# Patient Record
Sex: MALE | Race: WHITE | Employment: OTHER | URBAN - METROPOLITAN AREA
[De-identification: names, ages, dates, MRNs, and addresses within clinical notes are randomized per-mention and may not be internally consistent; named-entity substitution may affect disease eponyms.]

---

## 2018-04-16 ENCOUNTER — HOSPITAL ENCOUNTER (OUTPATIENT)
Dept: CT IMAGING | Age: 53
Discharge: HOME | End: 2018-04-16
Attending: NURSE PRACTITIONER
Payer: MEDICARE

## 2018-04-16 DIAGNOSIS — R91.8 X-RAY OF LUNG, ABNORMAL: ICD-10-CM

## 2018-04-16 LAB — CREAT BLD-MCNC: 0.8 MG/DL (ref 0.6–1.3)

## 2018-04-16 PROCEDURE — 74011636320 HC RX REV CODE- 636/320: Performed by: RADIOLOGY

## 2018-04-16 PROCEDURE — 71260 CT THORAX DX C+: CPT

## 2018-04-16 PROCEDURE — 82565 ASSAY OF CREATININE: CPT

## 2018-04-16 RX ORDER — SODIUM CHLORIDE 0.9 % (FLUSH) 0.9 %
5-10 SYRINGE (ML) INJECTION
Status: COMPLETED | OUTPATIENT
Start: 2018-04-16 | End: 2018-04-16

## 2018-04-16 RX ADMIN — IOPAMIDOL 100 ML: 612 INJECTION, SOLUTION INTRAVENOUS at 15:15

## 2018-04-16 RX ADMIN — Medication 10 ML: at 15:15

## 2018-06-01 ENCOUNTER — HOSPITAL ENCOUNTER (EMERGENCY)
Age: 53
Discharge: HOME | End: 2018-06-01
Attending: EMERGENCY MEDICINE
Payer: MEDICARE

## 2018-06-01 ENCOUNTER — APPOINTMENT (OUTPATIENT)
Dept: CT IMAGING | Age: 53
End: 2018-06-01
Attending: EMERGENCY MEDICINE
Payer: MEDICARE

## 2018-06-01 VITALS
HEIGHT: 69 IN | WEIGHT: 200 LBS | BODY MASS INDEX: 29.62 KG/M2 | TEMPERATURE: 98 F | DIASTOLIC BLOOD PRESSURE: 74 MMHG | OXYGEN SATURATION: 96 % | RESPIRATION RATE: 12 BRPM | HEART RATE: 78 BPM | SYSTOLIC BLOOD PRESSURE: 142 MMHG

## 2018-06-01 DIAGNOSIS — R55 SYNCOPE AND COLLAPSE: Primary | ICD-10-CM

## 2018-06-01 DIAGNOSIS — R73.9 HYPERGLYCEMIA: ICD-10-CM

## 2018-06-01 LAB
ANION GAP SERPL CALC-SCNC: 11 MMOL/L (ref 5–15)
ATRIAL RATE: 84 BPM
BUN SERPL-MCNC: 21 MG/DL (ref 6–20)
BUN/CREAT SERPL: 17 (ref 12–20)
CALCIUM SERPL-MCNC: 8.7 MG/DL (ref 8.5–10.1)
CALCULATED P AXIS, ECG09: 29 DEGREES
CALCULATED R AXIS, ECG10: 30 DEGREES
CALCULATED T AXIS, ECG11: 22 DEGREES
CHLORIDE SERPL-SCNC: 105 MMOL/L (ref 97–108)
CO2 SERPL-SCNC: 23 MMOL/L (ref 21–32)
CREAT SERPL-MCNC: 1.25 MG/DL (ref 0.7–1.3)
DIAGNOSIS, 93000: NORMAL
ERYTHROCYTE [DISTWIDTH] IN BLOOD BY AUTOMATED COUNT: 14.6 % (ref 11.5–14.5)
GLUCOSE BLD STRIP.AUTO-MCNC: 186 MG/DL (ref 65–100)
GLUCOSE SERPL-MCNC: 261 MG/DL (ref 65–100)
HCT VFR BLD AUTO: 34.4 % (ref 36.6–50.3)
HGB BLD-MCNC: 11 G/DL (ref 12.1–17)
MCH RBC QN AUTO: 30 PG (ref 26–34)
MCHC RBC AUTO-ENTMCNC: 32 G/DL (ref 30–36.5)
MCV RBC AUTO: 93.7 FL (ref 80–99)
NRBC # BLD: 0 K/UL (ref 0–0.01)
NRBC BLD-RTO: 0 PER 100 WBC
P-R INTERVAL, ECG05: 118 MS
PLATELET # BLD AUTO: 222 K/UL (ref 150–400)
PMV BLD AUTO: 10.8 FL (ref 8.9–12.9)
POTASSIUM SERPL-SCNC: 4.3 MMOL/L (ref 3.5–5.1)
Q-T INTERVAL, ECG07: 368 MS
QRS DURATION, ECG06: 88 MS
QTC CALCULATION (BEZET), ECG08: 434 MS
RBC # BLD AUTO: 3.67 M/UL (ref 4.1–5.7)
SERVICE CMNT-IMP: ABNORMAL
SODIUM SERPL-SCNC: 139 MMOL/L (ref 136–145)
TROPONIN I SERPL-MCNC: <0.04 NG/ML
VENTRICULAR RATE, ECG03: 84 BPM
WBC # BLD AUTO: 3.7 K/UL (ref 4.1–11.1)

## 2018-06-01 PROCEDURE — 82962 GLUCOSE BLOOD TEST: CPT

## 2018-06-01 PROCEDURE — 96360 HYDRATION IV INFUSION INIT: CPT

## 2018-06-01 PROCEDURE — 84484 ASSAY OF TROPONIN QUANT: CPT | Performed by: EMERGENCY MEDICINE

## 2018-06-01 PROCEDURE — 99285 EMERGENCY DEPT VISIT HI MDM: CPT

## 2018-06-01 PROCEDURE — 74011250636 HC RX REV CODE- 250/636: Performed by: EMERGENCY MEDICINE

## 2018-06-01 PROCEDURE — 70450 CT HEAD/BRAIN W/O DYE: CPT

## 2018-06-01 PROCEDURE — 36415 COLL VENOUS BLD VENIPUNCTURE: CPT | Performed by: EMERGENCY MEDICINE

## 2018-06-01 PROCEDURE — 93005 ELECTROCARDIOGRAM TRACING: CPT

## 2018-06-01 PROCEDURE — 85027 COMPLETE CBC AUTOMATED: CPT | Performed by: EMERGENCY MEDICINE

## 2018-06-01 PROCEDURE — 80048 BASIC METABOLIC PNL TOTAL CA: CPT | Performed by: EMERGENCY MEDICINE

## 2018-06-01 RX ORDER — ATORVASTATIN CALCIUM 10 MG/1
10 TABLET, FILM COATED ORAL
COMMUNITY
End: 2020-04-17 | Stop reason: SDUPTHER

## 2018-06-01 RX ORDER — TERBINAFINE HYDROCHLORIDE 250 MG/1
250 TABLET ORAL DAILY
COMMUNITY
End: 2021-02-17

## 2018-06-01 RX ORDER — ERGOCALCIFEROL 1.25 MG/1
50000 CAPSULE ORAL
COMMUNITY
End: 2021-02-17

## 2018-06-01 RX ORDER — INSULIN GLARGINE 100 [IU]/ML
40 INJECTION, SOLUTION SUBCUTANEOUS DAILY
COMMUNITY
End: 2018-10-11

## 2018-06-01 RX ORDER — METFORMIN HYDROCHLORIDE 850 MG/1
850 TABLET ORAL
COMMUNITY
End: 2020-10-09 | Stop reason: SDUPTHER

## 2018-06-01 RX ORDER — LOSARTAN POTASSIUM 25 MG/1
25 TABLET ORAL DAILY
COMMUNITY
End: 2021-02-17

## 2018-06-01 RX ORDER — SERTRALINE HYDROCHLORIDE 100 MG/1
100 TABLET, FILM COATED ORAL DAILY
COMMUNITY
End: 2020-09-06 | Stop reason: SDUPTHER

## 2018-06-01 RX ORDER — MIRTAZAPINE 15 MG/1
15 TABLET, FILM COATED ORAL
COMMUNITY
End: 2020-09-06 | Stop reason: SDUPTHER

## 2018-06-01 RX ORDER — DIVALPROEX SODIUM 500 MG/1
1500 TABLET, EXTENDED RELEASE ORAL
COMMUNITY
End: 2019-02-28 | Stop reason: DRUGHIGH

## 2018-06-01 RX ORDER — INSULIN ASPART 100 [IU]/ML
INJECTION, SOLUTION INTRAVENOUS; SUBCUTANEOUS
COMMUNITY
End: 2019-03-07 | Stop reason: SDUPTHER

## 2018-06-01 RX ADMIN — SODIUM CHLORIDE 1000 ML: 900 INJECTION, SOLUTION INTRAVENOUS at 12:44

## 2018-08-07 ENCOUNTER — HOSPITAL ENCOUNTER (EMERGENCY)
Age: 53
Discharge: HOME | End: 2018-08-07
Attending: EMERGENCY MEDICINE
Payer: MEDICARE

## 2018-08-07 VITALS
WEIGHT: 174 LBS | RESPIRATION RATE: 19 BRPM | OXYGEN SATURATION: 97 % | SYSTOLIC BLOOD PRESSURE: 157 MMHG | DIASTOLIC BLOOD PRESSURE: 81 MMHG | HEIGHT: 67 IN | HEART RATE: 83 BPM | TEMPERATURE: 97.9 F | BODY MASS INDEX: 27.31 KG/M2

## 2018-08-07 DIAGNOSIS — E87.5 ACUTE HYPERKALEMIA: ICD-10-CM

## 2018-08-07 DIAGNOSIS — R73.9 HYPERGLYCEMIA: Primary | ICD-10-CM

## 2018-08-07 LAB
ALBUMIN SERPL-MCNC: 3.1 G/DL (ref 3.5–5)
ALBUMIN/GLOB SERPL: 0.8 {RATIO} (ref 1.1–2.2)
ALP SERPL-CCNC: 64 U/L (ref 45–117)
ALT SERPL-CCNC: 16 U/L (ref 12–78)
ANION GAP SERPL CALC-SCNC: 10 MMOL/L (ref 5–15)
APPEARANCE UR: CLEAR
AST SERPL-CCNC: 11 U/L (ref 15–37)
BACTERIA URNS QL MICRO: NEGATIVE /HPF
BASOPHILS # BLD: 0 K/UL (ref 0–0.1)
BASOPHILS NFR BLD: 1 % (ref 0–1)
BILIRUB SERPL-MCNC: 0.3 MG/DL (ref 0.2–1)
BILIRUB UR QL: NEGATIVE
BUN SERPL-MCNC: 26 MG/DL (ref 6–20)
BUN/CREAT SERPL: 24 (ref 12–20)
CALCIUM SERPL-MCNC: 8.5 MG/DL (ref 8.5–10.1)
CHLORIDE SERPL-SCNC: 102 MMOL/L (ref 97–108)
CO2 SERPL-SCNC: 26 MMOL/L (ref 21–32)
COLOR UR: ABNORMAL
CREAT SERPL-MCNC: 1.1 MG/DL (ref 0.7–1.3)
DIFFERENTIAL METHOD BLD: ABNORMAL
EOSINOPHIL # BLD: 0.1 K/UL (ref 0–0.4)
EOSINOPHIL NFR BLD: 2 % (ref 0–7)
EPITH CASTS URNS QL MICRO: ABNORMAL /LPF
ERYTHROCYTE [DISTWIDTH] IN BLOOD BY AUTOMATED COUNT: 14.8 % (ref 11.5–14.5)
GLOBULIN SER CALC-MCNC: 3.7 G/DL (ref 2–4)
GLUCOSE BLD STRIP.AUTO-MCNC: 355 MG/DL (ref 65–100)
GLUCOSE BLD STRIP.AUTO-MCNC: 410 MG/DL (ref 65–100)
GLUCOSE SERPL-MCNC: 353 MG/DL (ref 65–100)
GLUCOSE UR STRIP.AUTO-MCNC: >1000 MG/DL
HCT VFR BLD AUTO: 30.4 % (ref 36.6–50.3)
HGB BLD-MCNC: 10.1 G/DL (ref 12.1–17)
HGB UR QL STRIP: NEGATIVE
HYALINE CASTS URNS QL MICRO: ABNORMAL /LPF (ref 0–5)
IMM GRANULOCYTES # BLD: 0 K/UL (ref 0–0.04)
IMM GRANULOCYTES NFR BLD AUTO: 0 % (ref 0–0.5)
KETONES UR QL STRIP.AUTO: 15 MG/DL
LEUKOCYTE ESTERASE UR QL STRIP.AUTO: NEGATIVE
LYMPHOCYTES # BLD: 1.2 K/UL (ref 0.8–3.5)
LYMPHOCYTES NFR BLD: 27 % (ref 12–49)
MCH RBC QN AUTO: 30.7 PG (ref 26–34)
MCHC RBC AUTO-ENTMCNC: 33.2 G/DL (ref 30–36.5)
MCV RBC AUTO: 92.4 FL (ref 80–99)
MONOCYTES # BLD: 0.5 K/UL (ref 0–1)
MONOCYTES NFR BLD: 12 % (ref 5–13)
NEUTS SEG # BLD: 2.5 K/UL (ref 1.8–8)
NEUTS SEG NFR BLD: 59 % (ref 32–75)
NITRITE UR QL STRIP.AUTO: NEGATIVE
NRBC # BLD: 0 K/UL (ref 0–0.01)
NRBC BLD-RTO: 0 PER 100 WBC
PH UR STRIP: 5.5 [PH] (ref 5–8)
PLATELET # BLD AUTO: 293 K/UL (ref 150–400)
PMV BLD AUTO: 10.2 FL (ref 8.9–12.9)
POTASSIUM SERPL-SCNC: 5.3 MMOL/L (ref 3.5–5.1)
POTASSIUM SERPL-SCNC: 5.9 MMOL/L (ref 3.5–5.1)
PROT SERPL-MCNC: 6.8 G/DL (ref 6.4–8.2)
PROT UR STRIP-MCNC: NEGATIVE MG/DL
RBC # BLD AUTO: 3.29 M/UL (ref 4.1–5.7)
RBC #/AREA URNS HPF: ABNORMAL /HPF (ref 0–5)
SERVICE CMNT-IMP: ABNORMAL
SERVICE CMNT-IMP: ABNORMAL
SODIUM SERPL-SCNC: 138 MMOL/L (ref 136–145)
SP GR UR REFRACTOMETRY: 1.02 (ref 1–1.03)
UR CULT HOLD, URHOLD: NORMAL
UROBILINOGEN UR QL STRIP.AUTO: 0.2 EU/DL (ref 0.2–1)
WBC # BLD AUTO: 4.3 K/UL (ref 4.1–11.1)
WBC URNS QL MICRO: ABNORMAL /HPF (ref 0–4)

## 2018-08-07 PROCEDURE — 82962 GLUCOSE BLOOD TEST: CPT

## 2018-08-07 PROCEDURE — 74011636637 HC RX REV CODE- 636/637: Performed by: EMERGENCY MEDICINE

## 2018-08-07 PROCEDURE — 74011250636 HC RX REV CODE- 250/636: Performed by: EMERGENCY MEDICINE

## 2018-08-07 PROCEDURE — 85025 COMPLETE CBC W/AUTO DIFF WBC: CPT | Performed by: EMERGENCY MEDICINE

## 2018-08-07 PROCEDURE — 74011250637 HC RX REV CODE- 250/637: Performed by: EMERGENCY MEDICINE

## 2018-08-07 PROCEDURE — 36415 COLL VENOUS BLD VENIPUNCTURE: CPT | Performed by: EMERGENCY MEDICINE

## 2018-08-07 PROCEDURE — 99285 EMERGENCY DEPT VISIT HI MDM: CPT

## 2018-08-07 PROCEDURE — 96374 THER/PROPH/DIAG INJ IV PUSH: CPT

## 2018-08-07 PROCEDURE — 96361 HYDRATE IV INFUSION ADD-ON: CPT

## 2018-08-07 PROCEDURE — 84132 ASSAY OF SERUM POTASSIUM: CPT | Performed by: EMERGENCY MEDICINE

## 2018-08-07 PROCEDURE — 81001 URINALYSIS AUTO W/SCOPE: CPT | Performed by: EMERGENCY MEDICINE

## 2018-08-07 PROCEDURE — 80053 COMPREHEN METABOLIC PANEL: CPT | Performed by: EMERGENCY MEDICINE

## 2018-08-07 RX ORDER — INSULIN LISPRO 100 [IU]/ML
25 INJECTION, SOLUTION INTRAVENOUS; SUBCUTANEOUS
Status: COMPLETED | OUTPATIENT
Start: 2018-08-07 | End: 2018-08-07

## 2018-08-07 RX ORDER — ACETAMINOPHEN 500 MG
1000 TABLET ORAL ONCE
Status: COMPLETED | OUTPATIENT
Start: 2018-08-07 | End: 2018-08-07

## 2018-08-07 RX ADMIN — HUMAN INSULIN 10 UNITS: 100 INJECTION, SOLUTION SUBCUTANEOUS at 16:41

## 2018-08-07 RX ADMIN — SODIUM CHLORIDE 1000 ML: 900 INJECTION, SOLUTION INTRAVENOUS at 16:42

## 2018-08-07 RX ADMIN — ACETAMINOPHEN 1000 MG: 500 TABLET ORAL at 16:41

## 2018-08-07 RX ADMIN — INSULIN LISPRO 25 UNITS: 100 INJECTION, SOLUTION INTRAVENOUS; SUBCUTANEOUS at 18:26

## 2018-08-07 NOTE — ED NOTES
Patient discharged by MD. Pt/family given the opportunity to ask questions, verbalized understanding of teaching.

## 2018-08-07 NOTE — ED NOTES
Bedside shift change report given to j carlos durán (oncoming nurse) by j carlos torres (offgoing nurse). Report included the following information SBAR, Kardex and ED Summary.

## 2018-08-07 NOTE — ED TRIAGE NOTES
Patient arrives through triage from group home patient assised by care giver. Care giver states patient took incorrect medications this morning 0730, per care giver patient has no symptoms. Patient took adderall, vit D, Ferrous sulphate, omeprazole, xarelto, citalopram, and lamictal per care giver. Patients care giver states this happened because another patients medication was placed in a cup and set down and patient mistook this medication for their own.

## 2018-08-07 NOTE — DISCHARGE INSTRUCTIONS
Please maintain tighter blood sugar control. Learning About High Blood Sugar  What is high blood sugar? Your body turns the food you eat into glucose (sugar), which it uses for energy. But if your body isn't able to use the sugar right away, it can build up in your blood and lead to high blood sugar. When the amount of sugar in your blood stays too high for too much of the time, you may have diabetes. Diabetes is a disease that can cause serious health problems. The good news is that lifestyle changes may help you get your blood sugar back to normal and avoid or delay diabetes. What causes high blood sugar? Sugar (glucose) can build up in your blood if you:  · Are overweight. · Have a family history of diabetes. · Take certain medicines, such as steroids. What are the symptoms? Having high blood sugar may not cause any symptoms at all. Or it may make you feel very thirsty or very hungry. You may also urinate more often than usual, have blurry vision, or lose weight without trying. How is high blood sugar treated? You can take steps to lower your blood sugar level if you understand what makes it get higher. Your doctor may want you to learn how to test your blood sugar level at home. Then you can see how illness, stress, or different kinds of food or medicine raise or lower your blood sugar level. Other tests may be needed to see if you have diabetes. How can you prevent high blood sugar? · Watch your weight. If you're overweight, losing just a small amount of weight may help. Reducing fat around your waist is most important. · Limit the amount of calories, sweets, and unhealthy fat you eat. Ask your doctor if a dietitian can help you. A registered dietitian can help you create meal plans that fit your lifestyle. · Get at least 30 minutes of exercise on most days of the week. Exercise helps control your blood sugar. It also helps you maintain a healthy weight. Walking is a good choice.  You also may want to do other activities, such as running, swimming, cycling, or playing tennis or team sports. · If your doctor prescribed medicines, take them exactly as prescribed. Call your doctor if you think you are having a problem with your medicine. You will get more details on the specific medicines your doctor prescribes. Follow-up care is a key part of your treatment and safety. Be sure to make and go to all appointments, and call your doctor if you are having problems. It's also a good idea to know your test results and keep a list of the medicines you take. Where can you learn more? Go to http://devExtended Stay Americaleonel.info/. Enter O108 in the search box to learn more about \"Learning About High Blood Sugar. \"  Current as of: December 7, 2017  Content Version: 11.7  © 2417-5369 [employer]. Care instructions adapted under license by [employer] (which disclaims liability or warranty for this information). If you have questions about a medical condition or this instruction, always ask your healthcare professional. [place] [address] any warranty or liability for your use of this information. Hyperkalemia: Care Instructions  Your Care Instructions    Hyperkalemia is too much potassium in the blood. Potassium helps keep the right mix of fluids in your body. It also helps your nerves and muscles work as they should. And it keeps your heartbeat in a normal rhythm. Some things can raise potassium levels. These include some health problems, medicines, and kidney problems. (Normally, your kidneys remove extra potassium.)  Too much potassium can cause nausea. It also can cause a heartbeat that isn't normal. But you may not have any symptoms. Too much potassium can be dangerous. That's why it's important to treat it. If you are taking any of the medicines that can raise your levels, your doctor will ask you to stop. You may get medicines to lower your levels.  And you may have to limit or not eat foods that have a lot of potassium. Follow-up care is a key part of your treatment and safety. Be sure to make and go to all appointments, and call your doctor if you are having problems. It's also a good idea to know your test results and keep a list of the medicines you take. How can you care for yourself at home? · Take your medicines exactly as prescribed. Call your doctor if you think you are having a problem with your medicine. · Stop taking certain medicines if your doctor asks you to. They may be causing your high potassium levels. If you have concerns about stopping medicine, talk with your doctor. · If you have kidney, heart, or liver disease and have to limit fluids, talk with your doctor before you increase the amount of fluids you drink. If the doctor says it's okay, drink plenty of fluids. This means drinking enough so that your urine is light yellow or clear like water. · Avoid strenuous exercise until your doctor tells you it is okay. · Potassium is in many foods, including vegetables, fruits, and milk products. Foods high in potassium include bananas, cantaloupe, broccoli, milk, potatoes, and tomatoes. · Low potassium foods include blueberries, raspberries, cucumber, white or brown rice, spaghetti, and macaroni. · Do not use a salt substitute without talking to your doctor first. Most of these are very high in potassium. · Be sure to tell your doctor about any prescription, over-the-counter, or herbal medicines you take. Some of these can raise potassium. When should you call for help? Call 911 anytime you think you may need emergency care. For example, call if:    · You passed out (lost consciousness).     · You have an unusual heartbeat.  Your heart may beat fast or skip beats.    Call your doctor now or seek immediate medical care if:    · You have muscle aches.     · You feel very weak.    Watch closely for changes in your health, and be sure to contact your doctor if:    · You do not get better as expected. Where can you learn more? Go to http://dev-leonel.info/. Enter F312 in the search box to learn more about \"Hyperkalemia: Care Instructions. \"  Current as of: May 12, 2017  Content Version: 11.7  © 1642-1422 [employer]. Care instructions adapted under license by [employer] (which disclaims liability or warranty for this information). If you have questions about a medical condition or this instruction, always ask your healthcare professional. [address] any warranty or liability for your use of this information.

## 2018-08-07 NOTE — ED PROVIDER NOTES
HPI Comments: 45 y.o. male with past medical history significant for neurocognitive disorder, DM, anxiety, and OCD who presents to the ED from group home with chief complaint of accidental medication ingestion. Pt's caregiver reports pt was at his adult home this morning when he accidentally took another resident's medications at about 0730. Per caregiver, the medications the pt took were adderall, vitamin D, ferrous sulfate, omeprazole, xarelto, citalopram, and lamictal. Per caregiver, pt has not taken any of his own medications today including insulin and metformin. Pt states he feels \"more tired\" than usual. Per pt's caregiver, pt is currently at his baseline. There are no other acute medical complaints voiced at this time. Social Hx: Former smoker. Denies EtOH use. Resident in adult home. PCP: Tylor Cheng NP    Note written by Last Milan, as dictated by Jacqui Hayes MD 2:35 PM     The history is provided by the patient and a caregiver. Past Medical History:   Diagnosis Date    Diabetes [facility])     Neurological disorder     neuro cognative disorder    Psychiatric disorder     OCD    Psychiatric disorder     anxiety       History reviewed. No pertinent surgical history. History reviewed. No pertinent family history. Social History     Social History    Marital status: SINGLE     Spouse name: N/A    Number of children: N/A    Years of education: N/A     Occupational History    Not on file. Social History Main Topics    Smoking status: Former Smoker    Smokeless tobacco: Never Used    Alcohol use No    Drug use: Not on file    Sexual activity: Not on file     Other Topics Concern    Not on file     Social History Narrative    No narrative on file         ALLERGIES: Penicillins    Review of Systems   Constitutional: Negative for activity change, chills and fever. HENT: Negative for nosebleeds, sore throat, trouble swallowing and voice change.     Eyes: Negative for visual disturbance. Respiratory: Negative for shortness of breath. Cardiovascular: Negative for chest pain and palpitations. Gastrointestinal: Negative for abdominal pain, constipation, diarrhea and nausea. Genitourinary: Negative for difficulty urinating, dysuria, hematuria and urgency. Musculoskeletal: Negative for back pain, neck pain and neck stiffness. Skin: Negative for color change. Allergic/Immunologic: Negative for immunocompromised state. Neurological: Negative for dizziness, seizures, syncope, weakness, light-headedness, numbness and headaches. Psychiatric/Behavioral: Negative for behavioral problems, confusion, hallucinations, self-injury and suicidal ideas. All other systems reviewed and are negative. Vitals:    08/07/18 1357   BP: 130/71   Pulse: (!) 104   Resp: 19   Temp: 97.9 °F (36.6 °C)   SpO2: 95%   Weight: 78.9 kg (174 lb)   Height: 5' 7\" (1.702 m)            Physical Exam   Constitutional: He is oriented to person, place, and time. He appears well-developed and well-nourished. No distress. HENT:   Head: Normocephalic and atraumatic. Eyes: Pupils are equal, round, and reactive to light. Neck: Normal range of motion. Neck supple. Cardiovascular: Normal rate, regular rhythm and normal heart sounds. Exam reveals no gallop and no friction rub. No murmur heard. Pulmonary/Chest: Effort normal and breath sounds normal. No respiratory distress. He has no wheezes. Abdominal: Soft. Bowel sounds are normal. He exhibits no distension. There is no tenderness. There is no rebound and no guarding. Musculoskeletal: Normal range of motion. Neurological: He is alert and oriented to person, place, and time. Skin: Skin is warm. No rash noted. He is not diaphoretic. Psychiatric: He has a normal mood and affect. His behavior is normal. Judgment and thought content normal.   Nursing note and vitals reviewed.      Note written by Last Khoury, as dictated by Liseth Puentes MD 2:36 PM    [facility]      ED Course     This is a 64-year-old male with past medical history, review of systems, physical exam as above, presenting with complaints of inadvertent ingestion of his housemates medications, including Adderall, vitamin D, iron sulfate, omeprazole, xarelto, citalopram, Lamictal. Patient endorses fatigue, unremarkable physical exam. He is not had his metformin for insulin today. Single doses of these medications, are unlikely to cause him acute distress, or complication. More concerning is his lack of insulin for history of diabetes this morning. Physical exam is reassuring. Plan to obtain CMP, CBC, UA. We will make a disposition based on the patient's diagnostics and response to therapy. Procedures    3:17 PM  CMP with hyperkalemia, no hx of, will recheck     6:17 PM  Patient w/o significant improvement in glucose, brother at bedside requesting DC to manage BG at home. Discussed elevated K+ as well, likely will correct with insulin as well. Will administer home Humalog and DC home with strict BG monitoring and return precautions, PCP follow up.

## 2018-08-07 NOTE — ED NOTES
Pt. States he is feeling \"sleepy\" otherwise denies any other symptoms. Caregiver at pt's side. Pt. Alert and oriented and sitting upright in chair.

## 2018-10-11 ENCOUNTER — OFFICE VISIT (OUTPATIENT)
Dept: FAMILY MEDICINE CLINIC | Age: 53
End: 2018-10-11

## 2018-10-11 VITALS
OXYGEN SATURATION: 99 % | SYSTOLIC BLOOD PRESSURE: 115 MMHG | HEIGHT: 67 IN | BODY MASS INDEX: 26.84 KG/M2 | WEIGHT: 171 LBS | HEART RATE: 87 BPM | RESPIRATION RATE: 16 BRPM | DIASTOLIC BLOOD PRESSURE: 71 MMHG | TEMPERATURE: 98.1 F

## 2018-10-11 DIAGNOSIS — Z23 ENCOUNTER FOR IMMUNIZATION: ICD-10-CM

## 2018-10-11 DIAGNOSIS — I78.0 HEREDITARY HEMORRHAGIC TELANGIECTASIA (HCC): Primary | ICD-10-CM

## 2018-10-11 DIAGNOSIS — G40.909 SEIZURE DISORDER (HCC): ICD-10-CM

## 2018-10-11 DIAGNOSIS — Q25.79 PULMONARY ARTERY ABNORMALITY: ICD-10-CM

## 2018-10-11 NOTE — PROGRESS NOTES
1. Have you been to the ER, urgent care clinic since your last visit? Hospitalized since your last visit? No    2. Have you seen or consulted any other health care providers outside of the [address] [place] [address] since your last visit? Include any pap smears or colon screening.  No     Chief Complaint   Patient presents with    Complete Physical    Labs     Non Fasting

## 2018-10-11 NOTE — PROGRESS NOTES
Chief Complaint   Patient presents with    Complete Physical    Labs     Non Fasting     he is a 52y.o. year old male who presents for evalution. Pt here to establish care. Needs new specialists, recently moved from [facility]. Need help establishing care with Pulm and Neuro, requesting referrals. 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 Pt wonders if this makes him feel tired all the time. Unsure who is managing his diabetes, if was seeing Endo previously. Not fasting for labs today and caregiver does not have log of blood sugars. No hypoglycemic episodes. Reviewed PmHx, RxHx, FmHx, SocHx, AllgHx and updated and dated in the chart. Review of Systems - negative except as listed above in the HPI    Objective:     Vitals:    10/11/18 0819   BP: 115/71   Pulse: 87   Resp: 16   Temp: 98.1 °F (36.7 °C)   TempSrc: Oral   SpO2: 99%   Weight: 171 lb (77.6 kg)   Height: 5' 7\" (1.702 m)     Physical Examination: General appearance - alert, well appearing, and in no distress  Chest - clear to auscultation, no wheezes, rales or rhonchi, symmetric air entry  Heart - normal rate, regular rhythm, normal S1, S2, no murmurs, rubs, clicks or gallops    Assessment/ Plan:   Diagnoses and all orders for this visit:    1. Hereditary hemorrhagic telangiectasia ([facility] [address]  -     REFERRAL TO PULMONARY DISEASE    2. Seizure disorder ([facility] [address]  -     [facility] ref [facility]    3. Pulmonary artery abnormality  -     REFERRAL TO PULMONARY DISEASE    4. Encounter for immunization  -     Influenza virus vaccine (QUADRIVALENT PRES FREE SYRINGE) IM ([id number])  -     [place] IMMUNIZ ADMIN,1 SINGLE/COMB VAC/TOXOID       Pt voiced understanding regarding plan of care. Follow-up Disposition:  Return in about 6 days (around 10/17/2018) for CPE, labs, PPD. I have discussed the diagnosis with the patient and the intended plan as seen in the above orders.   The patient has received an after-visit summary and questions were answered concerning future plans.      Medication Side Effects and Warnings were discussed with patient    Jason Modi NP

## 2018-10-11 NOTE — MR AVS SNAPSHOT
[address] [id number] [phone] Patient: Marielos Nascimento. MRN: [id number] :1965 Visit Information Date & Time Provider Department Dept. Phone Encounter #  
 10/11/2018  8:00 AM Jorge Hale NP [address] [facility] [phone] [id number] Follow-up Instructions Return in about 6 days (around 10/17/2018). Upcoming Health Maintenance Date Due Hepatitis C Screening 1965 DTaP/Tdap/Td series (1 - Tdap) 2007 Shingrix Vaccine Age 50> (1 of 2) 10/3/2013 FOBT Q 1 YEAR AGE 50-75 10/3/2013 Influenza Age 5 to Adult 2018 MEDICARE YEARLY EXAM 10/5/2018 Allergies as of 10/11/2018  Review Complete On: 10/11/2018 By: James Randolph LPN Severity Noted Reaction Type Reactions Penicillins  2018    Rash Current Immunizations  Never Reviewed Name Date Influenza Vaccine (Quad) PF  Incomplete, 2017 12:00 AM, 2016 12:00 AM, 2015 12:00 AM  
 Pneumococcal Polysaccharide (PPSV-23) 10/31/2007 12:00 AM  
 Td 10/31/2007 12:00 AM, 1998 12:00 AM  
  
 Not reviewed this visit You Were Diagnosed With   
  
 Codes Comments Hereditary hemorrhagic telangiectasia (HCC)    -  Primary ICD-10-CM: I78.0 ICD-9-CM: 448.0 [facility]     ICD-10-CM: G40.909 ICD-9-CM: 345.90 Pulmonary artery abnormality     ICD-10-CM: Q25.79 ICD-9-CM: 747.39 Encounter for immunization     ICD-10-CM: J09 ICD-9-CM: V03.89 Vitals BP Pulse Temp Resp Height(growth percentile) Weight(growth percentile) 115/71 87 98.1 °F (36.7 °C) (Oral) 16 5' 7\" (1.702 m) 171 lb (77.6 kg) SpO2 BMI Smoking Status 99% 26.78 kg/m2 Former Smoker BMI and BSA Data Body Mass Index Body Surface Area  
 26.78 kg/m 2 1.92 m 2 Preferred Pharmacy Pharmacy Name Phone [address] [place] [address] [phone] Your Updated Medication List  
  
   
This list is accurate as of 10/11/18  8:41 AM.  Always use your most recent med list.  
  
  
  
  
 atorvastatin 10 mg tablet Commonly known as:  LIPITOR Take 10 mg by mouth nightly. divalproex  mg ER tablet Commonly known as:  DEPAKOTE ER Take 1,500 mg by mouth nightly. LEVEMIR U-100 INSULIN 100 unit/mL injection Generic drug:  insulin detemir U-100  
55 Units by SubCUTAneous route nightly. losartan 25 mg tablet Commonly known as:  COZAAR Take 25 mg by mouth daily. metFORMIN 850 mg tablet Commonly known as:  GLUCOPHAGE Take 850 mg by mouth three (3) times daily (with meals). mirtazapine 15 mg tablet Commonly known as:  Gregoria Lav Take 15 mg by mouth nightly. NovoLOG U-100 Insulin aspart 100 unit/mL injection Generic drug:  insulin aspart U-100  
by SubCUTAneous route Before breakfast, lunch, and dinner. Sliding scale at meals  
  
 sertraline 100 mg tablet Commonly known as:  ZOLOFT Take 100 mg by mouth daily. terbinafine HCl 250 mg tablet Commonly known as:  LAMISIL Take 250 mg by mouth daily. VITAMIN D2 50,000 unit capsule Generic drug:  ergocalciferol Take 50,000 Units by mouth every seven (7) days. We Performed the Following INFLUENZA VIRUS VAC QUAD,SPLIT,PRESV FREE SYRINGE IM [id number] CPT(R)] [place] IMMUNIZ ADMIN,1 SINGLE/COMB VAC/TOXOID [id number] CPT(R)] REFERRAL TO NEUROLOGY [QRL43 Custom] REFERRAL TO PULMONARY DISEASE [RNS43 Custom] Follow-up Instructions Return in about 6 days (around 10/17/2018). Referral Information Referral ID Referred By Referred To  
  
 [id number] Tiffani NOLAN MD [address] [facility], [address] [facility] Phone: [phone] Fax: [phone] Visits Status Start Date End Date 1 New Request 10/11/18 10/11/19 If your referral has a status of pending review or denied, additional information will be sent to support the outcome of this decision. Referral ID Referred By Referred To  
 [id number] [facility] [address] Visits Status Start Date End Date 1 New Request 10/11/18 10/11/19 If your referral has a status of pending review or denied, additional information will be sent to support the outcome of this decision. Introducing [facility]! [employer] introduces TheShelf patient portal. Now you can access parts of your medical record, email your doctor's office, and request medication refills online. 1. In your internet browser, go to [email or web address] 2. Click on the First Time User? Click Here link in the Sign In box. You will see the New Member Sign Up page. 3. Enter your TheShelf Access Code exactly as it appears below. You will not need to use this code after youve completed the sign-up process. If you do not sign up before the expiration date, you must request a new code. · TheShelf Access Code: [email or web address] Expires: 11/5/2018  1:48 PM 
 
4. Enter the last four digits of your Social Security Number (xxxx) and Date of Birth (mm/dd/yyyy) as indicated and click Submit. You will be taken to the next sign-up page. 5. Create a Adiosot ID. This will be your TheShelf login ID and cannot be changed, so think of one that is secure and easy to remember. 6. Create a TheShelf password. You can change your password at any time. 7. Enter your Password Reset Question and Answer. This can be used at a later time if you forget your password. 8. Enter your e-mail address. You will receive e-mail notification when new information is available in [address]. 9. Click Sign Up. You can now view and download portions of your medical record. 10. Click the Download Summary menu link to download a portable copy of your medical information. If you have questions, please visit the Frequently Asked Questions section of the Anaphore website. Remember, Anaphore is NOT to be used for urgent needs. For medical emergencies, dial 911. Now available from your iPhone and Android! Please provide this summary of care documentation to your next provider. Your primary care clinician is listed as Mehran Lagunas. If you have any questions after today's visit, please call [phone].

## 2018-10-17 ENCOUNTER — HOSPITAL ENCOUNTER (OUTPATIENT)
Dept: LAB | Age: 53
Discharge: HOME | End: 2018-10-17
Payer: MEDICARE

## 2018-10-17 ENCOUNTER — OFFICE VISIT (OUTPATIENT)
Dept: FAMILY MEDICINE CLINIC | Age: 53
End: 2018-10-17

## 2018-10-17 VITALS
WEIGHT: 167 LBS | SYSTOLIC BLOOD PRESSURE: 133 MMHG | BODY MASS INDEX: 26.21 KG/M2 | DIASTOLIC BLOOD PRESSURE: 82 MMHG | TEMPERATURE: 98 F | RESPIRATION RATE: 16 BRPM | HEART RATE: 106 BPM | OXYGEN SATURATION: 99 % | HEIGHT: 67 IN

## 2018-10-17 DIAGNOSIS — E78.5 HYPERLIPIDEMIA, UNSPECIFIED HYPERLIPIDEMIA TYPE: ICD-10-CM

## 2018-10-17 DIAGNOSIS — Z12.11 SCREENING FOR COLON CANCER: ICD-10-CM

## 2018-10-17 DIAGNOSIS — Z12.5 SCREENING FOR PROSTATE CANCER: ICD-10-CM

## 2018-10-17 DIAGNOSIS — Z00.00 ROUTINE GENERAL MEDICAL EXAMINATION AT A HEALTH CARE FACILITY: Primary | ICD-10-CM

## 2018-10-17 DIAGNOSIS — Z11.59 SCREENING FOR VIRAL DISEASE: ICD-10-CM

## 2018-10-17 DIAGNOSIS — E11.9 CONTROLLED TYPE 2 DIABETES MELLITUS WITHOUT COMPLICATION, WITH LONG-TERM CURRENT USE OF INSULIN (HCC): ICD-10-CM

## 2018-10-17 DIAGNOSIS — Z11.1 SCREENING FOR TUBERCULOSIS: ICD-10-CM

## 2018-10-17 DIAGNOSIS — Z79.4 CONTROLLED TYPE 2 DIABETES MELLITUS WITHOUT COMPLICATION, WITH LONG-TERM CURRENT USE OF INSULIN (HCC): ICD-10-CM

## 2018-10-17 DIAGNOSIS — E55.9 VITAMIN D DEFICIENCY: ICD-10-CM

## 2018-10-17 PROCEDURE — 84443 ASSAY THYROID STIM HORMONE: CPT

## 2018-10-17 PROCEDURE — 83036 HEMOGLOBIN GLYCOSYLATED A1C: CPT

## 2018-10-17 PROCEDURE — 86803 HEPATITIS C AB TEST: CPT

## 2018-10-17 PROCEDURE — 80053 COMPREHEN METABOLIC PANEL: CPT

## 2018-10-17 PROCEDURE — 84153 ASSAY OF PSA TOTAL: CPT

## 2018-10-17 PROCEDURE — 80061 LIPID PANEL: CPT

## 2018-10-17 PROCEDURE — 82043 UR ALBUMIN QUANTITATIVE: CPT

## 2018-10-17 PROCEDURE — 82306 VITAMIN D 25 HYDROXY: CPT

## 2018-10-17 NOTE — PROGRESS NOTES
This is the Subsequent Medicare Annual Wellness Exam, performed 12 months or more after the Initial AWV or the last Subsequent AWV    I have reviewed the patient's medical history in detail and updated the computerized patient record. History     Past Medical History:   Diagnosis Date    Diabetes ([facility]     Neurological disorder     neuro cognative disorder    Psychiatric disorder     OCD    Psychiatric disorder     anxiety      History reviewed. No pertinent surgical history. Current Outpatient Medications   Medication Sig Dispense Refill    insulin detemir U-100 (LEVEMIR U-100 INSULIN) 100 unit/mL injection 55 Units by SubCUTAneous route nightly.  divalproex ER (DEPAKOTE ER) 500 mg ER tablet Take 1,500 mg by mouth nightly.  terbinafine HCl (LAMISIL) 250 mg tablet Take 250 mg by mouth daily.  atorvastatin (LIPITOR) 10 mg tablet Take 10 mg by mouth nightly.  metFORMIN (GLUCOPHAGE) 850 mg tablet Take 850 mg by mouth three (3) times daily (with meals).  sertraline (ZOLOFT) 100 mg tablet Take 100 mg by mouth daily.  losartan (COZAAR) 25 mg tablet Take 25 mg by mouth daily.  mirtazapine (REMERON) 15 mg tablet Take 15 mg by mouth nightly.  ergocalciferol (VITAMIN D2) 50,000 unit capsule Take 50,000 Units by mouth every seven (7) days.  insulin aspart U-100 (NOVOLOG U-100 INSULIN ASPART) 100 unit/mL injection by SubCUTAneous route Before breakfast, lunch, and dinner. Sliding scale at meals       Allergies   Allergen Reactions    Penicillins Rash     History reviewed. No pertinent family history.   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 Functional Ability and Level of Safety:   Hearing Loss  Hearing is good. Whisper Test done with normal results. Activities of Daily Living  The home contains: handrails and grab bars  Patient needs help with:  transportation, shopping, preparing meals, housework, managing medications and managing money    Fall Risk  No flowsheet data found. Abuse Screen  Patient is not abused    Cognitive Screening   Evaluation of Cognitive Function:  Has your family/caregiver stated any concerns about your memory: no  Abnormal but unchanged from prior    Fasting today for labs. Takes medications daily. No episodes of hypoglycemia. Did not bring log book of sugars. No pain in hands and feet. No chest pain, SOB, dizziness, or vision changes. Tries to watch diet, no real exercise.      Physical Examination: General appearance - alert, well appearing, and in no distress  Chest - clear to auscultation, no wheezes, rales or rhonchi, symmetric air entry  Heart - normal rate, regular rhythm, normal S1, S2, no murmurs, rubs, clicks or gallops  Neurological - abnormal neurological exam unchanged from prior examinations, hemiparesis on right  Extremities - peripheral pulses normal, no pedal edema, no clubbing or cyanosis    Patient Care Team   Patient Care Team:  Pee Spears NP as PCP - General (Nurse Practitioner)    Assessment/Plan   Education and counseling provided:  Are appropriate based on today's review and evaluation  End-of-Life planning (with patient's consent)  Pneumococcal Vaccine  Influenza Vaccine  Prostate cancer screening tests (PSA, covered annually)  Colorectal cancer screening tests  Cardiovascular screening blood test  Medical nutrition therapy for individuals with diabetes or renal disease    Diagnoses and all orders for this visit:    Routine general medical examination at a health care facility    Controlled type 2 diabetes mellitus without complication, with long-term current use of insulin ([facility]  -     METABOLIC PANEL, COMPREHENSIVE  -     LIPID PANEL  -     HEMOGLOBIN A1C WITH EAG  -     MICROALBUMIN, UR, RAND W/ MICROALB/CREAT RATIO  -     TSH 3RD GENERATION    Hyperlipidemia, unspecified hyperlipidemia type  -     METABOLIC PANEL, COMPREHENSIVE  -     LIPID PANEL  -     TSH 3RD GENERATION    Vitamin D deficiency  -     VITAMIN D, 25 HYDROXY    Screening for tuberculosis  -     AMB POC TUBERCULOSIS, INTRADERMAL (SKIN TEST)    Screening for viral disease  -     HEPATITIS C AB    Screening for colon cancer  -     OCCULT BLOOD IMMUNOASSAY,DIAGNOSTIC    Screening for prostate cancer  -     PSA SCREENING (SCREENING)    Will decide on F/U after reviewing labs.      Health Maintenance Due   Topic Date Due    Hepatitis C Screening  1965    HEMOGLOBIN A1C Q6M  1965    LIPID PANEL Q1  1965    FOOT EXAM Q1  10/03/1973    MICROALBUMIN Q1  10/03/1973    EYE EXAM RETINAL OR DILATED Q1  10/03/1973    DTaP/Tdap/Td series (1 - Tdap) 11/01/2007    Shingrix Vaccine Age 50> (1 of 2) 10/03/2013    FOBT Q 1 YEAR AGE 50-75  10/03/2013    MEDICARE YEARLY EXAM  10/05/2018     Ayah Moore NP

## 2018-10-17 NOTE — PATIENT INSTRUCTIONS

## 2018-10-17 NOTE — PROGRESS NOTES
1. Have you been to the ER, urgent care clinic since your last visit? Hospitalized since your last visit? No    2. Have you seen or consulted any other health care providers outside of the [address] [place] [address] since your last visit? Include any pap smears or colon screening.  No   Chief Complaint   Patient presents with    Complete Physical    Labs     Fasting    Injection     PPD

## 2018-10-18 LAB
25(OH)D3+25(OH)D2 SERPL-MCNC: 94.5 NG/ML (ref 30–100)
ALBUMIN SERPL-MCNC: 4.4 G/DL (ref 3.5–5.5)
ALBUMIN/CREAT UR: <3.8 MG/G CREAT (ref 0–30)
ALBUMIN/GLOB SERPL: 1.5 {RATIO} (ref 1.2–2.2)
ALP SERPL-CCNC: 63 IU/L (ref 39–117)
ALT SERPL-CCNC: 13 IU/L (ref 0–44)
AST SERPL-CCNC: 16 IU/L (ref 0–40)
BILIRUB SERPL-MCNC: 0.2 MG/DL (ref 0–1.2)
BUN SERPL-MCNC: 18 MG/DL (ref 6–24)
BUN/CREAT SERPL: 20 (ref 9–20)
CALCIUM SERPL-MCNC: 9.3 MG/DL (ref 8.7–10.2)
CHLORIDE SERPL-SCNC: 98 MMOL/L (ref 96–106)
CHOLEST SERPL-MCNC: 145 MG/DL (ref 100–199)
CO2 SERPL-SCNC: 24 MMOL/L (ref 20–29)
CREAT SERPL-MCNC: 0.89 MG/DL (ref 0.76–1.27)
CREAT UR-MCNC: 79.5 MG/DL
EST. AVERAGE GLUCOSE BLD GHB EST-MCNC: 192 MG/DL
GLOBULIN SER CALC-MCNC: 3 G/DL (ref 1.5–4.5)
GLUCOSE SERPL-MCNC: 104 MG/DL (ref 65–99)
HBA1C MFR BLD: 8.3 % (ref 4.8–5.6)
HCV AB S/CO SERPL IA: <0.1 S/CO RATIO (ref 0–0.9)
HDLC SERPL-MCNC: 48 MG/DL
INTERPRETATION, 910389: NORMAL
LDLC SERPL CALC-MCNC: 84 MG/DL (ref 0–99)
Lab: NORMAL
MICROALBUMIN UR-MCNC: <3 UG/ML
POTASSIUM SERPL-SCNC: 5 MMOL/L (ref 3.5–5.2)
PROT SERPL-MCNC: 7.4 G/DL (ref 6–8.5)
PSA SERPL-MCNC: 1.7 NG/ML (ref 0–4)
SODIUM SERPL-SCNC: 141 MMOL/L (ref 134–144)
TRIGL SERPL-MCNC: 66 MG/DL (ref 0–149)
TSH SERPL DL<=0.005 MIU/L-ACNC: 1.88 UIU/ML (ref 0.45–4.5)
VLDLC SERPL CALC-MCNC: 13 MG/DL (ref 5–40)

## 2018-10-19 LAB
MM INDURATION POC: 0 MM (ref 0–5)
PPD POC: NEGATIVE NEGATIVE

## 2018-10-19 NOTE — PROGRESS NOTES
Please inform caregivers labs show:   1. Cholesterol well controlled  2. Diabetes is currently uncontrolled, I would like them to F/U with list of blood sugars to discuss additional treatment   3.   Everything else is normal.   Thanks,  N

## 2018-10-19 NOTE — PROGRESS NOTES
Pt's caregiver Midi in office with pt for TB reading and  informed of lab results and providers recommendations, all questions answered. Midi expressed understanding. No further questions or comments voiced.  Labs results provided

## 2018-10-19 NOTE — PROGRESS NOTES
Caregiver also seen by TONY Law to inform of significant loss of DM control. She has been advised to stop at  to make an appt for a f/u.

## 2018-11-07 ENCOUNTER — TELEPHONE (OUTPATIENT)
Dept: FAMILY MEDICINE CLINIC | Age: 53
End: 2018-11-07

## 2018-11-07 NOTE — TELEPHONE ENCOUNTER
Spoke with Dr. Kalen Jc, pt had vomiting and was complaining about headache earlier today. Symptoms had resolved by time pt was in ER. Labs showed Hgb of 8.4, caregiver report frequent nose bleeds. Last Hgb in our file is from early August and was at 10.1. Did hemoccult test which was positive, recommended for admission. Wants to make me aware. Discussed new pt for us, had no prior records on him at this time. Discussed need to GI F/U if unable to have scope done in hospital due to high likelihood of AV malformations.

## 2018-11-07 NOTE — TELEPHONE ENCOUNTER
calling from [facility] ED requesting a call back from provider to discuss pts extensive medical history. He did request pts H&H results from last lab work. I advised him of results from 8/7/18, but he would still like a call back from provider.  His number is [phone]

## 2018-11-14 ENCOUNTER — OFFICE VISIT (OUTPATIENT)
Dept: FAMILY MEDICINE CLINIC | Age: 53
End: 2018-11-14

## 2018-11-14 ENCOUNTER — HOSPITAL ENCOUNTER (OUTPATIENT)
Dept: LAB | Age: 53
Discharge: HOME | End: 2018-11-14
Payer: MEDICARE

## 2018-11-14 VITALS
WEIGHT: 177 LBS | HEART RATE: 101 BPM | OXYGEN SATURATION: 99 % | BODY MASS INDEX: 27.78 KG/M2 | HEIGHT: 67 IN | SYSTOLIC BLOOD PRESSURE: 121 MMHG | TEMPERATURE: 98.1 F | DIASTOLIC BLOOD PRESSURE: 78 MMHG | RESPIRATION RATE: 16 BRPM

## 2018-11-14 DIAGNOSIS — D64.9 CHRONIC ANEMIA: Primary | ICD-10-CM

## 2018-11-14 DIAGNOSIS — K62.5 RECTAL BLEEDING: ICD-10-CM

## 2018-11-14 DIAGNOSIS — E87.5 HYPERKALEMIA: ICD-10-CM

## 2018-11-14 PROCEDURE — 85025 COMPLETE CBC W/AUTO DIFF WBC: CPT

## 2018-11-14 PROCEDURE — 80053 COMPREHEN METABOLIC PANEL: CPT

## 2018-11-14 NOTE — PROGRESS NOTES
1. Have you been to the ER, urgent care clinic since your last visit? Hospitalized since your last visit? Yes, Renoma Husam, 11/8/18    2. Have you seen or consulted any other health care providers outside of the [address] [place] [address] since your last visit? Include any pap smears or colon screening.  No   Chief Complaint   Patient presents with   [facility] Follow Up     Juanpablo Weiner, 11/8/18

## 2018-11-14 NOTE — PROGRESS NOTES
Chief Complaint   Patient presents with   [facility] Follow Up     Amandeep Pittman, 11/8/18     he is a 52y.o. year old male who presents for evalution. Pt here for hospital F/U. Review of records shows. Had heme positive stool and low Hgb. Also elevated potassium, uncontrolled diabetes. Here today for recheck. Pt and caregiver unsure if was scoped in hospital.      Pt states does have occasional rectal bleeding with bowel movements. Pt and caregiver not interested in discussing diabetes today and do not have log of blood sugars. Would like to discuss at different appointment. Reviewed PmHx, RxHx, FmHx, SocHx, AllgHx and updated and dated in the chart. Review of Systems - negative except as listed above in the HPI    Objective:     Vitals:    11/14/18 0821   BP: 121/78   Pulse: (!) 101   Resp: 16   Temp: 98.1 °F (36.7 °C)   TempSrc: Oral   SpO2: 99%   Weight: 177 lb (80.3 kg)   Height: 5' 7\" (1.702 m)     Physical Examination: General appearance - alert, well appearing, and in no distress  Chest - clear to auscultation, no wheezes, rales or rhonchi, symmetric air entry  Heart - normal rate, regular rhythm, normal S1, S2, no murmurs, rubs, clicks or gallops  Abdomen - soft, nontender, nondistended, no masses or organomegaly    Assessment/ Plan:   Diagnoses and all orders for this visit:    1. Chronic anemia  -     CBC WITH AUTOMATED DIFF  Will decide on F/U after reviewing labs. 2. Hyperkalemia  -     METABOLIC PANEL, COMPREHENSIVE  Will decide on F/U after reviewing labs. 3. Rectal bleeding  Use wet wipes instead of toilet paper after bowel movements. Pt voiced understanding regarding plan of care. Follow-up Disposition:  Return if symptoms worsen or fail to improve. I have discussed the diagnosis with the patient and the intended plan as seen in the above orders. The patient has received an after-visit summary and questions were answered concerning future plans. Medication Side Effects and Warnings were discussed with patient    All Schrader NP

## 2018-11-15 LAB
ALBUMIN SERPL-MCNC: 4.2 G/DL (ref 3.5–5.5)
ALBUMIN/GLOB SERPL: 1.5 {RATIO} (ref 1.2–2.2)
ALP SERPL-CCNC: 53 IU/L (ref 39–117)
ALT SERPL-CCNC: 11 IU/L (ref 0–44)
AST SERPL-CCNC: 13 IU/L (ref 0–40)
BASOPHILS # BLD AUTO: 0 X10E3/UL (ref 0–0.2)
BASOPHILS NFR BLD AUTO: 1 %
BILIRUB SERPL-MCNC: 0.2 MG/DL (ref 0–1.2)
BUN SERPL-MCNC: 25 MG/DL (ref 6–24)
BUN/CREAT SERPL: 26 (ref 9–20)
CALCIUM SERPL-MCNC: 9.2 MG/DL (ref 8.7–10.2)
CHLORIDE SERPL-SCNC: 98 MMOL/L (ref 96–106)
CO2 SERPL-SCNC: 23 MMOL/L (ref 20–29)
CREAT SERPL-MCNC: 0.97 MG/DL (ref 0.76–1.27)
EOSINOPHIL # BLD AUTO: 0.2 X10E3/UL (ref 0–0.4)
EOSINOPHIL NFR BLD AUTO: 5 %
ERYTHROCYTE [DISTWIDTH] IN BLOOD BY AUTOMATED COUNT: 17.4 % (ref 12.3–15.4)
GLOBULIN SER CALC-MCNC: 2.8 G/DL (ref 1.5–4.5)
GLUCOSE SERPL-MCNC: 334 MG/DL (ref 65–99)
HCT VFR BLD AUTO: 29.7 % (ref 37.5–51)
HGB BLD-MCNC: 9.1 G/DL (ref 13–17.7)
IMM GRANULOCYTES # BLD: 0 X10E3/UL (ref 0–0.1)
IMM GRANULOCYTES NFR BLD: 0 %
LYMPHOCYTES # BLD AUTO: 1.1 X10E3/UL (ref 0.7–3.1)
LYMPHOCYTES NFR BLD AUTO: 29 %
MCH RBC QN AUTO: 24.9 PG (ref 26.6–33)
MCHC RBC AUTO-ENTMCNC: 30.6 G/DL (ref 31.5–35.7)
MCV RBC AUTO: 81 FL (ref 79–97)
MONOCYTES # BLD AUTO: 0.5 X10E3/UL (ref 0.1–0.9)
MONOCYTES NFR BLD AUTO: 12 %
NEUTROPHILS # BLD AUTO: 2 X10E3/UL (ref 1.4–7)
NEUTROPHILS NFR BLD AUTO: 53 %
PLATELET # BLD AUTO: 400 X10E3/UL (ref 150–379)
POTASSIUM SERPL-SCNC: 5.1 MMOL/L (ref 3.5–5.2)
PROT SERPL-MCNC: 7 G/DL (ref 6–8.5)
RBC # BLD AUTO: 3.65 X10E6/UL (ref 4.14–5.8)
SODIUM SERPL-SCNC: 138 MMOL/L (ref 134–144)
WBC # BLD AUTO: 3.8 X10E3/UL (ref 3.4–10.8)

## 2018-11-15 NOTE — PROGRESS NOTES
Please inform caregivers labs show:   1. Anemia still stable from hospital.  Recheck 1 mo. 2.  Potassium back to normal range.    Thanks,  N

## 2019-02-27 ENCOUNTER — HOSPITAL ENCOUNTER (OUTPATIENT)
Dept: LAB | Age: 54
Discharge: HOME | End: 2019-02-27
Payer: MEDICARE

## 2019-02-27 ENCOUNTER — OFFICE VISIT (OUTPATIENT)
Dept: FAMILY MEDICINE CLINIC | Age: 54
End: 2019-02-27

## 2019-02-27 VITALS
BODY MASS INDEX: 25.74 KG/M2 | HEART RATE: 103 BPM | SYSTOLIC BLOOD PRESSURE: 134 MMHG | OXYGEN SATURATION: 97 % | DIASTOLIC BLOOD PRESSURE: 63 MMHG | WEIGHT: 164 LBS | RESPIRATION RATE: 18 BRPM | HEIGHT: 67 IN | TEMPERATURE: 97.5 F

## 2019-02-27 DIAGNOSIS — E11.65 UNCONTROLLED TYPE 2 DIABETES MELLITUS WITH HYPERGLYCEMIA (HCC): Primary | ICD-10-CM

## 2019-02-27 DIAGNOSIS — D64.9 ANEMIA, UNSPECIFIED TYPE: ICD-10-CM

## 2019-02-27 PROCEDURE — 85025 COMPLETE CBC W/AUTO DIFF WBC: CPT

## 2019-02-27 PROCEDURE — 83036 HEMOGLOBIN GLYCOSYLATED A1C: CPT

## 2019-02-27 NOTE — PROGRESS NOTES
Chief Complaint   Patient presents with    Follow-up     Diabetes(Type I-Insulin Dependent)     he is a 52y.o. year old male who presents for evalution. Pt here for DM F/U. States sugars have been very labile. Morning BS are typically in 300s. Before meals typically in 100s, low 200s,  Pt frequently fights staff to have blood sugar checked and gives them a hard time about administering insulin. Pt is not watching diet at all. No episodes of hypoglycemia. Caregiver requesting new rx for Nicky Faustin. Also would like pt to start seeing Endo. Reviewed PmHx, RxHx, FmHx, SocHx, AllgHx and updated and dated in the chart. Review of Systems - negative except as listed above in the HPI    Objective:     Vitals:    02/27/19 1044   BP: 134/63   Pulse: (!) 103   Resp: 18   Temp: 97.5 °F (36.4 °C)   TempSrc: Oral   SpO2: 97%   Weight: 164 lb (74.4 kg)   Height: 5' 7\" (1.702 m)     Physical Examination: General appearance - alert, well appearing, and in no distress  Chest - clear to auscultation, no wheezes, rales or rhonchi, symmetric air entry  Heart - normal rate, regular rhythm, normal S1, S2, no murmurs, rubs, clicks or gallops  Extremities - peripheral pulses normal, no pedal edema, no clubbing or cyanosis    Assessment/ Plan:   Diagnoses and all orders for this visit:    1. Uncontrolled type 2 diabetes mellitus with hyperglycemia (HCC)  -     REFERRAL TO ENDOCRINOLOGY  -     flash glucose sensor (FREESTYLE OTONIEL 14 DAY SENSOR) kit; Testing 6 times daily, dx: E11.65  -     flash glucose scanning reader (FREESTYLE OTONIEL 14 DAY READER) misc; Testing 6 times daily, dx: E11.65  -     insulin detemir U-100 (LEVEMIR FLEXTOUCH) 100 unit/mL (3 mL) inpn; 55 Units by SubCUTAneous route nightly for 30 days.  -     HEMOGLOBIN A1C WITH EAG  New rx and referral to Endo.   Discussed adding on Trulicity but caregiver would like to wait until we review log of blood sugars which she forgot at home and was just telling us off the top of her head. Involved NP Toña in diabetic care. Discussed importance of diabetic diet, need for regular exercise. Reviewed disease process and complications that arise from poor control of blood sugars - kidney damage, blindness, amputation, paresthesias. Goal of HgbA1c below 7 and LDL below 100. Need for yearly eye exam and regular foot care. 2. Anemia, unspecified type  -     CBC WITH AUTOMATED DIFF  Will decide on F/U after reviewing labs. Pt voiced understanding regarding plan of care. Follow-up Disposition: Not on File    I have discussed the diagnosis with the patient and the intended plan as seen in the above orders. The patient has received an after-visit summary and questions were answered concerning future plans.      Medication Side Effects and Warnings were discussed with patient      Óscar President, GREGG

## 2019-02-27 NOTE — PROGRESS NOTES
All health maintenance and other pertinent information has been reviewed in preparation for today's office visit. Patient presents in the office today for:    Chief Complaint   Patient presents with    Follow-up     Diabetes(Type I-Insulin Dependent)       1. Have you been to the ER, urgent care clinic since your last visit? Hospitalized since your last visit? No    2. Have you seen or consulted any other health care providers outside of the [address] [place] [address] since your last visit? Include any pap smears or colon screening.  No

## 2019-02-27 NOTE — PATIENT INSTRUCTIONS

## 2019-02-28 ENCOUNTER — OFFICE VISIT (OUTPATIENT)
Dept: NEUROLOGY | Age: 54
End: 2019-02-28

## 2019-02-28 VITALS
DIASTOLIC BLOOD PRESSURE: 65 MMHG | BODY MASS INDEX: 25.69 KG/M2 | HEART RATE: 94 BPM | RESPIRATION RATE: 18 BRPM | HEIGHT: 67 IN | OXYGEN SATURATION: 97 % | SYSTOLIC BLOOD PRESSURE: 138 MMHG

## 2019-02-28 DIAGNOSIS — G40.009 LOCALIZATION-RELATED (FOCAL) (PARTIAL) IDIOPATHIC EPILEPSY AND EPILEPTIC SYNDROMES WITH SEIZURES OF LOCALIZED ONSET, NOT INTRACTABLE, WITHOUT STATUS EPILEPTICUS (HCC): Primary | ICD-10-CM

## 2019-02-28 LAB
BASOPHILS # BLD AUTO: 0 X10E3/UL (ref 0–0.2)
BASOPHILS NFR BLD AUTO: 0 %
EOSINOPHIL # BLD AUTO: 0.1 X10E3/UL (ref 0–0.4)
EOSINOPHIL NFR BLD AUTO: 2 %
ERYTHROCYTE [DISTWIDTH] IN BLOOD BY AUTOMATED COUNT: 18.4 % (ref 12.3–15.4)
EST. AVERAGE GLUCOSE BLD GHB EST-MCNC: 194 MG/DL
HBA1C MFR BLD: 8.4 % (ref 4.8–5.6)
HCT VFR BLD AUTO: 27.5 % (ref 37.5–51)
HGB BLD-MCNC: 7.9 G/DL (ref 13–17.7)
IMM GRANULOCYTES # BLD AUTO: 0 X10E3/UL (ref 0–0.1)
IMM GRANULOCYTES NFR BLD AUTO: 0 %
LYMPHOCYTES # BLD AUTO: 1.5 X10E3/UL (ref 0.7–3.1)
LYMPHOCYTES NFR BLD AUTO: 25 %
MCH RBC QN AUTO: 22.1 PG (ref 26.6–33)
MCHC RBC AUTO-ENTMCNC: 28.7 G/DL (ref 31.5–35.7)
MCV RBC AUTO: 77 FL (ref 79–97)
MONOCYTES # BLD AUTO: 0.7 X10E3/UL (ref 0.1–0.9)
MONOCYTES NFR BLD AUTO: 12 %
NEUTROPHILS # BLD AUTO: 3.5 X10E3/UL (ref 1.4–7)
NEUTROPHILS NFR BLD AUTO: 61 %
PLATELET # BLD AUTO: 502 X10E3/UL (ref 150–379)
RBC # BLD AUTO: 3.57 X10E6/UL (ref 4.14–5.8)
WBC # BLD AUTO: 5.8 X10E3/UL (ref 3.4–10.8)

## 2019-02-28 RX ORDER — IPRATROPIUM BROMIDE AND ALBUTEROL SULFATE 2.5; .5 MG/3ML; MG/3ML
3 SOLUTION RESPIRATORY (INHALATION)
COMMUNITY
End: 2021-02-17

## 2019-02-28 RX ORDER — DIVALPROEX SODIUM 500 MG/1
TABLET, DELAYED RELEASE ORAL 2 TIMES DAILY
COMMUNITY
End: 2019-02-28 | Stop reason: DRUGHIGH

## 2019-02-28 RX ORDER — DIVALPROEX SODIUM 250 MG/1
TABLET, DELAYED RELEASE ORAL
Qty: 540 TAB | Refills: 2 | Status: SHIPPED | OUTPATIENT
Start: 2019-02-28 | End: 2019-05-17 | Stop reason: SDUPTHER

## 2019-02-28 RX ORDER — FERROUS SULFATE 325(65) MG
325 TABLET, DELAYED RELEASE (ENTERIC COATED) ORAL 2 TIMES DAILY WITH MEALS
Qty: 60 TAB | Refills: 2 | Status: SHIPPED | OUTPATIENT
Start: 2019-02-28 | End: 2019-12-10 | Stop reason: SDUPTHER

## 2019-02-28 NOTE — PROGRESS NOTES
Please inform caregivers labs show:   1. Worsening anemia, pt needs to F/U with Hematology since he was also just hospitalized for this. I like Dr. Talha Euceda office at [phone]. I will also send in daily iron supplement for patient. 2.  Diabetes remains uncontrolled but I am waiting on log of blood sugars before changing prescriptions so I will let them know once I have received it.    Thanks,  N

## 2019-02-28 NOTE — PATIENT INSTRUCTIONS
[facility]   Statement to Patients  April 1, 2014      In an effort to ensure the large volume of patient prescription refills is processed in the most efficient and expeditious manner, we are asking our patients to assist us by calling your Pharmacy for all prescription refills, this will include also your  Mail Order Pharmacy. The pharmacy will contact our office electronically to continue the refill process. Please do not wait until the last minute to call your pharmacy. We need at least 48 hours (2days) to fill prescriptions. We also encourage you to call your pharmacy before going to  your prescription to make sure it is ready. With regard to controlled substance prescription refill requests (narcotic refills) that need to be picked up at our office, we ask your cooperation by providing us with at least 72 hours (3days) notice that you will need a refill. We will not refill narcotic prescription refill requests after 4:00pm on any weekday, Monday through Thursday, or after 2:00pm on Fridays, or on the weekends. We encourage everyone to explore another way of getting your prescription refill request processed using [employer], our patient web portal through our electronic medical record system. [employer] is an efficient and effective way to communicate your medication request directly to the office and  downloadable as an april on your smart phone . [employer] also features a review functionality that allows you to view your medication list as well as leave messages for your physician. Are you ready to get connected? If so please review the attatched instructions or speak to any of our staff to get you set up right away! Thank you so much for your cooperation. Should you have any questions please contact our Practice Administrator.     The Physicians and Staff,  [facility] [address]  What is a living will?    A living will is a legal form you use to write down the kind of care you want at the end of your life. It is used by the health professionals who will treat you if you aren't able to decide for yourself. If you put your wishes in writing, your loved ones and others will know what kind of care you want. They won't need to guess. This can ease your mind and be helpful to others. A living will is not the same as an estate or property will. An estate will explains what you want to happen with your money and property after you die. Is a living will a legal document? A living will is a legal document. Each state has its own laws about living maria. If you move to another state, make sure that your living will is legal in the state where you now live. Or you might use a universal form that has been approved by many states. This kind of form can sometimes be completed and stored online. Your electronic copy will then be available wherever you have a connection to the Internet. In most cases, doctors will respect your wishes even if you have a form from a different state. · You don't need an  to complete a living will. But legal advice can be helpful if your state's laws are unclear, your health history is complicated, or your family can't agree on what should be in your living will. · You can change your living will at any time. Some people find that their wishes about end-of-life care change as their health changes. · In addition to making a living will, think about completing a medical power of  form. This form lets you name the person you want to make end-of-life treatment decisions for you (your \"health care agent\") if you're not able to. Many hospitals and nursing homes will give you the forms you need to complete a living will and a medical power of . · Your living will is used only if you can't make or communicate decisions for yourself anymore.  If you become able to make decisions again, you can accept or refuse any treatment, no matter what you wrote in your living will. · Your state may offer an online registry. This is a place where you can store your living will online so the doctors and nurses who need to treat you can find it right away. What should you think about when creating a living will? Talk about your end-of-life wishes with your family members and your doctor. Let them know what you want. That way the people making decisions for you won't be surprised by your choices. Think about these questions as you make your living will:  · Do you know enough about life support methods that might be used? If not, talk to your doctor so you know what might be done if you can't breathe on your own, your heart stops, or you're unable to swallow. · What things would you still want to be able to do after you receive life-support methods? Would you want to be able to walk? To speak? To eat on your own? To live without the help of machines? · If you have a choice, where do you want to be cared for? In your home? At a hospital or nursing home? · Do you want certain Baptism practices performed if you become very ill? · If you have a choice at the end of your life, where would you prefer to die? At home? In a hospital or nursing home? Somewhere else? · Would you prefer to be buried or cremated? · Do you want your organs to be donated after you die? What should you do with your living will? · Make sure that your family members and your health care agent have copies of your living will. · Give your doctor a copy of your living will to keep in your medical record. If you have more than one doctor, make sure that each one has a copy. · You may want to put a copy of your living will where it can be easily found. Where can you learn more? Go to http://dev-leonel.info/. Enter C542 in the search box to learn more about \"Learning About Living Permia. \"  Current as of: April 18, 2018  Content Version: 11.9  © 6164-8087 [employer]. Care instructions adapted under license by [employer] (which disclaims liability or warranty for this information). If you have questions about a medical condition or this instruction, always ask your healthcare professional. [address] any warranty or liability for your use of this information. First encounter procedures. We will increase the Depakote to just a revisit in 6 months. Seizure protocol sheet filled out.

## 2019-02-28 NOTE — PROGRESS NOTES
Pt caregiver Id x 3. Notified of labs and verbalized understanding. Copy of labs placed at the  for .

## 2019-02-28 NOTE — PROGRESS NOTES
Neurology Consult      Subjective:      Princess Simons is a 52 y.o. male who comes in today from a local group home. Has a complicated background history of seizures since age 6. 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 Ended up with right spastic hemiparesis and seizures. Has been currently 2 years at his local adult home and had his first seizure on February 11. Was witnessed to stiffen days for about 2 minutes. The last one before that was 1984? Has not had a neurologist in the picture in recent times. Is currently on Depakote delayed release 500 mg twice daily. Patient says he has no awareness of impending seizure. He has a primary care physician a dentist a podiatrist psychiatrist and a pulmonologist and ENT physician. Interestingly patient chooses not to believe that his recent episode in February was a seizure, as he has no recall. The patient's head CT would disclose prior craniectomy defects and large encephalomalacia of the posterior left cerebral hemisphere smaller focus right frontal lobe. Has left cerebral atrophy that creates a right to left shift. Prior chest CT in April 2018 disclosed bilateral pulmonary AVMs. Current Outpatient Medications   Medication Sig Dispense Refill    albuterol-ipratropium (DUO-NEB) 2.5 mg-0.5 mg/3 ml nebu 3 mL by Nebulization route.  divalproex DR (DEPAKOTE) 250 mg tablet 3 po bid 540 Tab 2    flash glucose sensor (FREESTYLE OTONIEL 14 DAY SENSOR) kit Testing 6 times daily, dx: E11.65 1 Kit 0    flash glucose scanning reader (FREESTYLE OTONIEL 14 DAY READER) misc Testing 6 times daily, dx: E11.65 1 Each 11    insulin detemir U-100 (LEVEMIR FLEXTOUCH) 100 unit/mL (3 mL) inpn 55 Units by SubCUTAneous route nightly for 30 days. 6 Pen 2    atorvastatin (LIPITOR) 10 mg tablet Take 10 mg by mouth nightly.       metFORMIN (GLUCOPHAGE) 850 mg tablet Take 850 mg by mouth three (3) times daily (with meals).  sertraline (ZOLOFT) 100 mg tablet Take 100 mg by mouth daily.  mirtazapine (REMERON) 15 mg tablet Take 15 mg by mouth nightly.  ergocalciferol (VITAMIN D2) 50,000 unit capsule Take 50,000 Units by mouth every seven (7) days.  insulin aspart U-100 (NOVOLOG U-100 INSULIN ASPART) 100 unit/mL injection by SubCUTAneous route Before breakfast, lunch, and dinner. Sliding scale at meals      ferrous sulfate (IRON) 325 mg (65 mg iron) EC tablet Take 1 Tab by mouth two (2) times daily (with meals). 60 Tab 2    terbinafine HCl (LAMISIL) 250 mg tablet Take 250 mg by mouth daily.  losartan (COZAAR) 25 mg tablet Take 25 mg by mouth daily.         Allergies   Allergen Reactions    Penicillins Rash     Past Medical History:   Diagnosis Date    Anxiety disorder     Arthritis     Depression     Diabetes ([facility] [address]     Liver disease     Neurological disorder     neuro cognative disorder    OCD (obsessive compulsive disorder)     Psychiatric disorder     OCD    Psychiatric disorder     anxiety    [facility]     Thyroid disease       Past Surgical History:   Procedure Laterality Date    HX CRANIOTOMY        Social History     Socioeconomic History    Marital status: SINGLE     Spouse name: Not on file    Number of children: Not on file    Years of education: Not on file    Highest education level: Not on file   Social Needs    Financial resource strain: Not on file    Food insecurity - worry: Not on file    Food insecurity - inability: Not on file   Contracts [employer] Grants needs - medical: Not on file   [place] needs - non-medical: Not on file   Occupational History    Not on file   Tobacco Use    Smoking status: Former Smoker    Smokeless tobacco: Never Used   Substance and Sexual Activity    Alcohol use: No    Drug use: No    Sexual activity: No   Other Topics Concern    Not on file   Social History Narrative    Not on file      Family History Problem Relation Age of Onset    Cancer Mother       Visit Vitals  /65   Pulse 94   Resp 18   Ht 5' 7\" (1.702 m)   SpO2 97%   BMI 25.69 kg/m²        Review of Systems:   A comprehensive review of systems was negative except for that written in the HPI. Neuro Exam:     Appearance: The patient is well developed, well nourished, provides a partial coherent history and is in no acute distress. Mental Status: Oriented to time, place and person. Mood and affect appropriate but somewhat impulsive. Cranial Nerves:    visual fields that are compromised in right and left hemifields. Doree Buoy are benign. OTILIA, EOM's full, no nystagmus, no ptosis. Facial sensation is normal. Corneal reflexes are intact. Facial movement is symmetric. Hearing is normal bilaterally. Palate is midline with normal sternocleidomastoid and trapezius muscles are normal. Tongue is midline. Motor:  5/5 strength in upper and lower proximal and distal muscles left and on the right he has contractured limb muscles with weakness that varies between 3 to 4+. . Normal bulk and tone. No fasciculations. Reflexes:   Deep tendon reflexes 2-3+/4 and symmetrical.   Sensory:   Normal to touch, pinprick and vibration on the left and consistently diminished on the right. Gait:   Patient ambulates with a right spastic hemiparetic gait. Tremor:   No tremor noted. Cerebellar:  No cerebellar signs present. Neurovascular:  Normal heart sounds and regular rhythm, peripheral pulses intact, and no carotid bruits. Assessment:   Localization-related seizures not intractable not status epilepticus. In lieu of recent seizure will increase Depakote delayed release to 750 mg twice daily. Protocol sheet filled out. Revisit 6 months. If doing well could envision annual revisits. Plan:   Revisit 6 months.   Signed by :  Swetha Andujar MD

## 2019-03-01 ENCOUNTER — TELEPHONE (OUTPATIENT)
Dept: FAMILY MEDICINE CLINIC | Age: 54
End: 2019-03-01

## 2019-03-01 NOTE — TELEPHONE ENCOUNTER
Caregiver id x 3, notified as per Nikhil Yun. She states that she believes that the day program checks the BG before lunch instead of after. She will provide clarification to them and obtain a copy of his BG log from them.

## 2019-03-05 NOTE — TELEPHONE ENCOUNTER
Yes, please place copy of blood sugar log on my desk when available. I will follow up w/ pt to set up an appointment.

## 2019-03-07 ENCOUNTER — DOCUMENTATION ONLY (OUTPATIENT)
Dept: FAMILY MEDICINE CLINIC | Age: 54
End: 2019-03-07

## 2019-03-07 DIAGNOSIS — E11.649 UNCONTROLLED TYPE 2 DIABETES MELLITUS WITH HYPOGLYCEMIA, UNSPECIFIED HYPOGLYCEMIA COMA STATUS (HCC): Primary | ICD-10-CM

## 2019-03-07 RX ORDER — INSULIN ASPART 100 [IU]/ML
INJECTION, SOLUTION INTRAVENOUS; SUBCUTANEOUS
Qty: 10 ML | Refills: 2 | Status: SHIPPED | OUTPATIENT
Start: 2019-03-07 | End: 2021-02-17

## 2019-03-21 ENCOUNTER — TELEPHONE (OUTPATIENT)
Dept: FAMILY MEDICINE CLINIC | Age: 54
End: 2019-03-21

## 2019-03-21 NOTE — TELEPHONE ENCOUNTER
Left voicemail. Called to discuss diabetes management. Pt enrolled in Diabetes Full [place] plan.

## 2019-03-22 ENCOUNTER — TELEPHONE (OUTPATIENT)
Dept: FAMILY MEDICINE CLINIC | Age: 54
End: 2019-03-22

## 2019-03-22 NOTE — TELEPHONE ENCOUNTER
Called and LVM to call the office back with glucose reading for today. If glucose is as elevated and yesterday recheck 30 minutes after insulin is administered and report that reading as well.

## 2019-03-22 NOTE — TELEPHONE ENCOUNTER
----- Message from Aleksandr Tyler sent at 3/21/2019  5:27 PM EDT -----  Regarding: GREGG Law/ Telephone   Contact: [phone]  Ms. Vick calling to report blood sugar reading at 497 and pt was given 8 units of Rx Novalog.

## 2019-04-01 ENCOUNTER — TELEPHONE (OUTPATIENT)
Dept: FAMILY MEDICINE CLINIC | Age: 54
End: 2019-04-01

## 2019-04-01 NOTE — TELEPHONE ENCOUNTER
RC to caregiver to see what pt's numbers were after the 30 mins. Caregiver had not heard back from pt's day support. Caregiver called day support, but they were not able to readily give pt's reading and stated that they would call her back. Caregiver assumes that pt's BG had not been rechecked by day support. Caregiver states that pt was switched from taking 55 unit of Levemir from the am to pm since they were having difficulty getting pt up to administer. Ever since the pt's BG in the afternoon has been out of whack. Caregiver also wants to know if we can place the sensor for his Freestyle Baig?

## 2019-04-01 NOTE — TELEPHONE ENCOUNTER
Received a call from pt's group home counselor. States that pt is at day support and his BG is 494. Advised to give 8 units of Novolog, recheck in 30mins and call back with reading. Please advise further.

## 2019-04-01 NOTE — TELEPHONE ENCOUNTER
Let me know what reading is 30 minutes after Humalog. Please ensure pt eating low sugar, low carb diet with appropriate protein.    Thanks,  N

## 2019-04-01 NOTE — TELEPHONE ENCOUNTER
I don't know how to place sensor, I reached out to Shavon Hull who is now managing pt until he can be seen by Dionte to see if she could.

## 2019-04-09 ENCOUNTER — DOCUMENTATION ONLY (OUTPATIENT)
Dept: FAMILY MEDICINE CLINIC | Age: 54
End: 2019-04-09

## 2019-04-09 NOTE — PROGRESS NOTES
Faxed Demographic, 02/27/19 office notes/lab results to Dr Rudolph Shook per [facility] request. Fax #[phone] confirmation received.

## 2019-04-10 ENCOUNTER — TELEPHONE (OUTPATIENT)
Dept: FAMILY MEDICINE CLINIC | Age: 54
End: 2019-04-10

## 2019-04-10 NOTE — TELEPHONE ENCOUNTER
Received call from pt's caregiver. Pt's BG is currently 551, and pt is at his day support. Advised that BG is critically high and that pt needs to go to ED for eval.   Caregiver verbalized understanding.

## 2019-04-11 ENCOUNTER — TELEPHONE (OUTPATIENT)
Dept: FAMILY MEDICINE CLINIC | Age: 54
End: 2019-04-11

## 2019-04-11 NOTE — TELEPHONE ENCOUNTER
RC to Ms. Vick. States that pt has already been taken to the ED. States that she would be appreciative of a protocol for when his BG is over 500 to avoid ED visits every day. States that this is only a problem when he goes to day support. States that when he was taken yesterday he was given IV fluids and a total of 16 units of insulin. States that he goes to the endo on 4/17/19. Rudy Huston- please advise if you have any recommendations on a protocol.

## 2019-04-11 NOTE — TELEPHONE ENCOUNTER
Mrs Charmayne [place] calling from group [place] and states that day support called them just now and pt's blood sugar is up to 464 again. It was high yesterday and they were told to take to urgent care and they did and all they told them was that he had slight anemia. She needs to know what to do due to it is high again today. Please call her back asap at [phone].

## 2019-04-11 NOTE — TELEPHONE ENCOUNTER
Give him additional 4 units of rapid acting insulin and recheck 30 minutes. Call us back if still over 300. Does pt have Endo appt scheduled yet? Thanks,  N    Courtney - don't know if we want to develop some sort of scale for nurses to use when group home calls with high readings to avoid sending him to Urgent Care/ER for labile blood sugars without other concerning symptoms?

## 2019-04-11 NOTE — LETTER
4/11/2019 3:17 PM 
 
[phone] Continue use of current sliding scale before meals. If patient's blood sugar over 300, give 8 units and recheck in 30 minutes. If patient's blood sugar remains above 300, give an additional 8 units and recheck in 30 minutes. If still remains above 300, call office. If patient's blood sugar is over 600-report to the ED. Sincerely, Doreen Brown, NP

## 2019-04-11 NOTE — TELEPHONE ENCOUNTER
New protocol per Wideman    Continue use of current sliding scale before meals. If patient's blood sugar over 300, give 8 units and recheck in 30 minutes. If patient's blood sugar remains above 300, give an additional 8 units and recheck in 30 minutes. If still remains above 300, call office. If patient's blood sugar is over 600-report to the ED. Will put on letterhead and print for Tanika to fax to group [place].

## 2019-04-11 NOTE — TELEPHONE ENCOUNTER
RC to Ms. Nicanor. Per MsKendell Vick, she advised day support to take pt out of the ED and administer 4 units of insulin and then recheck in 30 minutes. Ms Lonell Epley is requesting a new order to be sent in for protocol on what to do if bs remains above 300. Pt pmh of tbi. It is difficult for support staff to monitor pts soda and junk food intake b/c pt is able to make his own decisions. They can educate pt on food/drink choices but not allowed to take anything away from patient.

## 2019-04-11 NOTE — TELEPHONE ENCOUNTER
Dday Support called. They stated that pt's BS have been fine throughout the day. Pt's BS was fine and at lunch. Pt's BS is now 34 33 96. Day Support called [employer] and they stated to call office. PSR spoke with nurse and stated that pt needs to go to [facility] or ED. Day Support understood and stated that they will let the [address] [place] know.

## 2019-04-17 ENCOUNTER — OFFICE VISIT (OUTPATIENT)
Dept: FAMILY MEDICINE CLINIC | Age: 54
End: 2019-04-17

## 2019-04-17 VITALS
HEIGHT: 67 IN | HEART RATE: 87 BPM | DIASTOLIC BLOOD PRESSURE: 70 MMHG | OXYGEN SATURATION: 95 % | TEMPERATURE: 98.2 F | WEIGHT: 162.4 LBS | SYSTOLIC BLOOD PRESSURE: 129 MMHG | BODY MASS INDEX: 25.49 KG/M2 | RESPIRATION RATE: 18 BRPM

## 2019-04-17 DIAGNOSIS — G40.909 SEIZURE DISORDER (HCC): ICD-10-CM

## 2019-04-17 DIAGNOSIS — R73.09 LABILE BLOOD GLUCOSE: ICD-10-CM

## 2019-04-17 DIAGNOSIS — R41.3 MEMORY IMPAIRMENT: ICD-10-CM

## 2019-04-17 DIAGNOSIS — Z91.89 AT RISK FOR UNSAFE BEHAVIOR: ICD-10-CM

## 2019-04-17 DIAGNOSIS — Z72.4 INAPPROPRIATE DIET AND EATING HABITS: ICD-10-CM

## 2019-04-17 DIAGNOSIS — E11.65 UNCONTROLLED TYPE 2 DIABETES MELLITUS WITH HYPERGLYCEMIA (HCC): Primary | ICD-10-CM

## 2019-04-17 NOTE — PATIENT INSTRUCTIONS

## 2019-04-17 NOTE — LETTER
4/17/2019 9:31 AM 
 
Mr. Liliam Uriarte [facility] [phone] Pt needs to be on diabetic diet and is not allowed to make his own food choices. He is in capable of making his own food choices and puts his health and safety at risk when this is allowed. Sincerely, Robby Pretty, NP

## 2019-04-17 NOTE — PROGRESS NOTES
Identified pt with two pt identifiers(name and ). Reviewed record in preparation for visit and have obtained necessary documentation. All patient medications has been reviewed. Chief Complaint   Patient presents with    High Blood Sugar     x 3-4 days. pt states, when wake up in the morning it's low but around lunch its 500.  Diabetic Foot Exam       Health Maintenance Due   Topic    FOOT EXAM Q1     EYE EXAM RETINAL OR DILATED     DTaP/Tdap/Td series (1 - Tdap)    Shingrix Vaccine Age 49> (1 of 2)     3 most recent PHQ Screens 2019   Little interest or pleasure in doing things Several days   Feeling down, depressed, irritable, or hopeless Several days   Total Score PHQ 2 2         Vitals:    19 0917   BP: 129/70   Pulse: 87   Resp: 18   Temp: 98.2 °F (36.8 °C)   TempSrc: Oral   SpO2: 95%   Weight: 162 lb 6.4 oz (73.7 kg)   Height: 5' 7\" (1.702 m)   PainSc:   0 - No pain       Coordination of Care Questionnaire:  :   1) Have you been to an emergency room, urgent care, or hospitalized since your last visit? yes     Elevated Blood Sugar [facility]  2. Have seen or consulted any other health care provider since your last visit? NO    3) Do you have an Advanced Directive/ Living Will in place? NO  If yes, do we have a copy on file NO  If no, would you like information NO    Patient is accompanied by adult caretaker I have received verbal consent from Gui Razo. to discuss any/all medical information while they are present in the room.

## 2019-04-17 NOTE — PROGRESS NOTES
Chief Complaint   Patient presents with    High Blood Sugar     x 3-4 days. pt states, when wake up in the morning it's low but around lunch its 500.  Diabetic Foot Exam     he is a 52y.o. year old male who presents for evalution. Pt here for diabetes F/U. Sugars extremely labile and have been elevated after lunch - sometimes above 500s. Pt went to ER once for this, was given additional insulin. Pt is allowed to eat whatever he wants at group home and day support since they feel it is important to allow him to make his own decisions. Friday when pt called with elevated blood sugar for lunch had eaten peanut butter and jelly sandwich on white bread and 2 granola bars. Pt has appt with Endo later this afternoon. Reviewed PmHx, RxHx, FmHx, SocHx, AllgHx and updated and dated in the chart. Review of Systems - negative except as listed above in the HPI    Objective:     Vitals:    04/17/19 0917   BP: 129/70   Pulse: 87   Resp: 18   Temp: 98.2 °F (36.8 °C)   TempSrc: Oral   SpO2: 95%   Weight: 162 lb 6.4 oz (73.7 kg)   Height: 5' 7\" (1.702 m)     Physical Examination: General appearance - alert, well appearing, and in no distress  Chest - clear to auscultation, no wheezes, rales or rhonchi, symmetric air entry  Heart - normal rate, regular rhythm, normal S1, S2, no murmurs, rubs, clicks or gallops  Extremities - feet normal, good pulses, normal color, temperature and sensation, monofilament sensory exam is normal in both feet, no edema, redness or tenderness in the calves or thighs    Assessment/ Plan:   Diagnoses and all orders for this visit:    1. Uncontrolled type 2 diabetes mellitus with hyperglycemia (HCC)  2. Seizure disorder ([address]  3. Memory impairment  4. At risk for unsafe behavior  5. Inappropriate diet and eating habits  6.  Labile blood glucose  Pt incapable of making appropriate food choices, by allowing him to continue to do so puts him at risk  - already is developing significant hyperglycemia after meals. Needs to be on full diabetic diet and needs to see Endo ASAP. Pt voiced understanding regarding plan of care. Follow-up and Dispositions    · Return if symptoms worsen or fail to improve. I have discussed the diagnosis with the patient and the intended plan as seen in the above orders. The patient has received an after-visit summary and questions were answered concerning future plans.      Medication Side Effects and Warnings were discussed with patient      Roma Shrestha NP

## 2019-04-25 ENCOUNTER — DOCUMENTATION ONLY (OUTPATIENT)
Dept: FAMILY MEDICINE CLINIC | Age: 54
End: 2019-04-25

## 2019-05-14 DIAGNOSIS — E11.65 UNCONTROLLED TYPE 2 DIABETES MELLITUS WITH HYPERGLYCEMIA (HCC): ICD-10-CM

## 2019-05-17 RX ORDER — DIVALPROEX SODIUM 250 MG/1
TABLET, DELAYED RELEASE ORAL
Qty: 540 TAB | Refills: 2 | Status: SHIPPED | OUTPATIENT
Start: 2019-05-17 | End: 2021-02-17

## 2019-06-04 ENCOUNTER — DOCUMENTATION ONLY (OUTPATIENT)
Dept: FAMILY MEDICINE CLINIC | Age: 54
End: 2019-06-04

## 2019-06-10 ENCOUNTER — TELEPHONE (OUTPATIENT)
Dept: FAMILY MEDICINE CLINIC | Age: 54
End: 2019-06-10

## 2019-08-29 ENCOUNTER — OFFICE VISIT (OUTPATIENT)
Dept: NEUROLOGY | Age: 54
End: 2019-08-29

## 2019-08-29 VITALS
HEIGHT: 67 IN | OXYGEN SATURATION: 98 % | WEIGHT: 145 LBS | HEART RATE: 78 BPM | SYSTOLIC BLOOD PRESSURE: 158 MMHG | BODY MASS INDEX: 22.76 KG/M2 | DIASTOLIC BLOOD PRESSURE: 85 MMHG | RESPIRATION RATE: 18 BRPM

## 2019-08-29 DIAGNOSIS — R56.9 FOCAL SEIZURES (HCC): Primary | ICD-10-CM

## 2019-08-29 NOTE — PATIENT INSTRUCTIONS
[facility]   Statement to Patients  April 1, 2014      In an effort to ensure the large volume of patient prescription refills is processed in the most efficient and expeditious manner, we are asking our patients to assist us by calling your Pharmacy for all prescription refills, this will include also your  Mail Order Pharmacy. The pharmacy will contact our office electronically to continue the refill process. Please do not wait until the last minute to call your pharmacy. We need at least 48 hours (2days) to fill prescriptions. We also encourage you to call your pharmacy before going to  your prescription to make sure it is ready. With regard to controlled substance prescription refill requests (narcotic refills) that need to be picked up at our office, we ask your cooperation by providing us with at least 72 hours (3days) notice that you will need a refill. We will not refill narcotic prescription refill requests after 4:00pm on any weekday, Monday through Thursday, or after 2:00pm on Fridays, or on the weekends. We encourage everyone to explore another way of getting your prescription refill request processed using [employer], our patient web portal through our electronic medical record system. [employer] is an efficient and effective way to communicate your medication request directly to the office and  downloadable as an april on your smart phone . [employer] also features a review functionality that allows you to view your medication list as well as leave messages for your physician. Are you ready to get connected? If so please review the attatched instructions or speak to any of our staff to get you set up right away! Thank you so much for your cooperation. Should you have any questions please contact our Practice Administrator. The Physicians and Staff,  [facility]     Patient history reviewed and patient examined.   Doing well and will not manipulate his seizure medicine at this time. Revisit 1 year.

## 2019-08-29 NOTE — LETTER
8/29/19 Patient: Clarence Rashid. YOB: 1965 Date of Visit: 8/29/2019 Sirisha Gaitan NP 
[address] [place] [address] VIA Facsimile: [phone] Dear Sirisha Gaitan NP, Thank you for referring Mr. Alexander Underwood to [address] [place] [address] for evaluation. My notes for this consultation are attached. If you have questions, please do not hesitate to call me. I look forward to following your patient along with you.  
 
 
Sincerely, 
 
Jesús Barrow MD

## 2019-08-29 NOTE — PROGRESS NOTES
Neurology Consult      Subjective:      Marina Fox is a 52 y.o. male who comes in with the attendant from the adult home. 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 Had seeding of the brain with abscesses that needed attention and a mild right frontal atrophy and larger left encephalomalacia posterior head region with evidence of right to left shift on imaging. Is on Depakote delayed release 750 mg twice daily. Has been seizure-free and tolerates the medicine well. No seizures in recent times. I thought we could take advantage of this and suggest a 1 year follow-up visit. Exam looks strictly baseline and his usual very inquisitive and has lots of questions etc. by request the adult home encounter form was filled out as was a new permanent placard for handicap parking. Interestingly remains in complete denial about any seizures in recent times including the February 11 seizure breakthrough the led to his visit with me at the end of February. No reference to any new medical or surgical history. I looked at blood work from primary care and that covered for chemistries, where his hemoglobin A1c was around 8.4 random blood sugar 334, CBC with depression of hemoglobin and hematocrit etc.          Current Outpatient Medications   Medication Sig Dispense Refill    insulin detemir U-100 (LEVEMIR) 100 unit/mL injection by SubCUTAneous route nightly.  divalproex DR (DEPAKOTE) 250 mg tablet 3 po bid  Indications: Convulsive Seizures 540 Tab 2    flash glucose scanning reader (FREESTYLE OTONIEL 14 DAY READER) misc Testing 6 times daily, dx: E11.65 1 Each 11    insulin aspart U-100 (NOVOLOG U-100 INSULIN ASPART) 100 unit/mL injection Sliding scale at meals. DO NOT GIVE IF BLOOD SUGAR UNDER 150. 150-200 2 units; 201-250 4 units; 251-300 6 units.  >300 8 units and call PCP 10 mL 2    ferrous sulfate (IRON) 325 mg (65 mg iron) EC tablet Take 1 Tab by mouth two (2) times daily (with meals). 60 Tab 2    albuterol-ipratropium (DUO-NEB) 2.5 mg-0.5 mg/3 ml nebu 3 mL by Nebulization route.  flash glucose sensor (FREESTYLE OTONIEL 14 DAY SENSOR) kit Testing 6 times daily, dx: E11.65 1 Kit 0    atorvastatin (LIPITOR) 10 mg tablet Take 10 mg by mouth nightly.  metFORMIN (GLUCOPHAGE) 850 mg tablet Take 850 mg by mouth three (3) times daily (with meals).  sertraline (ZOLOFT) 100 mg tablet Take 100 mg by mouth daily.  mirtazapine (REMERON) 15 mg tablet Take 15 mg by mouth nightly.  terbinafine HCl (LAMISIL) 250 mg tablet Take 250 mg by mouth daily.  losartan (COZAAR) 25 mg tablet Take 25 mg by mouth daily.  ergocalciferol (VITAMIN D2) 50,000 unit capsule Take 50,000 Units by mouth every seven (7) days.         Allergies   Allergen Reactions    Penicillins Rash     Past Medical History:   Diagnosis Date    Anxiety disorder     Arthritis     Depression     Diabetes [facility]     Liver disease     Neurological disorder     neuro cognative disorder    OCD (obsessive compulsive disorder)     Psychiatric disorder     OCD    Psychiatric disorder     anxiety    Seizures ([facility]     Thyroid disease       Past Surgical History:   Procedure Laterality Date    HX CRANIOTOMY        Social History     Socioeconomic History    Marital status: SINGLE     Spouse name: Not on file    Number of children: Not on file    Years of education: Not on file    Highest education level: Not on file   Occupational History    Not on file   Social Needs    Financial resource strain: Not on file    Food insecurity:     Worry: Not on file     Inability: Not on file    Transportation needs:     Medical: Not on file     Non-medical: Not on file   Tobacco Use    Smoking status: Former Smoker    Smokeless tobacco: Never Used   Substance and Sexual Activity    Alcohol use: No    Drug use: No    Sexual activity: Never   Lifestyle    Physical activity:     Days per week: Not on file     Minutes per session: Not on file    Stress: Not on file   Relationships    Social connections:     Talks on phone: Not on file     Gets together: Not on file     Attends Buddhist service: Not on file     Active member of club or organization: Not on file     Attends meetings of clubs or organizations: Not on file     Relationship status: Not on file    Intimate partner violence:     Fear of current or ex partner: Not on file     Emotionally abused: Not on file     Physically abused: Not on file     Forced sexual activity: Not on file   Other Topics Concern    Not on file   Social History Narrative    Not on file      Family History   Problem Relation Age of Onset    Cancer Mother       Visit Vitals  /85   Pulse 78   Resp 18   Ht 5' 7\" (1.702 m)   Wt 65.8 kg (145 lb)   SpO2 98%   BMI 22.71 kg/m²        Review of Systems:   A comprehensive review of systems was negative except for that written in the HPI. Neuro Exam:     Appearance: The patient is well developed, well nourished, provides a partial coherent history and is in no acute distress. Mental Status: Oriented to time except called in September instead of August., place and person. Mood and affect appropriate and his usual very inquisitive. .   Cranial Nerves:   Intact visual fields. Fundi are benign. OTILIA, EOM's full, no nystagmus, no ptosis. Facial sensation is normal. Corneal reflexes are intact. Facial movement is symmetric. Hearing is normal bilaterally. Palate is midline with normal sternocleidomastoid and trapezius muscles are normal. Tongue is midline. Motor:  5/5 strength in upper and lower proximal and distal muscles on the left and 4+ to 5- strength right sided with impoverished right  and contracture formation. . Normal bulk and tone on the left and perceived right hemibody atrophy. No fasciculations.    Reflexes:   Deep tendon reflexes 2+/4 and asymmetrical on left, with the right reflexes being +3. Marianela Congo Sensory:   Normal to touch, pinprick and vibration on the left and diminished on the right. .   Gait:   Patient has his baseline right spastic hemiparetic gait. Tremor:   No tremor noted. Cerebellar:  No cerebellar signs present. Neurovascular:  Normal heart sounds and regular rhythm, peripheral pulses intact, and no carotid bruits. 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 Well-controlled at this time on Depakote delayed release 750 mg twice daily. The adult center form was filled out as was a original handicap parking placard application. General advice includes getting good sleep minimizing stress and staying compliant with medicine. Plan:   Revisit 1 year.   Signed by :  Laurel Johnson MD

## 2019-10-04 ENCOUNTER — ED HISTORICAL/CONVERTED ENCOUNTER (OUTPATIENT)
Dept: OTHER | Age: 54
End: 2019-10-04

## 2019-10-08 ENCOUNTER — OFFICE VISIT (OUTPATIENT)
Dept: FAMILY MEDICINE CLINIC | Age: 54
End: 2019-10-08

## 2019-10-08 VITALS
RESPIRATION RATE: 18 BRPM | WEIGHT: 154.2 LBS | DIASTOLIC BLOOD PRESSURE: 77 MMHG | BODY MASS INDEX: 24.2 KG/M2 | TEMPERATURE: 98.1 F | OXYGEN SATURATION: 94 % | SYSTOLIC BLOOD PRESSURE: 134 MMHG | HEART RATE: 87 BPM | HEIGHT: 67 IN

## 2019-10-08 DIAGNOSIS — G40.909 SEIZURE DISORDER (HCC): ICD-10-CM

## 2019-10-08 DIAGNOSIS — R04.0 NOSEBLEED: Primary | ICD-10-CM

## 2019-10-08 DIAGNOSIS — E11.649 UNCONTROLLED TYPE 2 DIABETES MELLITUS WITH HYPOGLYCEMIA, UNSPECIFIED HYPOGLYCEMIA COMA STATUS (HCC): ICD-10-CM

## 2019-10-08 NOTE — PROGRESS NOTES
Patient here for hospital f/u for nose bleed. Patient is a gh and nasal packing was removed by staff. Patient states he has had no active nosebleed since packing was removed. He does use vasoline every night. Patient currently taking Keflex for 7 days. Patient c/o toenails long and needs a podiatry consult. He states his feet hurt. Chief Complaint   Patient presents with   [facility] Follow Up     nose bleed    Foot Pain     toenails long, feet hurt, podiatry consult     He is a 62 y.o. male who presents for evalution. Reviewed PmHx, RxHx, FmHx, SocHx, AllgHx and updated and dated in the chart. Patient Active Problem List    Diagnosis    Memory impairment    Obsessive-compulsive disorder    Seizure disorder ([facility]    Uncontrolled type 2 diabetes mellitus ([facility]     Last Assessment & Plan:    Hemoglobin A1c is elevated 8.8% this time which is up from 7.3% in December of 2016. Worsened. This is most certainly related to his alcohol use that he has restarted in the last several months  I advised him to stop drinking in the strongest possible terms      Organic mental disorder    Anxiety disorder    Congenital anomaly of pulmonary artery    Hereditary hemorrhagic telangiectasia (HCC)       Review of Systems - negative except as listed above in the HPI    Objective:     Vitals:    10/08/19 1035   BP: 134/77   Pulse: 87   Resp: 18   Temp: 98.1 °F (36.7 °C)   SpO2: 94%   Weight: 154 lb 3.2 oz (69.9 kg)   Height: 5' 7\" (1.702 m)     Physical Examination: General appearance - alert, well appearing, and in no distress  Nose - normal and patent, no erythema, discharge or polyps  Mouth - mucous membranes moist, pharynx normal without lesions  Chest - clear to auscultation, no wheezes, rales or rhonchi, symmetric air entry  Heart - normal rate, regular rhythm, normal S1, S2, no murmurs, rubs, clicks or gallops    Assessment/ Plan:   Diagnoses and all orders for this visit:    1.  Nosebleed  -refer ENT  -better now    2. Uncontrolled type 2 diabetes mellitus with hypoglycemia, unspecified hypoglycemia coma status ([facility]  -stable  Lab Results   Component Value Date/Time    Hemoglobin A1c 8.4 (H) 02/27/2019 11:24 AM     -see endo    3. Seizure disorder ([facility]  -stble         I have discussed the diagnosis with the patient and the intended plan as seen in the above orders. The patient understands and agrees with the plan. The patient has received an after-visit summary and questions were answered concerning future plans. Medication Side Effects and Warnings were discussed with patient  Patient Labs were reviewed and or requested:  Patient Past Records were reviewed and or requested    Nat Fairchild M.D. There are no Patient Instructions on file for this visit.

## 2019-10-11 ENCOUNTER — TELEPHONE (OUTPATIENT)
Dept: FAMILY MEDICINE CLINIC | Age: 54
End: 2019-10-11

## 2019-10-11 NOTE — TELEPHONE ENCOUNTER
Farhana Chacko called back and advised of patients last physical date and made appointment with dr mccord for 11/07/19

## 2019-10-11 NOTE — TELEPHONE ENCOUNTER
[address] [facility]   Phone Number: [phone]             Caller's first and last name: Kobe Riddle with residential services   Reason for call : Inquiring about pt's last CPE date   Callback required yes/no and why: Yes   Best contact number(s): [phone] Office   Details to clarify the request: n/a

## 2019-10-31 ENCOUNTER — PATIENT OUTREACH (OUTPATIENT)
Dept: FAMILY MEDICINE CLINIC | Age: 54
End: 2019-10-31

## 2019-10-31 NOTE — PROGRESS NOTES
Ambulatory Care Management Note      Date/Time:  10/31/2019 11:34 AM    This patient was received as a referral from [address] [place] [address] reviewed with the provider   Labile BG at group home. Plan for patient to keep log to bring to appt             Ambulatory  contacted patient for discussion and case managements of DM   Summary of patients top problems:   1. DM- Patient has labile BG at group home, ranging from 200-500 per Melanie Hernandez, patient's supervisor. Patient is allowed to make his own food choices in the [facility] setting, not adhering to diabetic diet. Patient has appt with Dr. Rosalio Rivera 11/7. Patient's challenges to self management identified:   functional cognitive ability, ineffective coping, lack of knowledge about disease, level of motivation, medical condition, support system      Medication Management:  meds monitored by [facility] staff    Advance Care Planning:   Does patient have an Advance Directive:  not on file    [employer], Referrals, and Durable Medical Equipment: n/a    PCP/Specialist follow up:   Future Appointments   Date Time Provider Loulou Kelley   11/7/2019 10:20 AM Janet Orozco MD [facility]   8/27/2020 10:40 AM Vanda Ho MD [address] Patient verbalizes understanding of self -management goals of living with Diabetes. 10/31/19 [facility] staff will assist patient to keep BG log for review at appt 11/7. Will follow up after appt. APM             Patient verbalized understanding of all information discussed. Patient has this Nurse Navigators contact information for any further questions, concerns, or needs.

## 2019-11-07 ENCOUNTER — HOSPITAL ENCOUNTER (OUTPATIENT)
Dept: LAB | Age: 54
Discharge: HOME | End: 2019-11-07

## 2019-11-07 ENCOUNTER — OFFICE VISIT (OUTPATIENT)
Dept: FAMILY MEDICINE CLINIC | Age: 54
End: 2019-11-07

## 2019-11-07 VITALS
SYSTOLIC BLOOD PRESSURE: 115 MMHG | OXYGEN SATURATION: 95 % | RESPIRATION RATE: 16 BRPM | DIASTOLIC BLOOD PRESSURE: 63 MMHG | HEIGHT: 67 IN | HEART RATE: 75 BPM | TEMPERATURE: 97.9 F | BODY MASS INDEX: 24.33 KG/M2 | WEIGHT: 155 LBS

## 2019-11-07 DIAGNOSIS — Z11.1 SCREENING-PULMONARY TB: ICD-10-CM

## 2019-11-07 DIAGNOSIS — D64.9 CHRONIC ANEMIA: ICD-10-CM

## 2019-11-07 DIAGNOSIS — F09 ORGANIC MENTAL DISORDER: ICD-10-CM

## 2019-11-07 DIAGNOSIS — Z23 ENCOUNTER FOR IMMUNIZATION: ICD-10-CM

## 2019-11-07 DIAGNOSIS — G40.909 SEIZURE DISORDER (HCC): ICD-10-CM

## 2019-11-07 DIAGNOSIS — E11.649 UNCONTROLLED TYPE 2 DIABETES MELLITUS WITH HYPOGLYCEMIA, UNSPECIFIED HYPOGLYCEMIA COMA STATUS (HCC): ICD-10-CM

## 2019-11-07 DIAGNOSIS — Z00.00 ROUTINE GENERAL MEDICAL EXAMINATION AT A HEALTH CARE FACILITY: Primary | ICD-10-CM

## 2019-11-07 NOTE — PROGRESS NOTES
Chief Complaint   Patient presents with    Complete Physical    Shoulder Pain     Right shoulder    Labs     NON Fasting     1. Have you been to the ER, urgent care clinic since your last visit? Hospitalized since your last visit? No    2. Have you seen or consulted any other health care providers outside of the [address] [place] [address] since your last visit? Include any pap smears or colon screening. No    Chief Complaint   Patient presents with    Complete Physical    Shoulder Pain     Right shoulder    Labs     NON Fasting     he is a 62y.o. year old male who presents for evalution. Reviewed PmHx, RxHx, FmHx, SocHx, AllgHx and updated and dated in the chart. Patient Active Problem List    Diagnosis    Memory impairment    Obsessive-compulsive disorder    Seizure disorder ([facility]    Uncontrolled type 2 diabetes mellitus ([facility]     Last Assessment & Plan:    Hemoglobin A1c is elevated 8.8% this time which is up from 7.3% in December of 2016. Worsened.   This is most certainly related to his alcohol use that he has restarted in the last several months  I advised him to stop drinking in the strongest possible terms      Organic mental disorder    Anxiety disorder    Congenital anomaly of pulmonary artery    Hereditary hemorrhagic telangiectasia (HCC)       Review of Systems - negative except as listed above in the HPI    Objective:     Vitals:    11/07/19 1032   BP: 115/63   Pulse: 75   Resp: 16   Temp: 97.9 °F (36.6 °C)   TempSrc: Oral   SpO2: 95%   Weight: 155 lb (70.3 kg)   Height: 5' 7\" (1.702 m)     Physical Examination: General appearance - alert, well appearing, and in no distress  Nose - normal and patent, no erythema, discharge or polyps  Mouth - mucous membranes moist, pharynx normal without lesions  Neck - supple, no significant adenopathy  Chest - clear to auscultation, no wheezes, rales or rhonchi, symmetric air entry  Heart - normal rate, regular rhythm, normal S1, S2, no murmurs, rubs, clicks or gallops  Abdomen - soft, nontender, nondistended, no masses or organomegaly  Extremities - peripheral pulses normal, no pedal edema, no clubbing or cyanosis    Assessment/ Plan:   Diagnoses and all orders for this visit:    1. Routine general medical examination [facility] a health care facility  [facility]     2. Uncontrolled type 2 diabetes mellitus with hypoglycemia, unspecified hypoglycemia coma status ([facility] [address]  -     LIPID PANEL; Future  -     METABOLIC PANEL, COMPREHENSIVE; Future  -     HEMOGLOBIN A1C WITH EAG; Future  -     CBC WITH AUTOMATED DIFF; Future  -     REFERRAL TO HEMATOLOGY ONCOLOGY  -poor compliance and needs to be seen every 3 mo  -not at goal at [facility]    3. Seizure disorder ([facility]  -stable    4. Organic mental disorder  -[facility]  5. Chronic anemia  -     CBC WITH AUTOMATED DIFF; Future  -     FERRITIN; Future  -     IRON PROFILE; Future  -never saw specialist, referral given    6. Screening-pulmonary TB  -     QUANTIFERON-TB PLUS(CLIENT INCUB.); Future    7. Encounter for immunization  -     INFLUENZA, INJECTABLE, MDCK, PRESERVATIVE FREE, QUADRIVALENT  -     ADMIN INFLUENZA VIRUS VAC       -Patient is in good health  -Discussed with patient cancer risk factors and screens needed  -Colonoscopy was recommended based on current guidelines for screening.  -Labs from previous visits were discussed with patient yes  -Discussed with patient diet and exercise  -Immunizations appropriate for age were discussed with pt and updated  -  Follow-up and Dispositions    · Return in about 3 months (around 2/7/2020) for dm. I have discussed the diagnosis with the patient and the intended plan as seen in the above orders. The patient understands and agrees with the plan. The patient has received an after-visit summary and questions were answered concerning future plans.      Medication Side Effects and Warnings were discussed with patient  Patient Labs were reviewed and or requested  Patient Past Records were reviewed and or requested     There are no Patient Instructions on file for this visit.         Rola Harrison M.D.

## 2019-11-08 LAB
ALBUMIN SERPL-MCNC: 3.4 G/DL (ref 3.5–5)
ALBUMIN/GLOB SERPL: 0.9 {RATIO} (ref 1.1–2.2)
ALP SERPL-CCNC: 70 U/L (ref 45–117)
ALT SERPL-CCNC: 15 U/L (ref 12–78)
ANION GAP SERPL CALC-SCNC: 6 MMOL/L (ref 5–15)
AST SERPL-CCNC: 5 U/L (ref 15–37)
BASOPHILS # BLD: 0.1 K/UL (ref 0–0.1)
BASOPHILS NFR BLD: 1 % (ref 0–1)
BILIRUB SERPL-MCNC: 0.3 MG/DL (ref 0.2–1)
BUN SERPL-MCNC: 16 MG/DL (ref 6–20)
BUN/CREAT SERPL: 18 (ref 12–20)
CALCIUM SERPL-MCNC: 9 MG/DL (ref 8.5–10.1)
CHLORIDE SERPL-SCNC: 104 MMOL/L (ref 97–108)
CHOLEST SERPL-MCNC: 125 MG/DL
CO2 SERPL-SCNC: 30 MMOL/L (ref 21–32)
CREAT SERPL-MCNC: 0.88 MG/DL (ref 0.7–1.3)
DIFFERENTIAL METHOD BLD: ABNORMAL
EOSINOPHIL # BLD: 0.1 K/UL (ref 0–0.4)
EOSINOPHIL NFR BLD: 2 % (ref 0–7)
ERYTHROCYTE [DISTWIDTH] IN BLOOD BY AUTOMATED COUNT: 17.8 % (ref 11.5–14.5)
EST. AVERAGE GLUCOSE BLD GHB EST-MCNC: 209 MG/DL
FERRITIN SERPL-MCNC: 10 NG/ML (ref 26–388)
GLOBULIN SER CALC-MCNC: 3.8 G/DL (ref 2–4)
GLUCOSE SERPL-MCNC: 239 MG/DL (ref 65–100)
HBA1C MFR BLD: 8.9 % (ref 4.2–6.3)
HCT VFR BLD AUTO: 37.8 % (ref 36.6–50.3)
HDLC SERPL-MCNC: 52 MG/DL
HDLC SERPL: 2.4 {RATIO} (ref 0–5)
HGB BLD-MCNC: 10.8 G/DL (ref 12.1–17)
IMM GRANULOCYTES # BLD AUTO: 0 K/UL (ref 0–0.04)
IMM GRANULOCYTES NFR BLD AUTO: 0 % (ref 0–0.5)
IRON SATN MFR SERPL: 11 % (ref 20–50)
IRON SERPL-MCNC: 38 UG/DL (ref 35–150)
LDLC SERPL CALC-MCNC: 54.6 MG/DL (ref 0–100)
LIPID PROFILE,FLP: NORMAL
LYMPHOCYTES # BLD: 1.5 K/UL (ref 0.8–3.5)
LYMPHOCYTES NFR BLD: 27 % (ref 12–49)
MCH RBC QN AUTO: 28.3 PG (ref 26–34)
MCHC RBC AUTO-ENTMCNC: 28.6 G/DL (ref 30–36.5)
MCV RBC AUTO: 99.2 FL (ref 80–99)
MONOCYTES # BLD: 0.7 K/UL (ref 0–1)
MONOCYTES NFR BLD: 12 % (ref 5–13)
NEUTS SEG # BLD: 3.3 K/UL (ref 1.8–8)
NEUTS SEG NFR BLD: 58 % (ref 32–75)
NRBC # BLD: 0 K/UL (ref 0–0.01)
NRBC BLD-RTO: 0 PER 100 WBC
PLATELET # BLD AUTO: 238 K/UL (ref 150–400)
PLATELET COMMENTS,PCOM: ABNORMAL
PMV BLD AUTO: 11.2 FL (ref 8.9–12.9)
POTASSIUM SERPL-SCNC: 5 MMOL/L (ref 3.5–5.1)
PROT SERPL-MCNC: 7.2 G/DL (ref 6.4–8.2)
RBC # BLD AUTO: 3.81 M/UL (ref 4.1–5.7)
RBC MORPH BLD: ABNORMAL
SODIUM SERPL-SCNC: 140 MMOL/L (ref 136–145)
TIBC SERPL-MCNC: 354 UG/DL (ref 250–450)
TRIGL SERPL-MCNC: 92 MG/DL (ref ?–150)
VLDLC SERPL CALC-MCNC: 18.4 MG/DL
WBC # BLD AUTO: 5.7 K/UL (ref 4.1–11.1)

## 2019-11-11 LAB
M TB IFN-G BLD-IMP: NEGATIVE
QUANTIFERON CRITERIA, QFI1T: NORMAL
QUANTIFERON MITOGEN VALUE: >10 IU/ML
QUANTIFERON NIL VALUE: 0.04 IU/ML
QUANTIFERON TB1 AG: 0.08 IU/ML
QUANTIFERON TB2 AG: 0.09 IU/ML

## 2019-11-11 NOTE — PROGRESS NOTES
DM not at goal, what is current does at [facility]? Iron is low, is pt on iron?     Phyllis Snyder

## 2019-11-12 NOTE — PROGRESS NOTES
Spoke with caregiver stated she will call nurse back with pts insulin regimen and if he is taking iron supplements. Caregiver notes pt has not been following a low carb diet or being consciousness of diet choices.

## 2019-11-19 ENCOUNTER — PATIENT OUTREACH (OUTPATIENT)
Dept: FAMILY MEDICINE CLINIC | Age: 54
End: 2019-11-19

## 2019-11-20 NOTE — PROGRESS NOTES
Goals      Patient verbalizes understanding of self -management goals of living with Diabetes. 10/31/19 [address] staff will assist patient to keep BG log for review at appt 11/7. Will follow up after appt. MANUELA    11/20/19 A1C elevated further at [address]. Iron also low. Per nursing note, patient has not been compliant with [address] to call office to discuss insulin and iron intake, has not returned call. ACM left  requesting follow up. Will attempt call again next week.  MANUELA

## 2019-11-24 ENCOUNTER — IP HISTORICAL/CONVERTED ENCOUNTER (OUTPATIENT)
Dept: OTHER | Age: 54
End: 2019-11-24

## 2019-12-09 NOTE — PROGRESS NOTES
[employer]  Medical Oncology at [facility]  [phone]    Hematology / Oncology Consult    Reason for Visit:   Kay Griggs is a 62 y.o. male who is seen in consultation at the request of Dr. Adela Bennett for evaluation of anemia    History of Present Illness:   Kay Griggs is a 62 y.o. 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 Pt lives in a group home. Review of labs shows anemia since 2018, with Hgb fluctuating between 7-11 range. Currently taking iron supplements twice daily. He has recurrent nosebleeds which last anywhere from 2 -25 min. He has occasional hemoptysis. He reports intermittent red blood in stools and occasional black stools. No hematuria. He endorses SOB, which is at his baseline. Denies ice cravings. Last colonoscopy was 12-15 yrs ago by Dr. Any Cheng in [facility] notable for polyps. He states he usually has a BM four times a day. Patient's sister, Mason Enamorado, states patient's last colonoscopy was approx 5 yrs ago.  It was done at [facility] by        Labs 11/12/19: Hgb 10.8, MCV 99.2, ferritin 10, iron sat 11  Labs 2/2019: Hgb 7.9, MCV 77  Labs 11/14/18: Hgb 9.1 MCV 81    Past Medical History:   Diagnosis Date    Anxiety disorder     Arthritis     Depression     Diabetes ([facility] [address] [facility]     Liver disease     Neurological disorder     neuro cognative disorder    OCD (obsessive compulsive disorder)     Psychiatric disorder     OCD    Psychiatric disorder     anxiety    Seizures ([facility] [facility]     Thyroid disease       Past Surgical History:   Procedure Laterality Date    HX CRANIOTOMY        Social History     Tobacco Use    Smoking status: Former Smoker    Smokeless tobacco: Never Used   Substance Use Topics    Alcohol use: No      Family History   Problem Relation Age of Onset    Cancer Mother      Current Outpatient Medications   Medication Sig    insulin detemir U-100 (LEVEMIR) 100 unit/mL injection by SubCUTAneous route nightly.  divalproex DR (DEPAKOTE) 250 mg tablet 3 po bid  Indications: Convulsive Seizures    flash glucose scanning reader (FREESTYLE OTONIEL 14 DAY READER) misc Testing 6 times daily, dx: E11.65    insulin aspart U-100 (NOVOLOG U-100 INSULIN ASPART) 100 unit/mL injection Sliding scale at meals. DO NOT GIVE IF BLOOD SUGAR UNDER 150. 150-200 2 units; 201-250 4 units; 251-300 6 units. >300 8 units and call PCP    ferrous sulfate (IRON) 325 mg (65 mg iron) EC tablet Take 1 Tab by mouth two (2) times daily (with meals).  albuterol-ipratropium (DUO-NEB) 2.5 mg-0.5 mg/3 ml nebu 3 mL by Nebulization route.  flash glucose sensor (FREESTYLE OTONIEL 14 DAY SENSOR) kit Testing 6 times daily, dx: E11.65    terbinafine HCl (LAMISIL) 250 mg tablet Take 250 mg by mouth daily.  atorvastatin (LIPITOR) 10 mg tablet Take 10 mg by mouth nightly.  metFORMIN (GLUCOPHAGE) 850 mg tablet Take 850 mg by mouth three (3) times daily (with meals).  sertraline (ZOLOFT) 100 mg tablet Take 100 mg by mouth daily.  losartan (COZAAR) 25 mg tablet Take 25 mg by mouth daily.  mirtazapine (REMERON) 15 mg tablet Take 15 mg by mouth nightly.  ergocalciferol (VITAMIN D2) 50,000 unit capsule Take 50,000 Units by mouth every seven (7) days. No current facility-administered medications for this visit. Allergies   Allergen Reactions    Penicillins Rash        Review of Systems: A complete review of systems was obtained, negative except as described above.     Physical Exam:     Visit Vitals  /50   Pulse 92   Temp 97.7 °F (36.5 °C) (Oral)   Resp 16   Ht 5' 7\" (1.702 m)   Wt 151 lb 9.6 oz (68.8 kg)   SpO2 95%   BMI 23.74 kg/m²     ECOG PS: 3  General: Well developed, no acute distress  Eyes: PERRLA, EOMI, anicteric sclerae  HENT: Atraumatic, OP clear, TMs intact without erythema  Neck: Supple  Lymphatic: No cervical, supraclavicular, axillary or inguinal adenopathy  Respiratory: CTAB, normal respiratory effort  CV: Normal rate, regular rhythm, no murmurs, no peripheral edema  GI: Soft, nontender, nondistended, no masses, no hepatomegaly, no splenomegaly  MS: Normal gait and station. Digits without clubbing or cyanosis. Skin: No rashes, ecchymoses, or petechiae. Normal temperature, turgor, and texture. Neuro/Psych: Alert, oriented. 5/5 strength in all 4 extremities. Appropriate affect, normal judgment/insight. Results:     Lab Results   Component Value Date/Time    WBC 5.7 11/07/2019 10:57 AM    HGB 10.8 (L) 11/07/2019 10:57 AM    HCT 37.8 11/07/2019 10:57 AM    PLATELET 286 53/18/2330 10:57 AM    MCV 99.2 (H) 11/07/2019 10:57 AM    ABS. NEUTROPHILS 3.3 11/07/2019 10:57 AM     Lab Results   Component Value Date/Time    Sodium 140 11/07/2019 10:57 AM    Potassium 5.0 11/07/2019 10:57 AM    Chloride 104 11/07/2019 10:57 AM    CO2 30 11/07/2019 10:57 AM    Glucose 239 (H) 11/07/2019 10:57 AM    BUN 16 11/07/2019 10:57 AM    Creatinine 0.88 11/07/2019 10:57 AM    GFR est AA >60 11/07/2019 10:57 AM    GFR est non-AA >60 11/07/2019 10:57 AM    Calcium 9.0 11/07/2019 10:57 AM    Glucose (POC) 355 (H) 08/07/2018 06:00 PM    Creatinine (POC) 0.8 04/16/2018 02:53 PM     Lab Results   Component Value Date/Time    Bilirubin, total 0.3 11/07/2019 10:57 AM    ALT (SGPT) 15 11/07/2019 10:57 AM    AST (SGOT) 5 (L) 11/07/2019 10:57 AM    Alk.  phosphatase 70 11/07/2019 10:57 AM    Protein, total 7.2 11/07/2019 10:57 AM    Albumin 3.4 (L) 11/07/2019 10:57 AM    Globulin 3.8 11/07/2019 10:57 AM     Lab Results   Component Value Date/Time    Iron 38 11/07/2019 10:57 AM    TIBC 354 11/07/2019 10:57 AM    Iron % saturation 11 (L) 11/07/2019 10:57 AM    Ferritin 10 (L) 11/07/2019 10:57 AM       No results found for: B12LT, FOL, RBCF  Lab Results   Component Value Date/Time    TSH 1.880 10/17/2018 08:31 AM     Lab Results   Component Value Date/Time    Hep C Virus Ab <0.1 10/17/2018 08:31 AM         Imaging:     Radiology report(s) reviewed. Assessment & Plan:   Dasia Lambert is a 62 y.o. male here for evaluation of anemia. 1. Iron deficiency anemia: Likely 2/2 blood loss from GI tract as well as other sources of blood loss related to HHT. Although ferrous sulfate appears prescribed by Travis Pope in 2/2019, patient and staff member from his group home state he is not taking an iron supplement. Therefore, I feel it is reasonable to start with iron supplementation bid with meals rather than jumping to parenteral iron. He also needs GI evaluation to rule out ongoing bleeding lesion. -- Referral to GI for colonoscopy/EGD  -- Start daily iron supplement, ferrous sulfate 325mg bid with meals and stool softener prn for constipation. -- Urged patient and caretaker to call us if he is unable to tolerate iron supplements as we could write for parenteral iron at that time. -- Return in 8-9 weeks with repeat labs, MD visit    2. 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 Well-controlled at this time on Depakote delayed release 750 mg twice daily. Followed by neurology. 3. Type 2 Diabetes Mellitus: uncontrolled. Hgb A1c 8.8%. Managed by Dr. Radha Reddy. 4. HHT: Has intermittent bleeding from nose and GI tract per patient. Contact: August Reed (mom): [phone]    Emotional well being: Pt is coping well with his/her disease and has excellent support. I appreciate the opportunity to participate in Mr. Georgia Israel Jr.'s care.     Signed By: Laurent Garcia MD     December 10, 2019

## 2019-12-10 ENCOUNTER — OFFICE VISIT (OUTPATIENT)
Dept: ONCOLOGY | Age: 54
End: 2019-12-10

## 2019-12-10 VITALS
RESPIRATION RATE: 16 BRPM | BODY MASS INDEX: 23.79 KG/M2 | HEIGHT: 67 IN | HEART RATE: 92 BPM | TEMPERATURE: 97.7 F | DIASTOLIC BLOOD PRESSURE: 50 MMHG | SYSTOLIC BLOOD PRESSURE: 111 MMHG | OXYGEN SATURATION: 95 % | WEIGHT: 151.6 LBS

## 2019-12-10 DIAGNOSIS — G40.909 SEIZURE DISORDER (HCC): ICD-10-CM

## 2019-12-10 DIAGNOSIS — E11.649 UNCONTROLLED TYPE 2 DIABETES MELLITUS WITH HYPOGLYCEMIA, UNSPECIFIED HYPOGLYCEMIA COMA STATUS (HCC): ICD-10-CM

## 2019-12-10 DIAGNOSIS — D50.9 IRON DEFICIENCY ANEMIA, UNSPECIFIED IRON DEFICIENCY ANEMIA TYPE: Primary | ICD-10-CM

## 2019-12-10 DIAGNOSIS — I78.0 HEREDITARY HEMORRHAGIC TELANGIECTASIA (HCC): ICD-10-CM

## 2019-12-10 RX ORDER — LANOLIN ALCOHOL/MO/W.PET/CERES
325 CREAM (GRAM) TOPICAL
Qty: 60 TAB | Refills: 2 | Status: SHIPPED | OUTPATIENT
Start: 2019-12-10 | End: 2020-05-26 | Stop reason: SDUPTHER

## 2019-12-10 RX ORDER — LANOLIN ALCOHOL/MO/W.PET/CERES
325 CREAM (GRAM) TOPICAL
Qty: 60 TAB | Refills: 2 | Status: SHIPPED | OUTPATIENT
Start: 2019-12-10 | End: 2019-12-10 | Stop reason: SDUPTHER

## 2019-12-10 NOTE — PATIENT INSTRUCTIONS
I have placed a referral for GI to meet you. You will get a call about that appointment. They will discuss EGD, colonoscopy with you. I want you to start taking Ferrous sulfate 325mg twice a day with meals. Please add Docusate 50mg bid if you have constipation. Return to see us in 9 weeks to check on your anemia.

## 2019-12-16 ENCOUNTER — OFFICE VISIT (OUTPATIENT)
Dept: FAMILY MEDICINE CLINIC | Age: 54
End: 2019-12-16

## 2019-12-16 ENCOUNTER — HOSPITAL ENCOUNTER (OUTPATIENT)
Dept: LAB | Age: 54
Discharge: HOME | End: 2019-12-16

## 2019-12-16 VITALS
DIASTOLIC BLOOD PRESSURE: 73 MMHG | HEIGHT: 67 IN | RESPIRATION RATE: 20 BRPM | OXYGEN SATURATION: 95 % | SYSTOLIC BLOOD PRESSURE: 123 MMHG | WEIGHT: 152.6 LBS | HEART RATE: 99 BPM | TEMPERATURE: 97.9 F | BODY MASS INDEX: 23.95 KG/M2

## 2019-12-16 DIAGNOSIS — D64.9 ANEMIA, UNSPECIFIED TYPE: Primary | ICD-10-CM

## 2019-12-16 DIAGNOSIS — D64.9 ANEMIA, UNSPECIFIED TYPE: ICD-10-CM

## 2019-12-16 NOTE — PROGRESS NOTES
Patient here for hosp f/u fainting. Labs requested by Dr. Tenisha Montero. 1. Have you been to the ER, urgent care clinic since your last visit? Hospitalized since your last visit? No    2. Have you seen or consulted any other health care providers outside of the [address] [place] [address] since your last visit? Include any pap smears or colon screening. No       Chief Complaint   Patient presents with    Labs     labs by Dr. Tenisha Montero, [facility] f/u fainting , SSR     He is a 62 y.o. male who presents for evalution. Reviewed PmHx, RxHx, FmHx, SocHx, AllgHx and updated and dated in the chart. Patient Active Problem List    Diagnosis    Memory impairment    Obsessive-compulsive disorder    Seizure disorder ([facility] [facility]    Uncontrolled type 2 diabetes mellitus ([facility] [facility]     Last Assessment & Plan:    Hemoglobin A1c is elevated 8.8% this time which is up from 7.3% in December of 2016. Worsened. This is most certainly related to his alcohol use that he has restarted in the last several months  I advised him to stop drinking in the strongest possible terms      Organic mental disorder    Anxiety disorder    Congenital anomaly of pulmonary artery    Hereditary hemorrhagic telangiectasia (HCC)       Review of Systems - negative except as listed above in the HPI    Objective:     Vitals:    12/16/19 1349   BP: 123/73   Pulse: 99   Resp: 20   Temp: 97.9 °F (36.6 °C)   SpO2: 95%   Weight: 152 lb 9.6 oz (69.2 kg)   Height: 5' 7\" (1.702 m)     Physical Examination: General appearance - alert, well appearing, and in no distress  Chest - clear to auscultation, no wheezes, rales or rhonchi, symmetric air entry  Heart - normal rate, regular rhythm, normal S1, S2, no murmurs, rubs, clicks or gallops      Assessment/ Plan:   Diagnoses and all orders for this visit:    1. Anemia, unspecified type  -     CBC WITH AUTOMATED DIFF; Future  -     IRON PROFILE;  Future  -     FERRITIN; Future  -fax labs to Heme  -dw GH insulin regimen     Follow-up and Dispositions    · Return in about 3 months (around 3/16/2020). I have discussed the diagnosis with the patient and the intended plan as seen in the above orders. The patient understands and agrees with the plan. The patient has received an after-visit summary and questions were answered concerning future plans. Medication Side Effects and Warnings were discussed with patient  Patient Labs were reviewed and or requested:  Patient Past Records were reviewed and or requested    Sherlyn Dai M.D. There are no Patient Instructions on file for this visit.

## 2019-12-17 LAB
BASOPHILS # BLD: 0 K/UL (ref 0–0.1)
BASOPHILS NFR BLD: 1 % (ref 0–1)
DIFFERENTIAL METHOD BLD: ABNORMAL
EOSINOPHIL # BLD: 0.1 K/UL (ref 0–0.4)
EOSINOPHIL NFR BLD: 1 % (ref 0–7)
ERYTHROCYTE [DISTWIDTH] IN BLOOD BY AUTOMATED COUNT: 16.1 % (ref 11.5–14.5)
FERRITIN SERPL-MCNC: 11 NG/ML (ref 26–388)
HCT VFR BLD AUTO: 36.2 % (ref 36.6–50.3)
HGB BLD-MCNC: 11.4 G/DL (ref 12.1–17)
IMM GRANULOCYTES # BLD AUTO: 0 K/UL (ref 0–0.04)
IMM GRANULOCYTES NFR BLD AUTO: 1 % (ref 0–0.5)
IRON SATN MFR SERPL: 13 % (ref 20–50)
IRON SERPL-MCNC: 47 UG/DL (ref 35–150)
LYMPHOCYTES # BLD: 1.8 K/UL (ref 0.8–3.5)
LYMPHOCYTES NFR BLD: 28 % (ref 12–49)
MCH RBC QN AUTO: 30.6 PG (ref 26–34)
MCHC RBC AUTO-ENTMCNC: 31.5 G/DL (ref 30–36.5)
MCV RBC AUTO: 97.1 FL (ref 80–99)
MONOCYTES # BLD: 0.7 K/UL (ref 0–1)
MONOCYTES NFR BLD: 11 % (ref 5–13)
NEUTS SEG # BLD: 3.8 K/UL (ref 1.8–8)
NEUTS SEG NFR BLD: 58 % (ref 32–75)
NRBC # BLD: 0 K/UL (ref 0–0.01)
NRBC BLD-RTO: 0 PER 100 WBC
PLATELET # BLD AUTO: 388 K/UL (ref 150–400)
PMV BLD AUTO: 11.1 FL (ref 8.9–12.9)
RBC # BLD AUTO: 3.73 M/UL (ref 4.1–5.7)
TIBC SERPL-MCNC: 356 UG/DL (ref 250–450)
WBC # BLD AUTO: 6.5 K/UL (ref 4.1–11.1)

## 2019-12-24 ENCOUNTER — DOCUMENTATION ONLY (OUTPATIENT)
Dept: FAMILY MEDICINE CLINIC | Age: 54
End: 2019-12-24

## 2019-12-24 NOTE — PROGRESS NOTES
Faxed 12/16/19 & 11/07/19 office notes/lab results to Dr Christi Rawls per [facility] request. Fax #[phone] confirmation received.

## 2020-01-09 ENCOUNTER — PATIENT OUTREACH (OUTPATIENT)
Dept: FAMILY MEDICINE CLINIC | Age: 55
End: 2020-01-09

## 2020-01-09 NOTE — PROGRESS NOTES
Patient has graduated from the Complex Case Management  program on 01/09/20. Patient/family has the ability to self-manage at this time Care management goals have been completed. No further Ambulatory Care Manager follow up scheduled. Goals Addressed                 This Visit's Progress     COMPLETED: Patient verbalizes understanding of self -management goals of living with Diabetes. 10/31/19 [facility] staff will assist patient to keep BG log for review at appt 11/7. Will follow up after appt. APM    11/20/19 A1C elevated further at [address]. Iron also low. Per nursing note, patient has not been compliant with diabetic diet at [facility]. [address] [place] [address] to call office to discuss insulin and iron intake, has not returned call. ACM left VM requesting follow up. Will attempt call again next week. AP    01/09/20 no return calls from [facility] staff. Resolving encounter. APM            Patient has Ambulatory Care Manager's contact information for any further questions, concerns, or needs.   Patients upcoming visits:    Future Appointments   Date Time Provider Loulou Rezai   1/21/2020  7:15 AM Gagan Cano MD [facility]   2/6/2020  9:30 AM Gagan Cano MD [facility]   2/11/2020  1:00 PM Sada Worthington MD [facility]   8/27/2020 10:40 AM Noemí Barrow  [facility]

## 2020-01-30 ENCOUNTER — OFFICE VISIT (OUTPATIENT)
Dept: FAMILY MEDICINE CLINIC | Age: 55
End: 2020-01-30

## 2020-01-30 VITALS
HEART RATE: 92 BPM | DIASTOLIC BLOOD PRESSURE: 59 MMHG | SYSTOLIC BLOOD PRESSURE: 112 MMHG | WEIGHT: 153 LBS | BODY MASS INDEX: 24.01 KG/M2 | TEMPERATURE: 97.4 F | RESPIRATION RATE: 16 BRPM | OXYGEN SATURATION: 95 % | HEIGHT: 67 IN

## 2020-01-30 DIAGNOSIS — R19.7 DIARRHEA, UNSPECIFIED TYPE: Primary | ICD-10-CM

## 2020-01-30 DIAGNOSIS — E11.649 UNCONTROLLED TYPE 2 DIABETES MELLITUS WITH HYPOGLYCEMIA, UNSPECIFIED HYPOGLYCEMIA COMA STATUS (HCC): ICD-10-CM

## 2020-01-30 NOTE — PROGRESS NOTES
1. Have you been to the ER, urgent care clinic since your last visit? Hospitalized since your last visit? No    2. Have you seen or consulted any other health care providers outside of the [address] [place] [address] since your last visit? Include any pap smears or colon screening. No     Chief Complaint   Patient presents with    Diarrhea     Patient reports diarrhea about a week ago. Patient reports symptoms      Visit Vitals  /59 (BP 1 Location: Left arm, BP Patient Position: Sitting)   Pulse 92   Temp 97.4 °F (36.3 °C) (Oral)   Resp 16   Ht 5' 7\" (1.702 m)   Wt 153 lb (69.4 kg)   SpO2 95%   BMI 23.96 kg/m²       Chief Complaint   Patient presents with    Diarrhea     Patient reports diarrhea about a week ago. Patient reports symptoms comes and goes. He is a 62 y.o. male who presents for evalution. Reviewed PmHx, RxHx, FmHx, SocHx, AllgHx and updated and dated in the chart. Patient Active Problem List    Diagnosis    Memory impairment    Obsessive-compulsive disorder    Seizure disorder ([facility] [facility]    Uncontrolled type 2 diabetes mellitus ([facility] [facility]     Last Assessment & Plan:    Hemoglobin A1c is elevated 8.8% this time which is up from 7.3% in December of 2016. Worsened.   This is most certainly related to his alcohol use that he has restarted in the last several months  I advised him to stop drinking in the strongest possible terms      Organic mental disorder    Anxiety disorder    Congenital anomaly of pulmonary artery    Hereditary hemorrhagic telangiectasia (HCC)       Review of Systems - negative except as listed above in the HPI    Objective:     Vitals:    01/30/20 1051   BP: 112/59   Pulse: 92   Resp: 16   Temp: 97.4 °F (36.3 °C)   TempSrc: Oral   SpO2: 95%   Weight: 153 lb (69.4 kg)   Height: 5' 7\" (1.702 m)     Physical Examination: General appearance - alert, well appearing, and in no distress  Chest - clear to auscultation, no wheezes, rales or rhonchi, symmetric air entry  Heart - normal rate, regular rhythm, normal S1, S2, no murmurs, rubs, clicks or gallops  Abdomen - soft, nontender, nondistended, no masses or organomegaly      Assessment/ Plan:   Diagnoses and all orders for this visit:    1. Diarrhea, unspecified type  -better  -observe    2. Uncontrolled type 2 diabetes mellitus with hypoglycemia, unspecified hypoglycemia coma status ([facility] [address]  Lab Results   Component Value Date/Time    Hemoglobin A1c 8.9 (H) 11/07/2019 10:57 AM          Follow-up and Dispositions    · Return if symptoms worsen or fail to improve. I have discussed the diagnosis with the patient and the intended plan as seen in the above orders. The patient understands and agrees with the plan. The patient has received an after-visit summary and questions were answered concerning future plans. Medication Side Effects and Warnings were discussed with patient  Patient Labs were reviewed and or requested:  Patient Past Records were reviewed and or requested    Nellie Bull M.D. There are no Patient Instructions on file for this visit.

## 2020-02-17 DIAGNOSIS — D50.9 IRON DEFICIENCY ANEMIA, UNSPECIFIED IRON DEFICIENCY ANEMIA TYPE: ICD-10-CM

## 2020-02-25 ENCOUNTER — OFFICE VISIT (OUTPATIENT)
Dept: ONCOLOGY | Age: 55
End: 2020-02-25

## 2020-02-25 ENCOUNTER — HOSPITAL ENCOUNTER (OUTPATIENT)
Dept: LAB | Age: 55
Discharge: HOME | End: 2020-02-25

## 2020-02-25 VITALS
BODY MASS INDEX: 24.17 KG/M2 | RESPIRATION RATE: 16 BRPM | HEART RATE: 97 BPM | SYSTOLIC BLOOD PRESSURE: 121 MMHG | OXYGEN SATURATION: 93 % | DIASTOLIC BLOOD PRESSURE: 70 MMHG | WEIGHT: 154 LBS | HEIGHT: 67 IN | TEMPERATURE: 98.2 F

## 2020-02-25 DIAGNOSIS — D50.9 IRON DEFICIENCY ANEMIA, UNSPECIFIED IRON DEFICIENCY ANEMIA TYPE: ICD-10-CM

## 2020-02-25 DIAGNOSIS — G40.909 SEIZURE DISORDER (HCC): ICD-10-CM

## 2020-02-25 DIAGNOSIS — D50.9 IRON DEFICIENCY ANEMIA, UNSPECIFIED IRON DEFICIENCY ANEMIA TYPE: Primary | ICD-10-CM

## 2020-02-25 DIAGNOSIS — I78.0 HEREDITARY HEMORRHAGIC TELANGIECTASIA (HCC): ICD-10-CM

## 2020-02-25 LAB
BASOPHILS # BLD: 0.1 K/UL (ref 0–0.1)
BASOPHILS NFR BLD: 1 % (ref 0–1)
DIFFERENTIAL METHOD BLD: ABNORMAL
EOSINOPHIL # BLD: 0.1 K/UL (ref 0–0.4)
EOSINOPHIL NFR BLD: 2 % (ref 0–7)
ERYTHROCYTE [DISTWIDTH] IN BLOOD BY AUTOMATED COUNT: 14.8 % (ref 11.5–14.5)
FERRITIN SERPL-MCNC: 34 NG/ML (ref 26–388)
HCT VFR BLD AUTO: 35.1 % (ref 36.6–50.3)
HGB BLD-MCNC: 10.9 G/DL (ref 12.1–17)
IMM GRANULOCYTES # BLD AUTO: 0.2 K/UL (ref 0–0.04)
IMM GRANULOCYTES NFR BLD AUTO: 3 % (ref 0–0.5)
IRON SATN MFR SERPL: 7 % (ref 20–50)
IRON SERPL-MCNC: 25 UG/DL (ref 35–150)
LYMPHOCYTES # BLD: 1.8 K/UL (ref 0.8–3.5)
LYMPHOCYTES NFR BLD: 29 % (ref 12–49)
MCH RBC QN AUTO: 30.7 PG (ref 26–34)
MCHC RBC AUTO-ENTMCNC: 31.1 G/DL (ref 30–36.5)
MCV RBC AUTO: 98.9 FL (ref 80–99)
MONOCYTES # BLD: 0.9 K/UL (ref 0–1)
MONOCYTES NFR BLD: 15 % (ref 5–13)
NEUTS SEG # BLD: 3.2 K/UL (ref 1.8–8)
NEUTS SEG NFR BLD: 50 % (ref 32–75)
NRBC # BLD: 0 K/UL (ref 0–0.01)
NRBC BLD-RTO: 0 PER 100 WBC
PLATELET # BLD AUTO: 533 K/UL (ref 150–400)
PMV BLD AUTO: 10 FL (ref 8.9–12.9)
RBC # BLD AUTO: 3.55 M/UL (ref 4.1–5.7)
RBC MORPH BLD: ABNORMAL
TIBC SERPL-MCNC: 352 UG/DL (ref 250–450)
WBC # BLD AUTO: 6.3 K/UL (ref 4.1–11.1)

## 2020-02-25 NOTE — PROGRESS NOTES
[facility] [facility]   Medical Oncology at [address] [place] [address]  [phone]    Hematology / Oncology Consult    Reason for Visit:   Mal Munson is a 62 y.o. male who is seen in consultation at the request of Dr. Guillermo Jacinto for evaluation of anemia    History of Present Illness:   Mal Munson is a 62 y.o. 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 Pt lives in a group home. Review of labs shows anemia since 2018, with Hgb fluctuating between 7-11 range. Currently taking iron supplements twice daily. He has recurrent nosebleeds which last anywhere from 2 -25 min. He has occasional hemoptysis. He reports intermittent red blood in stools and occasional black stools. No hematuria. He endorses SOB, which is at his baseline. Denies ice cravings. Last colonoscopy was 12-15 yrs ago by Dr. Loni Mojica in [facility] notable for polyps. He states he usually has a BM four times a day. Patient's sister, Osmar Levin, states patient's last colonoscopy was approx 5 yrs ago. It was done at [facility] by      Labs 11/12/19: Hgb 10.8, MCV 99.2, ferritin 10, iron sat 11  Labs 2/2019: Hgb 7.9, MCV 77  Labs 11/14/18: Hgb 9.1 MCV 81    Interval History: Today, patient here for follow up of iron deficiency anemia. He was started on ferrous sulfate BID which he is taking. Dr. Guillermo Jacinto with [facility] evaluated patient on 12/31/19 with plans to perform colonoscopy and EGD. Patient missed appointment and is rescheduled. Unsure of reschedule date. Reports ongoing fatigue, napping one hour per day. Denies ice cravings. Denies SOB/MORE. Reports occasional dark blood when he has a bowel movement. He has a cough today, reports he is recovering from the flu.      Past Medical History:   Diagnosis Date    Anxiety disorder     Arthritis     Depression     Diabetes ([facility]     Liver disease     Neurological disorder     neuro cognative disorder    OCD (obsessive compulsive disorder)     Psychiatric disorder     OCD    Psychiatric disorder     anxiety    Seizures ([facility]     Thyroid disease       Past Surgical History:   Procedure Laterality Date    HX CRANIOTOMY        Social History     Tobacco Use    Smoking status: Former Smoker    Smokeless tobacco: Never Used   Substance Use Topics    Alcohol use: No      Family History   Problem Relation Age of Onset    Cancer Mother      Current Outpatient Medications   Medication Sig    docusate sodium (COLACE) 50 mg capsule Take 1 Cap by mouth two (2) times daily as needed for Constipation for up to 90 days.  ferrous sulfate 325 mg (65 mg iron) tablet Take 1 Tab by mouth two (2) times daily (after meals).  insulin detemir U-100 (LEVEMIR) 100 unit/mL injection by SubCUTAneous route nightly.  divalproex DR (DEPAKOTE) 250 mg tablet 3 po bid  Indications: Convulsive Seizures    flash glucose scanning reader (FREESTYLE OTONIEL 14 DAY READER) misc Testing 6 times daily, dx: E11.65    insulin aspart U-100 (NOVOLOG U-100 INSULIN ASPART) 100 unit/mL injection Sliding scale at meals. DO NOT GIVE IF BLOOD SUGAR UNDER 150. 150-200 2 units; 201-250 4 units; 251-300 6 units. >300 8 units and call PCP    albuterol-ipratropium (DUO-NEB) 2.5 mg-0.5 mg/3 ml nebu 3 mL by Nebulization route.  flash glucose sensor (FREESTYLE OTONIEL 14 DAY SENSOR) kit Testing 6 times daily, dx: E11.65    terbinafine HCl (LAMISIL) 250 mg tablet Take 250 mg by mouth daily.  atorvastatin (LIPITOR) 10 mg tablet Take 10 mg by mouth nightly.  metFORMIN (GLUCOPHAGE) 850 mg tablet Take 850 mg by mouth three (3) times daily (with meals).  sertraline (ZOLOFT) 100 mg tablet Take 100 mg by mouth daily.  losartan (COZAAR) 25 mg tablet Take 25 mg by mouth daily.  mirtazapine (REMERON) 15 mg tablet Take 15 mg by mouth nightly.  ergocalciferol (VITAMIN D2) 50,000 unit capsule Take 50,000 Units by mouth every seven (7) days. No current facility-administered medications for this visit. Allergies   Allergen Reactions    Penicillins Rash        Review of Systems: A complete review of systems was obtained, negative except as described above. Physical Exam:     Visit Vitals  /70   Pulse 97   Temp 98.2 °F (36.8 °C) (Oral)   Resp 16   Ht 5' 7\" (1.702 m)   Wt 154 lb (69.9 kg)   SpO2 93%   BMI 24.12 kg/m²     ECOG PS: 3  General: Well developed, no acute distress  Eyes: PERRLA, EOMI, anicteric sclerae  HENT: Atraumatic, OP clear, TMs intact without erythema  Neck: Supple  Lymphatic: No cervical, supraclavicular, axillary or inguinal adenopathy  Respiratory: CTAB, normal respiratory effort  CV: Normal rate, regular rhythm, no murmurs, no peripheral edema  GI: Soft, nontender, nondistended, no masses, no hepatomegaly, no splenomegaly  MS: Normal gait and station. Digits without clubbing or cyanosis. Skin: No rashes, ecchymoses, or petechiae. Normal temperature, turgor, and texture. Neuro/Psych: Alert, oriented. 5/5 strength in all 4 extremities. Appropriate affect, normal judgment/insight. Results:     Lab Results   Component Value Date/Time    WBC 6.5 12/16/2019 02:05 PM    HGB 11.4 (L) 12/16/2019 02:05 PM    HCT 36.2 (L) 12/16/2019 02:05 PM    PLATELET 453 66/29/8226 02:05 PM    MCV 97.1 12/16/2019 02:05 PM    ABS.  NEUTROPHILS 3.8 12/16/2019 02:05 PM     Lab Results   Component Value Date/Time    Sodium 140 11/07/2019 10:57 AM    Potassium 5.0 11/07/2019 10:57 AM    Chloride 104 11/07/2019 10:57 AM    CO2 30 11/07/2019 10:57 AM    Glucose 239 (H) 11/07/2019 10:57 AM    BUN 16 11/07/2019 10:57 AM    Creatinine 0.88 11/07/2019 10:57 AM    GFR est AA >60 11/07/2019 10:57 AM    GFR est non-AA >60 11/07/2019 10:57 AM    Calcium 9.0 11/07/2019 10:57 AM Glucose (POC) 355 (H) 08/07/2018 06:00 PM    Creatinine (POC) 0.8 04/16/2018 02:53 PM     Lab Results   Component Value Date/Time    Bilirubin, total 0.3 11/07/2019 10:57 AM    ALT (SGPT) 15 11/07/2019 10:57 AM    AST (SGOT) 5 (L) 11/07/2019 10:57 AM    Alk. phosphatase 70 11/07/2019 10:57 AM    Protein, total 7.2 11/07/2019 10:57 AM    Albumin 3.4 (L) 11/07/2019 10:57 AM    Globulin 3.8 11/07/2019 10:57 AM     Lab Results   Component Value Date/Time    Iron 47 12/16/2019 02:05 PM    TIBC 356 12/16/2019 02:05 PM    Iron % saturation 13 (L) 12/16/2019 02:05 PM    Ferritin 11 (L) 12/16/2019 02:05 PM       No results found for: B12LT, FOL, RBCF  Lab Results   Component Value Date/Time    TSH 1.880 10/17/2018 08:31 AM     Lab Results   Component Value Date/Time    Hep C Virus Ab <0.1 10/17/2018 08:31 AM         Imaging:     Radiology report(s) reviewed. Assessment & Plan:   Emerita Quintana. is a 62 y.o. male here for evaluation of anemia. 1. Iron deficiency anemia: Improving with iron supplementation. Was likely 2/2 blood loss from GI tract as well as other sources of blood loss related to HHT. He also needs GI evaluation to rule out ongoing bleeding lesion. [facility] evaluated patient on 12/31/19 with plans to perform colonoscopy and EGD, he missed the appointment and is rescheduled, group home coordinator unsure of date. -- Colonoscopy/EGD to be rescheduled. -- Continue daily iron supplement, ferrous sulfate 325mg bid with meals and stool softener prn for constipation. -- Urged patient and caretaker to call us if he is unable to tolerate iron supplements as we could write for parenteral iron at that time. -- CBC, iron profile, ferritin - now call patient with results - call mother or group home with results. -- Return in 12 weeks with repeat labs, MD visit. 2. 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  Well-controlled at this time on Depakote delayed release 750 mg twice daily. Followed by neurology. 3. Type 2 Diabetes Mellitus: uncontrolled. Hgb A1c 8.8%. Managed by Dr. Tony Heard. 4. HHT: Has intermittent bleeding from nose and GI tract per patient. Contact: Emma Campos (mom): [phone] or \"[place] [facility]\" [phone] [facility]    Emotional well being: Pt is coping well with his/her disease and has excellent support. I appreciate the opportunity to participate in Mr. Christal Pierce 's care.     Signed By: Jean Jaquez MD     February 25, 2020

## 2020-02-26 ENCOUNTER — TELEPHONE (OUTPATIENT)
Dept: ONCOLOGY | Age: 55
End: 2020-02-26

## 2020-02-26 PROBLEM — D50.0 IRON DEFICIENCY ANEMIA DUE TO CHRONIC BLOOD LOSS: Status: ACTIVE | Noted: 2020-02-26

## 2020-02-26 RX ORDER — HEPARIN 100 UNIT/ML
300-500 SYRINGE INTRAVENOUS AS NEEDED
Status: CANCELLED
Start: 2020-03-20

## 2020-02-26 RX ORDER — DIPHENHYDRAMINE HYDROCHLORIDE 50 MG/ML
25 INJECTION, SOLUTION INTRAMUSCULAR; INTRAVENOUS AS NEEDED
Status: CANCELLED
Start: 2020-03-13

## 2020-02-26 RX ORDER — SODIUM CHLORIDE 9 MG/ML
10 INJECTION INTRAMUSCULAR; INTRAVENOUS; SUBCUTANEOUS AS NEEDED
Status: CANCELLED | OUTPATIENT
Start: 2020-03-13

## 2020-02-26 RX ORDER — DIPHENHYDRAMINE HYDROCHLORIDE 50 MG/ML
50 INJECTION, SOLUTION INTRAMUSCULAR; INTRAVENOUS AS NEEDED
Status: CANCELLED
Start: 2020-03-13

## 2020-02-26 RX ORDER — HEPARIN 100 UNIT/ML
300-500 SYRINGE INTRAVENOUS AS NEEDED
Status: CANCELLED
Start: 2020-03-13

## 2020-02-26 RX ORDER — ACETAMINOPHEN 325 MG/1
650 TABLET ORAL AS NEEDED
Status: CANCELLED
Start: 2020-03-13

## 2020-02-26 RX ORDER — EPINEPHRINE 1 MG/ML
0.3 INJECTION, SOLUTION, CONCENTRATE INTRAVENOUS AS NEEDED
Status: CANCELLED | OUTPATIENT
Start: 2020-03-20

## 2020-02-26 RX ORDER — DIPHENHYDRAMINE HYDROCHLORIDE 50 MG/ML
25 INJECTION, SOLUTION INTRAMUSCULAR; INTRAVENOUS AS NEEDED
Status: CANCELLED
Start: 2020-03-20

## 2020-02-26 RX ORDER — ALBUTEROL SULFATE 0.83 MG/ML
2.5 SOLUTION RESPIRATORY (INHALATION) AS NEEDED
Status: CANCELLED
Start: 2020-03-13

## 2020-02-26 RX ORDER — SODIUM CHLORIDE 9 MG/ML
25 INJECTION, SOLUTION INTRAVENOUS CONTINUOUS
Status: CANCELLED
Start: 2020-03-13

## 2020-02-26 RX ORDER — DIPHENHYDRAMINE HYDROCHLORIDE 50 MG/ML
50 INJECTION, SOLUTION INTRAMUSCULAR; INTRAVENOUS AS NEEDED
Status: CANCELLED
Start: 2020-03-20

## 2020-02-26 RX ORDER — ONDANSETRON 2 MG/ML
8 INJECTION INTRAMUSCULAR; INTRAVENOUS AS NEEDED
Status: CANCELLED | OUTPATIENT
Start: 2020-03-20

## 2020-02-26 RX ORDER — ALBUTEROL SULFATE 0.83 MG/ML
2.5 SOLUTION RESPIRATORY (INHALATION) AS NEEDED
Status: CANCELLED
Start: 2020-03-20

## 2020-02-26 RX ORDER — SODIUM CHLORIDE 9 MG/ML
10 INJECTION INTRAMUSCULAR; INTRAVENOUS; SUBCUTANEOUS AS NEEDED
Status: CANCELLED | OUTPATIENT
Start: 2020-03-20

## 2020-02-26 RX ORDER — HYDROCORTISONE SODIUM SUCCINATE 100 MG/2ML
100 INJECTION, POWDER, FOR SOLUTION INTRAMUSCULAR; INTRAVENOUS AS NEEDED
Status: CANCELLED | OUTPATIENT
Start: 2020-03-20

## 2020-02-26 RX ORDER — ACETAMINOPHEN 325 MG/1
650 TABLET ORAL AS NEEDED
Status: CANCELLED
Start: 2020-03-20

## 2020-02-26 RX ORDER — SODIUM CHLORIDE 0.9 % (FLUSH) 0.9 %
10 SYRINGE (ML) INJECTION AS NEEDED
Status: CANCELLED
Start: 2020-03-20

## 2020-02-26 RX ORDER — SODIUM CHLORIDE 9 MG/ML
25 INJECTION, SOLUTION INTRAVENOUS CONTINUOUS
Status: CANCELLED
Start: 2020-03-20

## 2020-02-26 RX ORDER — EPINEPHRINE 1 MG/ML
0.3 INJECTION, SOLUTION, CONCENTRATE INTRAVENOUS AS NEEDED
Status: CANCELLED | OUTPATIENT
Start: 2020-03-13

## 2020-02-26 RX ORDER — SODIUM CHLORIDE 0.9 % (FLUSH) 0.9 %
10 SYRINGE (ML) INJECTION AS NEEDED
Status: CANCELLED
Start: 2020-03-13

## 2020-02-26 RX ORDER — HYDROCORTISONE SODIUM SUCCINATE 100 MG/2ML
100 INJECTION, POWDER, FOR SOLUTION INTRAMUSCULAR; INTRAVENOUS AS NEEDED
Status: CANCELLED | OUTPATIENT
Start: 2020-03-13

## 2020-02-26 RX ORDER — ONDANSETRON 2 MG/ML
8 INJECTION INTRAMUSCULAR; INTRAVENOUS AS NEEDED
Status: CANCELLED | OUTPATIENT
Start: 2020-03-13

## 2020-02-26 NOTE — PROGRESS NOTES
Called and spoke with patient's sister, Caden Armenta (okay per PHI on file). Informed her, per Yareli Anaya, that patient's repeat labs show anemia despite taking iron supplements twice daily. Advised that Dr. Marko Fiore and Yareli Anaya recommend injectafer x 2. Discussed that this consists of two separate appointments in the infusion center, scheduled a week a part from each other. Also discussed that medication is administered via an IV and that the  will contact patient's group home directly to coordinate scheduling of appointments. Caden Armenta agreeable and verbalized understanding. No further questions or concerns at this time.

## 2020-02-26 NOTE — TELEPHONE ENCOUNTER
LVM on Supervisor line about Appts for the injectafer .   Any questions, please call the office    [phone]

## 2020-02-26 NOTE — TELEPHONE ENCOUNTER
----- Message from Chuy Nicolas sent at 2/26/2020  1:22 PM EST -----  Can someone please call this patients group home and let them know he has appointments on 3/10 and 3/17 at 3:30. If that does not work we can reschedule  ----- Message -----  From: Ivania Crouch RN  Sent: 2/26/2020  11:18 AM EST  To: Taye Gallegos RN, #    Please schedule patient for injectafer x 2. He lives in a group home, number is listed at bottom of office note as well as on PHI.      Thank you,  Darwin Blanton

## 2020-02-28 ENCOUNTER — OFFICE VISIT (OUTPATIENT)
Dept: FAMILY MEDICINE CLINIC | Age: 55
End: 2020-02-28

## 2020-02-28 VITALS
HEART RATE: 100 BPM | SYSTOLIC BLOOD PRESSURE: 116 MMHG | WEIGHT: 154.1 LBS | TEMPERATURE: 96 F | RESPIRATION RATE: 18 BRPM | BODY MASS INDEX: 24.19 KG/M2 | DIASTOLIC BLOOD PRESSURE: 78 MMHG | HEIGHT: 67 IN | OXYGEN SATURATION: 96 %

## 2020-02-28 DIAGNOSIS — E16.2 HYPOGLYCEMIA: Primary | ICD-10-CM

## 2020-02-28 NOTE — PROGRESS NOTES
Chief Complaint   Patient presents with   [facility] Follow Up     patient is here for a hospital follow up for oxana Abreu. is a 62 y.o. male who presents for evaluation. Here for hospital follow up from [facility]. [facility] caregiver states pts BG was low, he was admitted for \"a few days\" for this  Caregiver does not know how low BG was during the hospital   Hx of DM, states his insulin was lowered prior to discharge and BG no longer low  Checking BG 4-5 times a day  Has endocrinologist and follow up has already been planned per caregiver  Also has appt here next week for annual physical  Has appt w/ podiatrist upcoming as well for concerns about long toenails    Reviewed PmHx, RxHx, FmHx, SocHx, AllgHx and updated and dated in the chart. Patient Active Problem List    Diagnosis    Iron deficiency anemia due to chronic blood loss    Memory impairment    Obsessive-compulsive disorder    [facility] disorder ([facility]    Uncontrolled type 2 diabetes mellitus ([facility]     Last Assessment & Plan:    Hemoglobin A1c is elevated 8.8% this time which is up from 7.3% in December of 2016. Worsened.   This is most certainly related to his alcohol use that he has restarted in the last several months  I advised him to stop drinking in the strongest possible terms      Organic mental disorder    Anxiety disorder    Congenital anomaly of pulmonary artery    Hereditary hemorrhagic telangiectasia (HCC)       Review of Systems - negative except as listed above in the HPI    Objective:     Vitals:    02/28/20 1425   BP: 116/78   Pulse: 100   Resp: 18   Temp: 96 °F (35.6 °C)   TempSrc: Oral   SpO2: 96%   Weight: 154 lb 1.6 oz (69.9 kg)   Height: 5' 7\" (1.702 m)     Physical Examination: General appearance - alert, well appearing, and in no distress  Mental status - alert, oriented to person, place, and time  Chest - clear to auscultation, no wheezes, rales or rhonchi, symmetric air entry  Heart - normal rate, regular rhythm, normal S1, S2, no murmurs, rubs, clicks or gallops  Extremities - peripheral pulses normal, no pedal edema, no clubbing or cyanosis    Assessment/ Plan:   Diagnoses and all orders for this visit:    1. Hypoglycemia  - Resolved s/p discharge from hospital, insulin lowered  - Has f/u with endo planned   - Will defer labs, has annual physical w/ me on Monday     Follow-up and Dispositions    · Return if symptoms worsen or fail to improve. I have discussed the diagnosis with the patient and the intended plan as seen in the above orders. The patient understands and agrees with the plan. The patient has received an after-visit summary and questions were answered concerning future plans. Medication Side Effects and Warnings were discussed with patient  Patient Labs were reviewed and or requested:  Patient Past Records were reviewed and or requested    Radha Klein NP  [facility]    There are no Patient Instructions on file for this visit.

## 2020-02-28 NOTE — PROGRESS NOTES
Joel Anderson. is a 62 y.o. male , id x 2(name and ). Reviewed record, history, and  medications. Chief Complaint   Patient presents with   [facility] Follow Up     patient is here for a hospital follow up for flu       Vitals:    20 1425   BP: 116/78   Pulse: 100   Resp: 18   Temp: 96 °F (35.6 °C)   TempSrc: Oral   SpO2: 96%   Weight: 154 lb 1.6 oz (69.9 kg)   Height: 5' 7\" (1.702 m)   PainSc:   0 - No pain       Coordination of Care Questionnaire:   1) Have you been to an emergency room, urgent care, or hospitalized since your last visit? yes   Patient was seen at [facility] for flu  2. Have seen or consulted any other health care provider since your last visit? YES      3 most recent PHQ Screens 2020   Little interest or pleasure in doing things Not at all   Feeling down, depressed, irritable, or hopeless Not at all   Total Score PHQ 2 0       Patient is accompanied by self and adult caretaker I have received verbal consent from Joel AndersonKendell to discuss any/all medical information while they are present in the room.

## 2020-03-04 ENCOUNTER — OFFICE VISIT (OUTPATIENT)
Dept: FAMILY MEDICINE CLINIC | Age: 55
End: 2020-03-04

## 2020-03-04 ENCOUNTER — HOSPITAL ENCOUNTER (OUTPATIENT)
Dept: LAB | Age: 55
Discharge: HOME | End: 2020-03-04

## 2020-03-04 VITALS
BODY MASS INDEX: 24.6 KG/M2 | OXYGEN SATURATION: 96 % | RESPIRATION RATE: 18 BRPM | HEART RATE: 94 BPM | TEMPERATURE: 97.7 F | SYSTOLIC BLOOD PRESSURE: 129 MMHG | HEIGHT: 67 IN | WEIGHT: 156.7 LBS | DIASTOLIC BLOOD PRESSURE: 62 MMHG

## 2020-03-04 DIAGNOSIS — D64.9 CHRONIC ANEMIA: ICD-10-CM

## 2020-03-04 DIAGNOSIS — Z79.4 CONTROLLED TYPE 2 DIABETES MELLITUS WITHOUT COMPLICATION, WITH LONG-TERM CURRENT USE OF INSULIN (HCC): ICD-10-CM

## 2020-03-04 DIAGNOSIS — Z13.29 SCREENING FOR THYROID DISORDER: ICD-10-CM

## 2020-03-04 DIAGNOSIS — G40.909 SEIZURE DISORDER (HCC): ICD-10-CM

## 2020-03-04 DIAGNOSIS — E11.9 CONTROLLED TYPE 2 DIABETES MELLITUS WITHOUT COMPLICATION, WITH LONG-TERM CURRENT USE OF INSULIN (HCC): ICD-10-CM

## 2020-03-04 DIAGNOSIS — Z11.1 SCREENING-PULMONARY TB: ICD-10-CM

## 2020-03-04 DIAGNOSIS — Z00.00 ROUTINE GENERAL MEDICAL EXAMINATION AT A HEALTH CARE FACILITY: Primary | ICD-10-CM

## 2020-03-04 LAB
ALBUMIN SERPL-MCNC: 3.3 G/DL (ref 3.5–5)
ALBUMIN/GLOB SERPL: 0.8 {RATIO} (ref 1.1–2.2)
ALP SERPL-CCNC: 65 U/L (ref 45–117)
ALT SERPL-CCNC: 16 U/L (ref 12–78)
ANION GAP SERPL CALC-SCNC: 10 MMOL/L (ref 5–15)
AST SERPL-CCNC: 6 U/L (ref 15–37)
BASOPHILS # BLD: 0 K/UL (ref 0–0.1)
BASOPHILS NFR BLD: 1 % (ref 0–1)
BILIRUB SERPL-MCNC: 0.2 MG/DL (ref 0.2–1)
BUN SERPL-MCNC: 30 MG/DL (ref 6–20)
BUN/CREAT SERPL: 28 (ref 12–20)
CALCIUM SERPL-MCNC: 9 MG/DL (ref 8.5–10.1)
CHLORIDE SERPL-SCNC: 101 MMOL/L (ref 97–108)
CHOLEST SERPL-MCNC: 155 MG/DL
CO2 SERPL-SCNC: 26 MMOL/L (ref 21–32)
CREAT SERPL-MCNC: 1.08 MG/DL (ref 0.7–1.3)
DIFFERENTIAL METHOD BLD: ABNORMAL
EOSINOPHIL # BLD: 0.1 K/UL (ref 0–0.4)
EOSINOPHIL NFR BLD: 1 % (ref 0–7)
ERYTHROCYTE [DISTWIDTH] IN BLOOD BY AUTOMATED COUNT: 14.6 % (ref 11.5–14.5)
EST. AVERAGE GLUCOSE BLD GHB EST-MCNC: 186 MG/DL
GLOBULIN SER CALC-MCNC: 3.9 G/DL (ref 2–4)
GLUCOSE SERPL-MCNC: 388 MG/DL (ref 65–100)
HBA1C MFR BLD: 8.1 % (ref 4–5.6)
HCT VFR BLD AUTO: 34.1 % (ref 36.6–50.3)
HDLC SERPL-MCNC: 44 MG/DL
HDLC SERPL: 3.5 {RATIO} (ref 0–5)
HGB BLD-MCNC: 10.7 G/DL (ref 12.1–17)
IMM GRANULOCYTES # BLD AUTO: 0 K/UL (ref 0–0.04)
IMM GRANULOCYTES NFR BLD AUTO: 0 % (ref 0–0.5)
LDLC SERPL CALC-MCNC: 92.2 MG/DL (ref 0–100)
LIPID PROFILE,FLP: NORMAL
LYMPHOCYTES # BLD: 1.5 K/UL (ref 0.8–3.5)
LYMPHOCYTES NFR BLD: 33 % (ref 12–49)
MCH RBC QN AUTO: 30.6 PG (ref 26–34)
MCHC RBC AUTO-ENTMCNC: 31.4 G/DL (ref 30–36.5)
MCV RBC AUTO: 97.4 FL (ref 80–99)
MONOCYTES # BLD: 0.5 K/UL (ref 0–1)
MONOCYTES NFR BLD: 12 % (ref 5–13)
NEUTS SEG # BLD: 2.4 K/UL (ref 1.8–8)
NEUTS SEG NFR BLD: 53 % (ref 32–75)
NRBC # BLD: 0 K/UL (ref 0–0.01)
NRBC BLD-RTO: 0 PER 100 WBC
PLATELET # BLD AUTO: 406 K/UL (ref 150–400)
PMV BLD AUTO: 10.8 FL (ref 8.9–12.9)
POTASSIUM SERPL-SCNC: 5.1 MMOL/L (ref 3.5–5.1)
PROT SERPL-MCNC: 7.2 G/DL (ref 6.4–8.2)
RBC # BLD AUTO: 3.5 M/UL (ref 4.1–5.7)
SODIUM SERPL-SCNC: 137 MMOL/L (ref 136–145)
TRIGL SERPL-MCNC: 94 MG/DL (ref ?–150)
TSH SERPL DL<=0.05 MIU/L-ACNC: 0.94 UIU/ML (ref 0.36–3.74)
VLDLC SERPL CALC-MCNC: 18.8 MG/DL
WBC # BLD AUTO: 4.5 K/UL (ref 4.1–11.1)

## 2020-03-04 NOTE — PROGRESS NOTES
Chris Maravilla is a 62 y.o. male , id x 2(name and ). Reviewed record, history, and  medications. Chief Complaint   Patient presents with    Physical     patient is here for annual physical       Vitals:    20 1435   BP: 129/62   Pulse: 94   Resp: 18   Temp: 97.7 °F (36.5 °C)   TempSrc: Oral   SpO2: 96%   Weight: 156 lb 11.2 oz (71.1 kg)   Height: 5' 7\" (1.702 m)   PainSc:   0 - No pain       Coordination of Care Questionnaire:   1) Have you been to an emergency room, urgent care, or hospitalized since your last visit?   no       2. Have seen or consulted any other health care provider since your last visit? NO      3 most recent PHQ Screens 2020   Little interest or pleasure in doing things Not at all   Feeling down, depressed, irritable, or hopeless Not at all   Total Score PHQ 2 0       Patient is accompanied by self I have received verbal consent from Chris Maravilla to discuss any/all medical information while they are present in the room.

## 2020-03-04 NOTE — PROGRESS NOTES
Chief Complaint   Patient presents with    Physical     patient is here for annual physical     Yony Bush. is a 62y.o. year old male who presents for evaluation/CPE. [address] [facility] patient here for annual physical and TB screen  Wants me to look at right foot, just saw podiatrist yesterday and states his toe is bothering him  Followed by endocrinologist for DM. States BG was 347 before lunch today. Recently adjusted insulin s/p hospital admission for hypoglycemia. [address] [facility] caregiver unsure of when his next follow up with endo is. Had 2 sandwiches for lunch (not fasting). Reviewed PmHx, RxHx, FmHx, SocHx, AllgHx and updated and dated in the chart. Patient Active Problem List    Diagnosis    Iron deficiency anemia due to chronic blood loss    Memory impairment    Obsessive-compulsive disorder    Se[facility] disorder ([facility]    Uncontrolled type 2 diabetes mellitus ([facility]     Last Assessment & Plan:    Hemoglobin A1c is elevated 8.8% this time which is up from 7.3% in December of 2016. Worsened.   This is most certainly related to his alcohol use that he has restarted in the last several months  I advised him to stop drinking in the strongest possible terms      Organic mental disorder    Anxiety disorder    Congenital anomaly of pulmonary artery    Hereditary hemorrhagic telangiectasia (HCC)       Review of Systems - negative except as listed above in the HPI    Objective:     Vitals:    03/04/20 1435   BP: 129/62   Pulse: 94   Resp: 18   Temp: 97.7 °F (36.5 °C)   TempSrc: Oral   SpO2: 96%   Weight: 156 lb 11.2 oz (71.1 kg)   Height: 5' 7\" (1.702 m)     Physical Examination: General appearance - alert, well appearing, and in no distress  Mental status - alert, oriented to person, place, and time  Eyes - pupils equal and reactive, extraocular eye movements intact  Ears - bilateral TM's and external ear canals normal  Nose - normal and patent, no erythema, discharge or polyps  Mouth - mucous membranes moist, pharynx normal without lesions  Neck - supple, no significant adenopathy  Chest - clear to auscultation, no wheezes, rales or rhonchi, symmetric air entry  Heart - normal rate, regular rhythm, normal S1, S2, no murmurs, rubs, clicks or gallops  Abdomen - soft, nontender, nondistended, no masses or organomegaly  Extremities - peripheral pulses normal, no pedal edema, no clubbing or cyanosis    Assessment/ Plan:   Diagnoses and all orders for this visit:    1. Routine general medical examination at a health care facility  - Completed group home paperwork    2. Seizure disorder ([facility]  -     CBC WITH AUTOMATED DIFF; Future  -     METABOLIC PANEL, COMPREHENSIVE; Future    3. Chronic anemia  -     CBC WITH AUTOMATED DIFF; Future    4. Screening-pulmonary TB  -     QUANTIFERON-TB GOLD PLUS    5. Controlled type 2 diabetes mellitus without complication, with long-term current use of insulin ([facility]  -     LIPID PANEL; Future  -     HEMOGLOBIN A1C WITH EAG; Future  - Followed by endo, on insulin    6. Screening for thyroid disorder  -     TSH 3RD GENERATION; Future       -Patient is in good health  -Discussed with patient cancer risk factors and screens needed  -Colonoscopy was recommended based on current guidelines for screening.  -Labs from previous visits were discussed with patient: yes  -Discussed with patient diet and exercise  -Immunizations appropriate for age were discussed with pt and updated    Follow-up and Dispositions    · Return if symptoms worsen or fail to improve. I have discussed the diagnosis with the patient and the intended plan as seen in the above orders. The patient understands and agrees with the plan. The patient has received an after-visit summary and questions were answered concerning future plans.      Medication Side Effects and Warnings were discussed with patient  Patient Labs were reviewed and or requested  Patient Past Records were reviewed and or requested     There are no Patient Instructions on file for this visit.       Josee Ascencio NP  [address] [facility]

## 2020-03-05 NOTE — PROGRESS NOTES
Please inform group home caregiver that diabetes is above goal. He needs to follow up with his endocrinologist to adjust his insulin. Cholesterol is perfect. Complete blood count shows he is still anemic but stable compared to last check.

## 2020-03-05 NOTE — PROGRESS NOTES
Patients gh was contacted, I informed them that his diabetes is above goal an that he needs to make a follow up with his endocrinologist, his cholesterol is perfect and his complete blood count shows he still is anemic but stable since last time.

## 2020-03-07 LAB
GAMMA INTERFERON BACKGROUND BLD IA-ACNC: 0.02 IU/ML
M TB IFN-G BLD-IMP: NEGATIVE
M TB IFN-G CD4+ BCKGRND COR BLD-ACNC: 0.1 IU/ML
MITOGEN IGNF BLD-ACNC: >10 IU/ML
QUANTIFERON INCUBATION, QF1T: NORMAL
QUANTIFERON TB2 AG: 0.11 IU/ML
SERVICE CMNT-IMP: NORMAL

## 2020-03-12 ENCOUNTER — TELEPHONE (OUTPATIENT)
Dept: FAMILY MEDICINE CLINIC | Age: 55
End: 2020-03-12

## 2020-03-13 ENCOUNTER — HOSPITAL ENCOUNTER (OUTPATIENT)
Dept: INFUSION THERAPY | Age: 55
Discharge: HOME | End: 2020-03-13
Payer: MEDICARE

## 2020-03-13 VITALS
WEIGHT: 154.9 LBS | DIASTOLIC BLOOD PRESSURE: 67 MMHG | RESPIRATION RATE: 16 BRPM | SYSTOLIC BLOOD PRESSURE: 123 MMHG | OXYGEN SATURATION: 97 % | TEMPERATURE: 97 F | HEART RATE: 87 BPM | BODY MASS INDEX: 24.89 KG/M2 | HEIGHT: 66 IN

## 2020-03-13 DIAGNOSIS — D50.0 IRON DEFICIENCY ANEMIA DUE TO CHRONIC BLOOD LOSS: Primary | ICD-10-CM

## 2020-03-13 PROCEDURE — 74011250636 HC RX REV CODE- 250/636: Performed by: NURSE PRACTITIONER

## 2020-03-13 PROCEDURE — 96365 THER/PROPH/DIAG IV INF INIT: CPT

## 2020-03-13 PROCEDURE — 74011000258 HC RX REV CODE- 258: Performed by: NURSE PRACTITIONER

## 2020-03-13 RX ORDER — SODIUM CHLORIDE 9 MG/ML
25 INJECTION, SOLUTION INTRAVENOUS CONTINUOUS
Status: DISCONTINUED | OUTPATIENT
Start: 2020-03-13 | End: 2020-03-14 | Stop reason: HOSPADM

## 2020-03-13 RX ADMIN — SODIUM CHLORIDE 25 ML/HR: 900 INJECTION, SOLUTION INTRAVENOUS at 12:42

## 2020-03-13 RX ADMIN — FERRIC CARBOXYMALTOSE INJECTION 750 MG: 50 INJECTION, SOLUTION INTRAVENOUS at 12:46

## 2020-03-13 NOTE — PROGRESS NOTES
Outpatient Infusion Center Short Visit Progress Note    1120 Patient admitted to [facility] ambulatory in stable condition. Assessment completed. No new concerns voiced. Vital Signs:  Visit Vitals  /67   Pulse 87   Temp 97 °F (36.1 °C)   Resp 16   Ht 5' 6\" (1.676 m)   Wt 70.3 kg (154 lb 14.4 oz)   SpO2 97%   BMI 25.00 kg/m²         24 PIV placed in right hand with positive blood return. Lab Results:  N/A        Medications:  Medications Administered     0.9% sodium chloride infusion     Admin Date  03/13/2020 Action  New Bag Dose  25 mL/hr Rate  25 mL/hr Route  IntraVENous Administered By  Isidro Vo RN          ferric carboxymaltose (INJECTAFER) 750 mg in 0.9% sodium chloride 250 mL, overfill volume 25 mL IVPB     Admin Date  03/13/2020 Action  Given Dose  750 mg Rate  870 mL/hr Route  IntraVENous Administered By  Isidro Vo RN                Patient tolerated treatment well. Patient discharged from [place] [address] ambulatory in no distress at 1315 . Patient aware of next appointment.     Future Appointments   Date Time Provider Loulou Kelley   3/20/2020 11:00 AM SS INF7 CH1 LAB PsychiatricS . [place]   5/19/2020  1:00 PM Minerva Ford MD [facility] SCHED   6/11/2020  9:00 AM Emily George NP [facility] SCHED   8/27/2020 10:40 AM Myah Charles  [facility]

## 2020-03-13 NOTE — PROGRESS NOTES
OUTPATIENT INFUSION CENTER    DISCHARGE INSTRUCTIONS FOR:    IRON INFUSIONS - INCLUDING VENOFER, FERRLECIT, AND INFED    You should continue to take your usual home medications unless otherwise instructed by your physician. Drink plenty of fluids and eat your usual diet. All medications have the potential to cause side effects. Your physician can instruct you regarding any necessary treatment for side effects. Some possible side effects of iron infusions may include the following:     - Urinary changes;   - Mild muscle cramping;   - Mild nausea, stomach pain, diarrhea or constipation;   - Mild skin itching, mild pain at IV site. Signs/Symptoms of an allergic reaction may require immediate medical attention. These may include one or more of the following:       Skin redness, itching, swelling, blistering, weeping, crusting, rash or hives. Wheezing, chest tightness, cough, or shortness of breath;   Swelling of the face, eyelids, lips, tongue, or throat;  Severe headache, seizures or tremor;  Stuffy nose, runny nose, sneezing;   Red (bloodshot), itchy, swollen, or watery eyes;  Stomach  pain, nausea, vomiting, diarrhea or bloody diarrhea; Chest pain or tightness, increased heart rate, palpitations, changes in blood pressure which can cause dizziness, unusual feelings of weakness or fatigue;  Back ache or pain around waist;  Painful urination, increase or decrease in amount of urine, blood in urine. Contact your physicians office with any questions or concerns regarding your treatment.     Mika Aguirre., Signature: _____________________________________ 3/13/2020  Smitha Weathers RN

## 2020-03-17 ENCOUNTER — APPOINTMENT (OUTPATIENT)
Dept: INFUSION THERAPY | Age: 55
End: 2020-03-17
Payer: MEDICARE

## 2020-03-17 ENCOUNTER — HOSPITAL ENCOUNTER (OUTPATIENT)
Dept: INFUSION THERAPY | Age: 55
End: 2020-03-17

## 2020-03-20 ENCOUNTER — HOSPITAL ENCOUNTER (OUTPATIENT)
Dept: INFUSION THERAPY | Age: 55
Discharge: HOME | End: 2020-03-20
Payer: MEDICARE

## 2020-03-20 VITALS
WEIGHT: 155.8 LBS | SYSTOLIC BLOOD PRESSURE: 127 MMHG | BODY MASS INDEX: 25.15 KG/M2 | OXYGEN SATURATION: 94 % | HEART RATE: 80 BPM | RESPIRATION RATE: 18 BRPM | DIASTOLIC BLOOD PRESSURE: 58 MMHG | TEMPERATURE: 96.3 F

## 2020-03-20 DIAGNOSIS — D50.0 IRON DEFICIENCY ANEMIA DUE TO CHRONIC BLOOD LOSS: Primary | ICD-10-CM

## 2020-03-20 PROCEDURE — 96365 THER/PROPH/DIAG IV INF INIT: CPT

## 2020-03-20 PROCEDURE — 74011000258 HC RX REV CODE- 258: Performed by: NURSE PRACTITIONER

## 2020-03-20 PROCEDURE — 74011250636 HC RX REV CODE- 250/636: Performed by: NURSE PRACTITIONER

## 2020-03-20 RX ORDER — SODIUM CHLORIDE 9 MG/ML
25 INJECTION, SOLUTION INTRAVENOUS CONTINUOUS
Status: DISCONTINUED | OUTPATIENT
Start: 2020-03-20 | End: 2020-03-21 | Stop reason: HOSPADM

## 2020-03-20 RX ADMIN — FERRIC CARBOXYMALTOSE INJECTION 750 MG: 50 INJECTION, SOLUTION INTRAVENOUS at 12:09

## 2020-03-20 RX ADMIN — SODIUM CHLORIDE 25 ML/HR: 900 INJECTION, SOLUTION INTRAVENOUS at 11:30

## 2020-03-20 NOTE — PROGRESS NOTES
[facility] VISIT NOTE    1115. Pt arrived at [facility] ambulatory and in no distress for Injectafer 2/2. Assessment completed, pt with no new complaints or concerns. 24g PIV placed in Left Wrist without difficulty. Positive blood return noted and flushes easily. Medications received:  NS KVO  Injectafer IV    Tolerated treatment well, no adverse reaction noted. PIV removed at discharge, no s/s of infiltration noted. Patient Vitals for the past 12 hrs:   Temp Pulse Resp BP SpO2   03/20/20 1230 96.3 °F (35.7 °C) 80 18 127/58 --   03/20/20 1116 97.7 °F (36.5 °C) 87 16 119/53 94 %     1235. D/C'd from [facility] ambulatory and in no distress accompanied by caregiver.

## 2020-04-17 RX ORDER — ATORVASTATIN CALCIUM 10 MG/1
10 TABLET, FILM COATED ORAL
Qty: 30 TAB | Refills: 3 | Status: SHIPPED | OUTPATIENT
Start: 2020-04-17 | End: 2020-09-06 | Stop reason: SDUPTHER

## 2020-05-05 ENCOUNTER — TELEPHONE (OUTPATIENT)
Dept: ONCOLOGY | Age: 55
End: 2020-05-05

## 2020-05-07 ENCOUNTER — TELEPHONE (OUTPATIENT)
Dept: ONCOLOGY | Age: 55
End: 2020-05-07

## 2020-05-26 RX ORDER — LANOLIN ALCOHOL/MO/W.PET/CERES
325 CREAM (GRAM) TOPICAL
Qty: 60 TAB | Refills: 2 | Status: SHIPPED | OUTPATIENT
Start: 2020-05-26 | End: 2020-11-04 | Stop reason: SDUPTHER

## 2020-06-01 ENCOUNTER — TELEPHONE (OUTPATIENT)
Dept: ONCOLOGY | Age: 55
End: 2020-06-01

## 2020-06-25 ENCOUNTER — DOCUMENTATION ONLY (OUTPATIENT)
Dept: FAMILY MEDICINE CLINIC | Age: 55
End: 2020-06-25

## 2020-07-02 ENCOUNTER — DOCUMENTATION ONLY (OUTPATIENT)
Dept: FAMILY MEDICINE CLINIC | Age: 55
End: 2020-07-02

## 2020-07-06 ENCOUNTER — IP HISTORICAL/CONVERTED ENCOUNTER (OUTPATIENT)
Dept: OTHER | Age: 55
End: 2020-07-06

## 2020-07-13 ENCOUNTER — DOCUMENTATION ONLY (OUTPATIENT)
Dept: FAMILY MEDICINE CLINIC | Age: 55
End: 2020-07-13

## 2020-07-15 ENCOUNTER — DOCUMENTATION ONLY (OUTPATIENT)
Dept: FAMILY MEDICINE CLINIC | Age: 55
End: 2020-07-15

## 2020-07-16 ENCOUNTER — TELEPHONE (OUTPATIENT)
Dept: FAMILY MEDICINE CLINIC | Age: 55
End: 2020-07-16

## 2020-07-16 NOTE — TELEPHONE ENCOUNTER
[facility] order was signed & faxed to [phone],PN,UVBS placed in scan folder to be scanned to chart.

## 2020-07-21 ENCOUNTER — TELEPHONE (OUTPATIENT)
Dept: FAMILY MEDICINE CLINIC | Age: 55
End: 2020-07-21

## 2020-07-21 ENCOUNTER — VIRTUAL VISIT (OUTPATIENT)
Dept: FAMILY MEDICINE CLINIC | Age: 55
End: 2020-07-21

## 2020-07-21 DIAGNOSIS — E11.649 UNCONTROLLED TYPE 2 DIABETES MELLITUS WITH HYPOGLYCEMIA, UNSPECIFIED HYPOGLYCEMIA COMA STATUS (HCC): Primary | ICD-10-CM

## 2020-07-21 DIAGNOSIS — G40.909 SEIZURE DISORDER (HCC): ICD-10-CM

## 2020-07-21 DIAGNOSIS — D50.0 IRON DEFICIENCY ANEMIA DUE TO CHRONIC BLOOD LOSS: ICD-10-CM

## 2020-07-21 NOTE — TELEPHONE ENCOUNTER
Returned call to [facility]. She questions whether patient may return to work since hospitalization. Per notes from Dr. Lloyd Morrissey virtual visit, no documentation that he is out of work. Labs scheduled for 7/23/2020.

## 2020-07-21 NOTE — TELEPHONE ENCOUNTER
Yuliya Peoples from [place] [facility] called and states she would like a call back to let her know if patient is cleared to go bsck to work or does he have to wait for labs to come back once he has them done.  Please call her back at [phone]

## 2020-07-21 NOTE — TELEPHONE ENCOUNTER
Jaron Khanna from [place] [facility] called and needs to set up a lab appt.  Please call her back at [phone]

## 2020-07-21 NOTE — PROGRESS NOTES
[address] [facility] pt here for VV for DM and anemia and other labs    Has seen endo in past    Consent: Sudhakar Casas, who was seen by synchronous (real-time) audio-video technology, and/or his healthcare decision maker, is aware that this patient-initiated, Telehealth encounter on 7/21/2020 is a billable service, with coverage as determined by his insurance carrier. He is aware that he may receive a bill and has provided verbal consent to proceed: YES-Consent obtained within past 12 months        712  Subjective:   Sudhakar Casas is a 62 y.o. male who was seen for No chief complaint on file. Prior to Admission medications    Medication Sig Start Date End Date Taking? Authorizing Provider   ferrous sulfate 325 mg (65 mg iron) tablet Take 1 Tab by mouth two (2) times daily (after meals). 5/26/20   Alex Martinez MD   atorvastatin (LIPITOR) 10 mg tablet Take 1 Tab by mouth nightly. 4/17/20   Garth Rodriguez NP   insulin detemir U-100 (LEVEMIR) 100 unit/mL injection by SubCUTAneous route nightly. Provider, Historical   divalproex DR (DEPAKOTE) 250 mg tablet 3 po bid  Indications: Convulsive Seizures 5/17/19   Fidelia Winters MD   flash glucose scanning reader (FREESTYLE OTONIEL 14 DAY READER) misc Testing 6 times daily, dx: E11.65 5/14/19   Kajal Trivedi NP   insulin aspart U-100 (NOVOLOG U-100 INSULIN ASPART) 100 unit/mL injection Sliding scale at meals. DO NOT GIVE IF BLOOD SUGAR UNDER 150. 150-200 2 units; 201-250 4 units; 251-300 6 units. >300 8 units and call PCP 3/7/19   Kajal Trivedi NP   albuterol-ipratropium (DUO-NEB) 2.5 mg-0.5 mg/3 ml nebu 3 mL by Nebulization route. Provider, Historical   flash glucose sensor (FREESTYLE OTONIEL 14 DAY SENSOR) kit Testing 6 times daily, dx: E11.65 2/27/19   Romel Gong NP   terbinafine HCl (LAMISIL) 250 mg tablet Take 250 mg by mouth daily.     Other, MD Pauly   metFORMIN (GLUCOPHAGE) 850 mg tablet Take 850 mg by mouth three (3) times daily (with meals). Pauly Lopez MD   sertraline (ZOLOFT) 100 mg tablet Take 100 mg by mouth daily. Pauly Lopez MD   losartan (COZAAR) 25 mg tablet Take 25 mg by mouth daily. Pauly Lopez MD   mirtazapine (REMERON) 15 mg tablet Take 15 mg by mouth nightly. Pauly Lopez MD   ergocalciferol (VITAMIN D2) 50,000 unit capsule Take 50,000 Units by mouth every seven (7) days. Pauly Lopez MD     Allergies   Allergen Reactions    Penicillins Rash     Patient Active Problem List    Diagnosis    Iron deficiency anemia due to chronic blood loss    Memory impairment    Obsessive-compulsive disorder    Seizure disorder ([facility]    Uncontrolled type 2 diabetes mellitus ([facility]     Last Assessment & Plan:    Hemoglobin A1c is elevated 8.8% this time which is up from 7.3% in December of 2016. Worsened. This is most certainly related to his alcohol use that he has restarted in the last several months  I advised him to stop drinking in the strongest possible terms      Organic mental disorder    Anxiety disorder    Congenital anomaly of pulmonary artery    Hereditary hemorrhagic telangiectasia (HCC)     Patient Active Problem List    Diagnosis Date Noted    Iron deficiency anemia due to chronic blood loss 02/26/2020    Memory impairment 04/26/2016    Obsessive-compulsive disorder 04/26/2016    Seizure disorder ([facility] 02/03/2016    Uncontrolled type 2 diabetes mellitus ([facility] 09/03/2015    Organic mental disorder 04/13/2010    Anxiety disorder 05/21/2002    Congenital anomaly of pulmonary artery 1965    Hereditary hemorrhagic telangiectasia ([facility] 1963       ROS    Objective:   Vital Signs: (As obtained by patient/caregiver at home)  There were no vitals taken for this visit.      [INSTRUCTIONS:  \"[x]\" Indicates a positive item  \"[]\" Indicates a negative item  -- DELETE ALL ITEMS NOT EXAMINED]    Constitutional: [x] Appears well-developed and well-nourished [x] No apparent distress      [] Abnormal - Mental status: [x] Alert and awake  [x] Oriented to person/place/time [x] Able to follow commands    [] Abnormal -     Eyes:   EOM    [x]  Normal    [] Abnormal -   Sclera  [x]  Normal    [] Abnormal -          Discharge [x]  None visible   [] Abnormal -     HENT: [x] Normocephalic, atraumatic  [] Abnormal -   [x] Mouth/Throat: Mucous membranes are moist    External Ears [x] Normal  [] Abnormal -    Neck: [x] No visualized mass [] Abnormal -     Pulmonary/Chest: [x] Respiratory effort normal   [x] No visualized signs of difficulty breathing or respiratory distress        [] Abnormal -        Neurological:        [x] No Facial Asymmetry (Cranial nerve 7 motor function) (limited exam due to video visit)          [x] No gaze palsy        [] Abnormal -          Skin:        [x] No significant exanthematous lesions or discoloration noted on facial skin         [] Abnormal -            Psychiatric:       [x] Normal Affect [] Abnormal -        [x] No Hallucinations    Other pertinent observable physical exam findings:-              Assessment & Plan:   Diagnoses and all orders for this visit:    1. Uncontrolled type 2 diabetes mellitus with hypoglycemia, unspecified hypoglycemia coma status ([facility]  -     LIPID PANEL; Future  -     METABOLIC PANEL, COMPREHENSIVE; Future  -     CBC WITH AUTOMATED DIFF; Future  -     TSH 3RD GENERATION; Future  -     HEMOGLOBIN A1C WITH EAG; Future  -     MICROALBUMIN, UR, RAND W/ MICROALB/CREAT RATIO; Future  Lab Results   Component Value Date/Time    Hemoglobin A1c 8.1 (H) 03/04/2020 03:03 PM   -not at goal  -[address] needs to do better with diet  -? Seeing endo during covid    2. Iron deficiency anemia due to chronic blood loss  -     CBC WITH AUTOMATED DIFF; Future    3. Seizure disorder ([facility]  -stable at [address]          Follow-up and Dispositions    · Return in about 3 months (around 10/21/2020) for dm.                We discussed the expected course, resolution and complications of the diagnosis(es) in detail. Medication risks, benefits, costs, interactions, and alternatives were discussed as indicated. I advised him to contact the office if his condition worsens, changes or fails to improve as anticipated. He expressed understanding with the diagnosis(es) and plan. Kay Griggs is a 62 y.o. male being evaluated by a video visit encounter for concerns as above. A caregiver was present when appropriate. Due to this being a TeleHealth encounter (During [facility] emergency), evaluation of the following organ systems was limited: Vitals/Constitutional/EENT/Resp/CV/GI//MS/Neuro/Skin/Heme-Lymph-Imm. Pursuant to the emergency declaration under the [facility] waiver authority and the [employer] Act, this Virtual  Visit was conducted, with patient's (and/or legal guardian's) consent, to reduce the patient's risk of exposure to COVID-19 and provide necessary medical care. Services were provided through a video synchronous discussion virtually to substitute for in-person clinic visit. Patient and provider were located at their individual homes.         Chasidy Hinojosa MD

## 2020-07-23 ENCOUNTER — HOSPITAL ENCOUNTER (OUTPATIENT)
Dept: LAB | Age: 55
Discharge: HOME | End: 2020-07-23

## 2020-07-23 DIAGNOSIS — E11.649 UNCONTROLLED TYPE 2 DIABETES MELLITUS WITH HYPOGLYCEMIA, UNSPECIFIED HYPOGLYCEMIA COMA STATUS (HCC): ICD-10-CM

## 2020-07-23 DIAGNOSIS — D50.0 IRON DEFICIENCY ANEMIA DUE TO CHRONIC BLOOD LOSS: ICD-10-CM

## 2020-07-23 LAB
ALBUMIN SERPL-MCNC: 3.3 G/DL (ref 3.5–5)
ALBUMIN/GLOB SERPL: 0.9 {RATIO} (ref 1.1–2.2)
ALP SERPL-CCNC: 64 U/L (ref 45–117)
ALT SERPL-CCNC: 12 U/L (ref 12–78)
ANION GAP SERPL CALC-SCNC: 5 MMOL/L (ref 5–15)
AST SERPL-CCNC: 7 U/L (ref 15–37)
BASOPHILS # BLD: 0 K/UL (ref 0–0.1)
BASOPHILS NFR BLD: 1 % (ref 0–1)
BILIRUB SERPL-MCNC: 0.2 MG/DL (ref 0.2–1)
BUN SERPL-MCNC: 16 MG/DL (ref 6–20)
BUN/CREAT SERPL: 16 (ref 12–20)
CALCIUM SERPL-MCNC: 8.7 MG/DL (ref 8.5–10.1)
CHLORIDE SERPL-SCNC: 104 MMOL/L (ref 97–108)
CHOLEST SERPL-MCNC: 122 MG/DL
CO2 SERPL-SCNC: 31 MMOL/L (ref 21–32)
CREAT SERPL-MCNC: 0.98 MG/DL (ref 0.7–1.3)
CREAT UR-MCNC: 151 MG/DL
DIFFERENTIAL METHOD BLD: ABNORMAL
EOSINOPHIL # BLD: 0.2 K/UL (ref 0–0.4)
EOSINOPHIL NFR BLD: 5 % (ref 0–7)
ERYTHROCYTE [DISTWIDTH] IN BLOOD BY AUTOMATED COUNT: 13.2 % (ref 11.5–14.5)
EST. AVERAGE GLUCOSE BLD GHB EST-MCNC: 134 MG/DL
GLOBULIN SER CALC-MCNC: 3.6 G/DL (ref 2–4)
GLUCOSE SERPL-MCNC: 212 MG/DL (ref 65–100)
HBA1C MFR BLD: 6.3 % (ref 4–5.6)
HCT VFR BLD AUTO: 40.3 % (ref 36.6–50.3)
HDLC SERPL-MCNC: 51 MG/DL
HDLC SERPL: 2.4 {RATIO} (ref 0–5)
HGB BLD-MCNC: 12.4 G/DL (ref 12.1–17)
IMM GRANULOCYTES # BLD AUTO: 0 K/UL (ref 0–0.04)
IMM GRANULOCYTES NFR BLD AUTO: 0 % (ref 0–0.5)
LDLC SERPL CALC-MCNC: 56.8 MG/DL (ref 0–100)
LIPID PROFILE,FLP: NORMAL
LYMPHOCYTES # BLD: 2.2 K/UL (ref 0.8–3.5)
LYMPHOCYTES NFR BLD: 45 % (ref 12–49)
MCH RBC QN AUTO: 33.1 PG (ref 26–34)
MCHC RBC AUTO-ENTMCNC: 30.8 G/DL (ref 30–36.5)
MCV RBC AUTO: 107.5 FL (ref 80–99)
MICROALBUMIN UR-MCNC: 0.68 MG/DL
MICROALBUMIN/CREAT UR-RTO: 5 MG/G (ref 0–30)
MONOCYTES # BLD: 0.4 K/UL (ref 0–1)
MONOCYTES NFR BLD: 8 % (ref 5–13)
NEUTS SEG # BLD: 2 K/UL (ref 1.8–8)
NEUTS SEG NFR BLD: 41 % (ref 32–75)
NRBC # BLD: 0 K/UL (ref 0–0.01)
NRBC BLD-RTO: 0 PER 100 WBC
PLATELET # BLD AUTO: 216 K/UL (ref 150–400)
PMV BLD AUTO: 11.1 FL (ref 8.9–12.9)
POTASSIUM SERPL-SCNC: 4.4 MMOL/L (ref 3.5–5.1)
PROT SERPL-MCNC: 6.9 G/DL (ref 6.4–8.2)
RBC # BLD AUTO: 3.75 M/UL (ref 4.1–5.7)
SODIUM SERPL-SCNC: 140 MMOL/L (ref 136–145)
TRIGL SERPL-MCNC: 71 MG/DL (ref ?–150)
TSH SERPL DL<=0.05 MIU/L-ACNC: 2.5 UIU/ML (ref 0.36–3.74)
VLDLC SERPL CALC-MCNC: 14.2 MG/DL
WBC # BLD AUTO: 4.9 K/UL (ref 4.1–11.1)

## 2020-07-27 NOTE — PROGRESS NOTES
Sunita Whitfield on hippa id x 3, notified of labs and verbalized understanding. Jeanna Starkey will drop of POA paper work.

## 2020-07-31 ENCOUNTER — TELEPHONE (OUTPATIENT)
Dept: FAMILY MEDICINE CLINIC | Age: 55
End: 2020-07-31

## 2020-07-31 NOTE — TELEPHONE ENCOUNTER
Bryan stafford from [facility] called and states that she would like to have a copy of patients med list and med regimen from last visit. Please fax to her at [phone]. Her number is [phone] ext [phone] if any questions.

## 2020-09-04 NOTE — TELEPHONE ENCOUNTER
[facility] requesting Sucralfate 1 GM Tablet and Pantoprazole Sod 40 MG TA 3 tab Metformin  MG tabl 6 tabs, Ferrous Sulfate 325 MG TA 62 Tab

## 2020-09-06 RX ORDER — ATORVASTATIN CALCIUM 10 MG/1
10 TABLET, FILM COATED ORAL
Qty: 30 TAB | Refills: 3 | Status: SHIPPED | OUTPATIENT
Start: 2020-09-06 | End: 2020-12-21

## 2020-09-06 RX ORDER — MIRTAZAPINE 15 MG/1
15 TABLET, FILM COATED ORAL
Qty: 90 TAB | Refills: 1 | Status: SHIPPED | OUTPATIENT
Start: 2020-09-06 | End: 2021-04-29

## 2020-09-06 RX ORDER — SERTRALINE HYDROCHLORIDE 100 MG/1
100 TABLET, FILM COATED ORAL DAILY
Qty: 90 TAB | Refills: 1 | Status: SHIPPED | OUTPATIENT
Start: 2020-09-06 | End: 2021-04-29

## 2020-09-24 ENCOUNTER — TELEPHONE (OUTPATIENT)
Dept: FAMILY MEDICINE CLINIC | Age: 55
End: 2020-09-24

## 2020-10-09 RX ORDER — METFORMIN HYDROCHLORIDE 850 MG/1
850 TABLET ORAL
Qty: 90 TAB | Refills: 2 | Status: SHIPPED | OUTPATIENT
Start: 2020-10-09 | End: 2021-01-20

## 2020-10-15 ENCOUNTER — TELEPHONE (OUTPATIENT)
Dept: FAMILY MEDICINE CLINIC | Age: 55
End: 2020-10-15

## 2020-10-20 ENCOUNTER — VIRTUAL VISIT (OUTPATIENT)
Dept: FAMILY MEDICINE CLINIC | Age: 55
End: 2020-10-20
Payer: MEDICARE

## 2020-10-20 DIAGNOSIS — Q25.79 CONGENITAL ANOMALY OF PULMONARY ARTERY: ICD-10-CM

## 2020-10-20 DIAGNOSIS — J11.00 INFLUENZA AND PNEUMONIA: Primary | ICD-10-CM

## 2020-10-20 DIAGNOSIS — E16.2 HYPOGLYCEMIA: ICD-10-CM

## 2020-10-20 DIAGNOSIS — G40.909 SEIZURE DISORDER (HCC): ICD-10-CM

## 2020-10-20 DIAGNOSIS — R41.3 MEMORY IMPAIRMENT: ICD-10-CM

## 2020-10-20 DIAGNOSIS — E11.649 UNCONTROLLED TYPE 2 DIABETES MELLITUS WITH HYPOGLYCEMIA, UNSPECIFIED HYPOGLYCEMIA COMA STATUS (HCC): ICD-10-CM

## 2020-10-20 PROCEDURE — 99214 OFFICE O/P EST MOD 30 MIN: CPT | Performed by: FAMILY MEDICINE

## 2020-10-20 PROCEDURE — G8510 SCR DEP NEG, NO PLAN REQD: HCPCS | Performed by: FAMILY MEDICINE

## 2020-10-20 PROCEDURE — 3044F HG A1C LEVEL LT 7.0%: CPT | Performed by: FAMILY MEDICINE

## 2020-10-20 PROCEDURE — G0463 HOSPITAL OUTPT CLINIC VISIT: HCPCS | Performed by: FAMILY MEDICINE

## 2020-10-20 PROCEDURE — G8427 DOCREV CUR MEDS BY ELIG CLIN: HCPCS | Performed by: FAMILY MEDICINE

## 2020-10-20 PROCEDURE — 3017F COLORECTAL CA SCREEN DOC REV: CPT | Performed by: FAMILY MEDICINE

## 2020-10-20 PROCEDURE — 2022F DILAT RTA XM EVC RTNOPTHY: CPT | Performed by: FAMILY MEDICINE

## 2020-10-20 NOTE — PROGRESS NOTES
Patient is here today for follow-up visit from the hospital when she was admitted for pneumonia. He is doing much better and feeling much better overall. In addition he is having low sugars at the group home he is currently taking Levemir 50 units once a day. None seizure activity. Consent: Mika Walden, who was seen by synchronous (real-time) audio-video technology, and/or his healthcare decision maker, is aware that this patient-initiated, Telehealth encounter on 10/20/2020 is a billable service, with coverage as determined by his insurance carrier. He is aware that he may receive a bill and has provided verbal consent to proceed: YES-Consent obtained within past 12 months        712  Subjective:   Mika Walden is a 60 y.o. male who was seen for No chief complaint on file. Prior to Admission medications    Medication Sig Start Date End Date Taking? Authorizing Provider   metFORMIN (GLUCOPHAGE) 850 mg tablet Take 1 Tab by mouth three (3) times daily (with meals). 10/9/20   Gabriel Saenz MD   mirtazapine (REMERON) 15 mg tablet Take 1 Tab by mouth nightly. 9/6/20   Gabriel Saenz MD   sertraline (ZOLOFT) 100 mg tablet Take 1 Tab by mouth daily. 9/6/20   Gabriel Saenz MD   atorvastatin (LIPITOR) 10 mg tablet Take 1 Tab by mouth nightly. 9/6/20   Gabriel Saenz MD   ferrous sulfate 325 mg (65 mg iron) tablet Take 1 Tab by mouth two (2) times daily (after meals). 5/26/20   Gabriel Saenz MD   insulin detemir U-100 (LEVEMIR) 100 unit/mL injection by SubCUTAneous route nightly. Provider, Historical   divalproex DR (DEPAKOTE) 250 mg tablet 3 po bid  Indications: Convulsive Seizures 5/17/19   Kathy Hull MD   flash glucose scanning reader (FREESTYLE OTONIEL 14 DAY READER) misc Testing 6 times daily, dx: E11.65 5/14/19   Adela Rashid, GREGG   insulin aspart U-100 (NOVOLOG U-100 INSULIN ASPART) 100 unit/mL injection Sliding scale at meals. DO NOT GIVE IF BLOOD SUGAR UNDER 150. 150-200 2 units; 201-250 4 units; 251-300 6 units. >300 8 units and call PCP 3/7/19   Bart Chiu NP   albuterol-ipratropium (DUO-NEB) 2.5 mg-0.5 mg/3 ml nebu 3 mL by Nebulization route. Provider, Historical   flash glucose sensor (FREESTYLE OTONIEL 14 DAY SENSOR) kit Testing 6 times daily, dx: E11.65 2/27/19   William Welsh NP   terbinafine HCl (LAMISIL) 250 mg tablet Take 250 mg by mouth daily. OtherPauly MD   losartan (COZAAR) 25 mg tablet Take 25 mg by mouth daily. Pauly Lopez MD   ergocalciferol (VITAMIN D2) 50,000 unit capsule Take 50,000 Units by mouth every seven (7) days. Jessica, MD Pauly     Allergies   Allergen Reactions    Penicillins Rash       ROS    Objective:   Vital Signs: (As obtained by patient/caregiver at home)  There were no vitals taken for this visit.      [INSTRUCTIONS:  \"[x]\" Indicates a positive item  \"[]\" Indicates a negative item  -- DELETE ALL ITEMS NOT EXAMINED]    Constitutional: [x] Appears well-developed and well-nourished [x] No apparent distress      [] Abnormal -     Mental status: [x] Alert and awake  [x] Oriented to person/place/time [x] Able to follow commands    [] Abnormal -     Eyes:   EOM    [x]  Normal    [] Abnormal -   Sclera  [x]  Normal    [] Abnormal -          Discharge [x]  None visible   [] Abnormal -     HENT: [x] Normocephalic, atraumatic  [] Abnormal -   [x] Mouth/Throat: Mucous membranes are moist    External Ears [x] Normal  [] Abnormal -    Neck: [x] No visualized mass [] Abnormal -     Pulmonary/Chest: [x] Respiratory effort normal   [x] No visualized signs of difficulty breathing or respiratory distress        [] Abnormal -        Neurological:        [x] No Facial Asymmetry (Cranial nerve 7 motor function) (limited exam due to video visit)          [x] No gaze palsy        [] Abnormal -          Skin:        [x] No significant exanthematous lesions or discoloration noted on facial skin         [] Abnormal -            Psychiatric: [x] Normal Affect [] Abnormal -        [x] No Hallucinations    Other pertinent observable physical exam findings:-              Assessment & Plan:   Diagnoses and all orders for this visit:    1. Influenza and pneumonia  -Resolved  -Continue current medications from hospital discharge    2. Uncontrolled type 2 diabetes mellitus with hypoglycemia, unspecified hypoglycemia coma status ([facility]  -Patient having some hypoglycemia  -Decrease Levemir from 50 units once a day to 40 units once a day  -Performed keep track of sugars let me know if there is any other issues, he is on sliding scale as well    3. Congenital anomaly of pulmonary artery  -Normal  4. Seizure disorder ([facility]  -No active seizures  5. Hypoglycemia  -As above  6. Memory impairment  -Stable at baseline  7. Lives in group home                    We discussed the expected course, resolution and complications of the diagnosis(es) in detail. Medication risks, benefits, costs, interactions, and alternatives were discussed as indicated. I advised him to contact the office if his condition worsens, changes or fails to improve as anticipated. He expressed understanding with the diagnosis(es) and plan. Chrissy Griffith is a 60 y.o. male being evaluated by a video visit encounter for concerns as above. A caregiver was present when appropriate. Due to this being a TeleHealth encounter (During [facility]- public health emergency), evaluation of the following organ systems was limited: Vitals/Constitutional/EENT/Resp/CV/GI//MS/Neuro/Skin/Heme-Lymph-Imm. Pursuant to the emergency declaration under the [facility] waiver authority and the [employer] Act, this Virtual  Visit was conducted, with patient's (and/or legal guardian's) consent, to reduce the patient's risk of exposure to COVID-19 and provide necessary medical care.      Services were provided through a video synchronous discussion virtually to substitute for in-person clinic visit. Patient and provider were located at their individual homes.         Giana Sibley MD

## 2020-11-05 RX ORDER — LANOLIN ALCOHOL/MO/W.PET/CERES
325 CREAM (GRAM) TOPICAL
Qty: 60 TAB | Refills: 2 | Status: SHIPPED | OUTPATIENT
Start: 2020-11-05 | End: 2021-01-20 | Stop reason: SDUPTHER

## 2020-11-10 ENCOUNTER — APPOINTMENT (OUTPATIENT)
Dept: CT IMAGING | Age: 55
End: 2020-11-10
Attending: PHYSICIAN ASSISTANT
Payer: MEDICARE

## 2020-11-10 ENCOUNTER — APPOINTMENT (OUTPATIENT)
Dept: GENERAL RADIOLOGY | Age: 55
End: 2020-11-10
Attending: PHYSICIAN ASSISTANT
Payer: MEDICARE

## 2020-11-10 ENCOUNTER — HOSPITAL ENCOUNTER (EMERGENCY)
Age: 55
Discharge: HOME | End: 2020-11-10
Payer: MEDICARE

## 2020-11-10 VITALS
RESPIRATION RATE: 15 BRPM | DIASTOLIC BLOOD PRESSURE: 56 MMHG | WEIGHT: 141 LBS | TEMPERATURE: 97.8 F | HEART RATE: 91 BPM | BODY MASS INDEX: 22.66 KG/M2 | OXYGEN SATURATION: 98 % | SYSTOLIC BLOOD PRESSURE: 105 MMHG | HEIGHT: 66 IN

## 2020-11-10 DIAGNOSIS — R11.2 NON-INTRACTABLE VOMITING WITH NAUSEA, UNSPECIFIED VOMITING TYPE: Primary | ICD-10-CM

## 2020-11-10 DIAGNOSIS — B34.9 VIRAL SYNDROME: ICD-10-CM
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
## 2020-11-10 DIAGNOSIS — S09.90XA CLOSED HEAD INJURY, INITIAL ENCOUNTER: ICD-10-CM

## 2020-11-10 DIAGNOSIS — R53.1 WEAKNESS: ICD-10-CM

## 2020-11-10 LAB
ALBUMIN SERPL-MCNC: 2.3 G/DL (ref 3.5–5)
ALBUMIN/GLOB SERPL: 0.8 {RATIO} (ref 1.1–2.2)
ALP SERPL-CCNC: 48 U/L (ref 45–117)
ALT SERPL-CCNC: 6 U/L (ref 12–78)
ANION GAP SERPL CALC-SCNC: 5 MMOL/L (ref 5–15)
APPEARANCE UR: ABNORMAL
APTT PPP: 31.1 SEC (ref 23–35.7)
AST SERPL W P-5'-P-CCNC: 5 U/L (ref 15–37)
ATRIAL RATE: 97 BPM
BACTERIA URNS QL MICRO: NEGATIVE /HPF
BASOPHILS # BLD: 0 K/UL (ref 0–0.1)
BASOPHILS NFR BLD: 0 % (ref 0–1)
BILIRUB DIRECT SERPL-MCNC: 0.1 MG/DL (ref 0–0.2)
BILIRUB SERPL-MCNC: 0.2 MG/DL (ref 0.2–1)
BILIRUB UR QL: NEGATIVE
BUN SERPL-MCNC: 33 MG/DL (ref 6–20)
BUN/CREAT SERPL: 26 (ref 12–20)
CA-I BLD-MCNC: 10.4 MG/DL (ref 8.5–10.1)
CALCULATED P AXIS, ECG09: 69 DEGREES
CALCULATED R AXIS, ECG10: 41 DEGREES
CALCULATED T AXIS, ECG11: 49 DEGREES
CHLORIDE SERPL-SCNC: 108 MMOL/L (ref 97–108)
CO2 SERPL-SCNC: 21 MMOL/L (ref 21–32)
COLOR UR: ABNORMAL
CREAT SERPL-MCNC: 1.27 MG/DL (ref 0.7–1.3)
DIAGNOSIS, 93000: NORMAL
DIFFERENTIAL METHOD BLD: ABNORMAL
EOSINOPHIL # BLD: 0.1 K/UL (ref 0–0.4)
EOSINOPHIL NFR BLD: 1 % (ref 0–7)
ERYTHROCYTE [DISTWIDTH] IN BLOOD BY AUTOMATED COUNT: 14.7 % (ref 11.5–14.5)
GLOBULIN SER CALC-MCNC: 2.9 G/DL (ref 2–4)
GLUCOSE BLD STRIP.AUTO-MCNC: 359 MG/DL (ref 65–100)
GLUCOSE SERPL-MCNC: 179 MG/DL (ref 65–100)
GLUCOSE UR STRIP.AUTO-MCNC: NEGATIVE MG/DL
HCT VFR BLD AUTO: 43.7 % (ref 36.6–50.3)
HGB BLD-MCNC: 14.5 G/DL (ref 12.1–17)
HGB UR QL STRIP: NEGATIVE
IMM GRANULOCYTES # BLD AUTO: 0 K/UL (ref 0–0.04)
IMM GRANULOCYTES NFR BLD AUTO: 0 % (ref 0–0.5)
INR PPP: 1 (ref 0.9–1.1)
KETONES UR QL STRIP.AUTO: 5 MG/DL
LACTATE SERPL-SCNC: 0.8 MMOL/L (ref 0.4–2)
LEUKOCYTE ESTERASE UR QL STRIP.AUTO: NEGATIVE
LIPASE SERPL-CCNC: 55 U/L (ref 73–393)
LYMPHOCYTES # BLD: 1.4 K/UL (ref 0.8–3.5)
LYMPHOCYTES NFR BLD: 8 % (ref 12–49)
MCH RBC QN AUTO: 32.7 PG (ref 26–34)
MCHC RBC AUTO-ENTMCNC: 33.2 G/DL (ref 30–36.5)
MCV RBC AUTO: 98.4 FL (ref 80–99)
MONOCYTES # BLD: 0.9 K/UL (ref 0–1)
MONOCYTES NFR BLD: 5 % (ref 5–13)
MUCOUS THREADS URNS QL MICRO: ABNORMAL /LPF
NEUTS SEG # BLD: 15.3 K/UL (ref 1.8–8)
NEUTS SEG NFR BLD: 86 % (ref 32–75)
NITRITE UR QL STRIP.AUTO: NEGATIVE
P-R INTERVAL, ECG05: 128 MS
PERFORMED BY, TECHID: ABNORMAL
PH UR STRIP: 5 [PH] (ref 5–8)
PLATELET # BLD AUTO: 226 K/UL (ref 150–400)
PMV BLD AUTO: 11.5 FL (ref 8.9–12.9)
POTASSIUM SERPL-SCNC: 3.4 MMOL/L (ref 3.5–5.1)
PROT SERPL-MCNC: 5.2 G/DL (ref 6.4–8.2)
PROT UR STRIP-MCNC: NEGATIVE MG/DL
PROTHROMBIN TIME: 13.5 SEC (ref 11.9–14.7)
Q-T INTERVAL, ECG07: 328 MS
QRS DURATION, ECG06: 86 MS
QTC CALCULATION (BEZET), ECG08: 416 MS
RBC # BLD AUTO: 4.44 M/UL (ref 4.1–5.7)
RBC #/AREA URNS HPF: ABNORMAL /HPF (ref 0–5)
SODIUM SERPL-SCNC: 134 MMOL/L (ref 136–145)
SP GR UR REFRACTOMETRY: >1.03 (ref 1–1.03)
THERAPEUTIC RANGE,PTTT: NORMAL SEC (ref 68–109)
TROPONIN I SERPL-MCNC: <0.05 NG/ML
UROBILINOGEN UR QL STRIP.AUTO: 0.1 EU/DL (ref 0.1–1)
VENTRICULAR RATE, ECG03: 97 BPM
WBC # BLD AUTO: 17.8 K/UL (ref 4.1–11.1)
WBC URNS QL MICRO: ABNORMAL /HPF (ref 0–4)

## 2020-11-10 PROCEDURE — 74011636637 HC RX REV CODE- 636/637: Performed by: PHYSICIAN ASSISTANT

## 2020-11-10 PROCEDURE — 36415 COLL VENOUS BLD VENIPUNCTURE: CPT

## 2020-11-10 PROCEDURE — 72125 CT NECK SPINE W/O DYE: CPT

## 2020-11-10 PROCEDURE — 80048 BASIC METABOLIC PNL TOTAL CA: CPT

## 2020-11-10 PROCEDURE — 85610 PROTHROMBIN TIME: CPT

## 2020-11-10 PROCEDURE — 74011000636 HC RX REV CODE- 636: Performed by: PHYSICIAN ASSISTANT

## 2020-11-10 PROCEDURE — 74011250636 HC RX REV CODE- 250/636: Performed by: PHYSICIAN ASSISTANT

## 2020-11-10 PROCEDURE — 82962 GLUCOSE BLOOD TEST: CPT

## 2020-11-10 PROCEDURE — 96375 TX/PRO/DX INJ NEW DRUG ADDON: CPT

## 2020-11-10 PROCEDURE — 83690 ASSAY OF LIPASE: CPT

## 2020-11-10 PROCEDURE — 71045 X-RAY EXAM CHEST 1 VIEW: CPT

## 2020-11-10 PROCEDURE — 71260 CT THORAX DX C+: CPT

## 2020-11-10 PROCEDURE — 84484 ASSAY OF TROPONIN QUANT: CPT

## 2020-11-10 PROCEDURE — 70450 CT HEAD/BRAIN W/O DYE: CPT

## 2020-11-10 PROCEDURE — 87040 BLOOD CULTURE FOR BACTERIA: CPT

## 2020-11-10 PROCEDURE — 85025 COMPLETE CBC W/AUTO DIFF WBC: CPT

## 2020-11-10 PROCEDURE — 74011000258 HC RX REV CODE- 258: Performed by: PHYSICIAN ASSISTANT

## 2020-11-10 PROCEDURE — 83605 ASSAY OF LACTIC ACID: CPT

## 2020-11-10 PROCEDURE — 74176 CT ABD & PELVIS W/O CONTRAST: CPT

## 2020-11-10 PROCEDURE — 85730 THROMBOPLASTIN TIME PARTIAL: CPT

## 2020-11-10 PROCEDURE — 80076 HEPATIC FUNCTION PANEL: CPT

## 2020-11-10 PROCEDURE — 81001 URINALYSIS AUTO W/SCOPE: CPT

## 2020-11-10 PROCEDURE — 93005 ELECTROCARDIOGRAM TRACING: CPT

## 2020-11-10 PROCEDURE — 96374 THER/PROPH/DIAG INJ IV PUSH: CPT

## 2020-11-10 PROCEDURE — 99285 EMERGENCY DEPT VISIT HI MDM: CPT

## 2020-11-10 RX ORDER — LEVOFLOXACIN 5 MG/ML
750 INJECTION, SOLUTION INTRAVENOUS EVERY 24 HOURS
Status: DISCONTINUED | OUTPATIENT
Start: 2020-11-10 | End: 2020-11-10 | Stop reason: HOSPADM

## 2020-11-10 RX ORDER — ONDANSETRON 4 MG/1
4 TABLET, ORALLY DISINTEGRATING ORAL
Qty: 10 TAB | Refills: 0 | Status: SHIPPED | OUTPATIENT
Start: 2020-11-10 | End: 2020-11-17

## 2020-11-10 RX ORDER — INSULIN LISPRO 100 [IU]/ML
8 INJECTION, SOLUTION INTRAVENOUS; SUBCUTANEOUS ONCE
Status: COMPLETED | OUTPATIENT
Start: 2020-11-10 | End: 2020-11-10

## 2020-11-10 RX ADMIN — INSULIN LISPRO 8 UNITS: 100 INJECTION, SOLUTION INTRAVENOUS; SUBCUTANEOUS at 18:10

## 2020-11-10 RX ADMIN — SODIUM CHLORIDE 1000 ML: 9 INJECTION, SOLUTION INTRAVENOUS at 14:18

## 2020-11-10 RX ADMIN — SODIUM CHLORIDE 1000 ML: 9 INJECTION, SOLUTION INTRAVENOUS at 11:07

## 2020-11-10 RX ADMIN — LEVOFLOXACIN 750 MG: 5 INJECTION, SOLUTION INTRAVENOUS at 15:13

## 2020-11-10 RX ADMIN — CEFEPIME HYDROCHLORIDE 2 G: 2 INJECTION, POWDER, FOR SOLUTION INTRAVENOUS at 14:19

## 2020-11-10 RX ADMIN — IOPAMIDOL 100 ML: 755 INJECTION, SOLUTION INTRAVENOUS at 14:06

## 2020-11-10 NOTE — ED PROVIDER NOTES
EMERGENCY DEPARTMENT HISTORY AND PHYSICAL EXAM      Date: 11/10/2020  Patient Name: Mika Aguirre. History of Presenting Illness     Chief Complaint   Patient presents with    Vomiting    Diarrhea       History Provided By: Patient and Caregiver    HPI: Mika Aguirre., 60 y.o. male with a past medical history significant diabetes, seizure and dementia, HHT presents to the ED with cc of feeling very weak, nausea, and vomiting onset last night. Patient reports that he got up to use the restroom this morning and had a hard time getting up off the toilet. He then fell and hit the right side of his head just above his eyebrow. He is unsure what he hit his head on. He denies any loss of consciousness. Ecchymosis and swelling just above right eyebrow noted. Patient does not take any blood thinners. Group home coordinator at bedside who confirms this and states that he was just very weak and fatigued this morning. He was unable to get up on his own which is why they called EMS. When EMS arrived, he vomited. No reported fevers. He specifically denies chest pain, shortness of breath, abdominal pain, headache, neck pain, back pain, dysuria, blurred vision    There are no other complaints, changes, or physical findings at this time. PCP: Gabriel Saenz MD    Current Outpatient Medications   Medication Sig Dispense Refill    ondansetron (Zofran ODT) 4 mg disintegrating tablet Take 1 Tab by mouth every eight (8) hours as needed for Nausea or Nausea or Vomiting for up to 7 days. 10 Tab 0    ferrous sulfate 325 mg (65 mg iron) tablet Take 1 Tab by mouth two (2) times daily (after meals). 60 Tab 2    insulin detemir U-100 (LEVEMIR) 100 unit/mL injection 40 Units by SubCUTAneous route daily. 5 Vial 1    metFORMIN (GLUCOPHAGE) 850 mg tablet Take 1 Tab by mouth three (3) times daily (with meals). 90 Tab 2    mirtazapine (REMERON) 15 mg tablet Take 1 Tab by mouth nightly.  90 Tab 1    sertraline (ZOLOFT) 100 mg tablet Take 1 Tab by mouth daily. 90 Tab 1    atorvastatin (LIPITOR) 10 mg tablet Take 1 Tab by mouth nightly. 30 Tab 3    divalproex DR (DEPAKOTE) 250 mg tablet 3 po bid  Indications: Convulsive Seizures 540 Tab 2    flash glucose scanning reader (FREESTYLE OTONIEL 14 DAY READER) misc Testing 6 times daily, dx: E11.65 1 Each 11    insulin aspart U-100 (NOVOLOG U-100 INSULIN ASPART) 100 unit/mL injection Sliding scale at meals. DO NOT GIVE IF BLOOD SUGAR UNDER 150. 150-200 2 units; 201-250 4 units; 251-300 6 units. >300 8 units and call PCP 10 mL 2    albuterol-ipratropium (DUO-NEB) 2.5 mg-0.5 mg/3 ml nebu 3 mL by Nebulization route.  flash glucose sensor (FREESTYLE OTONIEL 14 DAY SENSOR) kit Testing 6 times daily, dx: E11.65 1 Kit 0    terbinafine HCl (LAMISIL) 250 mg tablet Take 250 mg by mouth daily.  losartan (COZAAR) 25 mg tablet Take 25 mg by mouth daily.  ergocalciferol (VITAMIN D2) 50,000 unit capsule Take 50,000 Units by mouth every seven (7) days. Past History     Past Medical History:  Past Medical History:   Diagnosis Date    Anxiety disorder     Arthritis     Depression     Diabetes ([address]     Liver disease     Neurological disorder     neuro cognative disorder    OCD (obsessive compulsive disorder)     Psychiatric disorder     OCD    Psychiatric disorder     anxiety    Seizures [facility]     Thyroid disease        Past Surgical History:  Past Surgical History:   Procedure Laterality Date    HX CRANIOTOMY         Family History:  Family History   Problem Relation Age of Onset    Cancer Mother        Social History:  Social History     Tobacco Use    Smoking status: Former Smoker    Smokeless tobacco: Never Used   Substance Use Topics    Alcohol use: No    Drug use: No       Allergies: Allergies   Allergen Reactions    Penicillins Rash         Review of Systems   Review of Systems   Constitutional: Positive for fatigue.  Negative for activity change, chills and fever. HENT: Negative for congestion, ear pain, rhinorrhea and trouble swallowing. Eyes: Negative for pain and visual disturbance. Respiratory: Negative for cough and shortness of breath. Cardiovascular: Negative for chest pain. Gastrointestinal: Positive for nausea and vomiting. Negative for abdominal pain and diarrhea. Genitourinary: Negative for decreased urine volume, difficulty urinating, dysuria and hematuria. Musculoskeletal: Negative for arthralgias and myalgias. Skin: Negative for rash. Neurological: Positive for dizziness and weakness. Negative for headaches. Hematological: Negative for adenopathy. Psychiatric/Behavioral: The patient is not nervous/anxious. All other systems reviewed and are negative. Physical Exam   Physical Exam  Vitals signs and nursing note reviewed. Constitutional:       General: He is not in acute distress. Appearance: He is well-developed. HENT:      Head: Normocephalic. Contusion present. Comments: Small hematoma/contusion noted just above the right eyebrow, no laceration or abrasion, nontender to palpation, no other signs of trauma     Mouth/Throat:      Mouth: Mucous membranes are moist.   Eyes:      Extraocular Movements: Extraocular movements intact. Pupils: Pupils are equal, round, and reactive to light. Neck:      Musculoskeletal: Normal range of motion. No injury, pain with movement or spinous process tenderness. Trachea: Trachea normal.   Cardiovascular:      Rate and Rhythm: Normal rate and regular rhythm. Heart sounds: Normal heart sounds. Pulmonary:      Effort: Pulmonary effort is normal. No respiratory distress. Breath sounds: Normal breath sounds. Abdominal:      General: Abdomen is flat. Bowel sounds are normal.      Palpations: Abdomen is soft. Tenderness: There is no abdominal tenderness. Skin:     General: Skin is warm and dry.       Capillary Refill: Capillary refill takes less than 2 seconds. Findings: No rash. Neurological:      General: No focal deficit present. Mental Status: He is alert and oriented to person, place, and time. Mental status is at baseline. GCS: GCS eye subscore is 4. GCS verbal subscore is 5. GCS motor subscore is 6. Cranial Nerves: No cranial nerve deficit. Sensory: Sensation is intact. 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         Behavior: Behavior normal.         Diagnostic Study Results     Labs -     Recent Results (from the past 48 hour(s))   GLUCOSE, POC    Collection Time: 11/10/20  5:55 PM   Result Value Ref Range    Glucose (POC) 359 (H) 65 - 100 mg/dL    Performed by Yas Tucker        Radiologic Studies -   XR Results (most recent):  Results from Hospital Encounter encounter on 11/10/20   XR CHEST PORT    Narrative Chest single view. Comparison single view chest July 6, 2020    Similar coarsened reticular markings present through the lungs. Right perihilar nodular-like density measuring 2 cm; this is more conspicuous  compared to prior imaging. No gross interstitial or alveolar pulmonary edema. Cardiac and mediastinal structures unchanged noting thoracic aorta  atherosclerosis. No pneumothorax or sizable pleural effusion. Consider contrast enhanced CT chest.    cc: nurse navigator. CT Results  (Last 48 hours)               11/10/20 1608  CT ABD PELV WO CONT Final result    Impression:  IMPRESSION:   1. There are findings suspect for chronic air trapping related to chronic   obstructive pulmonary disease. 2.  There also are findings suspect for small to medium caliber reactive airways   disease. 3.  For the patient's age, there is increased degree of calcification along the   wall of his abdominal aorta and involving its branch vessels.  This finding could   be related to history of diabetes or chronic renal insufficiency. 4.  There retained food particles within the gastric lumen at the time of   imaging which might be associated with gastroparesis. 5.  This examination is negative for findings specific for colitis. Narrative: The study is a CT evaluation of the abdomen and pelvis dated 11/10/2020. History: Diarrhea. Technique: 3 mm axial imaging was acquired through the abdomen and pelvis   without intravenous contrast administration. Sagittal and coronal reconstructed   imaging is also submitted for interpretation. Dose Reduction Technique was employed to reduce radiation exposure - This   includes reduction optimization techniques as appropriate to a performed exam   with automated exposure control adjustments of the mA and/or Kv according to   patient size, or use of iterative reconstruction technique. LIMITATIONS: None. COMPARISON: There are no previous CT examinations of the abdomen available for   comparison. Correlation is made with the CT evaluation of the chest dated   11/10/2017. LIVER AND SPLEEN: This examination is negative for focal abnormalities of liver   of the spleen. [place] is contrast material within the renal collecting systems. This is   secondary to recent IV contrast administered during the patient's CT examination   of the chest. This examination is negative for renal obstruction or renal   masses. PANCREAS AND ADRENAL GLANDS: Normal.       GALLBLADDER AND BILIARY TREE: Normal.       LOWER CHEST: There is a splaying of the pulmonary vessels within the lung bases   suspect for chronic obstructive pulmonary disease with a hyperinflation of the   lungs. There also is peribronchial inflammation compatible with small to medium   caliber reactive airways disease. There is discoid atelectasis within the lung   bases.        There are moderate diffuse calcifications of the coronary arteries. VASCULARITY: There is significant increased calcification along the wall of the   abdominal aorta and involving the mesenteric arterial branches and the bilateral   renal arterial branches. This examination is negative for aneurysmal disease. BOWEL: There were retained food particles within the stomach at the time of   imaging which could be related to gastroparesis. The small bowel is normal in   caliber without a small bowel obstruction. This examination is negative for   findings specific for active colitis. OTHER: None. CT EVALUATION OF THE PELVIS: The prostate gland is normal in size. The seminal   vesicles image in a normal fashion. The bladder is unremarkable. There are   diffuse vascular calcifications of the common femoral and iliac arterial   branches. OSSEOUS STRUCTURES:There is mild degenerative disc disease at the L5-S1 level. There is mild leftward convexity along the lower lumbar spine and rightward   convexity at the thoracolumbar junction. There is spondylolysis of the L5 pars   interarticularis with mild grade 1 anterolisthesis of the L5 vertebra. Medical Decision Making and ED Course   I am the first provider for this patient. I reviewed the vital signs, available nursing notes, past medical history, past surgical history, family history and social history. Vital Signs-Reviewed the patient's vital signs. No data found.     Wt Readings from Last 3 Encounters:   11/10/20 64 kg (141 lb)   03/20/20 70.7 kg (155 lb 12.8 oz)   03/13/20 70.3 kg (154 lb 14.4 oz)     Temp Readings from Last 3 Encounters:   11/10/20 97.8 °F (36.6 °C)   03/20/20 96.3 °F (35.7 °C)   03/13/20 97 °F (36.1 °C)     BP Readings from Last 3 Encounters:   11/10/20 (!) 105/56   03/20/20 127/58   03/13/20 123/67     Pulse Readings from Last 3 Encounters:   11/10/20 91   03/20/20 80   03/13/20 87         EKG interpretation: (Preliminary)  Rhythm: normal sinus rhythm; and regular . Rate (approx.): 97; Axis: normal; ND interval: normal; QRS interval: normal ; ST/T wave: normal; Other findings: possible left atrial enlargement. Records Reviewed: Nursing Notes    Provider Notes (Medical Decision Making):       MDM  Number of Diagnoses or Management Options  Closed head injury, initial encounter:   Fall from ground level:   Non-intractable vomiting with nausea, unspecified vomiting type:   Viral syndrome:   Weakness:   Diagnosis management comments: Differentials include gastroenteritis, viral syndrome, syncopal episode, hypovolemia, orthostatic hypotension, hypoglycemia, intracranial hemorrhage, closed head injury, concussion    49-year-old male with history of dementia, resides at [facility], presented via EMS for ground-level fall this morning after a few days of diarrhea and vomiting. He does have a hematoma over his right eyebrow. Lab abnormality significant for leukocytosis. Lactic acid within normal limits. He has been given to liter bolus fluids, tolerating p.o., feels better at this time. CT head without any acute intracranial abnormality. CT chest without any signs of pneumonia or groundglass opacities. CT abdomen pelvis without any signs of acute infection, no signs of colitis, or diverticulitis. There is no reports of a fever over the last few days. I suspect some sort of viral syndrome causing nausea, vomiting, diarrhea. Suspect reactive leukocytosis secondary to vomiting diarrhea. Patient would like to go home. There are no URI symptoms or fever or groundglass opacities on CT chest therefore I have a low suspicion for COVID-19. I have discussed the case with Dr. John Coronado who agrees with the plan of care. I have also discussed close return precautions with  who was at bedside to include inability to keep food or drink down, dehydration, fever, pain, worsening symptoms.   She voices understanding. Amount and/or Complexity of Data Reviewed  Clinical lab tests: ordered and reviewed  Tests in the radiology section of CPT®: ordered and reviewed  Discuss the patient with other providers: yes          ED Course:   Initial assessment performed. The patients presenting problems have been discussed, and they are in agreement with the care plan formulated and outlined with them. I have encouraged them to ask questions as they arise throughout their visit. Procedures       Awilda Hendricks PA-C    Procedures     Awilda Hendricks PA-C        Disposition       Discharged      DISCHARGE PLAN:  1. Discharge Medication List as of 11/10/2020  5:39 PM      START taking these medications    Details   ondansetron (Zofran ODT) 4 mg disintegrating tablet Take 1 Tab by mouth every eight (8) hours as needed for Nausea or Nausea or Vomiting for up to 7 days. , Normal, Disp-10 Tab,R-0         CONTINUE these medications which have NOT CHANGED    Details   ferrous sulfate 325 mg (65 mg iron) tablet Take 1 Tab by mouth two (2) times daily (after meals). , Normal, Disp-60 Tab,R-2      insulin detemir U-100 (LEVEMIR) 100 unit/mL injection 40 Units by SubCUTAneous route daily. , Normal, Disp-5 Vial,R-1      metFORMIN (GLUCOPHAGE) 850 mg tablet Take 1 Tab by mouth three (3) times daily (with meals). , Normal, Disp-90 Tab,R-2      mirtazapine (REMERON) 15 mg tablet Take 1 Tab by mouth nightly., Normal, Disp-90 Tab,R-1      sertraline (ZOLOFT) 100 mg tablet Take 1 Tab by mouth daily. , Normal, Disp-90 Tab,R-1      atorvastatin (LIPITOR) 10 mg tablet Take 1 Tab by mouth nightly., Normal, Disp-30 Tab,R-3      divalproex DR (DEPAKOTE) 250 mg tablet 3 po bid  Indications: Convulsive Seizures, Normal, Disp-540 Tab, R-2      flash glucose scanning reader (FREESTYLE OTONIEL 14 DAY READER) misc Testing 6 times daily, dx: E11.65, Normal, Disp-1 Each, R-11      insulin aspart U-100 (NOVOLOG U-100 INSULIN ASPART) 100 unit/mL injection Sliding scale at meals. DO NOT GIVE IF BLOOD SUGAR UNDER 150. 150-200 2 units; 201-250 4 units; 251-300 6 units. >300 8 units and call PCP, Normal, Disp-10 mL, R-2      albuterol-ipratropium (DUO-NEB) 2.5 mg-0.5 mg/3 ml nebu 3 mL by Nebulization route., Historical Med      flash glucose sensor (FREESTYLE OTONIEL 14 DAY SENSOR) kit Testing 6 times daily, dx: E11.65, Normal, Disp-1 Kit, R-0      terbinafine HCl (LAMISIL) 250 mg tablet Take 250 mg by mouth daily. , Historical Med      losartan (COZAAR) 25 mg tablet Take 25 mg by mouth daily. , Historical Med      ergocalciferol (VITAMIN D2) 50,000 unit capsule Take 50,000 Units by mouth every seven (7) days. , Historical Med            2.   Follow-up Information     Follow up With Specialties Details Why Contact Info    Arnold Salazar MD Family Medicine Schedule an appointment as soon as possible for a visit for follow up from ER visit [id number] [place] [address] [address] EMERGENCY DEPT Emergency Medicine  As needed, If symptoms worsen [address] [address]  [phone]        3. Return to ED if worse     Diagnosis     Clinical Impression:   1. Non-intractable vomiting with nausea, unspecified vomiting type    2. Viral syndrome    3. Weakness    4. Fall from ground level    5. Closed head injury, initial encounter        Attestations:    Zoie Zee PA-C    Please note that this dictation was completed with "[employer] the [employer] voice recognition software. Quite often unanticipated grammatical, syntax, homophones, and other interpretive errors are inadvertently transcribed by the computer software. Please disregard these errors. Please excuse any errors that have escaped final proofreading. Thank you.

## 2020-11-10 NOTE — ED NOTES
6:03 PM    Patient's group home representative here to d/c patient home. This nurse spoke with patient's brother with verbal permission and informed of d/c and d/c instructions. Group home staff with instructions as well.  at time of d/c and staff requesting 8 units Novolog before d/c. Tee Goss approached and okay with insulin administration before d/c.

## 2020-11-10 NOTE — DISCHARGE INSTRUCTIONS
Patient Education        Viral Infections: Care Instructions  Your Care Instructions     You don't feel well, but it's not clear what's causing it. You may have a viral infection. Viruses cause many illnesses, such as the common cold, influenza, fever, rashes, and the diarrhea, nausea, and vomiting that are often called \"stomach flu. \" You may wonder if antibiotic medicines could make you feel better. But antibiotics only treat infections caused by bacteria. They don't work on viruses. The good news is that viral infections usually aren't serious. Most will go away in a few days without medical treatment. In the meantime, there are a few things you can do to make yourself more comfortable. Follow-up care is a key part of your treatment and safety. Be sure to make and go to all appointments, and call your doctor if you are having problems. It's also a good idea to know your test results and keep a list of the medicines you take. How can you care for yourself at home? · Get plenty of rest if you feel tired. · Take an over-the-counter pain medicine if needed, such as acetaminophen (Tylenol), ibuprofen (Advil, Motrin), or naproxen (Aleve). Read and follow all instructions on the label. · Be careful when taking over-the-counter cold or flu medicines and Tylenol at the same time. Many of these medicines have acetaminophen, which is Tylenol. Read the labels to make sure that you are not taking more than the recommended dose. Too much acetaminophen (Tylenol) can be harmful. · Drink plenty of fluids, enough so that your urine is light yellow or clear like water. If you have kidney, heart, or liver disease and have to limit fluids, talk with your doctor before you increase the amount of fluids you drink. · Stay home from work, school, and other public places while you have a fever. When should you call for help? Call 911 anytime you think you may need emergency care.  For example, call if:    · You have severe trouble breathing.     · You passed out (lost consciousness). Call your doctor now or seek immediate medical care if:    · You seem to be getting much sicker.     · You have a new or higher fever.     · You have blood in your stools.     · You have new belly pain, or your pain gets worse.     · You have a new rash. Watch closely for changes in your health, and be sure to contact your doctor if:    · You start to get better and then get worse.     · You do not get better as expected. Where can you learn more? Go to http://www.gray.com/  Enter L906 in the search box to learn more about \"Viral Infections: Care Instructions. \"  Current as of: February 11, 2020               Content Version: 12.6  © 2006-2020 [employer]. Care instructions adapted under license by [employer] (which disclaims liability or warranty for this information). If you have questions about a medical condition or this instruction, always ask your healthcare professional. [place] [address] any warranty or liability for your use of this information. Patient Education        Weakness: Care Instructions  Your Care Instructions     Weakness is a lack of physical or muscle strength. You may feel that you need to make extra effort to move your arms, legs, or other muscles. Generalized weakness means that you feel weak in most areas of your body. Another type of weakness may affect just one muscle or group of muscles. You may feel weak and tired after you have done too much activity, such as taking an extra-long hike. This is not a serious problem. It often goes away on its own. Feeling weak can also be caused by medical conditions like thyroid problems, depression, or a virus. Sometimes the cause can be serious. Your doctor may want to do more tests to try to find the cause of the weakness. The doctor has checked you carefully, but problems can develop later.  If you notice any problems or new symptoms, get medical treatment right away. Follow-up care is a key part of your treatment and safety. Be sure to make and go to all appointments, and call your doctor if you are having problems. It's also a good idea to know your test results and keep a list of the medicines you take. How can you care for yourself at home? · Rest when you feel tired. · Be safe with medicines. If your doctor prescribed medicine, take it exactly as prescribed. Call your doctor if you think you are having a problem with your medicine. You will get more details on the specific medicines your doctor prescribes. · Do not skip meals. Eating a balanced diet may increase your energy level. · Get some physical activity every day, but do not get too tired. When should you call for help? Call your doctor now or seek immediate medical care if:    · You have new or worse weakness.     · You are dizzy or lightheaded, or you feel like you may faint. Watch closely for changes in your health, and be sure to contact your doctor if:    · You do not get better as expected. Where can you learn more? Go to http://www.gray.com/  Enter V492 in the search box to learn more about \"Weakness: Care Instructions. \"  Current as of: June 26, 2019               Content Version: 12.6  © 6028-7528 [employer]. Care instructions adapted under license by [employer] (which disclaims liability or warranty for this information). If you have questions about a medical condition or this instruction, always ask your healthcare professional. [address] any warranty or liability for your use of this information.

## 2020-11-16 LAB
BACTERIA SPEC CULT: NORMAL
SPECIAL REQUESTS,SREQ: NORMAL

## 2020-12-21 RX ORDER — ATORVASTATIN CALCIUM 10 MG/1
TABLET, FILM COATED ORAL
Qty: 31 TAB | Refills: 0 | Status: SHIPPED | OUTPATIENT
Start: 2020-12-21 | End: 2021-01-20 | Stop reason: SDUPTHER

## 2020-12-26 ENCOUNTER — HOSPITAL ENCOUNTER (EMERGENCY)
Age: 55
Discharge: HOME | End: 2020-12-26
Attending: EMERGENCY MEDICINE | Admitting: EMERGENCY MEDICINE
Payer: MEDICARE

## 2020-12-26 VITALS
DIASTOLIC BLOOD PRESSURE: 72 MMHG | SYSTOLIC BLOOD PRESSURE: 111 MMHG | OXYGEN SATURATION: 97 % | RESPIRATION RATE: 20 BRPM | HEART RATE: 84 BPM | TEMPERATURE: 97.9 F

## 2020-12-26 DIAGNOSIS — R11.10 VOMITING, INTRACTABILITY OF VOMITING NOT SPECIFIED, PRESENCE OF NAUSEA NOT SPECIFIED, UNSPECIFIED VOMITING TYPE: ICD-10-CM

## 2020-12-26 DIAGNOSIS — R73.9 HYPERGLYCEMIA: Primary | ICD-10-CM

## 2020-12-26 LAB
ALBUMIN SERPL-MCNC: 2.9 G/DL (ref 3.5–5)
ALBUMIN/GLOB SERPL: 0.7 {RATIO} (ref 1.1–2.2)
ALP SERPL-CCNC: 71 U/L (ref 45–117)
ALT SERPL-CCNC: 10 U/L (ref 12–78)
ANION GAP SERPL CALC-SCNC: 6 MMOL/L (ref 5–15)
APPEARANCE UR: CLEAR
AST SERPL W P-5'-P-CCNC: 6 U/L (ref 15–37)
ATRIAL RATE: 80 BPM
B-OH-BUTYR SERPL-SCNC: 0.12 MMOL/L
BACTERIA URNS QL MICRO: NEGATIVE /HPF
BASE DEFICIT BLDV-SCNC: 0.5 MMOL/L (ref 0–2)
BASOPHILS # BLD: 0 K/UL (ref 0–0.1)
BASOPHILS NFR BLD: 0 % (ref 0–1)
BDY SITE: ABNORMAL
BILIRUB SERPL-MCNC: 0.2 MG/DL (ref 0.2–1)
BILIRUB UR QL: NEGATIVE
BUN SERPL-MCNC: 21 MG/DL (ref 6–20)
BUN/CREAT SERPL: 18 (ref 12–20)
CA-I BLD-MCNC: 9 MG/DL (ref 8.5–10.1)
CALCULATED P AXIS, ECG09: 56 DEGREES
CALCULATED R AXIS, ECG10: 11 DEGREES
CALCULATED T AXIS, ECG11: 38 DEGREES
CHLORIDE SERPL-SCNC: 110 MMOL/L (ref 97–108)
CO2 SERPL-SCNC: 23 MMOL/L (ref 21–32)
COLOR UR: ABNORMAL
CREAT SERPL-MCNC: 1.14 MG/DL (ref 0.7–1.3)
DIAGNOSIS, 93000: NORMAL
DIFFERENTIAL METHOD BLD: ABNORMAL
EOSINOPHIL # BLD: 0.1 K/UL (ref 0–0.4)
EOSINOPHIL NFR BLD: 2 % (ref 0–7)
ERYTHROCYTE [DISTWIDTH] IN BLOOD BY AUTOMATED COUNT: 15.1 % (ref 11.5–14.5)
FIO2 ON VENT: 21 %
GLOBULIN SER CALC-MCNC: 3.9 G/DL (ref 2–4)
GLUCOSE BLD STRIP.AUTO-MCNC: 246 MG/DL (ref 65–100)
GLUCOSE BLD STRIP.AUTO-MCNC: 374 MG/DL (ref 65–100)
GLUCOSE SERPL-MCNC: 368 MG/DL (ref 65–100)
GLUCOSE UR STRIP.AUTO-MCNC: >300 MG/DL
HCO3 BLDV-SCNC: 24 MMOL/L (ref 22–26)
HCT VFR BLD AUTO: 29.4 % (ref 36.6–50.3)
HGB BLD-MCNC: 9.7 G/DL (ref 12.1–17)
HGB UR QL STRIP: NEGATIVE
IMM GRANULOCYTES # BLD AUTO: 0 K/UL (ref 0–0.04)
IMM GRANULOCYTES NFR BLD AUTO: 0 % (ref 0–0.5)
KETONES UR QL STRIP.AUTO: 5 MG/DL
LEUKOCYTE ESTERASE UR QL STRIP.AUTO: NEGATIVE
LIPASE SERPL-CCNC: 73 U/L (ref 73–393)
LYMPHOCYTES # BLD: 1.1 K/UL (ref 0.8–3.5)
LYMPHOCYTES NFR BLD: 30 % (ref 12–49)
MCH RBC QN AUTO: 31.7 PG (ref 26–34)
MCHC RBC AUTO-ENTMCNC: 33 G/DL (ref 30–36.5)
MCV RBC AUTO: 96.1 FL (ref 80–99)
MONOCYTES # BLD: 0.5 K/UL (ref 0–1)
MONOCYTES NFR BLD: 14 % (ref 5–13)
NEUTS SEG # BLD: 2 K/UL (ref 1.8–8)
NEUTS SEG NFR BLD: 54 % (ref 32–75)
NITRITE UR QL STRIP.AUTO: NEGATIVE
P-R INTERVAL, ECG05: 126 MS
PCO2 BLDV: 41.7 MMHG (ref 40–50)
PERFORMED BY, TECHID: ABNORMAL
PERFORMED BY, TECHID: ABNORMAL
PH BLDV: 7.38 [PH] (ref 7.31–7.41)
PH UR STRIP: 5 [PH] (ref 5–8)
PLATELET # BLD AUTO: 293 K/UL (ref 150–400)
PMV BLD AUTO: 11.2 FL (ref 8.9–12.9)
PO2 BLDV: 53 MMHG (ref 36–42)
POTASSIUM SERPL-SCNC: 4.8 MMOL/L (ref 3.5–5.1)
PROT SERPL-MCNC: 6.8 G/DL (ref 6.4–8.2)
PROT UR STRIP-MCNC: NEGATIVE MG/DL
Q-T INTERVAL, ECG07: 360 MS
QRS DURATION, ECG06: 84 MS
QTC CALCULATION (BEZET), ECG08: 415 MS
RBC # BLD AUTO: 3.06 M/UL (ref 4.1–5.7)
RBC #/AREA URNS HPF: ABNORMAL /HPF (ref 0–5)
SAO2 % BLDV: 86 % (ref 60–80)
SAO2% DEVICE SAO2% SENSOR NAME: ABNORMAL
SODIUM SERPL-SCNC: 139 MMOL/L (ref 136–145)
SP GR UR REFRACTOMETRY: 1.02 (ref 1–1.03)
TROPONIN I SERPL-MCNC: <0.05 NG/ML
UA: UC IF INDICATED,UAUC: ABNORMAL
UROBILINOGEN UR QL STRIP.AUTO: 0.1 EU/DL (ref 0.1–1)
VENTRICULAR RATE, ECG03: 80 BPM
WBC # BLD AUTO: 3.7 K/UL (ref 4.1–11.1)
WBC URNS QL MICRO: ABNORMAL /HPF (ref 0–4)

## 2020-12-26 PROCEDURE — 82962 GLUCOSE BLOOD TEST: CPT

## 2020-12-26 PROCEDURE — 84484 ASSAY OF TROPONIN QUANT: CPT

## 2020-12-26 PROCEDURE — 93005 ELECTROCARDIOGRAM TRACING: CPT

## 2020-12-26 PROCEDURE — 82803 BLOOD GASES ANY COMBINATION: CPT

## 2020-12-26 PROCEDURE — 82010 KETONE BODYS QUAN: CPT

## 2020-12-26 PROCEDURE — 36415 COLL VENOUS BLD VENIPUNCTURE: CPT

## 2020-12-26 PROCEDURE — 74011250636 HC RX REV CODE- 250/636: Performed by: EMERGENCY MEDICINE

## 2020-12-26 PROCEDURE — 96374 THER/PROPH/DIAG INJ IV PUSH: CPT

## 2020-12-26 PROCEDURE — 99284 EMERGENCY DEPT VISIT MOD MDM: CPT

## 2020-12-26 PROCEDURE — 83690 ASSAY OF LIPASE: CPT

## 2020-12-26 PROCEDURE — 80053 COMPREHEN METABOLIC PANEL: CPT

## 2020-12-26 PROCEDURE — 85025 COMPLETE CBC W/AUTO DIFF WBC: CPT

## 2020-12-26 PROCEDURE — 81001 URINALYSIS AUTO W/SCOPE: CPT

## 2020-12-26 RX ORDER — ONDANSETRON 2 MG/ML
4 INJECTION INTRAMUSCULAR; INTRAVENOUS
Status: COMPLETED | OUTPATIENT
Start: 2020-12-26 | End: 2020-12-26

## 2020-12-26 RX ORDER — ONDANSETRON 4 MG/1
4 TABLET, ORALLY DISINTEGRATING ORAL
Qty: 20 TAB | Refills: 0 | Status: SHIPPED | OUTPATIENT
Start: 2020-12-26 | End: 2021-03-18 | Stop reason: SDUPTHER

## 2020-12-26 RX ADMIN — ONDANSETRON 4 MG: 2 INJECTION INTRAMUSCULAR; INTRAVENOUS at 11:02

## 2020-12-26 RX ADMIN — SODIUM CHLORIDE 1000 ML: 9 INJECTION, SOLUTION INTRAVENOUS at 11:02

## 2020-12-26 NOTE — ED PROVIDER NOTES
EMERGENCY DEPARTMENT HISTORY AND PHYSICAL EXAM      Date: 12/26/2020  Patient Name: Zak Doran History of Presenting Illness     Chief Complaint   Patient presents with    Vomiting     pt had one episode of vomiting this am when fsbs was taken read HI       History Provided By: Patient    HPI: Zak Doran, 60 y.o. male with a past medical history significant No significant past medical history presents to the ED with chief complaint of Vomiting (pt had one episode of vomiting this am when fsbs was taken read HI)  . Patient with a history of diabetes. Patient took his insulin this morning when his glucose was reading high in the 500s. Unable to tolerate any food. No abdominal pain. No diarrhea. No cough congestion. Otherwise has been feeling fine. He vomited one time in the bathroom today. There are no other complaints, changes, or physical findings at this time. PCP: Martha Ridley MD    Current Outpatient Medications   Medication Sig Dispense Refill    ondansetron (Zofran ODT) 4 mg disintegrating tablet 1 Tab by SubLINGual route every eight (8) hours as needed for Nausea or Vomiting. 20 Tab 0    atorvastatin (LIPITOR) 10 mg tablet TAKE 1 TABLET BY MOUTH ONCE DAILY 31 Tab 0    ferrous sulfate 325 mg (65 mg iron) tablet Take 1 Tab by mouth two (2) times daily (after meals). 60 Tab 2    insulin detemir U-100 (LEVEMIR) 100 unit/mL injection 40 Units by SubCUTAneous route daily. 5 Vial 1    metFORMIN (GLUCOPHAGE) 850 mg tablet Take 1 Tab by mouth three (3) times daily (with meals). 90 Tab 2    mirtazapine (REMERON) 15 mg tablet Take 1 Tab by mouth nightly. 90 Tab 1    sertraline (ZOLOFT) 100 mg tablet Take 1 Tab by mouth daily.  90 Tab 1    divalproex DR (DEPAKOTE) 250 mg tablet 3 po bid  Indications: Convulsive Seizures 540 Tab 2    flash glucose scanning reader (FREESTYLE OTONIEL 14 DAY READER) misc Testing 6 times daily, dx: E11.65 1 Each 11    insulin aspart U-100 (NOVOLOG U-100 INSULIN ASPART) 100 unit/mL injection Sliding scale at meals. DO NOT GIVE IF BLOOD SUGAR UNDER 150. 150-200 2 units; 201-250 4 units; 251-300 6 units. >300 8 units and call PCP 10 mL 2    albuterol-ipratropium (DUO-NEB) 2.5 mg-0.5 mg/3 ml nebu 3 mL by Nebulization route.  flash glucose sensor (FREESTYLE OTONIEL 14 DAY SENSOR) kit Testing 6 times daily, dx: E11.65 1 Kit 0    terbinafine HCl (LAMISIL) 250 mg tablet Take 250 mg by mouth daily.  losartan (COZAAR) 25 mg tablet Take 25 mg by mouth daily.  ergocalciferol (VITAMIN D2) 50,000 unit capsule Take 50,000 Units by mouth every seven (7) days. Past History     Past Medical History:  Past Medical History:   Diagnosis Date    Anxiety disorder     Arthritis     Depression     Diabetes ([facility] [address]     Liver disease     Neurological disorder     neuro cognative disorder    OCD (obsessive compulsive disorder)     Psychiatric disorder     OCD    Psychiatric disorder     anxiety    Seizures ([facility] [address]     Thyroid disease        Past Surgical History:  Past Surgical History:   Procedure Laterality Date    HX CRANIOTOMY         Family History:  Family History   Problem Relation Age of Onset    Cancer Mother        Social History:  Social History     Tobacco Use    Smoking status: Former Smoker    Smokeless tobacco: Never Used   Substance Use Topics    Alcohol use: No    Drug use: No       Allergies: Allergies   Allergen Reactions    Penicillins Rash         Review of Systems   Review of Systems   Constitutional: Negative. Negative for chills, fatigue and fever. HENT: Negative. Negative for congestion, ear pain, nosebleeds and sore throat. Eyes: Negative. Negative for pain, discharge and visual disturbance. Respiratory: Negative. Negative for cough, chest tightness and shortness of breath. Cardiovascular: Negative. Negative for chest pain and leg swelling. Gastrointestinal: Positive for nausea and vomiting. Negative for abdominal pain, blood in stool, constipation and diarrhea. Endocrine: Negative. Genitourinary: Negative. Negative for difficulty urinating, dysuria and flank pain. Musculoskeletal: Negative. Negative for back pain and myalgias. Skin: Negative. Negative for rash and wound. Allergic/Immunologic: Negative. Neurological: Negative. Negative for dizziness, syncope, weakness, numbness and headaches. Hematological: Negative. Does not bruise/bleed easily. Psychiatric/Behavioral: Negative. Negative for agitation, confusion, hallucinations and suicidal ideas. All other systems reviewed and are negative. Physical Exam   Physical Exam  Vitals signs and nursing note reviewed. Constitutional:       General: He is not in acute distress. Appearance: He is normal weight. He is not ill-appearing. HENT:      Head: Normocephalic and atraumatic. Right Ear: External ear normal.      Left Ear: External ear normal.      Nose: Nose normal. No rhinorrhea. Mouth/Throat:      Mouth: Mucous membranes are moist.      Pharynx: Oropharynx is clear. Eyes:      Extraocular Movements: Extraocular movements intact. Conjunctiva/sclera: Conjunctivae normal.      Pupils: Pupils are equal, round, and reactive to light. Neck:      Musculoskeletal: Normal range of motion and neck supple. Cardiovascular:      Rate and Rhythm: Normal rate and regular rhythm. Pulses: Normal pulses. Heart sounds: Normal heart sounds. Pulmonary:      Effort: Pulmonary effort is normal. No respiratory distress. Breath sounds: Normal breath sounds. Abdominal:      General: Abdomen is flat. Bowel sounds are normal.      Palpations: Abdomen is soft. Musculoskeletal: Normal range of motion. General: No tenderness or deformity. Skin:     General: Skin is warm and dry. Capillary Refill: Capillary refill takes less than 2 seconds. Findings: No bruising, lesion or rash. Neurological:      General: No focal deficit present. Mental Status: He is alert and oriented to person, place, and time. Mental status is at baseline. Psychiatric:         Mood and Affect: Mood normal.         Behavior: Behavior normal.         Thought Content: Thought content normal.         Judgment: Judgment normal.         Diagnostic Study Results     Labs -     Recent Results (from the past 12 hour(s))   METABOLIC PANEL, COMPREHENSIVE    Collection Time: 12/26/20  9:50 AM   Result Value Ref Range    Sodium 139 136 - 145 mmol/L    Potassium 4.8 3.5 - 5.1 mmol/L    Chloride 110 (H) 97 - 108 mmol/L    CO2 23 21 - 32 mmol/L    Anion gap 6 5 - 15 mmol/L    Glucose 368 (H) 65 - 100 mg/dL    BUN 21 (H) 6 - 20 mg/dL    Creatinine 1.14 0.70 - 1.30 mg/dL    BUN/Creatinine ratio 18 12 - 20      GFR est AA >60 >60 ml/min/1.73m2    GFR est non-AA >60 >60 ml/min/1.73m2    Calcium 9.0 8.5 - 10.1 mg/dL    Bilirubin, total 0.2 0.2 - 1.0 mg/dL    AST (SGOT) 6 (L) 15 - 37 U/L    ALT (SGPT) 10 (L) 12 - 78 U/L    Alk.  phosphatase 71 45 - 117 U/L    Protein, total 6.8 6.4 - 8.2 g/dL    Albumin 2.9 (L) 3.5 - 5.0 g/dL    Globulin 3.9 2.0 - 4.0 g/dL    A-G Ratio 0.7 (L) 1.1 - 2.2     LIPASE    Collection Time: 12/26/20  9:50 AM   Result Value Ref Range    Lipase 73 73 - 393 U/L   TROPONIN I    Collection Time: 12/26/20  9:50 AM   Result Value Ref Range    Troponin-I, Qt. <0.05 <0.05 ng/mL   GLUCOSE, POC    Collection Time: 12/26/20 10:01 AM   Result Value Ref Range    Glucose (POC) 374 (H) 65 - 100 mg/dL    Performed by BRIDGET CARTER    VENOUS BLOOD GAS    Collection Time: 12/26/20 10:15 AM   Result Value Ref Range    VENOUS PH 7.381 7.31 - 7.41      VENOUS PCO2 41.7 40 - 50 mmHg    VENOUS PO2 53 (H) 36 - 42 mmHg    VENOUS O2 SATURATION 86 (H) 60 - 80 %    VENOUS BICARBONATE 24 22 - 26 mmol/L    VENOUS BASE DEFICIT 0.5 0 - 2 mmol/L    O2 METHOD Room air      FIO2 21.0 %    SITE Right Brachial     GLUCOSE, POC    Collection Time: 12/26/20 11:47 AM   Result Value Ref Range    Glucose (POC) 246 (H) 65 - 100 mg/dL    Performed by [facility]        Radiologic Studies -   No orders to display     CT Results  (Last 48 hours)    None        CXR Results  (Last 48 hours)    None          Medical Decision Making and ED Course   I am the first provider for this patient. I reviewed the vital signs, available nursing notes, past medical history, past surgical history, family history and social history. Vital Signs-Reviewed the patient's vital signs. Patient Vitals for the past 12 hrs:   Temp Pulse Resp BP SpO2   12/26/20 0933 97.9 °F (36.6 °C) 92 20 133/78 98 %       EKG interpretation:   EKG at 10:56 AM.  Normal sinus rhythm rate of 80. No ST changes. No T wave inversions. Normal intervals. Reason rule out ACS. Interpreted by ER physician. Records Reviewed: Previous Hospital chart. EMS run report      ED Course:   Initial assessment performed. The patients presenting problems have been discussed, and they are in agreement with the care plan formulated and outlined with them. I have encouraged them to ask questions as they arise throughout their visit.     Orders Placed This Encounter    CBC WITH AUTOMATED DIFF     Standing Status:   Standing     Number of Occurrences:   1    METABOLIC PANEL, COMPREHENSIVE     Standing Status:   Standing     Number of Occurrences:   1    LIPASE     Standing Status:   Standing     Number of Occurrences:   1    BETA-HYDROXYBUTYRATE     Standing Status:   Standing     Number of Occurrences:   1    VENOUS BLOOD GAS     Standing Status:   Standing     Number of Occurrences:   1    URINALYSIS W/ REFLEX CULTURE     Standing Status:   Standing     Number of Occurrences:   1    TROPONIN I     Standing Status:   Standing     Number of Occurrences:   1    POC GLUCOSE     Standing Status:   Standing     Number of Occurrences:   1    GLUCOSE, POC     Standing Status:   Standing     Number of Occurrences:   1    GLUCOSE, POC     Standing Status:   Standing     Number of Occurrences:   1    EKG 12 LEAD INITIAL     Standing Status:   Standing     Number of Occurrences:   1     Order Specific Question:   Reason for Exam:     Answer:   vomit    sodium chloride 0.9 % bolus infusion 1,000 mL    ondansetron (ZOFRAN) injection 4 mg    ondansetron (Zofran ODT) 4 mg disintegrating tablet     Si Tab by SubLINGual route every eight (8) hours as needed for Nausea or Vomiting. Dispense:  20 Tab     Refill:  0              CONSULTANTS:  Consults      Provider Notes (Medical Decision Making):   66-year-old diabetic with improved glucose control after 1 L of fluids in his own home insulin that he already took. Tolerating p.o. now. No evidence of acute DKA severe dehydration or infection. Plan to discharge home with nausea medication      Procedures                       Disposition       Emergency Department Disposition:  Discharged         DISCHARGE PLAN:    Patient is discharged home. Discharge instructions provided. Patient is stable and improved at time of disposition. Vitals are stable. 1.   Current Discharge Medication List      START taking these medications    Details   ondansetron (Zofran ODT) 4 mg disintegrating tablet 1 Tab by SubLINGual route every eight (8) hours as needed for Nausea or Vomiting. Qty: 20 Tab, Refills: 0         CONTINUE these medications which have NOT CHANGED    Details   atorvastatin (LIPITOR) 10 mg tablet TAKE 1 TABLET BY MOUTH ONCE DAILY  Qty: 31 Tab, Refills: 0      ferrous sulfate 325 mg (65 mg iron) tablet Take 1 Tab by mouth two (2) times daily (after meals). Qty: 60 Tab, Refills: 2      insulin detemir U-100 (LEVEMIR) 100 unit/mL injection 40 Units by SubCUTAneous route daily. Qty: 5 Vial, Refills: 1      metFORMIN (GLUCOPHAGE) 850 mg tablet Take 1 Tab by mouth three (3) times daily (with meals).   Qty: 90 Tab, Refills: 2      mirtazapine (REMERON) 15 mg tablet Take 1 Tab by mouth nightly. Qty: 90 Tab, Refills: 1      sertraline (ZOLOFT) 100 mg tablet Take 1 Tab by mouth daily. Qty: 90 Tab, Refills: 1      divalproex DR (DEPAKOTE) 250 mg tablet 3 po bid  Indications: Convulsive Seizures  Qty: 540 Tab, Refills: 2      flash glucose scanning reader (FREESTYLE OTONIEL 14 DAY READER) misc Testing 6 times daily, dx: E11.65  Qty: 1 Each, Refills: 11    Associated Diagnoses: Uncontrolled type 2 diabetes mellitus with hyperglycemia (HCC)      insulin aspart U-100 (NOVOLOG U-100 INSULIN ASPART) 100 unit/mL injection Sliding scale at meals. DO NOT GIVE IF BLOOD SUGAR UNDER 150. 150-200 2 units; 201-250 4 units; 251-300 6 units. >300 8 units and call PCP  Qty: 10 mL, Refills: 2    Associated Diagnoses: Uncontrolled type 2 diabetes mellitus with hypoglycemia, unspecified hypoglycemia coma status (HCC)      albuterol-ipratropium (DUO-NEB) 2.5 mg-0.5 mg/3 ml nebu 3 mL by Nebulization route. flash glucose sensor (FREESTYLE OTONIEL 14 DAY SENSOR) kit Testing 6 times daily, dx: E11.65  Qty: 1 Kit, Refills: 0    Associated Diagnoses: Uncontrolled type 2 diabetes mellitus with hyperglycemia (HCC)      terbinafine HCl (LAMISIL) 250 mg tablet Take 250 mg by mouth daily. losartan (COZAAR) 25 mg tablet Take 25 mg by mouth daily. ergocalciferol (VITAMIN D2) 50,000 unit capsule Take 50,000 Units by mouth every seven (7) days. 2.   Follow-up Information    None       3. Return to ED if worse     Pt voiced they understand they plan and do not have questions at this time        Diagnosis     Clinical Impression:   1. Hyperglycemia    2. Vomiting, intractability of vomiting not specified, presence of nausea not specified, unspecified vomiting type        Attestations:    Morris Campos MD    Please note that this dictation was completed with [employer], the [employer] voice recognition software.   Quite often unanticipated grammatical, syntax, homophones, and other interpretive errors are inadvertently transcribed by the computer software. Please disregard these errors. Please excuse any errors that have escaped final proofreading. Thank you.

## 2020-12-26 NOTE — ED NOTES
Working on getting pt a ride home. IV fluids complete. Pt finished meal tray. Alert and oriented but asked the same questions everytime someone goes in the room.  Where is my wallet, when will I go home

## 2020-12-26 NOTE — DISCHARGE INSTRUCTIONS
Thank you! Thank you for allowing me to care for you in the emergency department. I sincerely hope that you are satisfied with your visit today. It is my goal to provide you with excellent care. Below you will find a list of your labs and imaging from your visit today. Should you have any questions regarding these results please do not hesitate to call the emergency department. Labs -     Recent Results (from the past 12 hour(s))   METABOLIC PANEL, COMPREHENSIVE    Collection Time: 12/26/20  9:50 AM   Result Value Ref Range    Sodium 139 136 - 145 mmol/L    Potassium 4.8 3.5 - 5.1 mmol/L    Chloride 110 (H) 97 - 108 mmol/L    CO2 23 21 - 32 mmol/L    Anion gap 6 5 - 15 mmol/L    Glucose 368 (H) 65 - 100 mg/dL    BUN 21 (H) 6 - 20 mg/dL    Creatinine 1.14 0.70 - 1.30 mg/dL    BUN/Creatinine ratio 18 12 - 20      GFR est AA >60 >60 ml/min/1.73m2    GFR est non-AA >60 >60 ml/min/1.73m2    Calcium 9.0 8.5 - 10.1 mg/dL    Bilirubin, total 0.2 0.2 - 1.0 mg/dL    AST (SGOT) 6 (L) 15 - 37 U/L    ALT (SGPT) 10 (L) 12 - 78 U/L    Alk.  phosphatase 71 45 - 117 U/L    Protein, total 6.8 6.4 - 8.2 g/dL    Albumin 2.9 (L) 3.5 - 5.0 g/dL    Globulin 3.9 2.0 - 4.0 g/dL    A-G Ratio 0.7 (L) 1.1 - 2.2     LIPASE    Collection Time: 12/26/20  9:50 AM   Result Value Ref Range    Lipase 73 73 - 393 U/L   TROPONIN I    Collection Time: 12/26/20  9:50 AM   Result Value Ref Range    Troponin-I, Qt. <0.05 <0.05 ng/mL   GLUCOSE, POC    Collection Time: 12/26/20 10:01 AM   Result Value Ref Range    Glucose (POC) 374 (H) 65 - 100 mg/dL    Performed by BRIDGET CARTER    VENOUS BLOOD GAS    Collection Time: 12/26/20 10:15 AM   Result Value Ref Range    VENOUS PH 7.381 7.31 - 7.41      VENOUS PCO2 41.7 40 - 50 mmHg    VENOUS PO2 53 (H) 36 - 42 mmHg    VENOUS O2 SATURATION 86 (H) 60 - 80 %    VENOUS BICARBONATE 24 22 - 26 mmol/L    VENOUS BASE DEFICIT 0.5 0 - 2 mmol/L    O2 METHOD Room air      FIO2 21.0 %    SITE Right Brachial GLUCOSE, POC    Collection Time: 12/26/20 11:47 AM   Result Value Ref Range    Glucose (POC) 246 (H) 65 - 100 mg/dL    Performed by Denice Duvall        Radiologic Studies -   No orders to display     CT Results  (Last 48 hours)      None          CXR Results  (Last 48 hours)      None               If you feel that you have not received excellent quality care or timely care, please ask to speak to the nurse manager. Please choose us in the future for your continued health care needs. ------------------------------------------------------------------------------------------------------------  The exam and treatment you received in the Emergency Department were for an urgent problem and are not intended as complete care. It is important that you follow-up with a doctor, nurse practitioner, or physician assistant to:  (1) confirm your diagnosis,  (2) re-evaluation of changes in your illness and treatment, and  (3) for ongoing care. If your symptoms become worse or you do not improve as expected and you are unable to reach your usual health care provider, you should return to the Emergency Department. We are available 24 hours a day. Please take your discharge instructions with you when you go to your follow-up appointment. If you have any problem arranging a follow-up appointment, contact the Emergency Department immediately. If a prescription has been provided, please have it filled as soon as possible to prevent a delay in treatment. Read the entire medication instruction sheet provided to you by the pharmacy. If you have any questions or reservations about taking the medication due to side effects or interactions with other medications, please call your primary care physician or contact the ER to speak with the charge nurse.      Make an appointment with your family doctor or the physician you were referred to for follow-up of this visit as instructed on your discharge paperwork, as this is a mandatory follow-up. Return to the ER if you are unable to be seen or if you are unable to be seen in a timely manner. If you have any problem arranging the follow-up visit, contact the Emergency Department immediately.

## 2021-01-13 ENCOUNTER — TELEPHONE (OUTPATIENT)
Dept: FAMILY MEDICINE CLINIC | Age: 56
End: 2021-01-13

## 2021-01-13 NOTE — TELEPHONE ENCOUNTER
Pharmacy sent in a refill request for Pantoprazole SOD 40 mg ta, 31 tab, Take one tablet by mouth before breakfast. Also Sucralfate 1 gm tab 124 tab take one tablet by mouth before meals and at night. Split noon M-F/SS.  And Pantoprazole sod 40 mg ta 31 tab Take one tablet by mouth before breakfast. Didn't see these on his med list.

## 2021-01-20 RX ORDER — LANOLIN ALCOHOL/MO/W.PET/CERES
325 CREAM (GRAM) TOPICAL
Qty: 60 TAB | Refills: 2 | Status: SHIPPED | OUTPATIENT
Start: 2021-01-20 | End: 2021-05-26

## 2021-01-20 RX ORDER — METFORMIN HYDROCHLORIDE 850 MG/1
TABLET ORAL
Qty: 93 TAB | Refills: 0 | Status: SHIPPED | OUTPATIENT
Start: 2021-01-20 | End: 2021-02-17

## 2021-02-05 ENCOUNTER — TELEPHONE (OUTPATIENT)
Dept: FAMILY MEDICINE CLINIC | Age: 56
End: 2021-02-05

## 2021-02-05 NOTE — TELEPHONE ENCOUNTER
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 Pharmacy on file.  Ms Arcenio Talley phone: [phone]

## 2021-02-05 NOTE — TELEPHONE ENCOUNTER
Spoke with Sanjay [place] states that patient has enough pins for his novolog states that she think he needs syringe for insulin

## 2021-02-08 ENCOUNTER — TELEPHONE (OUTPATIENT)
Dept: FAMILY MEDICINE CLINIC | Age: 56
End: 2021-02-08

## 2021-02-08 NOTE — TELEPHONE ENCOUNTER
Kalen Matias/ [employer] - requesting a call back to discuss how to manage patient's diabetes medication and there are two different orders.  Mr Coral López phone: [phone] ext [phone]

## 2021-02-09 NOTE — TELEPHONE ENCOUNTER
Called and LVM for Patient's care giver. Edgar Matias/ [employer])  Requested call back to office to discuss concerns.

## 2021-02-17 ENCOUNTER — APPOINTMENT (OUTPATIENT)
Dept: NON INVASIVE DIAGNOSTICS | Age: 56
DRG: 637 | End: 2021-02-17
Attending: INTERNAL MEDICINE
Payer: MEDICARE

## 2021-02-17 ENCOUNTER — APPOINTMENT (OUTPATIENT)
Dept: GENERAL RADIOLOGY | Age: 56
DRG: 637 | End: 2021-02-17
Attending: EMERGENCY MEDICINE
Payer: MEDICARE

## 2021-02-17 ENCOUNTER — HOSPITAL ENCOUNTER (INPATIENT)
Age: 56
LOS: 3 days | Discharge: HOME | DRG: 637 | End: 2021-02-20
Attending: EMERGENCY MEDICINE | Admitting: FAMILY MEDICINE
Payer: MEDICARE

## 2021-02-17 ENCOUNTER — APPOINTMENT (OUTPATIENT)
Dept: CT IMAGING | Age: 56
DRG: 637 | End: 2021-02-17
Attending: EMERGENCY MEDICINE
Payer: MEDICARE

## 2021-02-17 DIAGNOSIS — R57.9 SHOCK (HCC): ICD-10-CM

## 2021-02-17 DIAGNOSIS — I21.4 NSTEMI (NON-ST ELEVATED MYOCARDIAL INFARCTION) (HCC): ICD-10-CM

## 2021-02-17 DIAGNOSIS — E87.1 HYPONATREMIA: ICD-10-CM

## 2021-02-17 DIAGNOSIS — N17.9 AKI (ACUTE KIDNEY INJURY) (HCC): ICD-10-CM

## 2021-02-17 DIAGNOSIS — E11.10 DIABETIC KETOACIDOSIS WITHOUT COMA ASSOCIATED WITH TYPE 2 DIABETES MELLITUS (HCC): ICD-10-CM

## 2021-02-17 DIAGNOSIS — E11.649 UNCONTROLLED TYPE 2 DIABETES MELLITUS WITH HYPOGLYCEMIA, UNSPECIFIED HYPOGLYCEMIA COMA STATUS (HCC): ICD-10-CM

## 2021-02-17 DIAGNOSIS — E87.5 ACUTE HYPERKALEMIA: ICD-10-CM

## 2021-02-17 DIAGNOSIS — E08.10 DIABETIC KETOACIDOSIS WITHOUT COMA ASSOCIATED WITH DIABETES MELLITUS DUE TO UNDERLYING CONDITION (HCC): Primary | ICD-10-CM

## 2021-02-17 DIAGNOSIS — R41.3 MEMORY IMPAIRMENT: ICD-10-CM

## 2021-02-17 DIAGNOSIS — G40.909 SEIZURE DISORDER (HCC): ICD-10-CM

## 2021-02-17 DIAGNOSIS — D50.0 IRON DEFICIENCY ANEMIA DUE TO CHRONIC BLOOD LOSS: ICD-10-CM

## 2021-02-17 PROBLEM — E87.20 METABOLIC ACIDOSIS: Status: ACTIVE | Noted: 2021-02-17

## 2021-02-17 LAB
ADMINISTERED INITIALS, ADMINIT: NORMAL
ALBUMIN SERPL-MCNC: 3.1 G/DL (ref 3.5–5)
ALBUMIN/GLOB SERPL: 0.7 {RATIO} (ref 1.1–2.2)
ALP SERPL-CCNC: 92 U/L (ref 45–117)
ALT SERPL-CCNC: 11 U/L (ref 12–78)
AMPHET UR QL SCN: NEGATIVE
AMYLASE SERPL-CCNC: 30 U/L (ref 25–115)
ANION GAP SERPL CALC-SCNC: 2 MMOL/L (ref 5–15)
ANION GAP SERPL CALC-SCNC: 28 MMOL/L (ref 5–15)
ANION GAP SERPL CALC-SCNC: 30 MMOL/L (ref 5–15)
ANION GAP SERPL CALC-SCNC: 6 MMOL/L (ref 5–15)
ANION GAP SERPL CALC-SCNC: 9 MMOL/L (ref 5–15)
APPEARANCE UR: CLEAR
APTT PPP: 25.8 SEC (ref 22.1–31)
ARTERIAL PATENCY WRIST A: ABNORMAL
AST SERPL-CCNC: 15 U/L (ref 15–37)
ATRIAL RATE: 122 BPM
B-OH-BUTYR SERPL-SCNC: >4.42 MMOL/L
BACTERIA URNS QL MICRO: NEGATIVE /HPF
BARBITURATES UR QL SCN: NEGATIVE
BASE DEFICIT BLDA-SCNC: 25.7 MMOL/L
BASOPHILS # BLD: 0 K/UL (ref 0–0.1)
BASOPHILS # BLD: 0 K/UL (ref 0–0.1)
BASOPHILS NFR BLD: 0 % (ref 0–1)
BASOPHILS NFR BLD: 0 % (ref 0–1)
BDY SITE: ABNORMAL
BENZODIAZ UR QL: NEGATIVE
BILIRUB SERPL-MCNC: 0.4 MG/DL (ref 0.2–1)
BILIRUB UR QL: NEGATIVE
BUN SERPL-MCNC: 31 MG/DL (ref 6–20)
BUN SERPL-MCNC: 34 MG/DL (ref 6–20)
BUN SERPL-MCNC: 36 MG/DL (ref 6–20)
BUN SERPL-MCNC: 44 MG/DL (ref 6–20)
BUN SERPL-MCNC: 44 MG/DL (ref 6–20)
BUN/CREAT SERPL: 20 (ref 12–20)
BUN/CREAT SERPL: 22 (ref 12–20)
BUN/CREAT SERPL: 23 (ref 12–20)
BUN/CREAT SERPL: 23 (ref 12–20)
BUN/CREAT SERPL: 24 (ref 12–20)
CALCIUM SERPL-MCNC: 7.4 MG/DL (ref 8.5–10.1)
CALCIUM SERPL-MCNC: 7.6 MG/DL (ref 8.5–10.1)
CALCIUM SERPL-MCNC: 7.8 MG/DL (ref 8.5–10.1)
CALCIUM SERPL-MCNC: 7.9 MG/DL (ref 8.5–10.1)
CALCIUM SERPL-MCNC: 8.6 MG/DL (ref 8.5–10.1)
CALCULATED P AXIS, ECG09: 73 DEGREES
CALCULATED R AXIS, ECG10: 46 DEGREES
CALCULATED T AXIS, ECG11: 52 DEGREES
CANNABINOIDS UR QL SCN: NEGATIVE
CHLORIDE SERPL-SCNC: 100 MMOL/L (ref 97–108)
CHLORIDE SERPL-SCNC: 108 MMOL/L (ref 97–108)
CHLORIDE SERPL-SCNC: 109 MMOL/L (ref 97–108)
CHLORIDE SERPL-SCNC: 112 MMOL/L (ref 97–108)
CHLORIDE SERPL-SCNC: 90 MMOL/L (ref 97–108)
CO2 SERPL-SCNC: 26 MMOL/L (ref 21–32)
CO2 SERPL-SCNC: 30 MMOL/L (ref 21–32)
CO2 SERPL-SCNC: 30 MMOL/L (ref 21–32)
CO2 SERPL-SCNC: 5 MMOL/L (ref 21–32)
CO2 SERPL-SCNC: 9 MMOL/L (ref 21–32)
COCAINE UR QL SCN: NEGATIVE
COLOR UR: ABNORMAL
COMMENT, HOLDF: NORMAL
COVID-19 RAPID TEST, COVR: NOT DETECTED
CREAT SERPL-MCNC: 1.3 MG/DL (ref 0.7–1.3)
CREAT SERPL-MCNC: 1.49 MG/DL (ref 0.7–1.3)
CREAT SERPL-MCNC: 1.77 MG/DL (ref 0.7–1.3)
CREAT SERPL-MCNC: 1.94 MG/DL (ref 0.7–1.3)
CREAT SERPL-MCNC: 1.97 MG/DL (ref 0.7–1.3)
CRP SERPL-MCNC: 0.44 MG/DL (ref 0–0.6)
D50 ADMINISTERED, D50ADM: 0 ML
D50 ORDER, D50ORD: 0 ML
DIAGNOSIS, 93000: NORMAL
DIFFERENTIAL METHOD BLD: ABNORMAL
DIFFERENTIAL METHOD BLD: ABNORMAL
DRUG SCRN COMMENT,DRGCM: NORMAL
ECHO AO ROOT DIAM: 3.35 CM
ECHO AV AREA PEAK VELOCITY: 2.46 CM2
ECHO AV AREA VTI: 2.44 CM2
ECHO AV AREA/BSA PEAK VELOCITY: 1.5 CM2/M2
ECHO AV AREA/BSA VTI: 1.5 CM2/M2
ECHO AV MEAN GRADIENT: 4.92 MMHG
ECHO AV PEAK GRADIENT: 9.48 MMHG
ECHO AV PEAK VELOCITY: 153.97 CM/S
ECHO AV VTI: 29.91 CM
ECHO LA MAJOR AXIS: 3.62 CM
ECHO LA MINOR AXIS: 2.18 CM
ECHO LA VOL 2C: 33.52 ML (ref 18–58)
ECHO LA VOL 4C: 27.41 ML (ref 18–58)
ECHO LA VOL BP: 33.99 ML (ref 18–58)
ECHO LA VOL/BSA BIPLANE: 20.48 ML/M2 (ref 16–28)
ECHO LA VOLUME INDEX A2C: 20.19 ML/M2 (ref 16–28)
ECHO LA VOLUME INDEX A4C: 16.51 ML/M2 (ref 16–28)
ECHO LV INTERNAL DIMENSION DIASTOLIC: 3.58 CM (ref 4.2–5.9)
ECHO LV INTERNAL DIMENSION SYSTOLIC: 2.47 CM
ECHO LV IVSD: 1.05 CM (ref 0.6–1)
ECHO LV MASS 2D: 125.6 G (ref 88–224)
ECHO LV MASS INDEX 2D: 75.7 G/M2 (ref 49–115)
ECHO LV POSTERIOR WALL DIASTOLIC: 1.18 CM (ref 0.6–1)
ECHO LVOT DIAM: 1.99 CM
ECHO LVOT PEAK GRADIENT: 5.87 MMHG
ECHO LVOT PEAK VELOCITY: 121.19 CM/S
ECHO LVOT SV: 73 ML
ECHO LVOT VTI: 23.37 CM
ECHO PV PEAK INSTANTANEOUS GRADIENT SYSTOLIC: 7.56 MMHG
ECHO RV INTERNAL DIMENSION: 3.33 CM
ECHO TV REGURGITANT MAX VELOCITY: 179.87 CM/S
ECHO TV REGURGITANT PEAK GRADIENT: 12.94 MMHG
EOSINOPHIL # BLD: 0 K/UL (ref 0–0.4)
EOSINOPHIL # BLD: 0 K/UL (ref 0–0.4)
EOSINOPHIL NFR BLD: 0 % (ref 0–7)
EOSINOPHIL NFR BLD: 0 % (ref 0–7)
EPITH CASTS URNS QL MICRO: ABNORMAL /LPF
ERYTHROCYTE [DISTWIDTH] IN BLOOD BY AUTOMATED COUNT: 14.7 % (ref 11.5–14.5)
ERYTHROCYTE [DISTWIDTH] IN BLOOD BY AUTOMATED COUNT: 15 % (ref 11.5–14.5)
ERYTHROCYTE [SEDIMENTATION RATE] IN BLOOD: 17 MM/HR (ref 0–20)
EST. AVERAGE GLUCOSE BLD GHB EST-MCNC: 194 MG/DL
GLOBULIN SER CALC-MCNC: 4.2 G/DL (ref 2–4)
GLSCOM COMMENTS: NORMAL
GLUCOSE BLD STRIP.AUTO-MCNC: 112 MG/DL (ref 65–100)
GLUCOSE BLD STRIP.AUTO-MCNC: 119 MG/DL (ref 65–100)
GLUCOSE BLD STRIP.AUTO-MCNC: 121 MG/DL (ref 65–100)
GLUCOSE BLD STRIP.AUTO-MCNC: 126 MG/DL (ref 65–100)
GLUCOSE BLD STRIP.AUTO-MCNC: 190 MG/DL (ref 65–100)
GLUCOSE BLD STRIP.AUTO-MCNC: 210 MG/DL (ref 65–100)
GLUCOSE BLD STRIP.AUTO-MCNC: 256 MG/DL (ref 65–100)
GLUCOSE BLD STRIP.AUTO-MCNC: 384 MG/DL (ref 65–100)
GLUCOSE BLD STRIP.AUTO-MCNC: 520 MG/DL (ref 65–100)
GLUCOSE BLD STRIP.AUTO-MCNC: 563 MG/DL (ref 65–100)
GLUCOSE BLD STRIP.AUTO-MCNC: 88 MG/DL (ref 65–100)
GLUCOSE BLD STRIP.AUTO-MCNC: 99 MG/DL (ref 65–100)
GLUCOSE BLD STRIP.AUTO-MCNC: >600 MG/DL (ref 65–100)
GLUCOSE SERPL-MCNC: 131 MG/DL (ref 65–100)
GLUCOSE SERPL-MCNC: 148 MG/DL (ref 65–100)
GLUCOSE SERPL-MCNC: 228 MG/DL (ref 65–100)
GLUCOSE SERPL-MCNC: 686 MG/DL (ref 65–100)
GLUCOSE SERPL-MCNC: 960 MG/DL (ref 65–100)
GLUCOSE UR STRIP.AUTO-MCNC: >1000 MG/DL
GLUCOSE, GLC: 119 MG/DL
GLUCOSE, GLC: 121 MG/DL
GLUCOSE, GLC: 126 MG/DL
GLUCOSE, GLC: 210 MG/DL
GLUCOSE, GLC: 256 MG/DL
GLUCOSE, GLC: 384 MG/DL
GLUCOSE, GLC: 520 MG/DL
GLUCOSE, GLC: 563 MG/DL
GLUCOSE, GLC: 601 MG/DL
GLUCOSE, GLC: 601 MG/DL
GLUCOSE, GLC: 88 MG/DL
HBA1C MFR BLD: 8.4 % (ref 4–5.6)
HCO3 BLDA-SCNC: 4 MMOL/L (ref 22–26)
HCT VFR BLD AUTO: 25.2 % (ref 36.6–50.3)
HCT VFR BLD AUTO: 28.5 % (ref 36.6–50.3)
HEMOCCULT STL QL: POSITIVE
HGB BLD-MCNC: 8.4 G/DL (ref 12.1–17)
HGB BLD-MCNC: 8.9 G/DL (ref 12.1–17)
HGB UR QL STRIP: ABNORMAL
HIGH TARGET, HITG: 250 MG/DL
HYALINE CASTS URNS QL MICRO: ABNORMAL /LPF (ref 0–5)
IMM GRANULOCYTES # BLD AUTO: 0 K/UL (ref 0–0.04)
IMM GRANULOCYTES # BLD AUTO: 0.1 K/UL (ref 0–0.04)
IMM GRANULOCYTES NFR BLD AUTO: 0 % (ref 0–0.5)
IMM GRANULOCYTES NFR BLD AUTO: 1 % (ref 0–0.5)
INSULIN ADMINSTERED, INSADM: 0 UNITS/HOUR
INSULIN ADMINSTERED, INSADM: 0.3 UNITS/HOUR
INSULIN ADMINSTERED, INSADM: 10.8 UNITS/HOUR
INSULIN ADMINSTERED, INSADM: 16.2 UNITS/HOUR
INSULIN ADMINSTERED, INSADM: 16.2 UNITS/HOUR
INSULIN ADMINSTERED, INSADM: 2 UNITS/HOUR
INSULIN ADMINSTERED, INSADM: 2.4 UNITS/HOUR
INSULIN ADMINSTERED, INSADM: 20.1 UNITS/HOUR
INSULIN ADMINSTERED, INSADM: 23 UNITS/HOUR
INSULIN ADMINSTERED, INSADM: 7.5 UNITS/HOUR
INSULIN ADMINSTERED, INSADM: 9.8 UNITS/HOUR
INSULIN ORDER, INSORD: 0 UNITS/HOUR
INSULIN ORDER, INSORD: 0.3 UNITS/HOUR
INSULIN ORDER, INSORD: 10.8 UNITS/HOUR
INSULIN ORDER, INSORD: 16.2 UNITS/HOUR
INSULIN ORDER, INSORD: 16.2 UNITS/HOUR
INSULIN ORDER, INSORD: 2 UNITS/HOUR
INSULIN ORDER, INSORD: 2.4 UNITS/HOUR
INSULIN ORDER, INSORD: 20.1 UNITS/HOUR
INSULIN ORDER, INSORD: 23 UNITS/HOUR
INSULIN ORDER, INSORD: 7.5 UNITS/HOUR
INSULIN ORDER, INSORD: 9.8 UNITS/HOUR
KETONES UR QL STRIP.AUTO: 80 MG/DL
LACTATE BLD-SCNC: 4.05 MMOL/L (ref 0.4–2)
LACTATE SERPL-SCNC: 0.8 MMOL/L (ref 0.4–2)
LACTATE SERPL-SCNC: 1.6 MMOL/L (ref 0.4–2)
LACTATE SERPL-SCNC: 3.1 MMOL/L (ref 0.4–2)
LACTATE SERPL-SCNC: 4.1 MMOL/L (ref 0.4–2)
LEUKOCYTE ESTERASE UR QL STRIP.AUTO: NEGATIVE
LIPASE SERPL-CCNC: 54 U/L (ref 73–393)
LOW TARGET, LOT: 150 MG/DL
LVOT MG: 2.72 MMHG
LYMPHOCYTES # BLD: 0.7 K/UL (ref 0.8–3.5)
LYMPHOCYTES # BLD: 1.5 K/UL (ref 0.8–3.5)
LYMPHOCYTES NFR BLD: 14 % (ref 12–49)
LYMPHOCYTES NFR BLD: 6 % (ref 12–49)
MAGNESIUM SERPL-MCNC: 1.4 MG/DL (ref 1.6–2.4)
MCH RBC QN AUTO: 31.8 PG (ref 26–34)
MCH RBC QN AUTO: 32.2 PG (ref 26–34)
MCHC RBC AUTO-ENTMCNC: 31.2 G/DL (ref 30–36.5)
MCHC RBC AUTO-ENTMCNC: 33.3 G/DL (ref 30–36.5)
MCV RBC AUTO: 101.8 FL (ref 80–99)
MCV RBC AUTO: 96.6 FL (ref 80–99)
METHADONE UR QL: NEGATIVE
MINUTES UNTIL NEXT BG, NBG: 60 MIN
MONOCYTES # BLD: 1.1 K/UL (ref 0–1)
MONOCYTES # BLD: 1.5 K/UL (ref 0–1)
MONOCYTES NFR BLD: 14 % (ref 5–13)
MONOCYTES NFR BLD: 9 % (ref 5–13)
MULTIPLIER, MUL: 0
MULTIPLIER, MUL: 0.01
MULTIPLIER, MUL: 0.02
MULTIPLIER, MUL: 0.03
MULTIPLIER, MUL: 0.03
MULTIPLIER, MUL: 0.04
MULTIPLIER, MUL: 0.04
MULTIPLIER, MUL: 0.05
NEUTS SEG # BLD: 10.5 K/UL (ref 1.8–8)
NEUTS SEG # BLD: 7.4 K/UL (ref 1.8–8)
NEUTS SEG NFR BLD: 72 % (ref 32–75)
NEUTS SEG NFR BLD: 84 % (ref 32–75)
NITRITE UR QL STRIP.AUTO: NEGATIVE
NRBC # BLD: 0 K/UL (ref 0–0.01)
NRBC # BLD: 0 K/UL (ref 0–0.01)
NRBC BLD-RTO: 0 PER 100 WBC
NRBC BLD-RTO: 0 PER 100 WBC
OPIATES UR QL: NEGATIVE
ORDER INITIALS, ORDINIT: NORMAL
P-R INTERVAL, ECG05: 140 MS
PCO2 BLDA: 14 MMHG (ref 35–45)
PCP UR QL: NEGATIVE
PH BLDA: 7.01 [PH] (ref 7.35–7.45)
PH UR STRIP: 5 [PH] (ref 5–8)
PHOSPHATE SERPL-MCNC: 9 MG/DL (ref 2.6–4.7)
PLATELET # BLD AUTO: 293 K/UL (ref 150–400)
PLATELET # BLD AUTO: 336 K/UL (ref 150–400)
PMV BLD AUTO: 10 FL (ref 8.9–12.9)
PMV BLD AUTO: 9.3 FL (ref 8.9–12.9)
PO2 BLDA: 110 MMHG (ref 80–100)
POTASSIUM SERPL-SCNC: 3.1 MMOL/L (ref 3.5–5.1)
POTASSIUM SERPL-SCNC: 3.9 MMOL/L (ref 3.5–5.1)
POTASSIUM SERPL-SCNC: 4 MMOL/L (ref 3.5–5.1)
POTASSIUM SERPL-SCNC: 4.2 MMOL/L (ref 3.5–5.1)
POTASSIUM SERPL-SCNC: 6.3 MMOL/L (ref 3.5–5.1)
PROCALCITONIN SERPL-MCNC: 0.2 NG/ML
PROT SERPL-MCNC: 7.3 G/DL (ref 6.4–8.2)
PROT UR STRIP-MCNC: ABNORMAL MG/DL
Q-T INTERVAL, ECG07: 320 MS
QRS DURATION, ECG06: 92 MS
QTC CALCULATION (BEZET), ECG08: 456 MS
RBC # BLD AUTO: 2.61 M/UL (ref 4.1–5.7)
RBC # BLD AUTO: 2.8 M/UL (ref 4.1–5.7)
RBC #/AREA URNS HPF: ABNORMAL /HPF (ref 0–5)
RBC MORPH BLD: ABNORMAL
RBC MORPH BLD: ABNORMAL
SAMPLES BEING HELD,HOLD: NORMAL
SAO2 % BLD: 95 % (ref 92–97)
SAO2% DEVICE SAO2% SENSOR NAME: ABNORMAL
SARS-COV-2, COV2: NORMAL
SERVICE CMNT-IMP: ABNORMAL
SERVICE CMNT-IMP: NORMAL
SERVICE CMNT-IMP: NORMAL
SODIUM SERPL-SCNC: 125 MMOL/L (ref 136–145)
SODIUM SERPL-SCNC: 137 MMOL/L (ref 136–145)
SODIUM SERPL-SCNC: 144 MMOL/L (ref 136–145)
SOURCE, COVRS: NORMAL
SP GR UR REFRACTOMETRY: 1.02 (ref 1–1.03)
SPECIMEN SITE: ABNORMAL
THERAPEUTIC RANGE,PTTT: NORMAL SECS (ref 58–77)
TROPONIN I SERPL-MCNC: 1.99 NG/ML
TROPONIN I SERPL-MCNC: 11.6 NG/ML
TROPONIN I SERPL-MCNC: 8.05 NG/ML
UA: UC IF INDICATED,UAUC: ABNORMAL
UROBILINOGEN UR QL STRIP.AUTO: 0.2 EU/DL (ref 0.2–1)
VENTRICULAR RATE, ECG03: 122 BPM
WBC # BLD AUTO: 10.4 K/UL (ref 4.1–11.1)
WBC # BLD AUTO: 12.4 K/UL (ref 4.1–11.1)
WBC URNS QL MICRO: ABNORMAL /HPF (ref 0–4)

## 2021-02-17 PROCEDURE — 74011000258 HC RX REV CODE- 258: Performed by: INTERNAL MEDICINE

## 2021-02-17 PROCEDURE — 2709999900 HC NON-CHARGEABLE SUPPLY

## 2021-02-17 PROCEDURE — 83735 ASSAY OF MAGNESIUM: CPT

## 2021-02-17 PROCEDURE — 65610000006 HC RM INTENSIVE CARE

## 2021-02-17 PROCEDURE — 82962 GLUCOSE BLOOD TEST: CPT

## 2021-02-17 PROCEDURE — 74011250636 HC RX REV CODE- 250/636: Performed by: INTERNAL MEDICINE

## 2021-02-17 PROCEDURE — 99285 EMERGENCY DEPT VISIT HI MDM: CPT

## 2021-02-17 PROCEDURE — 71045 X-RAY EXAM CHEST 1 VIEW: CPT

## 2021-02-17 PROCEDURE — 99232 SBSQ HOSP IP/OBS MODERATE 35: CPT | Performed by: CLINICAL NURSE SPECIALIST

## 2021-02-17 PROCEDURE — 87040 BLOOD CULTURE FOR BACTERIA: CPT

## 2021-02-17 PROCEDURE — 83605 ASSAY OF LACTIC ACID: CPT

## 2021-02-17 PROCEDURE — C9113 INJ PANTOPRAZOLE SODIUM, VIA: HCPCS | Performed by: INTERNAL MEDICINE

## 2021-02-17 PROCEDURE — 93306 TTE W/DOPPLER COMPLETE: CPT | Performed by: STUDENT IN AN ORGANIZED HEALTH CARE EDUCATION/TRAINING PROGRAM

## 2021-02-17 PROCEDURE — 80053 COMPREHEN METABOLIC PANEL: CPT

## 2021-02-17 PROCEDURE — 96375 TX/PRO/DX INJ NEW DRUG ADDON: CPT

## 2021-02-17 PROCEDURE — 74011250636 HC RX REV CODE- 250/636: Performed by: STUDENT IN AN ORGANIZED HEALTH CARE EDUCATION/TRAINING PROGRAM

## 2021-02-17 PROCEDURE — 85025 COMPLETE CBC W/AUTO DIFF WBC: CPT

## 2021-02-17 PROCEDURE — C1751 CATH, INF, PER/CENT/MIDLINE: HCPCS

## 2021-02-17 PROCEDURE — 93005 ELECTROCARDIOGRAM TRACING: CPT

## 2021-02-17 PROCEDURE — 87635 SARS-COV-2 COVID-19 AMP PRB: CPT

## 2021-02-17 PROCEDURE — 36600 WITHDRAWAL OF ARTERIAL BLOOD: CPT

## 2021-02-17 PROCEDURE — 74011636637 HC RX REV CODE- 636/637: Performed by: STUDENT IN AN ORGANIZED HEALTH CARE EDUCATION/TRAINING PROGRAM

## 2021-02-17 PROCEDURE — 75810000455 HC PLCMT CENT VENOUS CATH LVL 2 5182

## 2021-02-17 PROCEDURE — 80048 BASIC METABOLIC PNL TOTAL CA: CPT

## 2021-02-17 PROCEDURE — 83036 HEMOGLOBIN GLYCOSYLATED A1C: CPT

## 2021-02-17 PROCEDURE — 97165 OT EVAL LOW COMPLEX 30 MIN: CPT

## 2021-02-17 PROCEDURE — 85652 RBC SED RATE AUTOMATED: CPT

## 2021-02-17 PROCEDURE — 81001 URINALYSIS AUTO W/SCOPE: CPT

## 2021-02-17 PROCEDURE — 82803 BLOOD GASES ANY COMBINATION: CPT

## 2021-02-17 PROCEDURE — 96361 HYDRATE IV INFUSION ADD-ON: CPT

## 2021-02-17 PROCEDURE — 36415 COLL VENOUS BLD VENIPUNCTURE: CPT

## 2021-02-17 PROCEDURE — 74011000250 HC RX REV CODE- 250: Performed by: INTERNAL MEDICINE

## 2021-02-17 PROCEDURE — 84484 ASSAY OF TROPONIN QUANT: CPT

## 2021-02-17 PROCEDURE — 02HV33Z INSERTION OF INFUSION DEVICE INTO SUPERIOR VENA CAVA, PERCUTANEOUS APPROACH: ICD-10-PCS | Performed by: FAMILY MEDICINE

## 2021-02-17 PROCEDURE — 96374 THER/PROPH/DIAG INJ IV PUSH: CPT

## 2021-02-17 PROCEDURE — 87086 URINE CULTURE/COLONY COUNT: CPT

## 2021-02-17 PROCEDURE — 74011250637 HC RX REV CODE- 250/637: Performed by: EMERGENCY MEDICINE

## 2021-02-17 PROCEDURE — 97161 PT EVAL LOW COMPLEX 20 MIN: CPT

## 2021-02-17 PROCEDURE — 80307 DRUG TEST PRSMV CHEM ANLYZR: CPT

## 2021-02-17 PROCEDURE — 74011000258 HC RX REV CODE- 258: Performed by: EMERGENCY MEDICINE

## 2021-02-17 PROCEDURE — 93306 TTE W/DOPPLER COMPLETE: CPT

## 2021-02-17 PROCEDURE — 82010 KETONE BODYS QUAN: CPT

## 2021-02-17 PROCEDURE — 65270000029 HC RM PRIVATE

## 2021-02-17 PROCEDURE — 84145 PROCALCITONIN (PCT): CPT

## 2021-02-17 PROCEDURE — 82150 ASSAY OF AMYLASE: CPT

## 2021-02-17 PROCEDURE — 86140 C-REACTIVE PROTEIN: CPT

## 2021-02-17 PROCEDURE — 80061 LIPID PANEL: CPT

## 2021-02-17 PROCEDURE — 84100 ASSAY OF PHOSPHORUS: CPT

## 2021-02-17 PROCEDURE — 97535 SELF CARE MNGMENT TRAINING: CPT

## 2021-02-17 PROCEDURE — 97530 THERAPEUTIC ACTIVITIES: CPT

## 2021-02-17 PROCEDURE — 74011636637 HC RX REV CODE- 636/637: Performed by: EMERGENCY MEDICINE

## 2021-02-17 PROCEDURE — 77030040831 HC BAG URINE DRNG MDII -A

## 2021-02-17 PROCEDURE — 74011250636 HC RX REV CODE- 250/636: Performed by: EMERGENCY MEDICINE

## 2021-02-17 PROCEDURE — 82272 OCCULT BLD FECES 1-3 TESTS: CPT

## 2021-02-17 PROCEDURE — 83690 ASSAY OF LIPASE: CPT

## 2021-02-17 PROCEDURE — 74176 CT ABD & PELVIS W/O CONTRAST: CPT

## 2021-02-17 PROCEDURE — 85730 THROMBOPLASTIN TIME PARTIAL: CPT

## 2021-02-17 RX ORDER — INSULIN ASPART 100 [IU]/ML
INJECTION, SOLUTION INTRAVENOUS; SUBCUTANEOUS
COMMUNITY
End: 2021-05-24

## 2021-02-17 RX ORDER — SODIUM BICARBONATE 1 MEQ/ML
100 SYRINGE (ML) INTRAVENOUS ONCE
Status: DISCONTINUED | OUTPATIENT
Start: 2021-02-17 | End: 2021-02-17 | Stop reason: ALTCHOICE

## 2021-02-17 RX ORDER — GUAIFENESIN 100 MG/5ML
200 SOLUTION ORAL
COMMUNITY
End: 2021-05-24

## 2021-02-17 RX ORDER — MAGNESIUM SULFATE 100 %
4 CRYSTALS MISCELLANEOUS AS NEEDED
Status: DISCONTINUED | OUTPATIENT
Start: 2021-02-17 | End: 2021-02-20 | Stop reason: HOSPADM

## 2021-02-17 RX ORDER — SODIUM CHLORIDE 9 MG/ML
75 INJECTION, SOLUTION INTRAVENOUS CONTINUOUS
Status: DISCONTINUED | OUTPATIENT
Start: 2021-02-17 | End: 2021-02-17

## 2021-02-17 RX ORDER — METFORMIN HYDROCHLORIDE 850 MG/1
850 TABLET ORAL 3 TIMES DAILY
COMMUNITY
End: 2021-04-29

## 2021-02-17 RX ORDER — POTASSIUM CHLORIDE AND SODIUM CHLORIDE 900; 300 MG/100ML; MG/100ML
INJECTION, SOLUTION INTRAVENOUS CONTINUOUS
Status: DISCONTINUED | OUTPATIENT
Start: 2021-02-17 | End: 2021-02-17

## 2021-02-17 RX ORDER — METRONIDAZOLE 500 MG/100ML
500 INJECTION, SOLUTION INTRAVENOUS EVERY 12 HOURS
Status: DISCONTINUED | OUTPATIENT
Start: 2021-02-17 | End: 2021-02-20 | Stop reason: HOSPADM

## 2021-02-17 RX ORDER — DEXTROSE 50 % IN WATER (D50W) INTRAVENOUS SYRINGE
25-50 AS NEEDED
Status: DISCONTINUED | OUTPATIENT
Start: 2021-02-17 | End: 2021-02-20 | Stop reason: HOSPADM

## 2021-02-17 RX ORDER — SODIUM CHLORIDE 0.9 % (FLUSH) 0.9 %
5-40 SYRINGE (ML) INJECTION AS NEEDED
Status: DISCONTINUED | OUTPATIENT
Start: 2021-02-17 | End: 2021-02-20 | Stop reason: HOSPADM

## 2021-02-17 RX ORDER — INSULIN LISPRO 100 [IU]/ML
INJECTION, SOLUTION INTRAVENOUS; SUBCUTANEOUS
Status: DISCONTINUED | OUTPATIENT
Start: 2021-02-17 | End: 2021-02-20 | Stop reason: HOSPADM

## 2021-02-17 RX ORDER — PANTOPRAZOLE SODIUM 40 MG/1
40 TABLET, DELAYED RELEASE ORAL
COMMUNITY
End: 2021-05-05 | Stop reason: SDUPTHER

## 2021-02-17 RX ORDER — HEPARIN SODIUM 10000 [USP'U]/100ML
12-25 INJECTION, SOLUTION INTRAVENOUS
Status: DISCONTINUED | OUTPATIENT
Start: 2021-02-17 | End: 2021-02-19

## 2021-02-17 RX ORDER — SODIUM BICARBONATE 1 MEQ/ML
100 SYRINGE (ML) INTRAVENOUS ONCE
Status: COMPLETED | OUTPATIENT
Start: 2021-02-17 | End: 2021-02-17

## 2021-02-17 RX ORDER — ACETAMINOPHEN 325 MG/1
650 TABLET ORAL
Status: ON HOLD | COMMUNITY
End: 2021-05-06 | Stop reason: ALTCHOICE

## 2021-02-17 RX ORDER — ACETAMINOPHEN 325 MG/1
650 TABLET ORAL
Status: DISCONTINUED | OUTPATIENT
Start: 2021-02-17 | End: 2021-02-20 | Stop reason: HOSPADM

## 2021-02-17 RX ORDER — HYDROCORTISONE 1 %
CREAM (GRAM) TOPICAL
COMMUNITY
End: 2021-05-24

## 2021-02-17 RX ORDER — ONDANSETRON 2 MG/ML
8 INJECTION INTRAMUSCULAR; INTRAVENOUS
Status: COMPLETED | OUTPATIENT
Start: 2021-02-17 | End: 2021-02-17

## 2021-02-17 RX ORDER — INSULIN GLARGINE 100 [IU]/ML
10 INJECTION, SOLUTION SUBCUTANEOUS DAILY
Status: DISCONTINUED | OUTPATIENT
Start: 2021-02-17 | End: 2021-02-18

## 2021-02-17 RX ORDER — ALBUTEROL SULFATE 0.83 MG/ML
5 SOLUTION RESPIRATORY (INHALATION)
Status: DISCONTINUED | OUTPATIENT
Start: 2021-02-17 | End: 2021-02-17

## 2021-02-17 RX ORDER — ALBUTEROL SULFATE 90 UG/1
6 AEROSOL, METERED RESPIRATORY (INHALATION)
Status: DISPENSED | OUTPATIENT
Start: 2021-02-17 | End: 2021-02-17

## 2021-02-17 RX ORDER — LOPERAMIDE HCL 2 MG
2 TABLET ORAL
Status: ON HOLD | COMMUNITY
End: 2021-05-06 | Stop reason: ALTCHOICE

## 2021-02-17 RX ORDER — SUCRALFATE 1 G/1
1 TABLET ORAL
COMMUNITY
End: 2021-05-05 | Stop reason: SDUPTHER

## 2021-02-17 RX ORDER — IBUPROFEN 200 MG
400 TABLET ORAL
COMMUNITY
End: 2021-05-24

## 2021-02-17 RX ORDER — GUAIFENESIN 100 MG/5ML
81 LIQUID (ML) ORAL DAILY
Status: DISCONTINUED | OUTPATIENT
Start: 2021-02-18 | End: 2021-02-20 | Stop reason: HOSPADM

## 2021-02-17 RX ORDER — DIPHENHYDRAMINE HCL 25 MG
25 CAPSULE ORAL
Status: ON HOLD | COMMUNITY
End: 2021-05-06 | Stop reason: ALTCHOICE

## 2021-02-17 RX ORDER — HEPARIN SODIUM 5000 [USP'U]/ML
60 INJECTION, SOLUTION INTRAVENOUS; SUBCUTANEOUS ONCE
Status: COMPLETED | OUTPATIENT
Start: 2021-02-17 | End: 2021-02-17

## 2021-02-17 RX ORDER — VANCOMYCIN/0.9 % SOD CHLORIDE 1.5G/250ML
1500 PLASTIC BAG, INJECTION (ML) INTRAVENOUS ONCE
Status: COMPLETED | OUTPATIENT
Start: 2021-02-17 | End: 2021-02-17

## 2021-02-17 RX ORDER — DEXTROSE 50 % IN WATER (D50W) INTRAVENOUS SYRINGE
25-50 AS NEEDED
Status: DISCONTINUED | OUTPATIENT
Start: 2021-02-17 | End: 2021-02-17 | Stop reason: SDUPTHER

## 2021-02-17 RX ORDER — DEXTROSE, SODIUM CHLORIDE, AND POTASSIUM CHLORIDE 5; .45; .3 G/100ML; G/100ML; G/100ML
INJECTION INTRAVENOUS CONTINUOUS
Status: DISPENSED | OUTPATIENT
Start: 2021-02-17 | End: 2021-02-17

## 2021-02-17 RX ORDER — SODIUM CHLORIDE 0.9 % (FLUSH) 0.9 %
5-40 SYRINGE (ML) INJECTION EVERY 8 HOURS
Status: DISCONTINUED | OUTPATIENT
Start: 2021-02-17 | End: 2021-02-20 | Stop reason: HOSPADM

## 2021-02-17 RX ORDER — VALPROIC ACID 250 MG/5ML
500 SOLUTION ORAL EVERY 8 HOURS
Status: DISCONTINUED | OUTPATIENT
Start: 2021-02-17 | End: 2021-02-17 | Stop reason: SDUPTHER

## 2021-02-17 RX ORDER — SODIUM CHLORIDE 9 MG/ML
125 INJECTION, SOLUTION INTRAVENOUS CONTINUOUS
Status: DISCONTINUED | OUTPATIENT
Start: 2021-02-17 | End: 2021-02-19

## 2021-02-17 RX ORDER — DIVALPROEX SODIUM 500 MG/1
1500 TABLET, EXTENDED RELEASE ORAL
COMMUNITY

## 2021-02-17 RX ORDER — ONDANSETRON 2 MG/ML
4 INJECTION INTRAMUSCULAR; INTRAVENOUS
Status: DISCONTINUED | OUTPATIENT
Start: 2021-02-17 | End: 2021-02-20 | Stop reason: HOSPADM

## 2021-02-17 RX ORDER — SODIUM POLYSTYRENE SULFONATE 15 G/60ML
30 SUSPENSION ORAL; RECTAL
Status: COMPLETED | OUTPATIENT
Start: 2021-02-17 | End: 2021-02-17

## 2021-02-17 RX ORDER — NALOXONE HYDROCHLORIDE 0.4 MG/ML
0.4 INJECTION, SOLUTION INTRAMUSCULAR; INTRAVENOUS; SUBCUTANEOUS AS NEEDED
Status: DISCONTINUED | OUTPATIENT
Start: 2021-02-17 | End: 2021-02-17

## 2021-02-17 RX ORDER — INSULIN LISPRO 100 [IU]/ML
INJECTION, SOLUTION INTRAVENOUS; SUBCUTANEOUS
Status: DISCONTINUED | OUTPATIENT
Start: 2021-02-17 | End: 2021-02-17

## 2021-02-17 RX ORDER — LORAZEPAM 2 MG/ML
1 INJECTION INTRAMUSCULAR ONCE
Status: COMPLETED | OUTPATIENT
Start: 2021-02-17 | End: 2021-02-17

## 2021-02-17 RX ORDER — KETOCONAZOLE 20 MG/G
CREAM TOPICAL
Status: ON HOLD | COMMUNITY
End: 2021-05-06 | Stop reason: ALTCHOICE

## 2021-02-17 RX ORDER — DIPHENHYDRAMINE HYDROCHLORIDE 50 MG/ML
25 INJECTION, SOLUTION INTRAMUSCULAR; INTRAVENOUS
Status: COMPLETED | OUTPATIENT
Start: 2021-02-17 | End: 2021-02-17

## 2021-02-17 RX ORDER — MAGNESIUM SULFATE 100 %
45 CRYSTALS MISCELLANEOUS AS NEEDED
COMMUNITY
End: 2021-05-24

## 2021-02-17 RX ORDER — IPRATROPIUM BROMIDE AND ALBUTEROL SULFATE 2.5; .5 MG/3ML; MG/3ML
3 SOLUTION RESPIRATORY (INHALATION)
COMMUNITY

## 2021-02-17 RX ORDER — MAGNESIUM SULFATE HEPTAHYDRATE 40 MG/ML
2 INJECTION, SOLUTION INTRAVENOUS ONCE
Status: COMPLETED | OUTPATIENT
Start: 2021-02-17 | End: 2021-02-17

## 2021-02-17 RX ORDER — MAGNESIUM SULFATE 100 %
4 CRYSTALS MISCELLANEOUS AS NEEDED
Status: DISCONTINUED | OUTPATIENT
Start: 2021-02-17 | End: 2021-02-17 | Stop reason: SDUPTHER

## 2021-02-17 RX ORDER — HYDROCODONE BITARTRATE AND ACETAMINOPHEN 5; 325 MG/1; MG/1
1 TABLET ORAL
Status: DISCONTINUED | OUTPATIENT
Start: 2021-02-17 | End: 2021-02-17

## 2021-02-17 RX ADMIN — SODIUM BICARBONATE 100 MEQ: 84 INJECTION, SOLUTION INTRAVENOUS at 05:10

## 2021-02-17 RX ADMIN — DIPHENHYDRAMINE HYDROCHLORIDE 25 MG: 50 INJECTION, SOLUTION INTRAMUSCULAR; INTRAVENOUS at 02:57

## 2021-02-17 RX ADMIN — PANTOPRAZOLE SODIUM 40 MG: 40 INJECTION, POWDER, FOR SOLUTION INTRAVENOUS at 20:35

## 2021-02-17 RX ADMIN — DEXTROSE MONOHYDRATE 4 MCG/MIN: 50 INJECTION, SOLUTION INTRAVENOUS at 08:28

## 2021-02-17 RX ADMIN — SODIUM CHLORIDE 10.8 UNITS/HR: 9 INJECTION, SOLUTION INTRAVENOUS at 05:23

## 2021-02-17 RX ADMIN — INSULIN GLARGINE 10 UNITS: 100 INJECTION, SOLUTION SUBCUTANEOUS at 20:28

## 2021-02-17 RX ADMIN — CEFEPIME HYDROCHLORIDE 2 G: 2 INJECTION, POWDER, FOR SOLUTION INTRAVENOUS at 08:58

## 2021-02-17 RX ADMIN — SODIUM POLYSTYRENE SULFONATE 30 G: 15 SUSPENSION ORAL; RECTAL at 05:28

## 2021-02-17 RX ADMIN — SODIUM CHLORIDE 16.2 UNITS/HR: 9 INJECTION, SOLUTION INTRAVENOUS at 09:52

## 2021-02-17 RX ADMIN — SODIUM CHLORIDE 125 ML/HR: 9 INJECTION, SOLUTION INTRAVENOUS at 23:01

## 2021-02-17 RX ADMIN — SODIUM CHLORIDE 500 MG: 9 INJECTION, SOLUTION INTRAVENOUS at 23:03

## 2021-02-17 RX ADMIN — SODIUM CHLORIDE 75 ML/HR: 9 INJECTION, SOLUTION INTRAVENOUS at 07:22

## 2021-02-17 RX ADMIN — SODIUM CHLORIDE 23 UNITS/HR: 9 INJECTION, SOLUTION INTRAVENOUS at 08:35

## 2021-02-17 RX ADMIN — SODIUM CHLORIDE 2.4 UNITS/HR: 9 INJECTION, SOLUTION INTRAVENOUS at 13:21

## 2021-02-17 RX ADMIN — HEPARIN SODIUM 3500 UNITS: 5000 INJECTION INTRAVENOUS; SUBCUTANEOUS at 20:25

## 2021-02-17 RX ADMIN — LORAZEPAM 1 MG: 2 INJECTION INTRAMUSCULAR; INTRAVENOUS at 05:23

## 2021-02-17 RX ADMIN — INSULIN HUMAN 10 UNITS: 100 INJECTION, SOLUTION PARENTERAL at 03:53

## 2021-02-17 RX ADMIN — DEXTROSE MONOHYDRATE 5 MCG/MIN: 50 INJECTION, SOLUTION INTRAVENOUS at 08:58

## 2021-02-17 RX ADMIN — METRONIDAZOLE 500 MG: 500 INJECTION, SOLUTION INTRAVENOUS at 08:58

## 2021-02-17 RX ADMIN — METRONIDAZOLE 500 MG: 500 INJECTION, SOLUTION INTRAVENOUS at 21:13

## 2021-02-17 RX ADMIN — SODIUM CHLORIDE 1000 ML: 9 INJECTION, SOLUTION INTRAVENOUS at 04:07

## 2021-02-17 RX ADMIN — Medication 10 ML: at 07:01

## 2021-02-17 RX ADMIN — DEXTROSE MONOHYDRATE 6 MCG/MIN: 50 INJECTION, SOLUTION INTRAVENOUS at 09:40

## 2021-02-17 RX ADMIN — POTASSIUM CHLORIDE AND SODIUM CHLORIDE: 900; 300 INJECTION, SOLUTION INTRAVENOUS at 13:22

## 2021-02-17 RX ADMIN — SODIUM CHLORIDE 9.8 UNITS/HR: 9 INJECTION, SOLUTION INTRAVENOUS at 11:06

## 2021-02-17 RX ADMIN — SODIUM CHLORIDE 1000 ML: 9 INJECTION, SOLUTION INTRAVENOUS at 02:57

## 2021-02-17 RX ADMIN — SODIUM CHLORIDE 2 UNITS/HR: 9 INJECTION, SOLUTION INTRAVENOUS at 14:22

## 2021-02-17 RX ADMIN — DEXTROSE MONOHYDRATE, SODIUM CHLORIDE, AND POTASSIUM CHLORIDE: 50; 4.5; 2.98 INJECTION, SOLUTION INTRAVENOUS at 14:22

## 2021-02-17 RX ADMIN — ONDANSETRON 8 MG: 2 INJECTION INTRAMUSCULAR; INTRAVENOUS at 02:57

## 2021-02-17 RX ADMIN — CEFEPIME HYDROCHLORIDE 2 G: 2 INJECTION, POWDER, FOR SOLUTION INTRAVENOUS at 20:37

## 2021-02-17 RX ADMIN — Medication 10 ML: at 20:32

## 2021-02-17 RX ADMIN — SODIUM CHLORIDE 1000 ML: 9 INJECTION, SOLUTION INTRAVENOUS at 06:48

## 2021-02-17 RX ADMIN — Medication 10 ML: at 22:00

## 2021-02-17 RX ADMIN — VANCOMYCIN HYDROCHLORIDE 1500 MG: 5 INJECTION, POWDER, LYOPHILIZED, FOR SOLUTION INTRAVENOUS at 20:42

## 2021-02-17 RX ADMIN — MAGNESIUM SULFATE HEPTAHYDRATE 2 G: 40 INJECTION, SOLUTION INTRAVENOUS at 07:22

## 2021-02-17 RX ADMIN — PANTOPRAZOLE SODIUM 40 MG: 40 INJECTION, POWDER, FOR SOLUTION INTRAVENOUS at 07:20

## 2021-02-17 RX ADMIN — SODIUM BICARBONATE: 84 INJECTION, SOLUTION INTRAVENOUS at 07:01

## 2021-02-17 RX ADMIN — HEPARIN SODIUM 12 UNITS/KG/HR: 10000 INJECTION, SOLUTION INTRAVENOUS at 20:47

## 2021-02-17 NOTE — ED NOTES
0200  Unable to get IV on patient; second attempt with IV US was unsuccessful; patient vomiting at bedside and reports feeling unwell        0315  Patients blood sugar checked and registered HI and recheck registered HI a second time;  notified

## 2021-02-17 NOTE — PROGRESS NOTES
Problem: Self Care Deficits Care Plan (Adult)  Goal: *Acute Goals and Plan of Care (Insert Text)  Description:   FUNCTIONAL STATUS PRIOR TO ADMISSION: Pt is a poor historian but reports he lives in a [address] [place] [address]. Per discussion with RN, pt is normally more alert and oriented, able to converse. Unclear PLOF with regards to ADL performance and mobility. Occupational Therapy Goals  Initiated 2/17/2021  1. Patient will perform grooming, seated EOB, with supervision/set-up within 7 day(s). 2.  Patient will perform upper body dressing and bathing with minimal assistance/contact guard assist within 7 day(s). 3.  Patient will perform lower body dressing with minimal assistance within 7 day(s). 4.  Patient will perform toilet transfers with minimal assistance/contact guard assist within 7 day(s). 5.  Patient will perform all aspects of toileting with minimal assistance/contact guard assist within 7 day(s). 6.  Patient will participate in upper extremity therapeutic exercise/activities with supervision/set-up for 5 minutes within 7 day(s). 7.  Patient will utilize energy conservation techniques during functional activities with verbal cues within 7 day(s). Outcome: Progressing Towards Goal     OCCUPATIONAL THERAPY EVALUATION  Patient: Janes Chapman (56 y.o. male)  Date: 2/17/2021  Primary Diagnosis: DKA (diabetic ketoacidoses) ([facility] [address] [E11.10]        Precautions: Fall; Droplet Plus (COVID r/o)       ASSESSMENT  Based on the objective data described below, the patient presents with decreased activity tolerance, lethargy, decreased command following, impaired balance, generally decreased strength, and impaired coordination following admission for DKA, SHIRA, and AMS, now COVID r/o. Pt is received in ED, very lethargic but able to participate with therapy with increased time and stimulation, intermittently confused and a fair to poor historian.  He has baseline R hemiparesis from h/o CVA \"from when he was 6years old\" per pt and demonstrates decreased AROM in R UE. He requires A x 2 for bed mobility and for standing trials at EOB for linen change. He requires increased assistance to manage seated UB ADL tasks due to R lateral lean at EOB and performs self feeding with min A in semi-supine. At this time, anticipate pt is functioning below his baseline and will require continued skilled OT services. Current Level of Function Impacting Discharge (ADLs/self-care): up to max A for toileting, LB dressing; up to mod A for self feeding and grooming; A x 2 for mobility    Functional Outcome Measure: The patient scored Total: 10/100 on the Barthel Index outcome measure which is indicative of 90% impaired ability to care for basic self needs/dependency on others; inferred 100% dependency on others for instrumental ADLs. Other factors to consider for discharge: unclear PLOF; lethargy; high fall risk; h/o CVA with R heri (flexor tone)     Patient will benefit from skilled therapy intervention to address the above noted impairments. PLAN :  Recommendations and Planned Interventions: self care training, functional mobility training, therapeutic exercise, balance training, visual/perceptual training, therapeutic activities, cognitive retraining, endurance activities, neuromuscular re-education, patient education, home safety training and family training/education    Frequency/Duration: Patient will be followed by occupational therapy 5 times a week to address goals. Recommendation for discharge: (in order for the patient to meet his/her long term goals)  Therapy up to 5 days/week in SNF setting vs TBD    This discharge recommendation:  Has been made in collaboration with the attending provider and/or case management    IF patient discharges home will need the following DME: TBD       SUBJECTIVE:   Patient stated That arm is paralyzed.  -referring to R UE    OBJECTIVE DATA SUMMARY:   HISTORY:   Past Medical History:   Diagnosis Date    Anxiety disorder     Arthritis     Depression     [facility]     Liver disease     Neurological disorder     neuro cognative disorder    OCD (obsessive compulsive disorder)     Psychiatric disorder     OCD    Psychiatric disorder     anxiety    Seizures ([facility]     Thyroid disease      Past Surgical History:   Procedure Laterality Date    HX CRANIOTOMY         Expanded or extensive additional review of patient history:     Home Situation  Home Environment: Group home  Living Alone: No    Hand dominance: Left    EXAMINATION OF PERFORMANCE DEFICITS:  Cognitive/Behavioral Status:  Neurologic State: Lethargic; Alert;Eyes open to voice  Orientation Level: Oriented to person;Oriented to place  Cognition: No command following; Impaired decision making  Perception: Cues to maintain midline in sitting; Tactile;Verbal;Visual(R lateral lean)  Perseveration: No perseveration noted  Safety/Judgement: Decreased awareness of environment;Decreased awareness of need for safety; Fall prevention    Vision/Perceptual:    Tracking: Unable to test secondary due to decreased visual attention      Range of Motion:  AROM: Generally decreased, functional(RUE non-functional)    Strength:  Strength: Generally decreased, functional(RUE non-functional)    Coordination:  Coordination: Grossly decreased, non-functional  Fine Motor Skills-Upper: Right Impaired;Left Intact(due to h/o stroke)    Gross Motor Skills-Upper: Right Impaired;Left Intact    Tone:  Tone: Abnormal    Balance:  Sitting: Impaired  Sitting - Static: Fair (occasional)  Sitting - Dynamic: Fair (occasional); Poor (constant support)  Standing: Impaired; With support  Standing - Static: Poor  Standing - Dynamic : Poor    Functional Mobility and Transfers for ADLs:  Bed Mobility:  Supine to Sit: Maximum assistance; Total assistance;Assist x2  Sit to Supine:  Total assistance;Assist x2  Scooting: Maximum assistance    Transfers:  Sit to Stand: Moderate assistance;Maximum assistance;Assist x2  Stand to Sit: Moderate assistance;Assist x2    ADL Assessment:  Feeding: Minimum assistance; Moderate assistance    Oral Facial Hygiene/Grooming: Minimum assistance; Moderate assistance    Bathing: Maximum assistance    Upper Body Dressing: Moderate assistance    Lower Body Dressing: Maximum assistance; Total assistance    Toileting: Maximum assistance; Total assistance    ADL Intervention and task modifications:  Feeding  Utensil Management: Set-up; Minimum assistance(to hold spoon)  Food to Mouth: Set-up(to bring spoonful jell-o up to mouth; cues for orientation )  Cues: Physical assistance; Tactile cues provided;Verbal cues provided;Visual cues provided    Grooming  Grooming Assistance: Moderate assistance  Position Performed: Seated edge of bed  Washing Face: Moderate assistance  Cues: Physical assistance; Tactile cues provided;Verbal cues provided;Visual cues provided    Upper Body [address] Gown: Maximum assistance  Cues: Physical assistance; Tactile cues provided;Verbal cues provided;Visual cues provided    Lower Body Dressing Assistance  Socks: Total assistance (dependent)  Leg Crossed Method Used: No  Position Performed: Supine  Cues: Don;Physical assistance; Tactile cues provided;Verbal cues provided;Visual cues provided    Toileting  Bowel Hygiene: (performed in supine due to incontinence)    Cognitive Retraining  Safety/Judgement: Decreased awareness of environment;Decreased awareness of need for safety; Fall prevention    Functional Measure:  Barthel Index:    Bathin  Bladder: 0  Bowels: 0  Groomin  Dressin  Feedin  Mobility: 0  Stairs: 0  Toilet Use: 0  Transfer (Bed to Chair and Back): 5  Total: 10/100        The Barthel ADL Index: Guidelines  1. The index should be used as a record of what a patient does, not as a record of what a patient could do.   2. The main aim is to establish degree of independence from any help, physical or verbal, however minor and for whatever reason. 3. The need for supervision renders the patient not independent. 4. A patient's performance should be established using the best available evidence. Asking the patient, friends/relatives and nurses are the usual sources, but direct observation and common sense are also important. However direct testing is not needed. 5. Usually the patient's performance over the preceding 24-48 hours is important, but occasionally longer periods will be relevant. 6. Middle categories imply that the patient supplies over 50 per cent of the effort. 7. Use of aids to be independent is allowed. Frederica Del., Barthel, DALVIN (1117). Functional evaluation: the Barthel Index. [facility] (14)2. Xavi Galindo don MAR Diaz, Myrla Holter., Dale Rodriguez., [place], [facility] (1999). Measuring the change indisability after inpatient rehabilitation; comparison of the responsiveness of the Barthel Index and Functional [place] Measure. Journal of Neurology, Neurosurgery, and Psychiatry, 66(4), 548-082. Kat Barry, N.J.A, NIVIA Hoffman, & Adrian Bennett MLEATHA. (2004.) Assessment of post-stroke quality of life in cost-effectiveness studies: The usefulness of the Barthel Index and the EuroQoL-5D. Quality of Life Research, 15, 422-01     Occupational Therapy Evaluation Charge Determination   History Examination Decision-Making   LOW Complexity : Brief history review  HIGH Complexity : 5 or more performance deficits relating to physical, cognitive , or psychosocial skils that result in activity limitations and / or participation restrictions HIGH Complexity : Patient presents with comorbidities that affect occupational performance.  Signifigant modification of tasks or assistance (eg, physical or verbal) with assessment (s) is necessary to enable patient to complete evaluation       Based on the above components, the patient evaluation is determined to be of the following complexity level: LOW   Pain Rating:  Pt reporting minimal pain    Activity Tolerance:   Poor    After treatment patient left in no apparent distress:    Supine in bed, Call bell within reach and Side rails x 3    COMMUNICATION/EDUCATION:   The patients plan of care was discussed with: Physical therapist and Registered nurse. Home safety education was provided and the patient/caregiver indicated understanding., Patient/family have participated as able in goal setting and plan of care. and Patient/family agree to work toward stated goals and plan of care. This patients plan of care is appropriate for delegation to ANDREW.     Thank you for this referral.  Lindsey Ortiz, OT  Time Calculation: 26 mins

## 2021-02-17 NOTE — PROGRESS NOTES
Received critical from lab, will update primary RN     Carbon Dioxide 9  Glucose 686   Lactic Acid 4.1  Troponin 1.99

## 2021-02-17 NOTE — ED TRIAGE NOTES
Patient reports vomiting that started at 0000; he has had 4 episodes but none since EMS transport; BS in EMS was 169

## 2021-02-17 NOTE — ED NOTES
Pt unaware of situation and unable to consent for central line. Two physicians agree to central line. (Regina/ Divya).

## 2021-02-17 NOTE — ED NOTES
Bedside and Verbal shift change report given to Matt (oncoming nurse) by Kristofer Flynn (offgoing nurse). Report included the following information SBAR, Kardex, ED Summary, Intake/Output, MAR and Recent Results.

## 2021-02-17 NOTE — PROGRESS NOTES
[address] Dosing Services: Antimicrobial Stewardship Progress Note Consult for antibiotic dosing of cefepime, vancomycin by Dr. Angie Zhou Pharmacist reviewed antibiotic appropriateness for 60year old , male  for indication of sepsis Day of Therapy 1 Plan: 
Vancomycin therapy: 
Start Vancomycin therapy, with a dose of 1000 (mg) IV once. Pharmacy will dose based on level due to SHIRA Dose calculated to approximate a therapeutic trough of 15 to 20 mcg/mL. Last trough level / Plan for level: Creatinine ordered daily, MRSA swab ordered. Random level ordered with am labs on 2/18 Pharmacy to follow daily and will make changes to dose and/or frequency based on clinical status. Non-Kinetic Antimicrobial Dosing:  
Recommendation: cefepime 2 gram IV every 12 hours Other Antimicrobial 
(not dosed by pharmacist) Cultures Serum Creatinine Lab Results Component Value Date/Time Creatinine 1.97 (H) 02/17/2021 06:40 AM  
 Creatinine (POC) 0.8 04/16/2018 02:53 PM  
   
Creatinine Clearance Estimated Creatinine Clearance: 34.2 mL/min (A) (based on SCr of 1.97 mg/dL (H)). Procalcitonin Lab Results Component Value Date/Time Procalcitonin 0.20 02/17/2021 02:51 AM  
 
  
Temp 97.5 °F (36.4 °C) WBC Lab Results Component Value Date/Time WBC 12.4 (H) 02/17/2021 06:40 AM  
   
H/H Lab Results Component Value Date/Time HGB 8.9 (L) 02/17/2021 06:40 AM  
  
Platelets Lab Results Component Value Date/Time PLATELET 703 66/76/7848 06:40 AM  
 
  
 
 
Pharmacist: Signed Noa Nation Contact information: [phone]

## 2021-02-17 NOTE — DIABETES MGMT
[facility] SPECIALIST CONSULT NOTE    Presentation   Perla Montes De Oca. is a 60 y.o. male presented to the ED 2/17/21 with nausea and vomiting. Patient found to be in DKA by labs in ED.  and AG 30 by labs at 0251. Patient on isolation precautions due to rule out COVID-19. HX:   Past Medical History:   Diagnosis Date    Anxiety disorder     Arthritis     Depression     Diabetes ([facility] [address]     Liver disease     Neurological disorder     neuro cognative disorder    OCD (obsessive compulsive disorder)     Psychiatric disorder     OCD    Psychiatric disorder     anxiety    Seizures ([facility] [address]     Thyroid disease        Current clinical course has been uncomplicated. Diabetes: Patient has known Type 2 diabetes. Home diabetes medication regimen is Metformin 850 mg three times daily with meals, Levemir 40 units daily and Novolog on a sliding scale. Admission  and A1c 8.4% (2/17/21) indicate poor diabetes control. Consulted by Provider for advanced diabetes nursing assessment and care, specifically related to   [] Transitioning off Dariusz Thomas   [] Inpatient management strategy  [] Home management assessment  [] Survival skill education    Diabetes-related medical history  Acute complications  DKA  Neurological complications  Unable to assess at this time  Microvascular disease  Nephropathy  Macrovascular disease  Unable to assess at this time  Other associated conditions     Depression    Diabetes medication history  Drug class Currently in use Discontinued Never used   Biguanide Metformin 850 mg TID with meals     DDP-4 inhibitor       Sulfonylurea      Thiazolidinedione      GLP-1 RA      SGLT-2 inhibitors      Basal insulin Levemir 40 units daily     Bolus insulin Novolog sliding scale     Fixed Dose  Combinations        Subjective   Patient on isolation precautions due to COVID-19 rule out. Assessment completed by chart review. Objective   Physical exam    Vital Signs   Visit Vitals  BP (!) 113/50   Pulse (!) 107   Temp 97.5 °F (36.4 °C)   Resp 28   Ht 5' 6\" (1.676 m)   Wt 58.5 kg (129 lb)   SpO2 99%   BMI 20.82 kg/m²     Full PE deferred due to patient on isolation precautions due to COVID-19 rule out. Laboratory  Lab Results   Component Value Date/Time    Hemoglobin A1c 8.4 (H) 02/17/2021 06:40 AM     Lab Results   Component Value Date/Time    LDL, calculated 56.8 07/23/2020 09:45 AM     Lab Results   Component Value Date/Time    Creatinine (POC) 0.8 04/16/2018 02:53 PM    Creatinine 1.97 (H) 02/17/2021 06:40 AM     Lab Results   Component Value Date/Time    Sodium 137 02/17/2021 06:40 AM    Potassium 4.0 02/17/2021 06:40 AM    Chloride 100 02/17/2021 06:40 AM    CO2 9 (LL) 02/17/2021 06:40 AM    Anion gap 28 (H) 02/17/2021 06:40 AM    Glucose 686 (HH) 02/17/2021 06:40 AM    BUN 44 (H) 02/17/2021 06:40 AM    Creatinine 1.97 (H) 02/17/2021 06:40 AM    BUN/Creatinine ratio 22 (H) 02/17/2021 06:40 AM    GFR est AA 43 (L) 02/17/2021 06:40 AM    GFR est non-AA 35 (L) 02/17/2021 06:40 AM    Calcium 7.6 (L) 02/17/2021 06:40 AM    Bilirubin, total 0.4 02/17/2021 02:51 AM    Alk.  phosphatase 92 02/17/2021 02:51 AM    Protein, total 7.3 02/17/2021 02:51 AM    Albumin 3.1 (L) 02/17/2021 02:51 AM    Globulin 4.2 (H) 02/17/2021 02:51 AM    A-G Ratio 0.7 (L) 02/17/2021 02:51 AM    ALT (SGPT) 11 (L) 02/17/2021 02:51 AM     Lab Results   Component Value Date/Time    ALT (SGPT) 11 (L) 02/17/2021 02:51 AM       Tests 2/17/21  0251 2/17/21  0640 2/17/21  1123   A1c - 8.4 -    656 228   Anion gap 30 28 9   Serum creatinine 1.94 1.97 1.77   GFR 36 35 40   Troponin  1.99 8.05     Factors affecting BG management  Factor Dose Comments   Nutrition:  Clear liquid   Clear liquid    Drugs:  Vasopressor load  Steroids  HIV   Epogen  Blood transfusion(s)  Other: n/a   Levophed  n/a  n/a  n/a  n/a         A1cs inaccurate  A1cs inaccurate   Pain 0/10 Infection Cefepime, Flagyl Sepsis of unknown etiology   Other: n/a n/a      Blood glucose pattern            Evaluation   This 60year old male, with Type 2 diabetes, did not achieve diabetes control prior to admission (PTA), as evidenced by admission BG of 960 and A1c of 8.4%. Based on assessment of diabetes self-care practices, interventions that warrant action while hospitalized include:  Unable to assess at this time. The patient would also benefit from diabetes self-management education and support [facility] after [facility]. During this hospitalization, the patient has not achieved inpatient blood glucose target of 100-180mg/dl. BG's have ranged 228-960 over the past 24 hours. Factors that have played a role include:  [x] Critical nature of illness state  [x] Compromised insulin absorption or delivery  [x]  Kidney dysfunction  [x]  Liver disease    Basal insulin isn't in use. Bolus insulin isn't in use. Patient is eating meals. Corrective insulin is in use. Patient has not required any corrective insulin as remains on insulin drip. Insulin drip started today at 0523. Drip continues to infuse at this time. Patient received a one time dose of Regular insulin 10 units at 0323 today. To optimize BG control and support a positive health outcome, would utilize the Subcutaneous Insulin Order set (9762). Impression: It is likely that the insulin drip will bring BG's down and close anion gap. It is suspected that patient will require a basal/bolus/corrective insulin regimen once transitions off of insulin drip.        Assessment and Plan   Nursing Diagnosis Risk for unstable blood glucose pattern   Nursing Intervention Domain 3584 Decision-making Support   Nursing Interventions Examined current inpatient diabetes control   Explored factors facilitating and impeding inpatient management  Identified self-management practices impeding diabetes control  Explored corrective strategies with responsible inpatient provider        Recommendations   Recommend:     Continue insulin drip with glucostabilizer until anion gap has closed on 2 consecutive BMP's. Transition off insulin drip 2 hours after giving 1st basal dose. [x] Use of Subcutaneous Insulin Order set (1343)  Insulin Use Recommendation   Basal                                      (Based on weight, BMI & GFR) Addresses basic metabolic needs Lantus 12 units (0.2 units/kg/day)   Nutritional                                      (Based on CHO load) Addresses nutrition interventions Humalog 3 units with meals, hold if patient eats < 50% CHO on meal tray. Corrective                                       (Offset gaps in dosing) Useful in adjusting insulin dosing Correctional Scale for Normal Sensitivity    200-249- 2units Humalog  [phone] Humalog  [phone] Humalog  [phone] Humalog  Over 400- 10units Humalog    Do NOT hold for NPO; give in addition to meal time insulin dose. If patient does not eat, -give correction dose only. [x] Referral to    [x] Diabetes Self-Management Training through [facility] (Phone [phone] to schedule appointment)    Billing Code(s)   Total time spent with patient: 25 Minutes. I personally reviewed medical record, including notes, data and current medications, and examined the patient at bedside before making care recommendations.        [x] 19815 IP subsequent hospital care - 25 minutes    TEDDY Pinzon  Diabetes Clinical Nurse Specialist  [facility]  Access via [address] [place] [address]

## 2021-02-17 NOTE — PROGRESS NOTES
[facility] Pharmacy Dosing Services: Antimicrobial Stewardship Progress Note    Consult for antibiotic dosing of Cefepime and flagyl for sepsis unclear sourceby Dr. Jeff Kothari  Pharmacist reviewed antibiotic appropriateness for 60year old , male  for indication of sepsis  Day of Therapy 1    Plan:      Non-Kinetic Antimicrobial Dosing:   Current Regimen:  cefepime 2 gram IV every 12 hours, metronidazole 500 mg IV every 12 hours    Other Antimicrobial  (not dosed by pharmacist)      Cultures        Serum Creatinine     Lab Results   Component Value Date/Time    Creatinine 1.97 (H) 02/17/2021 06:40 AM    Creatinine (POC) 0.8 04/16/2018 02:53 PM       Creatinine Clearance Estimated Creatinine Clearance: 34.2 mL/min (A) (based on SCr of 1.97 mg/dL (H)).      Procalcitonin    Lab Results   Component Value Date/Time    Procalcitonin 0.20 02/17/2021 02:51 AM        Temp   97.5 °F (36.4 °C)    WBC   Lab Results   Component Value Date/Time    WBC 12.4 (H) 02/17/2021 06:40 AM       H/H   Lab Results   Component Value Date/Time    HGB 8.9 (L) 02/17/2021 06:40 AM      Platelets Lab Results   Component Value Date/Time    PLATELET 805 70/75/1573 06:40 AM            Pharmacist: Arya rByant PHARMD Contact information: [phone]

## 2021-02-17 NOTE — PROGRESS NOTES
2/17/2021  5:00 PM  CM attempted to complete assessment w/ pt, pt is on droplet precautions for COVID R/O as he resides in a group home. CM contacted pt's sister Dutch Henao) [phone], she recommended I call pt's brother Genoveva Stephens (C) [phone], she also provided name of Group Home [facility] is [facility] [address] is Coletta Meigs [phone]. CM called brother Uzma Contreras no answer.   Will continue to follow  HERMAN Mccray

## 2021-02-17 NOTE — PROGRESS NOTES
[facility]  [address] [facility], [address] [place] [address]  [phone]      Medical Progress Note      NAME:         Demario Garduno. :        1965  MRM:        [id number]    Date of service: 2021      Subjective: Patient has been seen and examined as a follow up for multiple medical issues. Chart, labs, diagnostics reviewed. Patient not a good historian. Discussed with his nurse for collaborative hx. Now with an elevated troponin, and hypotensive. Objective:    Vital Signs:    Visit Vitals  BP (!) 104/55   Pulse 94   Temp 97.5 °F (36.4 °C)   Resp 15   Ht 5' 6\" (1.676 m)   Wt 58.5 kg (129 lb)   SpO2 93%   BMI 20.82 kg/m²          Intake/Output Summary (Last 24 hours) at 2021 1234  Last data filed at 2021 0407  Gross per 24 hour   Intake 1000 ml   Output 250 ml   Net 750 ml        Physical Examination:    General:   Weak and critically ill male patient, not in distress   Eyes:   pink conjunctivae, PERRLA with no discharge. ENT:   no ottorrhea or rhinorrhea with dry mucous membranes  Neck: no masses, thyroid non-tender and trachea central.  Pulm:  no accessory muscle use, decreased and clear breath sounds without crackles or wheezes  Card:  no JVD or murmurs, has regular and normal S1, S2 without thrills, bruits or peripheral edema  Abd:  Soft, non-tender, non-distended, normoactive bowel sounds with no palpable organomegaly  Musc:  No cyanosis, clubbing, atrophy or deformities. Skin:  No rashes, bruising or ulcers. Neuro: Awake but lapses to sleep. Able to answer a few questions.  Otherwise, no obvious focal weakness  Psych:  Has a poor insight to illness     Current Facility-Administered Medications   Medication Dose Route Frequency    insulin regular (NOVOLIN R, HUMULIN R) 100 Units in 0.9% sodium chloride 100 mL infusion  0-50 Units/hr IntraVENous TITRATE    insulin lispro (HUMALOG) injection   SubCUTAneous TIDAC    glucose chewable tablet 16 g  4 Tab Oral PRN    dextrose (D50W) injection syrg 12.5-25 g  25-50 mL IntraVENous PRN    glucagon (GLUCAGEN) injection 1 mg  1 mg IntraMUSCular PRN    0.9% sodium chloride infusion  75 mL/hr IntraVENous CONTINUOUS    sodium bicarbonate (8.4%) 100 mEq in sterile water 1,000 mL infusion   IntraVENous CONTINUOUS    sodium chloride (NS) flush 5-40 mL  5-40 mL IntraVENous Q8H    sodium chloride (NS) flush 5-40 mL  5-40 mL IntraVENous PRN    acetaminophen (TYLENOL) tablet 650 mg  650 mg Oral Q4H PRN    HYDROcodone-acetaminophen (NORCO) 5-325 mg per tablet 1 Tab  1 Tab Oral Q4H PRN    naloxone (NARCAN) injection 0.4 mg  0.4 mg IntraVENous PRN    ondansetron (ZOFRAN) injection 4 mg  4 mg IntraVENous Q4H PRN    pantoprazole (PROTONIX) 40 mg in 0.9% sodium chloride 10 mL injection  40 mg IntraVENous Q12H    albuterol (PROVENTIL HFA, VENTOLIN HFA, PROAIR HFA) inhaler 6 Puff  6 Puff Inhalation NOW    NOREPINephrine (LEVOPHED) 32,000 mcg in dextrose 5% 250 mL (128 mcg/mL) infusion  0.5-16 mcg/min IntraVENous TITRATE    cefepime (MAXIPIME) 2 g in sterile water (preservative free) 10 mL IV syringe  2 g IntraVENous Q12H    metroNIDAZOLE (FLAGYL) IVPB premix 500 mg  500 mg IntraVENous Q12H    sodium bicarbonate 8.4 % (1 mEq/mL) injection 100 mEq  100 mEq IntraVENous ONCE    valproic acid (as sodium salt) (DEPAKENE) 250 mg/5 mL (5 mL) oral solution 500 mg  500 mg Oral Q8H    [START ON 2/18/2021] aspirin chewable tablet 81 mg  81 mg Oral DAILY     Current Outpatient Medications   Medication Sig    diphenhydrAMINE (BENADRYL) 25 mg capsule Take 25 mg by mouth every four (4) hours as needed for Itching.  divalproex ER (Depakote ER) 500 mg ER tablet Take 1,500 mg by mouth nightly.  insulin detemir U-100 (LEVEMIR FLEXTOUCH) 100 unit/mL (3 mL) inpn 26 Units by SubCUTAneous route Daily (before breakfast).     metFORMIN (GLUCOPHAGE) 850 mg tablet Take 850 mg by mouth three (3) times daily.  insulin aspart U-100 (NovoLOG Flexpen U-100 Insulin) 100 unit/mL (3 mL) inpn by SubCUTAneous route Before breakfast, lunch, and dinner. Sliding Scale  Blood glucose  Less than 70 give 0 units  70 to 150 give 3 units  151 to 200 give 5 units  201 to 250 give 7 units  251 to 300 give 12 units  301 to 350 give 15 units  Greater than 350 give  20 units  If blood glucose is greater than is greater than 300 recheck in 2 hours    insulin aspart U-100 (NovoLOG Flexpen U-100 Insulin) 100 unit/mL (3 mL) inpn by SubCUTAneous route nightly. Sliding scale  Blood glucose  Less than 200 give 0 units  200 to 299 give 3 units  Greater than 300 give 6 units    pantoprazole (Protonix) 40 mg tablet Take 40 mg by mouth Daily (before breakfast).  sucralfate (Carafate) 1 gram tablet Take 1 g by mouth Before breakfast, lunch, dinner and at bedtime.  lip protectant (BLISTEX) 0.6-0.5-1.1-0.5 % oint ointment Apply  to affected area as needed for Lubrication.  lip protectant with sunscreen (CARMEX) crea Apply  to affected area as needed for Dry Skin.  glucagon (GLUCAGEN) 1 mg injection 1 mg by IntraVENous route as needed for Hypoglycemia.  glucose 4 gram chewable tablet Take 45 g by mouth as needed (Blood glucose less than 70).  guaiFENesin (ROBITUSSIN) 100 mg/5 mL liquid Take 200 mg by mouth four (4) times daily as needed for Cough.  hydrocortisone (CORTAID) 1 % topical cream Apply  to affected area two (2) times daily as needed for Skin Irritation. use thin layer    ibuprofen (Motrin IB) 200 mg tablet Take 400 mg by mouth every four (4) hours as needed for Pain.  albuterol-ipratropium (DUO-NEB) 2.5 mg-0.5 mg/3 ml nebu 3 mL by Nebulization route three (3) times daily as needed for Wheezing.  ketoconazole (NIZORAL) 2 % topical cream Apply  to affected area daily as needed for Skin Irritation.     loperamide (IMMODIUM) 2 mg tablet Take 2 mg by mouth four (4) times daily as needed for Diarrhea.  acetaminophen (TYLENOL) 325 mg tablet Take 650 mg by mouth every four (4) hours as needed for Pain or Fever.  ferrous sulfate 325 mg (65 mg iron) tablet Take 1 Tab by mouth two (2) times daily (after meals).  atorvastatin (LIPITOR) 10 mg tablet TAKE 1 TABLET BY MOUTH ONCE DAILY    ondansetron (Zofran ODT) 4 mg disintegrating tablet 1 Tab by SubLINGual route every eight (8) hours as needed for Nausea or Vomiting.  mirtazapine (REMERON) 15 mg tablet Take 1 Tab by mouth nightly.  sertraline (ZOLOFT) 100 mg tablet Take 1 Tab by mouth daily. Laboratory data and review:    Recent Labs     02/17/21  0640   WBC 12.4*   HGB 8.9*   HCT 28.5*        Recent Labs     02/17/21  1123 02/17/21  0640 02/17/21  0251    137 125*   K 3.1* 4.0 6.3*   * 100 90*   CO2 26 9* 5*   * 686* 960*   BUN 36* 44* 44*   CREA 1.77* 1.97* 1.94*   CA 7.9* 7.6* 8.6   MG  --   --  1.4*   PHOS  --   --  9.0*   ALB  --   --  3.1*   ALT  --   --  11*     No components found for: [place]    Diagnostics: Imaging studies have been reviewed    Telemetry reviewed by me:   normal sinus rhythm    Assessment and Plan:    NSTEMI (non-ST elevated myocardial infarction) ([facility] (2/17/2021) POA: troponin continue to trend up, now at 8.05. Given Hx of anemia, HHT, would hold anticoagulation for now. Get an Echocardiogram. Continue to trend troponin. Resume Lipitor when able to tolerate. Start Asprin. No BB due to hypotension. Transfer to ICU    Shock ([facility].) (2/17/2021) POA: unclear etiology but likely from volume depletion given he presented with vomiting vs sepsis of unclear etiology ( given leukocytosis, elevated lactate and WBCs) vs cardiogenic. CXR, CT scan abdomen and pelvis neg for acute changes. Get blood cultures, Echo as noted above.  Continue IV [place]-Synephrine, IV fluids, empiric IV antibiotics and monitor lactate, clinical progress    DKA (diabetic ketoacidoses) ([facility].) (2/17/2021) / Uncontrolled type 2 diabetes mellitus ([facility] (9/3/2015) POA: unclear trigger. AG better. Blood glucose better. A1c 8.4. Continue insulin gtt, monitor BMP. DM diet once vomiting is better controlled    SHIRA (acute kidney injury) ([facility] (2/17/2021) POA: likely may have underlying CKD. SCr better. Continue hydration. Monitor renal function    Seizure disorder ([facility].) (2/3/2016) /  Anxiety disorder (5/21/2002) POA: hold home Depakote, Remeron and Zoloft. Start IV Depakene until he is able to take orally    Hyponatremia (2/17/2021) / Hyperkalemia (2/17/2021) POA: due to Gi loss and transcellular shifts. Na better. K+ now low. Continue hydration and replete K+    Iron deficiency anemia due to chronic blood loss (2/26/2020) / Hereditary hemorrhagic telangiectasia (HCC) (1965) POA: Hgb 8.9 now.  Monitor and transfuse as needed     Total time spent for the patient's care: 45 Minutes Additional critical care time                 Care Plan discussed with: Patient, Nursing Staff and Family Medicine who will assume care this evening at 7 PM    Discussed:  Care Plan    Prophylaxis:  SCD's    Anticipated Disposition:  SNF/LTC           ___________________________________________________    Attending Physician:   Wm Drummond MD

## 2021-02-17 NOTE — PROGRESS NOTES
Admission Medication Reconciliation:     Information obtained from:   Pharmacist called Keith DELGADO was faxed. The [facility] was given to the ED unit secretary to be scanned into the EMR. RxQuery data available pharmacy benefit data reflects medications filled and processed through the patient's insurance, however   this data does NOT capture whether the medication was picked up or is currently being taken by the patient. Prior to Admission Medications   Prescriptions Last Dose Informant Taking?   acetaminophen (TYLENOL) 325 mg tablet   Yes   Sig: Take 650 mg by mouth every four (4) hours as needed for Pain or Fever. albuterol-ipratropium (DUO-NEB) 2.5 mg-0.5 mg/3 ml nebu   Yes   Sig: 3 mL by Nebulization route three (3) times daily as needed for Wheezing. atorvastatin (LIPITOR) 10 mg tablet 2021 at Unknown time  Yes   Sig: TAKE 1 TABLET BY MOUTH ONCE DAILY   diphenhydrAMINE (BENADRYL) 25 mg capsule   Yes   Sig: Take 25 mg by mouth every four (4) hours as needed for Itching. divalproex ER (Depakote ER) 500 mg ER tablet 2021 at Unknown time  Yes   Sig: Take 1,500 mg by mouth nightly. ferrous sulfate 325 mg (65 mg iron) tablet 2021 at Unknown time  Yes   Sig: Take 1 Tab by mouth two (2) times daily (after meals). glucagon (GLUCAGEN) 1 mg injection   Yes   Si mg by IntraVENous route as needed for Hypoglycemia.   glucose 4 gram chewable tablet   Yes   Sig: Take 45 g by mouth as needed (Blood glucose less than 70). guaiFENesin (ROBITUSSIN) 100 mg/5 mL liquid   Yes   Sig: Take 200 mg by mouth four (4) times daily as needed for Cough. hydrocortisone (CORTAID) 1 % topical cream   Yes   Sig: Apply  to affected area two (2) times daily as needed for Skin Irritation. use thin layer   ibuprofen (Motrin IB) 200 mg tablet   Yes   Sig: Take 400 mg by mouth every four (4) hours as needed for Pain.    insulin aspart U-100 (NovoLOG Flexpen U-100 Insulin) 100 unit/mL (3 mL) inpn 2021 at Unknown time  Yes   Sig: by SubCUTAneous route Before breakfast, lunch, and dinner. Sliding Scale  Blood glucose  Less than 70 give 0 units  70 to 150 give 3 units  151 to 200 give 5 units  201 to 250 give 7 units  251 to 300 give 12 units  301 to 350 give 15 units  Greater than 350 give  20 units  If blood glucose is greater than is greater than 300 recheck in 2 hours   insulin aspart U-100 (NovoLOG Flexpen U-100 Insulin) 100 unit/mL (3 mL) inpn   Yes   Sig: by SubCUTAneous route nightly. Sliding scale  Blood glucose  Less than 200 give 0 units  200 to 299 give 3 units  Greater than 300 give 6 units   insulin detemir U-100 (LEVEMIR FLEXTOUCH) 100 unit/mL (3 mL) inpn 2021 at Unknown time  Yes   Si Units by SubCUTAneous route Daily (before breakfast). ketoconazole (NIZORAL) 2 % topical cream   Yes   Sig: Apply  to affected area daily as needed for Skin Irritation. lip protectant (BLISTEX) 0.6-0.5-1.1-0.5 % oint ointment   Yes   Sig: Apply  to affected area as needed for Lubrication. lip protectant with sunscreen (CARMEX) crea   Yes   Sig: Apply  to affected area as needed for Dry Skin. loperamide (IMMODIUM) 2 mg tablet   Yes   Sig: Take 2 mg by mouth four (4) times daily as needed for Diarrhea. metFORMIN (GLUCOPHAGE) 850 mg tablet 2021 at Unknown time  Yes   Sig: Take 850 mg by mouth three (3) times daily. mirtazapine (REMERON) 15 mg tablet 2021 at Unknown time  Yes   Sig: Take 1 Tab by mouth nightly. ondansetron (Zofran ODT) 4 mg disintegrating tablet   Yes   Si Tab by SubLINGual route every eight (8) hours as needed for Nausea or Vomiting. pantoprazole (Protonix) 40 mg tablet 2021 at Unknown time  Yes   Sig: Take 40 mg by mouth Daily (before breakfast). sertraline (ZOLOFT) 100 mg tablet 2021 at Unknown time  Yes   Sig: Take 1 Tab by mouth daily.    sucralfate (Carafate) 1 gram tablet 2021 at Unknown time  Yes   Sig: Take 1 g by mouth Before breakfast, lunch, dinner and at bedtime. Facility-Administered Medications: None          Please contact the main inpatient pharmacy with any questions or concerns at [phone] and we will direct you to the clinical pharmacist covering this patient's care while in-house.    Ariana Rosa, PharmD, BCPS

## 2021-02-17 NOTE — PROGRESS NOTES
Patient signed out to [facility] team by Dr. Shelbie Juarez. [facility] team will assume care of patient starting tonight. Brief overview as outlined below. Please see daily progress not for detailed A/P.    S: Unable to obtain any meaningful history as patient is somnolent. States he is very tired. Answers to yes or no questions. Denies any CP, SOB, nausea, vomiting. O:  Visit Vitals  /66   Pulse 81   Temp 97.5 °F (36.4 °C)   Resp 14   Ht 5' 6\" (1.676 m)   Wt 129 lb (58.5 kg)   SpO2 97%   BMI 20.82 kg/m²       Physical Examination  General appearance - somnolent  Chest - CTAB  Heart - RRR  Abdomen - soft, nontender, nondistended, no masses or organomegaly  Neurological - somnolent. Follows some commands. Extremities - peripheral pulses normal, no pedal edema    A/P:   Michelle Marino is a 60 y.o. admitted for DKA, septic shock, and NSTEMI    NSTEMI: POA EKG w/ sinus tachycardia. POA trop 1.99, now elevated to 11.6. Anticoagulation held due to [address] [place]. ECHO EF 65-70%. - Cardiology consulted, appreciate recs    Shock: 2/2 infectious vs cardiogenic vs DKA. POA LA 4.05, now resolved. CXR and CT A/P with no acute process. Rapid COVID neg.   - Continue levophed gtt, and wean as tolerated  - Currently on Cefepime and flagyl, will add vancomycin  - bcx pending, adding on ucx    DKA: now resolved, currently on insulin gtt protocol. AG close x2 at 1530. - Will administer Lantus 10 units (less than home Levemir 6units) as patient's glucose currently low at 88  - Will discontinue insulin gtt 2 hours after Lantus administered  - Will also switch D5 1/2 NS to NS 2 hours after Lantus administered  - Continue POC glucose checks      Please see daily progress note for detailed assessment and plan.      Gokul Good DO  Family Medicine Resident

## 2021-02-17 NOTE — PROGRESS NOTES
Admission Medication Reconciliation:     The patient is a resident of a group home. Paper work not scanned into the chart and the nurse has not located any paperwork at this time. Pharmacist called [facility], the pharmacy which provides medications to the group home, a list will be faxed.     Thank you,      Rafal Uribe, PharmD, BCPS

## 2021-02-17 NOTE — PROGRESS NOTES
[facility] Dosing Services: Antimicrobial Stewardship Progress Note    Consult for antibiotic dosing of vancomycin by Dr. Moi Wills  Pharmacist reviewed antibiotic appropriateness for 60year old , male  for indication of sepsis of unknown etiology  Day of Therapy: 1    Plan:  Vancomycin therapy:  Start vancomycin therapy, with loading dose of 1500 mg IV x 1 dose (~25 mg/kg rounded to nearest 250 mg dose)  Follow with maintenance dose of 1000 mg IV every 12 hours (~15 mg/kg rounded to nearest 250 mg dose)  Dose calculated to approximate a therapeutic trough of 15-20 mcg/mL. Assessment/Plan:  No history of vancomycin use in [facility] to guide empiric therapy, initial vancomycin consult earlier today was discontinued prior to any vancomycin administration  Plan to draw trough level prior to 4th total dose on q12h regimen  SCr ordered daily with AM labs per protocol  Pharmacy to follow daily and will make changes to dose and/or frequency based on clinical status. Date Dose & Interval Measured (mcg/mL) Extrapolated (mcg/mL)   ? ? ? ?   ? ? ? ?   ? ? ? ? Other Antimicrobial  (not dosed by pharmacist)   Cefepime 2 g IV every 12 hours  Metronidazole 500 mg IV every 12 hours   Cultures     2/17 - Blood - Pending  2/17 - Blood - Pending   Serum Creatinine     Lab Results   Component Value Date/Time    Creatinine 1.49 (H) 02/17/2021 03:45 PM    Creatinine (POC) 0.8 04/16/2018 02:53 PM       Creatinine Clearance Estimated Creatinine Clearance: 45.3 mL/min (A) (based on SCr of 1.49 mg/dL (H)).      Procalcitonin    Lab Results   Component Value Date/Time    Procalcitonin 0.20 02/17/2021 02:51 AM        Temp   97.5 °F (36.4 °C)    WBC   Lab Results   Component Value Date/Time    WBC 12.4 (H) 02/17/2021 06:40 AM       H/H   Lab Results   Component Value Date/Time    HGB 8.9 (L) 02/17/2021 06:40 AM      Platelets Lab Results   Component Value Date/Time    PLATELET 552 86/32/6512 06:40 AM            Pharmacist: Signed Tod Rdz PHARMD Contact information:

## 2021-02-17 NOTE — ED NOTES
0200  Patient vomiting and not feeling well; low blood pressure readings and Unable to get IV started on patient to get labs or to start fluids; 2 RNS and 1 US attempt; DR notified    Patient blood sugar checked and reading was HIGH      EJ placed and continuous insulin initiated; patient reports starting to feel better; still disoriented    0600   Multiple IV medications ordered and timed labwork to be done;  aware of limited access and started a central line

## 2021-02-17 NOTE — ED PROVIDER NOTES
The patient is a 77-year-old male with a past medical history significant for anxiety, arthritis, depression, diabetes, liver disease, OCD, seizure disorder, thyroid disease who presents to the ED with a complaint of intractable nausea and vomiting that began this evening with epigastric discomfort, severity 5 out of 10, constant, without any aggravating or relieving factors nonradiating. The patient resides in a group home who states that the patient had an Accu-Chek done earlier yesterday afternoon and it read high.            Past Medical History:   Diagnosis Date    Anxiety disorder     Arthritis     Depression     Diabetes ([facility] [address]     Liver disease     Neurological disorder     neuro cognative disorder    OCD (obsessive compulsive disorder)     Psychiatric disorder     OCD    Psychiatric disorder     anxiety    Seizures ([facility] [address]     Thyroid disease        Past Surgical History:   Procedure Laterality Date    HX CRANIOTOMY           Family History:   Problem Relation Age of Onset    Cancer Mother        Social History     Socioeconomic History    Marital status: SINGLE     Spouse name: Not on file    Number of children: Not on file    Years of education: Not on file    Highest education level: Not on file   Occupational History    Not on file   Social Needs    Financial resource strain: Not on file    Food insecurity     Worry: Not on file     Inability: Not on file    Transportation needs     Medical: Not on file     Non-medical: Not on file   Tobacco Use    Smoking status: Former Smoker    Smokeless tobacco: Never Used   Substance and Sexual Activity    Alcohol use: No    Drug use: No    Sexual activity: Never   Lifestyle    Physical activity     Days per week: Not on file     Minutes per session: Not on file    Stress: Not on file   Relationships    Social connections     Talks on phone: Not on file     Gets together: Not on file     Attends [employer] service: Not on file     Active member of club or organization: Not on file     Attends meetings of clubs or organizations: Not on file     Relationship status: Not on file    Intimate partner violence     Fear of current or ex partner: Not on file     Emotionally abused: Not on file     Physically abused: Not on file     Forced sexual activity: Not on file   Other Topics Concern    Not on file   Social History Narrative    Not on file         ALLERGIES: Penicillins    Review of Systems   All other systems reviewed and are negative. Vitals:    02/17/21 0152 02/17/21 0245   BP: (!) 112/29 (!) 110/34   Pulse:  (!) 113   Resp: 18    Temp: 97.5 °F (36.4 °C)    SpO2:  100%   Weight: 58.5 kg (129 lb)    Height: 5' 6\" (1.676 m)             Physical Exam  Vitals signs and nursing note reviewed. Exam conducted with a chaperone present. CONSTITUTIONAL: Well-appearing; well-nourished; in mild distress  HEAD: Normocephalic; atraumatic  EYES: PERRL; EOM intact; conjunctiva and sclera are clear bilaterally. ENT: No rhinorrhea; normal pharynx with no tonsillar hypertrophy; mucous membranes pink/moist, no erythema, no exudate. NECK: Supple; non-tender; no cervical lymphadenopathy  CARD: Normal S1, S2; no murmurs, rubs, or gallops. Regular rate and rhythm. RESP: Normal respiratory effort; breath sounds clear and equal bilaterally; no wheezes, rhonchi, or rales. ABD: Normal bowel sounds; non-distended; diffuse tenderness without any rebound or guarding; no palpable organomegaly, no masses, no bruits. Back Exam: Normal inspection; no vertebral point tenderness, no CVA tenderness. Normal range of motion. EXT: Normal ROM in all four extremities; non-tender to palpation; no swelling or deformity; distal pulses are normal, no edema. SKIN: Warm; dry; no rash.   NEURO:Alert and oriented x 3, coherent, SERVANDO-XII grossly intact, chronic right-sided weakness      MDM  Number of Diagnoses or Management Options     Amount and/or Complexity of Data Reviewed  Clinical lab tests: ordered and reviewed  Tests in the radiology section of CPT®: ordered and reviewed  Tests in the medicine section of CPT®: reviewed and ordered  Discussion of test results with the performing providers: yes  Decide to obtain previous medical records or to obtain history from someone other than the patient: yes  Obtain history from someone other than the patient: yes  Review and summarize past medical records: yes  Discuss the patient with other providers: yes  Independent visualization of images, tracings, or specimens: yes    Risk of Complications, Morbidity, and/or Mortality  Presenting problems: moderate  Diagnostic procedures: moderate  Management options: moderate  General comments: Total critical care time spent exclusive of procedures:  45 minutes    Patient Progress  Patient progress: stable         Central Line    Date/Time: 2/17/2021 6:25 AM  Performed by: Jakob Hutchins MD  Authorized by: Jakob Hutchins MD     Consent:     Consent obtained:  Verbal and emergent situation    Risks discussed:  Incorrect placement, infection, bleeding and pneumothorax    Alternatives discussed:  No treatment  Pre-procedure details:     Hand hygiene: Hand hygiene performed prior to insertion      Sterile barrier technique: All elements of maximal sterile technique followed      Skin preparation:  ChloraPrep    Skin preparation agent: Skin preparation agent completely dried prior to procedure    Sedation:     Sedation type: Anxiolysis  Anesthesia (see MAR for exact dosages):      Anesthesia method:  Local infiltration    Local anesthetic:  Lidocaine 1% WITH epi  Procedure details:     Location:  L subclavian    Patient position:  Reverse Trendelenburg    Procedural supplies:  Triple lumen    Landmarks identified: yes      Number of attempts:  1    Successful placement: yes    Post-procedure details:     Post-procedure:  Line sutured and dressing applied    Assessment:  Blood return through all ports, no pneumothorax on x-ray, free fluid flow and placement verified by x-ray    Patient tolerance of procedure: Tolerated well, no immediate complications        ED EKG interpretation:  Rhythm: sinus tachycardia; and regular . Rate (approx.): 122; Axis: normal; P wave: normal; QRS interval: normal ; ST/T wave: non-specific changes; in  Lead: Diffusely; LAE; Other findings: abnormal ekg. This EKG was interpreted by Donell Valera MD,ED Provider. XRAY INTERPRETATION (ED MD)  Chest Xray  No acute process seen. Normal heart size. No bony abnormalities. No infiltrate. Cherie Calderon MD 3:51 AM      PROGRESS NOTE:  Pt has been reexamined by Cherie Calderon MD all available results have been reviewed with pt and any available family. Pt understands sx, dx, and tx in ED. Care plan has been outlined and questions have been answered. Pt and any available family understands and agrees to need for admission to hospital for further tx not available in ED. Pt is ready for admission. Written by Donell Valera MD,  3:51 AM    CONSULT NOTE:  I spoke with Dr. Simi Valadez of the adult hospitalist team. Discussed patient's presentation, history, physical assessment, and available diagnostic results.  She will evaluate, write orders and admit the patient to the hospital. 4:34 AM    .

## 2021-02-17 NOTE — PROGRESS NOTES
Problem: Mobility Impaired (Adult and Pediatric)  Goal: *Acute Goals and Plan of Care (Insert Text)  Description: FUNCTIONAL STATUS PRIOR TO ADMISSION: Alejandro Scott, reports he lives in a group home. HOME SUPPORT PRIOR TO ADMISSION: Patient lived in a group home. Reports they don't help with anything. Physical Therapy Goals  Initiated 2/17/2021  1. Patient will move from supine to sit and sit to supine , scoot up and down, and roll side to side in bed with supervision/set-up within 7 day(s). 2.  Patient will transfer from bed to chair and chair to bed with minimal assistance/contact guard assist using the least restrictive device within 7 day(s). 3.  Patient will perform sit to stand with minimal assistance/contact guard assist within 7 day(s). 4.  Patient will ambulate with moderate assistance  for 15 feet with the least restrictive device within 7 day(s). Outcome: Progressing Towards Goal   PHYSICAL THERAPY EVALUATION  Patient: Jeb Joiner (56 y.o. male)  Date: 2/17/2021  Primary Diagnosis: DKA (diabetic ketoacidoses) ([facility] [address] [E11.10]        Precautions: Fall       ASSESSMENT  Based on the objective data described below, the patient presents with tachycardia up to 130BPM, decreased activity tolerance, lethargy, impaired balance, generally decreased strength, baseline RUE contracture, and impaired coordination following admission for DKA, SHIRA, and AMS, now COVID r/o. Pt with R hemiparesis from h/o CVA at 6years old. Reports he has been in his group home for 2 years and they do not help with meals, laundry, cleaning or bathing. He requires A x 2 for bed mobility and for standing trials at EOB for linen change. Unable to advance to side steps to due to heavy R sided and posterior lean. At this time, anticipate pt is functioning below his baseline and will require continued skilled PT services.  May require SNF placement pending pt's prior baseline and assistance level available at group home.    Current Level of Function Impacting Discharge (mobility/balance): Mod-Max A x 2 for bed mobility    Functional Outcome Measure: The patient scored 10/100 on the Barthel outcome measure. Other factors to consider for discharge: lives in group home? Patient will benefit from skilled therapy intervention to address the above noted impairments. PLAN :  Recommendations and Planned Interventions: bed mobility training, transfer training, gait training, therapeutic exercises, neuromuscular re-education, patient and family training/education, and therapeutic activities      Frequency/Duration: Patient will be followed by physical therapy:  5 times a week to address goals. Recommendation for discharge: (in order for the patient to meet his/her long term goals)  Therapy up to 5 days/week in SNF setting    This discharge recommendation:  A follow-up discussion with the attending provider and/or case management is planned    IF patient discharges home will need the following DME: to be determined (TBD)         SUBJECTIVE:   Patient stated Leopold Das helped me with bathing like one time.     OBJECTIVE DATA SUMMARY:   HISTORY:    Past Medical History:   Diagnosis Date    Anxiety disorder     Arthritis     Depression     Diabetes ([facility] [address]     Liver disease     Neurological disorder     neuro cognative disorder    OCD (obsessive compulsive disorder)     Psychiatric disorder     OCD    Psychiatric disorder     anxiety    Seizures ([facility] [address]     Thyroid disease      Past Surgical History:   Procedure Laterality Date    HX CRANIOTOMY         Personal factors and/or comorbidities impacting plan of care: diabetes, arthritis, OCD, psych history, CVA, brain surgery    Home Situation  Home Environment: Group home  Living Alone: No    EXAMINATION/PRESENTATION/DECISION MAKING:   Critical Behavior:  Neurologic State: Lethargic, Alert, Eyes open to voice  Orientation Level: Oriented to person, Oriented to place  Cognition: No command following, Impaired decision making  Safety/Judgement: Decreased awareness of environment, Decreased awareness of need for safety, Fall prevention  Hearing:     Skin:    Edema:   Range Of Motion:  AROM: Generally decreased, functional(RUE non-functional)                       Strength:    Strength: Generally decreased, functional(RUE non-functional)                    Tone & Sensation:   Tone: Abnormal                              Coordination:  Coordination: Grossly decreased, non-functional  Vision:   Tracking: Unable to test secondary due to decreased visual attention  Functional Mobility:  Bed Mobility:     Supine to Sit: Maximum assistance; Total assistance;Assist x2  Sit to Supine: Total assistance;Assist x2  Scooting: Maximum assistance  Transfers:  Sit to Stand: Moderate assistance;Maximum assistance;Assist x2  Stand to Sit: Moderate assistance;Assist x2                       Balance:   Sitting: Impaired  Sitting - Static: Fair (occasional)  Sitting - Dynamic: Fair (occasional); Poor (constant support)  Standing: Impaired; With support  Standing - Static: Poor  Standing - Dynamic : Poor  Ambulation/Gait Training:                                                         Stairs: Therapeutic Exercises:       Functional Measure:  Barthel Index:    Bathin  Bladder: 0  Bowels: 0  Groomin  Dressin  Feedin  Mobility: 0  Stairs: 0  Toilet Use: 0  Transfer (Bed to Chair and Back): 5  Total: 10/100       The Barthel ADL Index: Guidelines  1. The index should be used as a record of what a patient does, not as a record of what a patient could do. 2. The main aim is to establish degree of independence from any help, physical or verbal, however minor and for whatever reason. 3. The need for supervision renders the patient not independent. 4. A patient's performance should be established using the best available evidence.  Asking the patient, friends/relatives and nurses are the usual sources, but direct observation and common sense are also important. However direct testing is not needed. 5. Usually the patient's performance over the preceding 24-48 hours is important, but occasionally longer periods will be relevant. 6. Middle categories imply that the patient supplies over 50 per cent of the effort. 7. Use of aids to be independent is allowed. Guillermo Lynn., Barthel, D.W. ([id number]). Functional evaluation: the Barthel Index. [address] [facility] (14)2. [facility] [place] don MAR Diaz Romualdo Holster., Shelby De Anda., [place], [address] [facility] (1999). Measuring the change indisability after inpatient rehabilitation; comparison of the responsiveness of the Barthel Index and Functional [place] Measure. Journal of Neurology, Neurosurgery, and Psychiatry, 66(4), 643-902. VINEET Craig, NIVIA Hoffman, & Jessica Gautam M.A. (2004.) Assessment of post-stroke quality of life in cost-effectiveness studies: The usefulness of the Barthel Index and the EuroQoL-5D. Quality of Life Research, 15, 141-47            Physical Therapy Evaluation Charge Determination   History Examination Presentation Decision-Making   HIGH Complexity :3+ comorbidities / personal factors will impact the outcome/ POC  MEDIUM Complexity : 3 Standardized tests and measures addressing body structure, function, activity limitation and / or participation in recreation  LOW Complexity : Stable, uncomplicated  Other outcome measures Barthel  LOW       Based on the above components, the patient evaluation is determined to be of the following complexity level: LOW     Pain Rating:  none    Activity Tolerance:   Fair and requires rest breaks    After treatment patient left in no apparent distress:   Supine in bed, Call bell within reach, and Side rails x 3    COMMUNICATION/EDUCATION:   The patients plan of care was discussed with: Occupational therapist and Registered nurse.      Fall prevention education was provided and the patient/caregiver indicated understanding., Patient/family have participated as able in goal setting and plan of care. , and Patient/family agree to work toward stated goals and plan of care.     Thank you for this referral.  Ander Rosenberg, PT   Time Calculation: 34 mins

## 2021-02-17 NOTE — CONSULTS
Alpha Beverage, DO  [facility] [address] [place] [address], [address] [place] [address], [address] [place] [address]                                       Office [phone],MDJ [phone]  Office [phone],GZN [phone]      Date of  Admission: 2/17/2021  1:42 AM  PCP- Adal Block MD    Angeline Crews is a 60 y.o. male admitted for DKA (diabetic ketoacidoses) ([facility] [E11.10]. Consult requested by Lloyd Scruggs MD for elevated troponin. Assessment/Plan      Diabetic ketoacidosis  NSTEMI  Metabolic acidosis with elevated anion gap, anion gap closed  Acute kidney injury  Macrocytic anemia with hemoglobin of 8.5    No ongoing anginal chest pain. Recommend starting heparin drip for treatment of acute coronary syndrome. Would monitor his hemoglobin closely with heparin and anemia. Continue aspirin 81 mg.  Recommend obtaining Hemoccult to evaluate for occult bleeding. Patient continues to have volume depletion based on echocardiogram, recommend to continue aggressive IV hydration. Recommend coronary angiography prior to discharge once patient has stabilized from his diabetic ketoacidosis. Considering beta-blocker and nitrate therapy once his hemodynamics have stabilized. Obtain lipid panel, and titrate statin accordingly. Cardiology will continue to follow along         I appreciate the opportunity to be involved in [facility]. See below note for details. Please do not hesitate to contact us with questions or concerns. Tony Doran DO      Subjective:  Angeline Crews is a 60 y.o. male presented to the ED for evaluation of altered mental status in setting of recent nausea vomiting. He has a history of uncontrolled diabetes and seizure disorder. He apparently lives in a group home per notes. Patient is somnolent and unable to provide meaningful history. He shook his head yes or no with routine questions.   Found to have diabetic ketoacidosis on admission with evidence of severe volume depletion and acute kidney injury. His troponins have increased since admission here up to 11 this afternoon. Patient denies having any ongoing chest pain chest pressure or chest discomfort. No shortness of breath. His echocardiogram showed preserved LV function without evidence of significant valvular pathology, he did have evidence of hyper bulimia on his echocardiogram.  He remains on insulin drip along with very low-dose Levophed therapy.     Past Medical History:   Diagnosis Date    Anxiety disorder     Arthritis     Depression     Diabetes ([facility] [address]     Liver disease     Neurological disorder     neuro cognative disorder    OCD (obsessive compulsive disorder)     Psychiatric disorder     OCD    Psychiatric disorder     anxiety    Seizures ([facility] [address] [facility]     Thyroid disease       Past Surgical History:   Procedure Laterality Date    HX CRANIOTOMY       Allergies   Allergen Reactions    Penicillins Rash     Family History   Problem Relation Age of Onset    Cancer Mother       Social History     Tobacco Use    Smoking status: Former Smoker    Smokeless tobacco: Never Used   Substance Use Topics    Alcohol use: No    Drug use: No          Medications:  (Not in a hospital admission)    Current Facility-Administered Medications   Medication Dose Route Frequency    insulin regular (NOVOLIN R, HUMULIN R) 100 Units in 0.9% sodium chloride 100 mL infusion  0-50 Units/hr IntraVENous TITRATE    insulin lispro (HUMALOG) injection   SubCUTAneous TIDAC    glucose chewable tablet 16 g  4 Tab Oral PRN    dextrose (D50W) injection syrg 12.5-25 g  25-50 mL IntraVENous PRN    glucagon (GLUCAGEN) injection 1 mg  1 mg IntraMUSCular PRN    sodium chloride (NS) flush 5-40 mL  5-40 mL IntraVENous Q8H    sodium chloride (NS) flush 5-40 mL  5-40 mL IntraVENous PRN    acetaminophen (TYLENOL) tablet 650 mg  650 mg Oral Q4H PRN    HYDROcodone-acetaminophen (NORCO) 5-325 mg per tablet 1 Tab  1 Tab Oral Q4H PRN    naloxone (NARCAN) injection 0.4 mg  0.4 mg IntraVENous PRN    ondansetron (ZOFRAN) injection 4 mg  4 mg IntraVENous Q4H PRN    pantoprazole (PROTONIX) 40 mg in 0.9% sodium chloride 10 mL injection  40 mg IntraVENous Q12H    albuterol (PROVENTIL HFA, VENTOLIN HFA, PROAIR HFA) inhaler 6 Puff  6 Puff Inhalation NOW    NOREPINephrine (LEVOPHED) 32,000 mcg in dextrose 5% 250 mL (128 mcg/mL) infusion  0.5-16 mcg/min IntraVENous TITRATE    cefepime (MAXIPIME) 2 g in sterile water (preservative free) 10 mL IV syringe  2 g IntraVENous Q12H    metroNIDAZOLE (FLAGYL) IVPB premix 500 mg  500 mg IntraVENous Q12H    [START ON 2/18/2021] aspirin chewable tablet 81 mg  81 mg Oral DAILY    dextrose 5% - 0.45% NaCl with KCl 40 mEq/L infusion   IntraVENous CONTINUOUS    valproate (DEPACON) 500 mg in 0.9% sodium chloride 50 mL IVPB  500 mg IntraVENous Q8H    glucose chewable tablet 16 g  4 Tab Oral PRN    dextrose (D50W) injection syrg 12.5-25 g  25-50 mL IntraVENous PRN    glucagon (GLUCAGEN) injection 1 mg  1 mg IntraMUSCular PRN    insulin glargine (LANTUS) injection 10 Units  10 Units SubCUTAneous DAILY    insulin lispro (HUMALOG) injection   SubCUTAneous AC&HS     Current Outpatient Medications   Medication Sig    diphenhydrAMINE (BENADRYL) 25 mg capsule Take 25 mg by mouth every four (4) hours as needed for Itching.  divalproex ER (Depakote ER) 500 mg ER tablet Take 1,500 mg by mouth nightly.  insulin detemir U-100 (LEVEMIR FLEXTOUCH) 100 unit/mL (3 mL) inpn 26 Units by SubCUTAneous route Daily (before breakfast).  metFORMIN (GLUCOPHAGE) 850 mg tablet Take 850 mg by mouth three (3) times daily.  insulin aspart U-100 (NovoLOG Flexpen U-100 Insulin) 100 unit/mL (3 mL) inpn by SubCUTAneous route Before breakfast, lunch, and dinner.  Sliding Scale  Blood glucose  Less than 70 give 0 units  70 to 150 give 3 units  151 to 200 give 5 units  201 to 250 give 7 units  251 to 300 give 12 units  301 to 350 give 15 units  Greater than 350 give  20 units  If blood glucose is greater than is greater than 300 recheck in 2 hours    insulin aspart U-100 (NovoLOG Flexpen U-100 Insulin) 100 unit/mL (3 mL) inpn by SubCUTAneous route nightly. Sliding scale  Blood glucose  Less than 200 give 0 units  200 to 299 give 3 units  Greater than 300 give 6 units    pantoprazole (Protonix) 40 mg tablet Take 40 mg by mouth Daily (before breakfast).  sucralfate (Carafate) 1 gram tablet Take 1 g by mouth Before breakfast, lunch, dinner and at bedtime.  lip protectant (BLISTEX) 0.6-0.5-1.1-0.5 % oint ointment Apply  to affected area as needed for Lubrication.  lip protectant with sunscreen (CARMEX) crea Apply  to affected area as needed for Dry Skin.  glucagon (GLUCAGEN) 1 mg injection 1 mg by IntraVENous route as needed for Hypoglycemia.  glucose 4 gram chewable tablet Take 45 g by mouth as needed (Blood glucose less than 70).  guaiFENesin (ROBITUSSIN) 100 mg/5 mL liquid Take 200 mg by mouth four (4) times daily as needed for Cough.  hydrocortisone (CORTAID) 1 % topical cream Apply  to affected area two (2) times daily as needed for Skin Irritation. use thin layer    ibuprofen (Motrin IB) 200 mg tablet Take 400 mg by mouth every four (4) hours as needed for Pain.  albuterol-ipratropium (DUO-NEB) 2.5 mg-0.5 mg/3 ml nebu 3 mL by Nebulization route three (3) times daily as needed for Wheezing.  ketoconazole (NIZORAL) 2 % topical cream Apply  to affected area daily as needed for Skin Irritation.  loperamide (IMMODIUM) 2 mg tablet Take 2 mg by mouth four (4) times daily as needed for Diarrhea.  acetaminophen (TYLENOL) 325 mg tablet Take 650 mg by mouth every four (4) hours as needed for Pain or Fever.  ferrous sulfate 325 mg (65 mg iron) tablet Take 1 Tab by mouth two (2) times daily (after meals).     atorvastatin (LIPITOR) 10 mg tablet TAKE 1 TABLET BY MOUTH ONCE DAILY    ondansetron (Zofran ODT) 4 mg disintegrating tablet 1 Tab by SubLINGual route every eight (8) hours as needed for Nausea or Vomiting.  mirtazapine (REMERON) 15 mg tablet Take 1 Tab by mouth nightly.  sertraline (ZOLOFT) 100 mg tablet Take 1 Tab by mouth daily. Review of Systems:  Unable to perform due to alteration in mental status        Physical Exam:  Visit Vitals  /66   Pulse 81   Temp 97.5 °F (36.4 °C)   Resp 14   Ht 5' 6\" (1.676 m)   Wt 129 lb (58.5 kg)   SpO2 97%   BMI 20.82 kg/m²           Gen: Disheveled, ill-appearing, somnolent  HEENT:  Pink conjunctivae, hearing intact to voice, dry mucous membranes  Neck: Supple,No JVD, No Carotid Bruit  Resp: No accessory muscle use, Clear breath sounds, No rales or rhonchi  Card: Tachycardic,Regular Rythm,Normal S1, S2, No murmurs, rubs or gallop. No thrills. Abd:  Soft, non-tender, non-distended, normoactive bowel sounds are present   MSK: No cyanosis or clubbing  Skin: No rashes or ulcers  Neuro:  Cranial nerves are grossly intact, moving all four extremities, no focal deficit, follows commands appropriately  Psych:  Good insight, oriented to person, place and time, alert, Nml Affect  LE: No edema  Vascular:Distal Pulses 2+ and symmetric        EKG:          Cardiac Testing/ Procedures:    02/17/21   ECHO ADULT COMPLETE 02/17/2021 2/17/2021    Narrative · LV: Estimated LVEF is 65 - 70%. Normal cavity size. Mild concentric   hypertrophy. Hyperdynamic systolic function. Wall motion: normal.   Age-appropriate left ventricular diastolic function. · RV: Not well visualized. Mildly reduced systolic function. · MV: Mitral valve non-specific thickening. Mild mitral annular   calcification.         Signed by: Nola Alejo DO       LABS:    Lab Results   Component Value Date/Time    WBC 12.4 (H) 02/17/2021 06:40 AM    HGB 8.9 (L) 02/17/2021 06:40 AM    HCT 28.5 (L) 02/17/2021 06:40 AM    PLATELET 506 31/01/7716 06:40 AM    .8 (H) 02/17/2021 06:40 AM     Lab Results   Component Value Date/Time    Sodium 144 02/17/2021 03:45 PM    Potassium 4.2 02/17/2021 03:45 PM    Chloride 112 (H) 02/17/2021 03:45 PM    CO2 30 02/17/2021 03:45 PM    Anion gap 2 (L) 02/17/2021 03:45 PM    Glucose 131 (H) 02/17/2021 03:45 PM    BUN 34 (H) 02/17/2021 03:45 PM    Creatinine 1.49 (H) 02/17/2021 03:45 PM    BUN/Creatinine ratio 23 (H) 02/17/2021 03:45 PM    GFR est AA 59 (L) 02/17/2021 03:45 PM    GFR est non-AA 49 (L) 02/17/2021 03:45 PM    Calcium 7.8 (L) 02/17/2021 03:45 PM     Lab Results   Component Value Date/Time    Troponin-I, Qt. 11.60 (H) 02/17/2021 03:45 PM     Lab Results   Component Value Date/Time    aPTT 31.1 11/10/2020 10:55 AM     Lab Results   Component Value Date/Time    INR 1.0 11/10/2020 10:55 AM    Prothrombin time 13.5 11/10/2020 10:55 AM           Laurent Lopez DO

## 2021-02-17 NOTE — H&P
[address] [facility]  [phone]    Admission History and Physical      NAME:  Kendy Mcdaniels :   1965   MRN:  [id number]     PCP:  Pelon Morris MD     Date/Time:  2021         Subjective:     CHIEF COMPLAINT: \"My blood sugars aren't that high\"     HISTORY OF PRESENT ILLNESS:     Mr. Luis Eduardo Shafer is a 60 y.o.  male with PMH of DM, anxiety/depression, seizure d/o admitted for DKA, metabolic acidosis, SHIRA. Per pt, has had several days of N/V though at this time is not a great historian due to [address]. Denies ab pain. Past Medical History:   Diagnosis Date    Anxiety disorder     Arthritis     Depression     Diabetes ([address]     Liver disease     Neurological disorder     neuro cognative disorder    OCD (obsessive compulsive disorder)     Psychiatric disorder     OCD    Psychiatric disorder     anxiety    Seizures ([address]     Thyroid disease         Past Surgical History:   Procedure Laterality Date    HX CRANIOTOMY         Social History     Tobacco Use    Smoking status: Former Smoker    Smokeless tobacco: Never Used   Substance Use Topics    Alcohol use: No        Family History   Problem Relation Age of Onset    Cancer Mother         Allergies   Allergen Reactions    Penicillins Rash        Prior to Admission medications    Medication Sig Start Date End Date Taking? Authorizing Provider   metFORMIN (GLUCOPHAGE) 850 mg tablet TAKE ONE TABLET BY MOUTH 3 TIMES A DAY WITH MEALS**SPLIT NOON DOSE** 21   Pelon Morris MD   ferrous sulfate 325 mg (65 mg iron) tablet Take 1 Tab by mouth two (2) times daily (after meals). 21   Pelon Morris MD   atorvastatin (LIPITOR) 10 mg tablet TAKE 1 TABLET BY MOUTH ONCE DAILY 21   Pelon Morris MD   ondansetron (Zofran ODT) 4 mg disintegrating tablet 1 Tab by SubLINGual route every eight (8) hours as needed for Nausea or Vomiting.  20   Sadia Susan Narayanan MD   insulin detemir U-100 (LEVEMIR) 100 unit/mL injection 40 Units by SubCUTAneous route daily. 10/20/20   Mike Salas MD   mirtazapine (REMERON) 15 mg tablet Take 1 Tab by mouth nightly. 9/6/20   Mike Salas MD   sertraline (ZOLOFT) 100 mg tablet Take 1 Tab by mouth daily. 9/6/20   Mike Salas MD   divalproex DR (DEPAKOTE) 250 mg tablet 3 po bid  Indications: Convulsive Seizures 5/17/19   Zuri Ortiz MD   flash glucose scanning reader (FREESTYLE OTONIEL 14 DAY READER) misc Testing 6 times daily, dx: E11.65 5/14/19   Britt Ferrsi NP   insulin aspart U-100 (NOVOLOG U-100 INSULIN ASPART) 100 unit/mL injection Sliding scale at meals. DO NOT GIVE IF BLOOD SUGAR UNDER 150. 150-200 2 units; 201-250 4 units; 251-300 6 units. >300 8 units and call PCP 3/7/19   Britt Ferris NP   albuterol-ipratropium (DUO-NEB) 2.5 mg-0.5 mg/3 ml nebu 3 mL by Nebulization route. Provider, Historical   flash glucose sensor (FREESTYLE OTONIEL 14 DAY SENSOR) kit Testing 6 times daily, dx: E11.65 2/27/19   Samir Garcia NP   terbinafine HCl (LAMISIL) 250 mg tablet Take 250 mg by mouth daily. Pauly Lopez MD   losartan (COZAAR) 25 mg tablet Take 25 mg by mouth daily. Pauly oLpez MD   ergocalciferol (VITAMIN D2) 50,000 unit capsule Take 50,000 Units by mouth every seven (7) days.     Pauly Lopez MD         Review of Systems:  (bold if positive, if negative)    Gen:  Eyes:  ENT:  CVS:  Pulm:  GI:    :    MS:  Skin:  Psych:  Endo:    Hem:  Renal:    Neuro:     N/V       Objective:      VITALS:    Vital signs reviewed; most recent are:    Visit Vitals  BP (!) 110/34 (BP 1 Location: Left upper arm)   Pulse (!) 113   Temp 97.5 °F (36.4 °C)   Resp 18   Ht 5' 6\" (1.676 m)   Wt 58.5 kg (129 lb)   SpO2 100%   BMI 20.82 kg/m²     SpO2 Readings from Last 6 Encounters:   02/17/21 100%   12/26/20 97%   11/10/20 98%   03/20/20 94%   03/13/20 97%   03/04/20 96%            Intake/Output Summary (Last 24 hours) at 2/17/2021 0523  Last data filed at 2/17/2021 0407  Gross per 24 hour   Intake 1000 ml   Output    Net 1000 ml            Exam:     Physical Exam:    Gen: Disheveled male. Tachypneic. Ill appearing   HEENT:  Pink conjunctivae, PERRL, hearing intact to voice, moist mucous membranes  Neck:  Supple, without masses, thyroid non-tender  Resp:  No accessory muscle use, clear breath sounds without wheezes rales or rhonchi  Card: Tachycardic. No MRG  Abd:  Soft, non-tender, non-distended, normoactive bowel sounds are present, no palpable organomegaly  Lymph:  No cervical adenopathy  Musc:  No cyanosis or clubbing  Skin:  No rashes or ulcers, skin turgor is good  Neuro: RUE contracted. RLE atrophy  Psych: Poor insight. Labs:    No results for input(s): WBC, HGB, HCT, PLT, HGBEXT, HCTEXT, PLTEXT in the last 72 hours. Recent Labs     02/17/21  0251   *   K 6.3*   CL 90*   CO2 5*   *   BUN 44*   CREA 1.94*   CA 8.6   MG 1.4*   PHOS 9.0*   ALB 3.1*   ALT 11*     No components found for: GLPOC  Recent Labs     02/17/21  0340   PH 7.01*   PCO2 14*   PO2 110*   HCO3 4*     No results for input(s): INR, INREXT in the last 72 hours. CXR => no acute findings   CT ab/pelvis => no acute process        Assessment/Plan:    DKA (diabetic ketoacidoses) ([address] (2/17/2021) - profound  -start insulin gtt  -IVF's   -bicarb gtt   -serial BMP's  -A1c pending       Metabolic acidosis (1/04/3891) - likely 2/2 DKA and SHIRA   -check lactate   -optimize BG   -IVF's   -bicarb gtt      Hyponatremia (2/17/2021) - likely pseudohyponatremia from hyperglycemia.  Also appears IVVD   -IVF's   -optimize BG control      Hyperkalemia (2/17/2021) - suspect shift from acidosis and hyperglycemia  -bicarb gtt  -insulin gtt   -albuterol, kayexalate by ED MD       SHIRA (acute kidney injury) ([address] (2/17/2021) - likely 2/2 osmotic diuresis from hyperglycemia, N/V leading to IVVD  -strict I's and O's   -check UA, urine lytes   -IVF's   -hold ARB, metformin      Seizure disorder ([facility] (2/3/2016)  -resume outpt meds once confirmed       Uncontrolled type 2 diabetes mellitus ([facility] (9/3/2015)  -see above     N/V - ?gastroparesis, ? DKA ? COVID   -check COVID   -PRN anti-emetics   -NPO  -IVF's     Surrogate decision maker: Unknown     Total time spent with patient: 70 Minutes cc time                  Care Plan discussed with: Patient, Nursing Staff, Consultant/Specialist and >50% of time spent in counseling and coordination of care    Discussed:  Code Status, Care Plan and D/C Planning    Prophylaxis:  SCD's    Probable Disposition:  Home w/Family           ___________________________________________________    Attending Physician: Ebony Farfan MD

## 2021-02-18 LAB
ANION GAP SERPL CALC-SCNC: 7 MMOL/L (ref 5–15)
APTT PPP: 40.5 SEC (ref 22.1–31)
APTT PPP: 49.3 SEC (ref 22.1–31)
APTT PPP: 86.2 SEC (ref 22.1–31)
ATRIAL RATE: 81 BPM
BASOPHILS # BLD: 0 K/UL (ref 0–0.1)
BASOPHILS NFR BLD: 0 % (ref 0–1)
BUN SERPL-MCNC: 26 MG/DL (ref 6–20)
BUN/CREAT SERPL: 24 (ref 12–20)
CALCIUM SERPL-MCNC: 7.2 MG/DL (ref 8.5–10.1)
CALCULATED P AXIS, ECG09: 45 DEGREES
CALCULATED R AXIS, ECG10: 15 DEGREES
CALCULATED T AXIS, ECG11: 7 DEGREES
CHLORIDE SERPL-SCNC: 108 MMOL/L (ref 97–108)
CHOLEST SERPL-MCNC: 122 MG/DL
CO2 SERPL-SCNC: 29 MMOL/L (ref 21–32)
CREAT SERPL-MCNC: 1.09 MG/DL (ref 0.7–1.3)
DIAGNOSIS, 93000: NORMAL
DIFFERENTIAL METHOD BLD: ABNORMAL
EOSINOPHIL # BLD: 0.1 K/UL (ref 0–0.4)
EOSINOPHIL NFR BLD: 1 % (ref 0–7)
ERYTHROCYTE [DISTWIDTH] IN BLOOD BY AUTOMATED COUNT: 15.4 % (ref 11.5–14.5)
ERYTHROCYTE [DISTWIDTH] IN BLOOD BY AUTOMATED COUNT: 15.6 % (ref 11.5–14.5)
GLUCOSE BLD STRIP.AUTO-MCNC: 117 MG/DL (ref 65–100)
GLUCOSE BLD STRIP.AUTO-MCNC: 137 MG/DL (ref 65–100)
GLUCOSE BLD STRIP.AUTO-MCNC: 271 MG/DL (ref 65–100)
GLUCOSE BLD STRIP.AUTO-MCNC: 307 MG/DL (ref 65–100)
GLUCOSE SERPL-MCNC: 235 MG/DL (ref 65–100)
HCT VFR BLD AUTO: 24.5 % (ref 36.6–50.3)
HCT VFR BLD AUTO: 25.4 % (ref 36.6–50.3)
HCT VFR BLD AUTO: 25.5 % (ref 36.6–50.3)
HDLC SERPL-MCNC: 52 MG/DL
HDLC SERPL: 2.3 {RATIO} (ref 0–5)
HGB BLD-MCNC: 8 G/DL (ref 12.1–17)
HGB BLD-MCNC: 8.2 G/DL (ref 12.1–17)
HGB BLD-MCNC: 8.2 G/DL (ref 12.1–17)
HISTORY CHECKED?,CKHIST: NORMAL
IMM GRANULOCYTES # BLD AUTO: 0 K/UL (ref 0–0.04)
IMM GRANULOCYTES NFR BLD AUTO: 0 % (ref 0–0.5)
LDLC SERPL CALC-MCNC: 57.4 MG/DL (ref 0–100)
LIPID PROFILE,FLP: NORMAL
LYMPHOCYTES # BLD: 1.5 K/UL (ref 0.8–3.5)
LYMPHOCYTES NFR BLD: 16 % (ref 12–49)
MAGNESIUM SERPL-MCNC: 1.5 MG/DL (ref 1.6–2.4)
MCH RBC QN AUTO: 31.5 PG (ref 26–34)
MCH RBC QN AUTO: 32 PG (ref 26–34)
MCHC RBC AUTO-ENTMCNC: 32.3 G/DL (ref 30–36.5)
MCHC RBC AUTO-ENTMCNC: 32.7 G/DL (ref 30–36.5)
MCV RBC AUTO: 97.7 FL (ref 80–99)
MCV RBC AUTO: 98 FL (ref 80–99)
MONOCYTES # BLD: 0.9 K/UL (ref 0–1)
MONOCYTES NFR BLD: 10 % (ref 5–13)
NEUTS SEG # BLD: 6.6 K/UL (ref 1.8–8)
NEUTS SEG NFR BLD: 73 % (ref 32–75)
NRBC # BLD: 0 K/UL (ref 0–0.01)
NRBC # BLD: 0 K/UL (ref 0–0.01)
NRBC BLD-RTO: 0 PER 100 WBC
NRBC BLD-RTO: 0 PER 100 WBC
P-R INTERVAL, ECG05: 120 MS
PHOSPHATE SERPL-MCNC: 3.4 MG/DL (ref 2.6–4.7)
PLATELET # BLD AUTO: 261 K/UL (ref 150–400)
PLATELET # BLD AUTO: 277 K/UL (ref 150–400)
PMV BLD AUTO: 9.3 FL (ref 8.9–12.9)
PMV BLD AUTO: 9.6 FL (ref 8.9–12.9)
POTASSIUM SERPL-SCNC: 3.5 MMOL/L (ref 3.5–5.1)
Q-T INTERVAL, ECG07: 404 MS
QRS DURATION, ECG06: 86 MS
QTC CALCULATION (BEZET), ECG08: 469 MS
RBC # BLD AUTO: 2.5 M/UL (ref 4.1–5.7)
RBC # BLD AUTO: 2.6 M/UL (ref 4.1–5.7)
SERVICE CMNT-IMP: ABNORMAL
SODIUM SERPL-SCNC: 144 MMOL/L (ref 136–145)
THERAPEUTIC RANGE,PTTT: ABNORMAL SECS (ref 58–77)
TRIGL SERPL-MCNC: 63 MG/DL (ref ?–150)
TROPONIN I SERPL-MCNC: 5.09 NG/ML
TROPONIN I SERPL-MCNC: 7.6 NG/ML
VENTRICULAR RATE, ECG03: 81 BPM
VLDLC SERPL CALC-MCNC: 12.6 MG/DL
WBC # BLD AUTO: 7.8 K/UL (ref 4.1–11.1)
WBC # BLD AUTO: 9.1 K/UL (ref 4.1–11.1)

## 2021-02-18 PROCEDURE — 84484 ASSAY OF TROPONIN QUANT: CPT

## 2021-02-18 PROCEDURE — 74011250636 HC RX REV CODE- 250/636: Performed by: STUDENT IN AN ORGANIZED HEALTH CARE EDUCATION/TRAINING PROGRAM

## 2021-02-18 PROCEDURE — 99232 SBSQ HOSP IP/OBS MODERATE 35: CPT | Performed by: SPECIALIST

## 2021-02-18 PROCEDURE — 74011250637 HC RX REV CODE- 250/637: Performed by: INTERNAL MEDICINE

## 2021-02-18 PROCEDURE — 74011250636 HC RX REV CODE- 250/636: Performed by: FAMILY MEDICINE

## 2021-02-18 PROCEDURE — 85027 COMPLETE CBC AUTOMATED: CPT

## 2021-02-18 PROCEDURE — 74011636637 HC RX REV CODE- 636/637: Performed by: STUDENT IN AN ORGANIZED HEALTH CARE EDUCATION/TRAINING PROGRAM

## 2021-02-18 PROCEDURE — 85018 HEMOGLOBIN: CPT

## 2021-02-18 PROCEDURE — 2709999900 HC NON-CHARGEABLE SUPPLY

## 2021-02-18 PROCEDURE — 97530 THERAPEUTIC ACTIVITIES: CPT

## 2021-02-18 PROCEDURE — 74011250636 HC RX REV CODE- 250/636: Performed by: NURSE PRACTITIONER

## 2021-02-18 PROCEDURE — 86901 BLOOD TYPING SEROLOGIC RH(D): CPT

## 2021-02-18 PROCEDURE — 36415 COLL VENOUS BLD VENIPUNCTURE: CPT

## 2021-02-18 PROCEDURE — 74011250637 HC RX REV CODE- 250/637: Performed by: STUDENT IN AN ORGANIZED HEALTH CARE EDUCATION/TRAINING PROGRAM

## 2021-02-18 PROCEDURE — 65660000000 HC RM CCU STEPDOWN

## 2021-02-18 PROCEDURE — 74011000258 HC RX REV CODE- 258: Performed by: INTERNAL MEDICINE

## 2021-02-18 PROCEDURE — 86923 COMPATIBILITY TEST ELECTRIC: CPT

## 2021-02-18 PROCEDURE — 85730 THROMBOPLASTIN TIME PARTIAL: CPT

## 2021-02-18 PROCEDURE — 99232 SBSQ HOSP IP/OBS MODERATE 35: CPT | Performed by: FAMILY MEDICINE

## 2021-02-18 PROCEDURE — 97535 SELF CARE MNGMENT TRAINING: CPT

## 2021-02-18 PROCEDURE — 82962 GLUCOSE BLOOD TEST: CPT

## 2021-02-18 PROCEDURE — 74011000250 HC RX REV CODE- 250: Performed by: FAMILY MEDICINE

## 2021-02-18 PROCEDURE — 84100 ASSAY OF PHOSPHORUS: CPT

## 2021-02-18 PROCEDURE — C9113 INJ PANTOPRAZOLE SODIUM, VIA: HCPCS | Performed by: INTERNAL MEDICINE

## 2021-02-18 PROCEDURE — 74011250636 HC RX REV CODE- 250/636: Performed by: INTERNAL MEDICINE

## 2021-02-18 PROCEDURE — 80048 BASIC METABOLIC PNL TOTAL CA: CPT

## 2021-02-18 PROCEDURE — 85025 COMPLETE CBC W/AUTO DIFF WBC: CPT

## 2021-02-18 PROCEDURE — 74011000250 HC RX REV CODE- 250: Performed by: INTERNAL MEDICINE

## 2021-02-18 PROCEDURE — 83735 ASSAY OF MAGNESIUM: CPT

## 2021-02-18 PROCEDURE — 99231 SBSQ HOSP IP/OBS SF/LOW 25: CPT | Performed by: CLINICAL NURSE SPECIALIST

## 2021-02-18 RX ORDER — HEPARIN SODIUM 5000 [USP'U]/ML
30 INJECTION, SOLUTION INTRAVENOUS; SUBCUTANEOUS
Status: COMPLETED | OUTPATIENT
Start: 2021-02-18 | End: 2021-02-18

## 2021-02-18 RX ORDER — MIRTAZAPINE 15 MG/1
15 TABLET, FILM COATED ORAL
Status: DISCONTINUED | OUTPATIENT
Start: 2021-02-18 | End: 2021-02-20 | Stop reason: HOSPADM

## 2021-02-18 RX ORDER — SODIUM CHLORIDE 9 MG/ML
250 INJECTION, SOLUTION INTRAVENOUS AS NEEDED
Status: DISCONTINUED | OUTPATIENT
Start: 2021-02-18 | End: 2021-02-18

## 2021-02-18 RX ORDER — ATORVASTATIN CALCIUM 10 MG/1
10 TABLET, FILM COATED ORAL
Status: DISCONTINUED | OUTPATIENT
Start: 2021-02-18 | End: 2021-02-19

## 2021-02-18 RX ORDER — IPRATROPIUM BROMIDE AND ALBUTEROL SULFATE 2.5; .5 MG/3ML; MG/3ML
3 SOLUTION RESPIRATORY (INHALATION)
Status: DISCONTINUED | OUTPATIENT
Start: 2021-02-18 | End: 2021-02-20 | Stop reason: HOSPADM

## 2021-02-18 RX ORDER — MAGNESIUM SULFATE HEPTAHYDRATE 40 MG/ML
2 INJECTION, SOLUTION INTRAVENOUS ONCE
Status: COMPLETED | OUTPATIENT
Start: 2021-02-18 | End: 2021-02-18

## 2021-02-18 RX ORDER — DIVALPROEX SODIUM 500 MG/1
1500 TABLET, EXTENDED RELEASE ORAL
Status: DISCONTINUED | OUTPATIENT
Start: 2021-02-18 | End: 2021-02-20 | Stop reason: HOSPADM

## 2021-02-18 RX ORDER — INSULIN GLARGINE 100 [IU]/ML
23 INJECTION, SOLUTION SUBCUTANEOUS DAILY
Status: DISCONTINUED | OUTPATIENT
Start: 2021-02-18 | End: 2021-02-19

## 2021-02-18 RX ORDER — CALCIUM CARB/MAGNESIUM CARB 311-232MG
2.5 TABLET ORAL
Status: DISCONTINUED | OUTPATIENT
Start: 2021-02-18 | End: 2021-02-20 | Stop reason: HOSPADM

## 2021-02-18 RX ORDER — SERTRALINE HYDROCHLORIDE 50 MG/1
100 TABLET, FILM COATED ORAL DAILY
Status: DISCONTINUED | OUTPATIENT
Start: 2021-02-19 | End: 2021-02-20 | Stop reason: HOSPADM

## 2021-02-18 RX ORDER — LANOLIN ALCOHOL/MO/W.PET/CERES
325 CREAM (GRAM) TOPICAL
Status: DISCONTINUED | OUTPATIENT
Start: 2021-02-18 | End: 2021-02-20 | Stop reason: HOSPADM

## 2021-02-18 RX ORDER — SUCRALFATE 1 G/1
1 TABLET ORAL
Status: DISCONTINUED | OUTPATIENT
Start: 2021-02-18 | End: 2021-02-20 | Stop reason: HOSPADM

## 2021-02-18 RX ORDER — HEPARIN SODIUM 5000 [USP'U]/ML
60 INJECTION, SOLUTION INTRAVENOUS; SUBCUTANEOUS ONCE
Status: COMPLETED | OUTPATIENT
Start: 2021-02-18 | End: 2021-02-18

## 2021-02-18 RX ADMIN — PANTOPRAZOLE SODIUM 40 MG: 40 INJECTION, POWDER, FOR SOLUTION INTRAVENOUS at 22:52

## 2021-02-18 RX ADMIN — MIRTAZAPINE 15 MG: 15 TABLET, FILM COATED ORAL at 22:55

## 2021-02-18 RX ADMIN — VANCOMYCIN HYDROCHLORIDE 1000 MG: 1 INJECTION, POWDER, LYOPHILIZED, FOR SOLUTION INTRAVENOUS at 09:27

## 2021-02-18 RX ADMIN — SODIUM CHLORIDE 500 MG: 9 INJECTION, SOLUTION INTRAVENOUS at 05:18

## 2021-02-18 RX ADMIN — ASPIRIN 81 MG: 81 TABLET, CHEWABLE ORAL at 15:00

## 2021-02-18 RX ADMIN — PANTOPRAZOLE SODIUM 40 MG: 40 INJECTION, POWDER, FOR SOLUTION INTRAVENOUS at 09:27

## 2021-02-18 RX ADMIN — FERROUS SULFATE TAB 325 MG (65 MG ELEMENTAL FE) 325 MG: 325 (65 FE) TAB at 17:51

## 2021-02-18 RX ADMIN — MAGNESIUM SULFATE HEPTAHYDRATE 2 G: 40 INJECTION, SOLUTION INTRAVENOUS at 09:28

## 2021-02-18 RX ADMIN — HEPARIN SODIUM 11 UNITS/KG/HR: 10000 INJECTION, SOLUTION INTRAVENOUS at 12:08

## 2021-02-18 RX ADMIN — ONDANSETRON 4 MG: 2 INJECTION INTRAMUSCULAR; INTRAVENOUS at 16:07

## 2021-02-18 RX ADMIN — CEFEPIME HYDROCHLORIDE 2 G: 2 INJECTION, POWDER, FOR SOLUTION INTRAVENOUS at 16:07

## 2021-02-18 RX ADMIN — DIVALPROEX SODIUM 1500 MG: 500 TABLET, EXTENDED RELEASE ORAL at 23:13

## 2021-02-18 RX ADMIN — ATORVASTATIN CALCIUM 10 MG: 10 TABLET, FILM COATED ORAL at 22:55

## 2021-02-18 RX ADMIN — Medication 10 ML: at 22:52

## 2021-02-18 RX ADMIN — METRONIDAZOLE 500 MG: 500 INJECTION, SOLUTION INTRAVENOUS at 22:54

## 2021-02-18 RX ADMIN — HEPARIN SODIUM 1750 UNITS: 5000 INJECTION INTRAVENOUS; SUBCUTANEOUS at 04:01

## 2021-02-18 RX ADMIN — CEFEPIME HYDROCHLORIDE 2 G: 2 INJECTION, POWDER, FOR SOLUTION INTRAVENOUS at 23:08

## 2021-02-18 RX ADMIN — INSULIN GLARGINE 23 UNITS: 100 INJECTION, SOLUTION SUBCUTANEOUS at 17:51

## 2021-02-18 RX ADMIN — METRONIDAZOLE 500 MG: 500 INJECTION, SOLUTION INTRAVENOUS at 09:27

## 2021-02-18 RX ADMIN — SUCRALFATE 1 G: 1 TABLET ORAL at 23:14

## 2021-02-18 RX ADMIN — Medication 2.5 MG: at 23:36

## 2021-02-18 RX ADMIN — SODIUM CHLORIDE 125 ML/HR: 9 INJECTION, SOLUTION INTRAVENOUS at 09:24

## 2021-02-18 RX ADMIN — HEPARIN SODIUM 11 UNITS/KG/HR: 10000 INJECTION, SOLUTION INTRAVENOUS at 18:15

## 2021-02-18 RX ADMIN — INSULIN LISPRO 5 UNITS: 100 INJECTION, SOLUTION INTRAVENOUS; SUBCUTANEOUS at 09:26

## 2021-02-18 RX ADMIN — HEPARIN SODIUM 3500 UNITS: 5000 INJECTION INTRAVENOUS; SUBCUTANEOUS at 19:22

## 2021-02-18 RX ADMIN — SUCRALFATE 1 G: 1 TABLET ORAL at 16:07

## 2021-02-18 RX ADMIN — SODIUM CHLORIDE 500 MG: 9 INJECTION, SOLUTION INTRAVENOUS at 14:58

## 2021-02-18 RX ADMIN — INSULIN LISPRO 4 UNITS: 100 INJECTION, SOLUTION INTRAVENOUS; SUBCUTANEOUS at 22:55

## 2021-02-18 RX ADMIN — HEPARIN SODIUM 14 UNITS/KG/HR: 10000 INJECTION, SOLUTION INTRAVENOUS at 04:03

## 2021-02-18 NOTE — PROGRESS NOTES
Cardiology Progress Note                              [address] [facility] [place], [address] [facility]                                 Phone [phone]; Fax [phone]        2021 11:02 AM     Admit Date:           2021  Admit Diagnosis:  DKA (diabetic ketoacidoses) ([facility] [address] [E11.10]  :          1965   MRN:          [id number]       Impression Plan/Recommendation   Diabetic ketoacidosis  NSTEMI  Metabolic acidosis with elevated anion gap, anion gap closed  Acute GI bleed, w hx of hereditary hemorrhagic telangectasia   Acute kidney injury  Macrocytic anemia with hemoglobin of 8.5  LVH, mild  Right sided contractures -chronic    · Once stabilized from DKA/GI bleed will need ischemic work up prior to d/c. Appreciate GI recommendations, if intervention is needed BMS may be better w hx of HHT? ? · Peak troponin 11.6- continue ASA, ACS heparin x 48 hours   · Echo shows pEF, no WMA  · BP stable - of pressors          CARDIOLOGY ATTENDING  Patient personally seen and examined. All the elements of history and examination were personally performed. Assessment and plan was discussed and agree as written above    Feels fine this AM.  Denies CP or SOB. Hrt RRR, lungs clear, no edema. NSTEMI - will see what GI says about GI bleeding risk. Once cleared by GI, will plan for a cardiac cath (prior to discharge). Cont ASA, statin, and IV heparin (for 48 hours). Armando Ashton MD, [facility]         21   ECHO ADULT COMPLETE 2021    Narrative · LV: Estimated LVEF is 65 - 70%. Normal cavity size. Mild concentric   hypertrophy. Hyperdynamic systolic function. Wall motion: normal.   Age-appropriate left ventricular diastolic function. · RV: Not well visualized. Mildly reduced systolic function. · MV: Mitral valve non-specific thickening. Mild mitral annular   calcification.         Signed by: Lin Taveras Winifred Byrd,              No intake/output data recorded. Last 3 Recorded Weights in this Encounter    02/17/21 0152 02/17/21 1255   Weight: 129 lb (58.5 kg) 129 lb (58.5 kg)         02/16 1901 - 02/18 0700  In: 3098.9 [I.V.:3098.9]  Out: 250 [Urine:250]    SUBJECTIVE               Maya Score. denies palpitations, irregular heart beat, SOB, chest pain or LE edema.    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- but he is able to walk        Current Facility-Administered Medications   Medication Dose Route Frequency    vancomycin (VANCOCIN) 1,000 mg in 0.9% sodium chloride 250 mL (VIAL-MATE)  1,000 mg IntraVENous Q12H    [START ON 2/19/2021] Vancomycin Trough 2/19 8:30am   Other ONCE    cefepime (MAXIPIME) 2 g in sterile water (preservative free) 10 mL IV syringe  2 g IntraVENous Q8H    insulin glargine (LANTUS) injection 23 Units  23 Units SubCUTAneous DAILY    0.9% sodium chloride infusion 250 mL  250 mL IntraVENous PRN    sodium chloride (NS) flush 5-40 mL  5-40 mL IntraVENous Q8H    sodium chloride (NS) flush 5-40 mL  5-40 mL IntraVENous PRN    acetaminophen (TYLENOL) tablet 650 mg  650 mg Oral Q4H PRN    ondansetron (ZOFRAN) injection 4 mg  4 mg IntraVENous Q4H PRN    pantoprazole (PROTONIX) 40 mg in 0.9% sodium chloride 10 mL injection  40 mg IntraVENous Q12H    NOREPINephrine (LEVOPHED) 32,000 mcg in dextrose 5% 250 mL (128 mcg/mL) infusion  0.5-16 mcg/min IntraVENous TITRATE    metroNIDAZOLE (FLAGYL) IVPB premix 500 mg  500 mg IntraVENous Q12H    aspirin chewable tablet 81 mg  81 mg Oral DAILY    valproate (DEPACON) 500 mg in 0.9% sodium chloride 50 mL IVPB  500 mg IntraVENous Q8H    glucose chewable tablet 16 g  4 Tab Oral PRN    dextrose (D50W) injection syrg 12.5-25 g  25-50 mL IntraVENous PRN    glucagon (GLUCAGEN) injection 1 mg  1 mg IntraMUSCular PRN    insulin lispro (HUMALOG) injection   SubCUTAneous AC&HS    heparin 25,000 units in D5W 250 ml infusion  12-25 Units/kg/hr IntraVENous TITRATE    0.9% sodium chloride infusion  125 mL/hr IntraVENous CONTINUOUS     Current Outpatient Medications   Medication Sig    diphenhydrAMINE (BENADRYL) 25 mg capsule Take 25 mg by mouth every four (4) hours as needed for Itching.  divalproex ER (Depakote ER) 500 mg ER tablet Take 1,500 mg by mouth nightly.  insulin detemir U-100 (LEVEMIR FLEXTOUCH) 100 unit/mL (3 mL) inpn 26 Units by SubCUTAneous route Daily (before breakfast).  metFORMIN (GLUCOPHAGE) 850 mg tablet Take 850 mg by mouth three (3) times daily.  insulin aspart U-100 (NovoLOG Flexpen U-100 Insulin) 100 unit/mL (3 mL) inpn by SubCUTAneous route Before breakfast, lunch, and dinner. Sliding Scale  Blood glucose  Less than 70 give 0 units  70 to 150 give 3 units  151 to 200 give 5 units  201 to 250 give 7 units  251 to 300 give 12 units  301 to 350 give 15 units  Greater than 350 give  20 units  If blood glucose is greater than is greater than 300 recheck in 2 hours    insulin aspart U-100 (NovoLOG Flexpen U-100 Insulin) 100 unit/mL (3 mL) inpn by SubCUTAneous route nightly. Sliding scale  Blood glucose  Less than 200 give 0 units  200 to 299 give 3 units  Greater than 300 give 6 units    pantoprazole (Protonix) 40 mg tablet Take 40 mg by mouth Daily (before breakfast).  sucralfate (Carafate) 1 gram tablet Take 1 g by mouth Before breakfast, lunch, dinner and at bedtime.  lip protectant (BLISTEX) 0.6-0.5-1.1-0.5 % oint ointment Apply  to affected area as needed for Lubrication.  lip protectant with sunscreen (CARMEX) crea Apply  to affected area as needed for Dry Skin.  glucagon (GLUCAGEN) 1 mg injection 1 mg by IntraVENous route as needed for Hypoglycemia.  glucose 4 gram chewable tablet Take 45 g by mouth as needed (Blood glucose less than 70).     guaiFENesin (ROBITUSSIN) 100 mg/5 mL liquid Take 200 mg by mouth four (4) times daily as needed for Cough.  hydrocortisone (CORTAID) 1 % topical cream Apply  to affected area two (2) times daily as needed for Skin Irritation. use thin layer    ibuprofen (Motrin IB) 200 mg tablet Take 400 mg by mouth every four (4) hours as needed for Pain.  albuterol-ipratropium (DUO-NEB) 2.5 mg-0.5 mg/3 ml nebu 3 mL by Nebulization route three (3) times daily as needed for Wheezing.  ketoconazole (NIZORAL) 2 % topical cream Apply  to affected area daily as needed for Skin Irritation.  loperamide (IMMODIUM) 2 mg tablet Take 2 mg by mouth four (4) times daily as needed for Diarrhea.  acetaminophen (TYLENOL) 325 mg tablet Take 650 mg by mouth every four (4) hours as needed for Pain or Fever.  ferrous sulfate 325 mg (65 mg iron) tablet Take 1 Tab by mouth two (2) times daily (after meals).  atorvastatin (LIPITOR) 10 mg tablet TAKE 1 TABLET BY MOUTH ONCE DAILY    ondansetron (Zofran ODT) 4 mg disintegrating tablet 1 Tab by SubLINGual route every eight (8) hours as needed for Nausea or Vomiting.  mirtazapine (REMERON) 15 mg tablet Take 1 Tab by mouth nightly.  sertraline (ZOLOFT) 100 mg tablet Take 1 Tab by mouth daily.       OBJECTIVE               Intake/Output Summary (Last 24 hours) at 2/18/2021 1102  Last data filed at 2/17/2021 2257  Gross per 24 hour   Intake 2098.91 ml   Output    Net 2098.91 ml       Review of Systems - History obtained from the patient AS PER  HPI    Telemetry NSR    PHYSICAL EXAM        Visit Vitals  /65 (BP 1 Location: Right upper arm, BP Patient Position: At rest)   Pulse 86   Temp 98.1 °F (36.7 °C)   Resp 14   Ht 5' 6\" (1.676 m)   Wt 129 lb (58.5 kg)   SpO2 96%   BMI 20.82 kg/m²       Gen: Well-developed, thin, in no acute distress  alert and oriented x 3  HEENT:  Pink conjunctivae, Hearing grossly normal.No scleral icterus or conjunctival, moist mucous membranes  Neck: Supple,No JVD  Resp: No accessory muscle use, Clear breath sounds, No rales or [facility]ho[facility]  Card: Regular Rate,Rythm,2/6murmurs lUSB, no rubs or gallop. No thrills. GI:          soft, non-tender   MSK: No cyanosis or clubbing  Skin: No rashes or ulcers  Neuro:  Cranial nerves are grossly intact, moving all four extremities, no focal deficit, follows commands appropriately. Right upper arm and hand contracted/ rle contraction   Psych:  Good insight, oriented to person, place and time, alert, Nml Affect  LE: No edema       DATA REVIEW            Laboratory and Imaging have been reviewed by me and are notable for  Recent Labs     02/18/21  0241 02/17/21  2306 02/17/21  1545   TROIQ 5.09* 7.60* 11.60*     Recent Labs     02/18/21  0953 02/18/21 0241 02/17/21  1957 02/17/21  1545 02/17/21  0251 02/17/21  0251   NA  --  144 144 144   < > 125*   K  --  3.5 3.9 4.2   < > 6.3*   CO2  --  29 30 30   < > 5*   BUN  --  26* 31* 34*   < > 44*   CREA  --  1.09 1.30 1.49*   < > 1.94*   GLU  --  235* 148* 131*   < > 960*   PHOS  --  3.4  --   --   --  9.0*   MG  --  1.5*  --   --   --  1.4*   WBC 7.8 9.1 10.4  --    < >  --    HGB 8.2* 8.0* 8.4*  --    < >  --    HCT 25.4* 24.5* 25.2*  --    < >  --     277 293  --    < >  --     < > = values in this interval not displayed.              Adelita Perez, NP

## 2021-02-18 NOTE — PROGRESS NOTES
[address] [facility] with [address] [place] [address]       Resident Progress Note in Brief    S: Patient seen and examined from door. Pt sleeping comfortably. O:  Visit Vitals  /61   Pulse 78   Temp 97.5 °F (36.4 °C)   Resp 13   Ht 5' 6\" (1.676 m)   Wt 129 lb (58.5 kg)   SpO2 93%   BMI 20.82 kg/m²     Physical Examination:   General appearance - asleep and in no distress  Chest - breathing comfortably on room air  Heart - regular rate and rhythm     A/P:     NSTEMI: POA EKG w/ sinus tachycardia. POA trop 1.99, now elevated to 11.6. Anticoagulation held due to [address]. ECHO EF 65-70%. Hemoccult positive. - Heparin gtt started per cards, appreciate recs  - Will closely monitor AM labs to ensure Hgb isn't dropping. Pt has hx of telangiectasias w/ positive FOBT.     Shock: 2/2 infectious vs cardiogenic vs DKA. POA LA 4.05, now resolved. CXR and CT A/P with no acute process. Rapid COVID neg. Levo stopped last evening at 1900. - Levophed gtt as needed  - Currently on Cefepime and flagyl, will add vancomycin  - bcx, ucx pending     DKA: now resolved, currently on insulin gtt protocol. AG close x2 at 1530. S/p insulin gtt. - Lantus 10u QHS  - Continue POC glucose checks    Please see the primary team's daily progress note for the patient's full plan.     Price Greene MD  Family Medicine Resident

## 2021-02-18 NOTE — PROGRESS NOTES
Problem: Mobility Impaired (Adult and Pediatric)  Goal: *Acute Goals and Plan of Care (Insert Text)  Description: FUNCTIONAL STATUS PRIOR TO ADMISSION: Brien Richard, reports he lives in a group home. HOME SUPPORT PRIOR TO ADMISSION: Patient lived in a group home. Reports they don't help with anything. Physical Therapy Goals  Initiated 2/17/2021  1. Patient will move from supine to sit and sit to supine , scoot up and down, and roll side to side in bed with supervision/set-up within 7 day(s). 2.  Patient will transfer from bed to chair and chair to bed with minimal assistance/contact guard assist using the least restrictive device within 7 day(s). 3.  Patient will perform sit to stand with minimal assistance/contact guard assist within 7 day(s). 4.  Patient will ambulate with moderate assistance  for 15 feet with the least restrictive device within 7 day(s). 2/18/2021 1057 by Angel Brown, PT, DPT  Outcome: Progressing Towards Goal   PHYSICAL THERAPY TREATMENT  Patient: Juan Duron (56 y.o. male)  Date: 2/18/2021  Diagnosis: DKA (diabetic ketoacidoses) ([facility] [address] [E11.10] DKA (diabetic ketoacidoses) ([facility] [address]       Precautions: Fall, Skin  Chart, physical therapy assessment, plan of care and goals were reviewed. ASSESSMENT  Patient continues with skilled PT services and is progressing towards goals. Pt participates in sitting balance activities, multiple transfers, and brief ambulation with hand held assistance. Standing and gait tolerance limited by fatigue and pain at R heel associated with callous and area of open skin. Pt cued for RLE forefoot weight bearing and attempts with some difficulty. RN reports wound care team aware and managing.       Current Level of Function Impacting Discharge (mobility/balance): mod assist x 2    Other factors to consider for discharge: none additional         PLAN :  Patient continues to benefit from skilled intervention to address the above impairments. Continue treatment per established plan of care. to address goals. Recommendation for discharge: (in order for the patient to meet his/her long term goals)  Therapy up to 5 days/week in SNF setting    This discharge recommendation:  Has not yet been discussed the attending provider and/or case management    IF patient discharges home will need the following DME: to be determined (TBD)       SUBJECTIVE:   Patient stated My heel is bothering me.       OBJECTIVE DATA SUMMARY:     Pt received supine, agreeable to PT and cleared by RN. Pt seen holding in ED. Critical Behavior:  Neurologic State: Alert  Orientation Level: Oriented to person, Oriented to time, Disoriented to place  Cognition: Follows commands  Safety/Judgement: Decreased awareness of environment, Decreased awareness of need for safety, Fall prevention  Functional Mobility Training:  Bed Mobility:     Supine to Sit: Additional time;Assist x1; moderate assistance. Scooting: Stand-by assistance; Additional time        Transfers:  Sit to Stand: Minimum assistance;Assist x2  Stand to Sit: Minimum assistance, mod assist;Assist x2                    Other: bedside commode with min to mod x 2        Balance:  Sitting: Intact; Without support  Sitting - Static: Good (unsupported)  Sitting - Dynamic: Fair (occasional)  Standing: Impaired; With support  Standing - Static: Poor  Standing - Dynamic : Poor  Ambulation/Gait Training:  Distance (ft): 3 Feet (ft)(lateral steps toward head of bed) followed by pivot steps to/from bedside commode. Assistive Device: Gait belt(hand held assist)  Ambulation - Level of Assistance: Minimal assistance; Moderate assistance;Assist x2; Additional time        Gait Abnormalities: Decreased step clearance; Antalgic;Hemiplegic        Base of Support: Widened     Speed/Phoebe: Pace decreased (<100 feet/min)                         Pain Rating:  Pt reports pain to R heel as described above.  RN aware, offered education, elevation    Activity Tolerance:   requires frequent rest breaks    After treatment patient left in no apparent distress:   Supine in bed, Call bell within reach, and stretcher side rails up , heels floating    COMMUNICATION/COLLABORATION:   The patients plan of care was discussed with: Occupational therapist and Registered nurse.      Nikolay Laws, PT, DPT   Time Calculation: 31 mins

## 2021-02-18 NOTE — PROGRESS NOTES
[address] [facility] [address] [place] [address]   Office [phone]  Fax [phone] [address] [facility] Attending Addendum:  I saw and evaluated the patient on the day of the encounter with Dr. Chio Charles, performing the key elements of the service. I discussed the findings, assessment and plan with the resident and agree with the resident's findings and plan as documented in the resident's note. Potassium repleted which could be contributing to trigeminy is on telemetry. Covid pending for SNF placement. Blood pressures improved. On aspirin and statin. GI to follow-up outpatient. Potential discharge tomorrow. Kike Carranza MD, FAAFP, CAQSM, RMSK           Assessment and Plan     Marianne Hernandez. is a 60 y.o. male w/PMH T2DM, HHT, HLD, GERD, Anxiety, Seizures, Dementia admitted for DKA. 24 Hour Events: No acute events   Tele: Sinus 82, PVCs in trigeminy    NSTEMI: POA EKG w/ sinus tachycardia. POA Trop 1.99 > peaked at 11.6. ECHO EF 65-70%. - Hep gtt (last day today)  - ASA 81mg daily  - Lipitor 40mg daily  - Cardiology consulted, appreciate recs   - angio prior to dc     Shock of unknown etiology: Possibly 2/2 IVVD vs Infection of unknown source. POA LA 4.05 (resolved), 3/4 SIRS (WBC, HR, RR). CXR and CT A/P neg. Rapid COVID neg. S/p Levophed (2/). S/p Vanc.   - Cefepime/flagyl  - F/u BcxNGTD, Ucx     DKA: RESOLVED. s/p insulin gtt. POA AG 30, pH 7.01, BHB >4.42, glu 960. DM2: A1c 8.4. Home metformin 850mg TID, Levemir 26U + SSI.  - Lantus 27U  - POC glu ACHS + SSI  - Hypoglycemia protocol    GI bleed: FOBT+, no gross bleeding     - H&H q6H  - Patient refuses transfusion  - GI consulted, no scope planned at this time, following from a distance    Hereditary Hemorrhagic Telangiectasia:  - Monitor for bleeding     PRADEEP: POA Hgb 8.9 (BL 10-14?).    - Continue home Iron 325mg BID     Anxiety:   - Continue home Mirtazapine 15 mg hs, sertraline 100 mg daily Seizure:   - Continue home Depakote ER 1500 mg hs     Asthma:   - Duoneb prn     HLD: Tchol 122, LDL 57.4, HDL 52, TAG 63.   - Continue home Lipitor 10 mg     GERD: stable. 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 POA Cr 1.94 (BL 1.1). FEN/GI - DM   Activity - Bedrest  DVT prophylaxis - HLD  GI prophylaxis -  Protonix  Disposition - PT/OT      Claudetta Norway, MD  Family Medicine Resident         Subjective / Objective     Subjective: Pt was seen and examined at bedside. Afebrile and hemodynamically stable. Concerns overnight include: none. Reports fatigue. Denies chest pain, SOB, nausea, vomiting, abdominal pain, dizziness. Thorough discussion with patient this AM about transfusion. Discussed risk and benefits. Patient stated he was worried about catching HIV from blood products, reassured patient that blood is tested for HIV. Patient adamantly refuses transfusion due to \"not wanting another person's blood inside him\". Objective:  Temp (24hrs), Av.1 °F (36.7 °C), Min:97.5 °F (36.4 °C), Max:98.7 °F (37.1 °C)     Visit Vitals  BP (!) 146/74 (BP 1 Location: Left arm, BP Patient Position: At rest)   Pulse 73   Temp 98.1 °F (36.7 °C)   Resp 16   Ht 5' 6\" (1.676 m)   Wt 129 lb (58.5 kg)   SpO2 96%   BMI 20.82 kg/m²     Physical Exam:  General: No acute distress. Alert. Cooperative. Respiratory: CTAB. No w/r/r/c.   Cardiovascular: Normal S1,S2. No m/r/g.   GI: Nondistended.+ bowel sounds. Nontender. No rebound tenderness or guarding. Extremities: No LE edema. Skin: Warm, dry. No rashes.    Neuro: Alert and oriented    Respiratory:   O2 Device: Room air     I/O:  Date 21 07 - 21 0659 21 07 - 21 0659   Shift 5832-2675 5518-9487 24 Hour Total 2684-1295 0247-2536 24 Hour Total   INTAKE I.V.(mL/kg/hr) 150(0.2) 353.5(0.5) 503.5(0.4)        Heparin Volume  223.5 223.5        Volume (metroNIDAZOLE (FLAGYL) IVPB premix 500 mg) 100 100 200        Volume (magnesium sulfate 2 g/50 ml IVPB (premix or compounded)) 50  50        Volume (cefepime (MAXIPIME) 2 g in sterile water (preservative free) 10 mL IV syringe)  30 30      Shift Total(mL/kg) 150(2.6) 353. 5(6) 503.5(8.6)      OUTPUT   Urine(mL/kg/hr)  800(1.1) 800(0.6)        Urine Output (mL) (Condom Catheter 02/18/21)  800 800      Stool           Stool Occurrence(s) 2 x  2 x      Shift Total(mL/kg)  800(13.7) 800(13.7)       -446.5 -296.5      Weight (kg) 58.5 58.5 58.5 58.5 58.5 58.5       Inpatient Medications  Current Facility-Administered Medications   Medication Dose Route Frequency    atorvastatin (LIPITOR) tablet 40 mg  40 mg Oral QHS    insulin glargine (LANTUS) injection 27 Units  27 Units SubCUTAneous DAILY    cefepime (MAXIPIME) 2 g in sterile water (preservative free) 10 mL IV syringe  2 g IntraVENous Q8H    albuterol-ipratropium (DUO-NEB) 2.5 MG-0.5 MG/3 ML  3 mL Nebulization TID PRN    mirtazapine (REMERON) tablet 15 mg  15 mg Oral QHS    sertraline (ZOLOFT) tablet 100 mg  100 mg Oral DAILY    sucralfate (CARAFATE) tablet 1 g  1 g Oral AC&HS    ferrous sulfate tablet 325 mg  325 mg Oral BIDPC    divalproex ER (DEPAKOTE ER) 24 hour tablet 1,500 mg  1,500 mg Oral QHS    melatonin (rapid dissolve) tablet 2.5 mg  2.5 mg Oral QHS PRN    sodium chloride (NS) flush 5-40 mL  5-40 mL IntraVENous Q8H    sodium chloride (NS) flush 5-40 mL  5-40 mL IntraVENous PRN    acetaminophen (TYLENOL) tablet 650 mg  650 mg Oral Q4H PRN    ondansetron (ZOFRAN) injection 4 mg  4 mg IntraVENous Q4H PRN    pantoprazole (PROTONIX) 40 mg in 0.9% sodium chloride 10 mL injection  40 mg IntraVENous Q12H    metroNIDAZOLE (FLAGYL) IVPB premix 500 mg  500 mg IntraVENous Q12H    aspirin chewable tablet 81 mg  81 mg Oral DAILY    glucose chewable tablet 16 g  4 Tab Oral PRN    dextrose (D50W) injection syrg 12.5-25 g  25-50 mL IntraVENous PRN    glucagon (GLUCAGEN) injection 1 mg  1 mg IntraMUSCular PRN    insulin lispro (HUMALOG) injection   SubCUTAneous AC&HS    heparin 25,000 units in D5W 250 ml infusion  12-25 Units/kg/hr IntraVENous TITRATE         Allergies  Allergies   Allergen Reactions    Penicillins Rash         CBC:  Recent Labs     02/19/21  0312 02/19/21  0055 02/18/21  1709 02/18/21  0953   WBC 7.1 6.9  --  7.8   HGB 7.3* 7.4* 8.2* 8.2*   HCT 22.7* 22.8* 25.5* 25.4*    207  --  954       Metabolic Panel:  Recent Labs     02/19/21  0055 02/18/21  0241 02/17/21  1957 02/17/21  0251 02/17/21  0251    144 144   < > 125*   K 3.1* 3.5 3.9   < > 6.3*   * 108 108   < > 90*   CO2 27 29 30   < > 5*   BUN 14 26* 31*   < > 44*   CREA 0.85 1.09 1.30   < > 1.94*   * 235* 148*   < > 960*   CA 7.3* 7.2* 7.4*   < > 8.6   MG 1.6 1.5*  --   --  1.4*   PHOS 2.8 3.4  --   --  9.0*   ALB  --   --   --   --  3.1*   ALT  --   --   --   --  11*    < > = values in this interval not displayed. Procedures: None    Imaging/Diagnostic Studies:  Results from Hospital Encounter encounter on 02/17/21   XR CHEST PORT    Narrative EXAM: XR CHEST PORT    INDICATION: central line placement    COMPARISON: Earlier in the day    FINDINGS: A portable AP radiograph of the chest was obtained at 0614 hours. The  central line tip is in the region of the superior vena cava. The right lung  nodule is stable. The patient is on a cardiac monitor. The lungs are clear. The  cardiac and mediastinal contours and pulmonary vascularity are normal.  The  bones and soft tissues are grossly within normal limits. Impression Appropriate line placement. No results found for this or any previous visit.          Results from [place] encounter on 02/17/21   CT ABD PELV WO CONT    Narrative EXAM: CT ABD PELV WO CONT    INDICATION: Abdominal pain with nausea and vomiting    COMPARISON: November 2020    CONTRAST:  None. TECHNIQUE:   Thin axial images were obtained through the abdomen and pelvis. Coronal and  sagittal reformats were generated. Oral contrast was not administered. CT dose  reduction was achieved through use of a standardized protocol tailored for this  examination and automatic exposure control for dose modulation. The absence of intravenous contrast material reduces the sensitivity for  evaluation of the vasculature and solid organs. FINDINGS:   LOWER THORAX: No significant abnormality in the incidentally imaged lower chest.  LIVER: No mass. BILIARY TREE: Gallbladder is within normal limits. CBD is not dilated. SPLEEN: within normal limits. PANCREAS: No focal abnormality. ADRENALS: Unremarkable. KIDNEYS/URETERS: No calculus or hydronephrosis. STOMACH: Unremarkable. SMALL BOWEL: No dilatation or wall thickening. COLON: No dilatation or wall thickening. APPENDIX: Normal.  PERITONEUM: No ascites or pneumoperitoneum. RETROPERITONEUM: There are significant vascular calcifications. REPRODUCTIVE ORGANS: Enlarged prostate. URINARY BLADDER: No mass or calculus. BONES: No destructive bone lesion. ABDOMINAL WALL: No mass or hernia. ADDITIONAL COMMENTS: There is a right hydrocele. Impression No acute findings.                    For Billing    Chief Complaint   Patient presents with   [facility] Problems  Date Reviewed: 10/20/2020          Codes Class Noted POA    * (Principal) DKA (diabetic ketoacidoses) ([facility] ICD-10-CM: E11.10  ICD-9-CM: 250.12  2/17/2021 Yes        Metabolic acidosis NQY-77-VG: E87.2  ICD-9-CM: 276.2  2/17/2021 Yes        Hyponatremia ICD-10-CM: E87.1  ICD-9-CM: 276.1  2/17/2021 Yes        Hyperkalemia ICD-10-CM: E87.5  ICD-9-CM: 276.7  2/17/2021 Yes        SHIRA (acute kidney injury) ([facility] ICD-10-CM: N17.9  ICD-9-CM: 584.9  2/17/2021 Yes        NSTEMI (non-ST elevated myocardial infarction) [facility]) ICD-10-CM: I21.4  ICD-9-CM: 410.70  2/17/2021 Yes        Shock ([facility] ICD-10-CM: R57.9  ICD-9-CM: 785.50  2/17/2021 No        Iron deficiency anemia due to chronic blood loss ICD-10-CM: D50.0  ICD-9-CM: 280.0  2/26/2020 Yes        Seizure disorder ([facility] ICD-10-CM: G40.909  ICD-9-CM: 345.90  2/3/2016 Yes        Uncontrolled type 2 diabetes mellitus ([facility] ICD-10-CM: E11.65  ICD-9-CM: 250.02  9/3/2015 Yes    Overview Signed 10/11/2018  8:28 AM by David Floyd Last Assessment & Plan:    Hemoglobin A1c is elevated 8.8% this time which is up from 7.3% in December of 2016. Worsened.   This is most certainly related to his alcohol use that he has restarted in the last several months  I advised him to stop drinking in the strongest possible terms             Anxiety disorder ICD-10-CM: F41.9  ICD-9-CM: 300.00  5/21/2002 Yes        Hereditary hemorrhagic telangiectasia ([facility] ICD-10-CM: I78.0  ICD-9-CM: 448.0  1963 Yes

## 2021-02-18 NOTE — ED NOTES
Spoke with family practice- they are aware of patients drop in Hgb, positive occult blood and that the patient is still on a Heparin drip. Clarified that 1 unit PRBC is to be held at this time; no transfusion at this time.

## 2021-02-18 NOTE — PROGRESS NOTES
[address] [address] Pharmacy Dosing Services: Antimicrobial Stewardship Daily Doc    Pharmacy renally adjusted cefepime per protocol. Ht Readings from Last 1 Encounters:   02/17/21 167.6 cm (66\")        Wt Readings from Last 1 Encounters:   02/17/21 58.5 kg (129 lb)          Non-Kinetic Antimicrobial Dosing Regimen:   Current Regimen:  cefepime 2 gram IV every 12 hours,   Recommendation: cefepime 2 gram IV every 8 hours for crcl greater than 60 ml/min    Other Antimicrobial   (not dosed by pharmacist)    Cultures 2/17 - Blood - NG x 1 day  2/17 - Blood - NG x 1 day   Serum Creatinine Lab Results   Component Value Date/Time    Creatinine 1.09 02/18/2021 02:41 AM    Creatinine (POC) 0.8 04/16/2018 02:53 PM         Creatinine Clearance Estimated Creatinine Clearance: 61.9 mL/min (based on SCr of 1.09 mg/dL).      Temp Temp: 97.5 °F (36.4 °C)       WBC Lab Results   Component Value Date/Time    WBC 9.1 02/18/2021 02:41 AM        H/H Lab Results   Component Value Date/Time    HGB 8.0 (L) 02/18/2021 02:41 AM        Platelets    Lab Results   Component Value Date/Time    PLATELET 430 86/58/2145 02:41 AM              For Antifungals, Metronidazole, and Nafcillin:  ALT:        AST:      Alk Phos:      T Bili:    Pharmacist Felipe Peterson, PHARMD Contact information: [phone]

## 2021-02-18 NOTE — ED NOTES
Spoke with Aissatou Klein (patients daughter)- provided her an update at the patients request to be relayed to the patients mother Jung Goff)

## 2021-02-18 NOTE — PROGRESS NOTES
1805: TRANSFER - IN REPORT:    Verbal report received from Cielo) on Ed Pals.  being received from ED (unit) for routine progression of care      Report consisted of patients Situation, Background, Assessment and   Recommendations(SBAR). Information from the following report(s) SBAR, Kardex, Procedure Summary, Intake/Output, MAR, Accordion and Cardiac Rhythm NSR was reviewed with the receiving nurse. Opportunity for questions and clarification was provided. Assessment completed upon patients arrival to unit and care assumed.

## 2021-02-18 NOTE — PROGRESS NOTES
Pt off levophed since 2/17/21 @ 1940. Pt on RA, VSS from chart review.  Inquired w/ FP Residents regarding possible downgrade, they are to d/w team.

## 2021-02-18 NOTE — PROGRESS NOTES
Broderick Suarez [address] Office [phone] Fax [phone] Assessment and Plan Tong Calvin is a 60 y.o. male w/PMH T2DM, HHT, HLD, GERD, Anxiety, Seizures, Dementia admitted for DKA. 24 Hour Events: Transfer to [facility] service NSTEMI: POA EKG w/ sinus tachycardia. POA Trop 1.99 > peaked at 11.6. ECHO EF 65-70%. - Hep gtt. - ASA 81mg daily - Cardiology consulted, appreciate recs - F/u Lipid Septic Shock: POA LA 4.05 (resolved), 3/4 SIRS (WBC, HR, RR). CXR and CT A/P neg. Rapid COVID neg. S/p Levophed (). - Cefepime/flagyl/vanc - F/u BcxNGTD, ucx DKA: Resolved, s/p insulin gtt. POA AG 30, pH 7.01, BHB >4.42, glu 960. DM2: A1c 8.4. Home metformin 850mg TID, Levemir 26U + SSI. 
- Lantus 23U - POC glu ACHS + SSI 
- Hypoglycemia protocol GI bleed: FOBT+  
- Consult GI 
- 1U pRBC on hold Hereditary Hemorrhagic Telangiectasia: 
- Monitor for bleeding PRADEEP: POA Hgb 8.9 (BL 10-14?). - HOLD Iron 325mg BID Anxiety:  
- HOLD Mirtazapine 15 mg hs, sertraline 100 mg daily Seizure:  
- HOLD Depakote ER 1500 mg hs Asthma:  
- Duoneb prn HLD:  
- HOLD Lipitor 10 mg 
  
GERD:  
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  
- Will call group home to discuss patient's baseline FEN/GI - NPO Activity - Bedrest 
DVT prophylaxis - HLD 
GI prophylaxis -  Protonix Disposition - PT/OT Dary Campbell MD 
Family Medicine Resident Subjective / Objective Subjective: Pt was seen and examined at bedside. Afebrile and hemodynamically stable. Concerns overnight include: none. Denies chest pain, SOB, nausea, vomiting, abdominal pain, dizziness. Objective: 
Temp (24hrs), Av.1 °F (36.7 °C), Min:98.1 °F (36.7 °C), Max:98.1 °F (36.7 °C) Visit Vitals /71 (BP 1 Location: Left upper arm, BP Patient Position: At rest) Pulse 82 Temp 98.1 °F (36.7 °C) Resp 15 Ht 5' 6\" (1.676 m) Wt 129 lb (58.5 kg) SpO2 96% BMI 20.82 kg/m² Physical Exam: 
General: No acute distress. Alert. Cooperative. Respiratory: CTAB. No w/r/r/c.  
Cardiovascular: Normal S1,S2. No m/r/g. GI: Nondistended.+ bowel sounds. Nontender. No rebound tenderness or guarding. Extremities: No LE edema. Skin: Warm, dry. No rashes. Neuro: Alert and oriented Respiratory:   O2 Device: Room air I/O: 
Date 02/17/21 0700 - 02/18/21 5659 02/18/21 0700 - 02/19/21 9525 Shift [id number] 0769-4744 24 Hour Total 8008-4113 5539-0848 24 Hour Total  
INTAKE  
I.V.(mL/kg/hr) 540(0.8) 1558.9(2.2) 2098.9(1.5) 150  150 Insulin Volume  126.2 126.2 Levophed Volume  18.9 18.9 Volume (0.9% sodium chloride with KCl 40 mEq/L infusion) 80  80 Volume (dextrose 5% - 0.45% NaCl with KCl 40 mEq/L infusion)  813.8 813.8 Volume (0.9% sodium chloride infusion) 460  460 Volume (metroNIDAZOLE (FLAGYL) IVPB premix 500 mg)  100 100 100  100 Volume (vancomycin (VANCOCIN) 1500 mg in  ml infusion)  500 500 Volume (magnesium sulfate 2 g/50 ml IVPB (premix or compounded))    50  50 Shift Total(mL/kg) 540(9.2) 1558. 9(26.6) 2098. 9(35.9) 150(2.6)  150(2.6) OUTPUT Shift Total(mL/kg)  1558.9 2098. 9 150  150 Weight (kg) 58.5 58.5 58.5 58.5 58.5 58.5 Inpatient Medications Current Facility-Administered Medications Medication Dose Route Frequency  [START ON 2/19/2021] Vancomycin Trough 2/19 8:30am   Other ONCE  
 cefepime (MAXIPIME) 2 g in sterile water (preservative free) 10 mL IV syringe  2 g IntraVENous Q8H  
 insulin glargine (LANTUS) injection 23 Units  23 Units SubCUTAneous DAILY  albuterol-ipratropium (DUO-NEB) 2.5 MG-0.5 MG/3 ML  3 mL Nebulization TID PRN  
 atorvastatin (LIPITOR) tablet 10 mg  10 mg Oral QHS  mirtazapine (REMERON) tablet 15 mg  15 mg Oral QHS  [START ON 2/19/2021] sertraline (ZOLOFT) tablet 100 mg  100 mg Oral DAILY  sucralfate (CARAFATE) tablet 1 g  1 g Oral AC&HS  ferrous sulfate tablet 325 mg  325 mg Oral BIDPC  divalproex ER (DEPAKOTE ER) 24 hour tablet 1,500 mg  1,500 mg Oral QHS  sodium chloride (NS) flush 5-40 mL  5-40 mL IntraVENous Q8H  
 sodium chloride (NS) flush 5-40 mL  5-40 mL IntraVENous PRN  
 acetaminophen (TYLENOL) tablet 650 mg  650 mg Oral Q4H PRN  
 ondansetron (ZOFRAN) injection 4 mg  4 mg IntraVENous Q4H PRN  pantoprazole (PROTONIX) 40 mg in 0.9% sodium chloride 10 mL injection  40 mg IntraVENous Q12H  
 NOREPINephrine (LEVOPHED) 32,000 mcg in dextrose 5% 250 mL (128 mcg/mL) infusion  0.5-16 mcg/min IntraVENous TITRATE  metroNIDAZOLE (FLAGYL) IVPB premix 500 mg  500 mg IntraVENous Q12H  aspirin chewable tablet 81 mg  81 mg Oral DAILY  glucose chewable tablet 16 g  4 Tab Oral PRN  
 dextrose (D50W) injection syrg 12.5-25 g  25-50 mL IntraVENous PRN  
 glucagon (GLUCAGEN) injection 1 mg  1 mg IntraMUSCular PRN  
 insulin lispro (HUMALOG) injection   SubCUTAneous AC&HS  heparin 25,000 units in D5W 250 ml infusion  12-25 Units/kg/hr IntraVENous TITRATE  
 0.9% sodium chloride infusion  125 mL/hr IntraVENous CONTINUOUS Current Outpatient Medications Medication Sig  diphenhydrAMINE (BENADRYL) 25 mg capsule Take 25 mg by mouth every four (4) hours as needed for Itching.  divalproex ER (Depakote ER) 500 mg ER tablet Take 1,500 mg by mouth nightly.  insulin detemir U-100 (LEVEMIR FLEXTOUCH) 100 unit/mL (3 mL) inpn 26 Units by SubCUTAneous route Daily (before breakfast).  metFORMIN (GLUCOPHAGE) 850 mg tablet Take 850 mg by mouth three (3) times daily.  insulin aspart U-100 (NovoLOG Flexpen U-100 Insulin) 100 unit/mL (3 mL) inpn by SubCUTAneous route Before breakfast, lunch, and dinner. Sliding Scale Blood glucose Less than 70 give 0 units 70 to 150 give 3 units 151 to 200 give 5 units 201 to 250 give 7 units 251 to 300 give 12 units 301 to 350 give 15 units Greater than 350 give  20 units If blood glucose is greater than is greater than 300 recheck in 2 hours  insulin aspart U-100 (NovoLOG Flexpen U-100 Insulin) 100 unit/mL (3 mL) inpn by SubCUTAneous route nightly. Sliding scale Blood glucose Less than 200 give 0 units 200 to 299 give 3 units Greater than 300 give 6 units  pantoprazole (Protonix) 40 mg tablet Take 40 mg by mouth Daily (before breakfast).  sucralfate (Carafate) 1 gram tablet Take 1 g by mouth Before breakfast, lunch, dinner and at bedtime.  lip protectant (BLISTEX) 0.6-0.5-1.1-0.5 % oint ointment Apply  to affected area as needed for Lubrication.  lip protectant with sunscreen (CARMEX) crea Apply  to affected area as needed for Dry Skin.  glucagon (GLUCAGEN) 1 mg injection 1 mg by IntraVENous route as needed for Hypoglycemia.  glucose 4 gram chewable tablet Take 45 g by mouth as needed (Blood glucose less than 70).  guaiFENesin (ROBITUSSIN) 100 mg/5 mL liquid Take 200 mg by mouth four (4) times daily as needed for Cough.  hydrocortisone (CORTAID) 1 % topical cream Apply  to affected area two (2) times daily as needed for Skin Irritation. use thin layer  ibuprofen (Motrin IB) 200 mg tablet Take 400 mg by mouth every four (4) hours as needed for Pain.  albuterol-ipratropium (DUO-NEB) 2.5 mg-0.5 mg/3 ml nebu 3 mL by Nebulization route three (3) times daily as needed for Wheezing.  ketoconazole (NIZORAL) 2 % topical cream Apply  to affected area daily as needed for Skin Irritation.  loperamide (IMMODIUM) 2 mg tablet Take 2 mg by mouth four (4) times daily as needed for Diarrhea.  acetaminophen (TYLENOL) 325 mg tablet Take 650 mg by mouth every four (4) hours as needed for Pain or Fever.  ferrous sulfate 325 mg (65 mg iron) tablet Take 1 Tab by mouth two (2) times daily (after meals).  atorvastatin (LIPITOR) 10 mg tablet TAKE 1 TABLET BY MOUTH ONCE DAILY  ondansetron (Zofran ODT) 4 mg disintegrating tablet 1 Tab by SubLINGual route every eight (8) hours as needed for Nausea or Vomiting.  mirtazapine (REMERON) 15 mg tablet Take 1 Tab by mouth nightly.  sertraline (ZOLOFT) 100 mg tablet Take 1 Tab by mouth daily. Allergies Allergies Allergen Reactions  Penicillins Rash CBC: 
Recent Labs  
  02/18/21 
1709 02/18/21 
0953 02/18/21 
0241 02/17/21 1957 WBC  --  7.8 9.1 10.4 HGB 8.2* 8.2* 8.0* 8.4* HCT 25.5* 25.4* 24.5* 25.2*  
PLT  --  261 277 293 Metabolic Panel: 
Recent Labs  
  02/18/21 
0241 02/17/21 1957 02/17/21 
1545 02/17/21 
0251 02/17/21 
0251  144 144   < > 125* K 3.5 3.9 4.2   < > 6.3*  
 108 112*   < > 90* CO2 29 30 30   < > 5*  
BUN 26* 31* 34*   < > 44* CREA 1.09 1.30 1.49*   < > 1.94* * 148* 131*   < > 960* CA 7.2* 7.4* 7.8*   < > 8.6 MG 1.5*  --   --   --  1.4* PHOS 3.4  --   --   --  9.0* ALB  --   --   --   --  3.1* ALT  --   --   --   --  11*  
 < > = values in this interval not displayed. Procedures: None Imaging/Diagnostic Studies: 
Results from [facility] [place] Encounter encounter on 02/17/21 XR CHEST PORT Narrative EXAM: XR CHEST PORT INDICATION: central line placement COMPARISON: Earlier in the day FINDINGS: A portable AP radiograph of the chest was obtained at 0614 hours. The 
central line tip is in the region of the superior vena cava. The right lung 
nodule is stable. The patient is on a cardiac monitor. The lungs are clear. The 
cardiac and mediastinal contours and pulmonary vascularity are normal.  The 
bones and soft tissues are grossly within normal limits. Impression Appropriate line placement. No results found for this or any previous visit. Results from [facility] [place] Encounter encounter on 02/17/21 CT ABD PELV WO CONT Narrative EXAM: CT ABD PELV WO CONT INDICATION: Abdominal pain with nausea and vomiting COMPARISON: November 2020 CONTRAST:  None. TECHNIQUE:  
Thin axial images were obtained through the abdomen and pelvis. Coronal and 
sagittal reformats were generated. Oral contrast was not administered. CT dose 
reduction was achieved through use of a standardized protocol tailored for this 
examination and automatic exposure control for dose modulation. The absence of intravenous contrast material reduces the sensitivity for 
evaluation of the vasculature and solid organs. FINDINGS:  
LOWER THORAX: No significant abnormality in the incidentally imaged lower chest. 
LIVER: No mass. BILIARY TREE: Gallbladder is within normal limits. CBD is not dilated. SPLEEN: within normal limits. PANCREAS: No focal abnormality. ADRENALS: Unremarkable. KIDNEYS/URETERS: No calculus or hydronephrosis. STOMACH: Unremarkable. SMALL BOWEL: No dilatation or wall thickening. COLON: No dilatation or wall thickening. APPENDIX: Normal. 
PERITONEUM: No ascites or pneumoperitoneum. RETROPERITONEUM: There are significant vascular calcifications. REPRODUCTIVE ORGANS: Enlarged prostate. URINARY BLADDER: No mass or calculus. BONES: No destructive bone lesion. ABDOMINAL WALL: No mass or hernia. ADDITIONAL COMMENTS: There is a right hydrocele. Impression No acute findings. For Billing Chief Complaint Patient presents with  Vomiting Hospital Problems  Date Reviewed: 10/20/2020 Codes Class Noted POA * (Principal) [facility] k[facility]acidoses) [facility] ICD-10-CM: E11.10 ICD-9-CM: 250.12  2/17/2021 Yes Metabolic acidosis EEE-50-ND: E87.2 ICD-9-CM: 276.2  2/17/2021 Yes Hyponatremia ICD-10-CM: E87.1 ICD-9-CM: 276.1  2/17/2021 Yes Hyperkalemia ICD-10-CM: E87.5 ICD-9-CM: 276.7  2/17/2021 Yes SHIRA (acute kidney injury) ([facility] ICD-10-CM: N17.9 ICD-9-CM: 584.9  2/17/2021 Yes NSTEMI (non-ST elevated myocardial infarction) ([facility] ICD-10-CM: I21.4 ICD-9-CM: 410.70  2/17/2021 Yes Shock ([facility] ICD-10-CM: R57.9 ICD-9-CM: 785.50  2/17/2021 No  
   
 Iron deficiency anemia due to chronic blood loss ICD-10-CM: D50.0 ICD-9-CM: 280.0  2/26/2020 Yes Seizure disorder ([facility] ICD-10-CM: G40.909 ICD-9-CM: 345.90  2/3/2016 Yes Uncontrolled type 2 diabetes mellitus ([facility] ICD-10-CM: E11.65 ICD-9-CM: 250.02  9/3/2015 Yes Overview Signed 10/11/2018  8:28 AM by Юлия Serrano Last Assessment & Plan:  
 Hemoglobin A1c is elevated 8.8% this time which is up from 7.3% in December of 2016. Worsened. This is most certainly related to his alcohol use that he has restarted in the last several months I advised him to stop drinking in the strongest possible terms Anxiety disorder ICD-10-CM: F41.9 ICD-9-CM: 300.00  5/21/2002 Yes Hereditary hemorrhagic telangiectasia (HCC) ICD-10-CM: I78.0 ICD-9-CM: 448.0  1963 Yes

## 2021-02-18 NOTE — ED NOTES
Bedside and Verbal shift change report given to Mariangel Olguin (oncoming nurse) by Elizabeth Ferreira (offgoing nurse). Report included the following information SBAR, ED Summary, MAR and Recent Results.

## 2021-02-18 NOTE — PROGRESS NOTES
[address] [facility] [address] [place] [address]   Office [phone]  Fax [phone]          Assessment and Plan     Ashley Wilkerson is a 60 y.o. male w/PMH T2DM, HHT, HLD, GERD, Anxiety, Seizures, Dementia admitted for DKA. 24 Hour Events: Transfer to family medicine service    NSTEMI: POA EKG w/ sinus tachycardia. POA Trop 1.99 > peaked at 11.6. ECHO EF 65-70%. - Hep gtt. - ASA 81mg daily  - Cardiology consulted, appreciate recs  - F/u Lipid     Septic Shock: POA LA 4.05 (resolved), 3/4 SIRS (WBC, HR, RR). CXR and CT A/P neg. Rapid COVID neg. S/p Levophed (2/). - Cefepime/flagyl/vanc  - F/u BcxNGTD, ucx     DKA: Resolved, s/p insulin gtt. POA AG 30, pH 7.01, BHB >4.42, glu 960. DM2: A1c 8.4. Home metformin 850mg TID, Levemir 26U + SSI.  - Lantus 23U   - POC glu ACHS + SSI  - Hypoglycemia protocol    GI bleed: FOBT+   - Consult GI  - 1U pRBC on hold    Hereditary Hemorrhagic Telangiectasia:  - Monitor for bleeding     PRADEEP: POA Hgb 8.9 (BL 10-14?). 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 Afebrile and hemodynamically stable. Concerns overnight include: none. Denies chest pain, SOB, nausea, vomiting, abdominal pain, dizziness. Objective:  No data recorded.      Visit Vitals  BP (!) 125/58   Pulse 73   Temp 97.5 °F (36.4 °C)   Resp 13   Ht 5' 6\" (1.676 m)   Wt 129 lb (58.5 kg)   SpO2 92%   BMI 20.82 kg/m²     Physical Exam:  General: No acute distress. Alert. Cooperative. Respiratory: CTAB. No w/r/r/c.   Cardiovascular: Normal S1,S2. No m/r/g.   GI: Nondistended.+ bowel sounds. Nontender. No rebound tenderness or guarding. Extremities: No LE edema. Skin: Warm, dry. No rashes. Neuro: Alert and oriented    Respiratory:   O2 Device: Room air     I/O:  Date 02/17/21 0700 - 02/18/21 0659 02/18/21 0700 - 02/19/21 0659   Shift 9675-7349 5181-4368 24 Hour Total 8334-5940 3156-7549 24 Hour Total   INTAKE   I.V.(mL/kg/hr) 540(0.8) 1558.9(2.2) 2098.9(1.5)        Insulin Volume  126.2 126.2        Levophed Volume  18.9 18.9        Volume (0.9% sodium chloride with KCl 40 mEq/L infusion) 80  80        Volume (dextrose 5% - 0.45% NaCl with KCl 40 mEq/L infusion)  813.8 813.8        Volume (0.9% sodium chloride infusion) 460  460        Volume (metroNIDAZOLE (FLAGYL) IVPB premix 500 mg)  100 100        Volume (vancomycin (VANCOCIN) 1500 mg in  ml infusion)  500 500      Shift Total(mL/kg) 540(9.2) 1558. 9(26.6) 2098. 9(35.9)      OUTPUT   Shift Total(mL/kg)          1558.9 2098. 9      Weight (kg) 58.5 58.5 58.5 58.5 58.5 58.5       Inpatient Medications  Current Facility-Administered Medications   Medication Dose Route Frequency    magnesium sulfate 2 g/50 ml IVPB (premix or compounded)  2 g IntraVENous ONCE    vancomycin (VANCOCIN) 1,000 mg in 0.9% sodium chloride 250 mL (VIAL-MATE)  1,000 mg IntraVENous Q12H    [START ON 2/19/2021] Vancomycin Trough 2/19 8:30am   Other ONCE    cefepime (MAXIPIME) 2 g in sterile water (preservative free) 10 mL IV syringe  2 g IntraVENous Q8H    insulin glargine (LANTUS) injection 23 Units  23 Units SubCUTAneous DAILY    0.9% sodium chloride infusion 250 mL  250 mL IntraVENous PRN    sodium chloride (NS) flush 5-40 mL  5-40 mL IntraVENous Q8H    sodium chloride (NS) flush 5-40 mL  5-40 mL IntraVENous PRN    acetaminophen (TYLENOL) tablet 650 mg  650 mg Oral Q4H PRN    ondansetron (ZOFRAN) injection 4 mg  4 mg IntraVENous Q4H PRN    pantoprazole (PROTONIX) 40 mg in 0.9% sodium chloride 10 mL injection  40 mg IntraVENous Q12H    NOREPINephrine (LEVOPHED) 32,000 mcg in dextrose 5% 250 mL (128 mcg/mL) infusion  0.5-16 mcg/min IntraVENous TITRATE    metroNIDAZOLE (FLAGYL) IVPB premix 500 mg  500 mg IntraVENous Q12H    aspirin chewable tablet 81 mg  81 mg Oral DAILY    valproate (DEPACON) 500 mg in 0.9% sodium chloride 50 mL IVPB  500 mg IntraVENous Q8H    glucose chewable tablet 16 g  4 Tab Oral PRN    dextrose (D50W) injection syrg 12.5-25 g  25-50 mL IntraVENous PRN    glucagon (GLUCAGEN) injection 1 mg  1 mg IntraMUSCular PRN    insulin lispro (HUMALOG) injection   SubCUTAneous AC&HS    heparin 25,000 units in D5W 250 ml infusion  12-25 Units/kg/hr IntraVENous TITRATE    0.9% sodium chloride infusion  125 mL/hr IntraVENous CONTINUOUS     Current Outpatient Medications   Medication Sig    diphenhydrAMINE (BENADRYL) 25 mg capsule Take 25 mg by mouth every four (4) hours as needed for Itching.  divalproex ER (Depakote ER) 500 mg ER tablet Take 1,500 mg by mouth nightly.  insulin detemir U-100 (LEVEMIR FLEXTOUCH) 100 unit/mL (3 mL) inpn 26 Units by SubCUTAneous route Daily (before breakfast).  metFORMIN (GLUCOPHAGE) 850 mg tablet Take 850 mg by mouth three (3) times daily.  insulin aspart U-100 (NovoLOG Flexpen U-100 Insulin) 100 unit/mL (3 mL) inpn by SubCUTAneous route Before breakfast, lunch, and dinner. Sliding Scale  Blood glucose  Less than 70 give 0 units  70 to 150 give 3 units  151 to 200 give 5 units  201 to 250 give 7 units  251 to 300 give 12 units  301 to 350 give 15 units  Greater than 350 give  20 units  If blood glucose is greater than is greater than 300 recheck in 2 hours    insulin aspart U-100 (NovoLOG Flexpen U-100 Insulin) 100 unit/mL (3 mL) inpn by SubCUTAneous route nightly.  Sliding scale  Blood glucose  Less than 200 give 0 units  200 to 299 give 3 units  Greater than 300 give 6 units    pantoprazole (Protonix) 40 mg tablet Take 40 mg by mouth Daily (before breakfast).  sucralfate (Carafate) 1 gram tablet Take 1 g by mouth Before breakfast, lunch, dinner and at bedtime.  lip protectant (BLISTEX) 0.6-0.5-1.1-0.5 % oint ointment Apply  to affected area as needed for Lubrication.  lip protectant with sunscreen (CARMEX) crea Apply  to affected area as needed for Dry Skin.  glucagon (GLUCAGEN) 1 mg injection 1 mg by IntraVENous route as needed for Hypoglycemia.  glucose 4 gram chewable tablet Take 45 g by mouth as needed (Blood glucose less than 70).  guaiFENesin (ROBITUSSIN) 100 mg/5 mL liquid Take 200 mg by mouth four (4) times daily as needed for Cough.  hydrocortisone (CORTAID) 1 % topical cream Apply  to affected area two (2) times daily as needed for Skin Irritation. use thin layer    ibuprofen (Motrin IB) 200 mg tablet Take 400 mg by mouth every four (4) hours as needed for Pain.  albuterol-ipratropium (DUO-NEB) 2.5 mg-0.5 mg/3 ml nebu 3 mL by Nebulization route three (3) times daily as needed for Wheezing.  ketoconazole (NIZORAL) 2 % topical cream Apply  to affected area daily as needed for Skin Irritation.  loperamide (IMMODIUM) 2 mg tablet Take 2 mg by mouth four (4) times daily as needed for Diarrhea.  acetaminophen (TYLENOL) 325 mg tablet Take 650 mg by mouth every four (4) hours as needed for Pain or Fever.  ferrous sulfate 325 mg (65 mg iron) tablet Take 1 Tab by mouth two (2) times daily (after meals).  atorvastatin (LIPITOR) 10 mg tablet TAKE 1 TABLET BY MOUTH ONCE DAILY    ondansetron (Zofran ODT) 4 mg disintegrating tablet 1 Tab by SubLINGual route every eight (8) hours as needed for Nausea or Vomiting.  mirtazapine (REMERON) 15 mg tablet Take 1 Tab by mouth nightly.  sertraline (ZOLOFT) 100 mg tablet Take 1 Tab by mouth daily.          Allergies  Allergies   Allergen Reactions    Penicillins Rash         CBC:  Recent Labs     02/18/21  0241 02/17/21 1957 02/17/21  0640   WBC 9.1 10.4 12.4*   HGB 8.0* 8.4* 8.9*   HCT 24.5* 25.2* 28.5*    293 158       Metabolic Panel:  Recent Labs     02/18/21  0241 02/17/21 1957 02/17/21  1545 02/17/21  0251 02/17/21  0251    144 144   < > 125*   K 3.5 3.9 4.2   < > 6.3*    108 112*   < > 90*   CO2 29 30 30   < > 5*   BUN 26* 31* 34*   < > 44*   CREA 1.09 1.30 1.49*   < > 1.94*   * 148* 131*   < > 960*   CA 7.2* 7.4* 7.8*   < > 8.6   MG 1.5*  --   --   --  1.4*   PHOS 3.4  --   --   --  9.0*   ALB  --   --   --   --  3.1*   ALT  --   --   --   --  11*    < > = values in this interval not displayed. Procedures: None    Imaging/Diagnostic Studies:  Results from Hospital Encounter encounter on 02/17/21   XR CHEST PORT    Narrative EXAM: XR CHEST PORT    INDICATION: central line placement    COMPARISON: Earlier in the day    FINDINGS: A portable AP radiograph of the chest was obtained at 0614 hours. The  central line tip is in the region of the superior vena cava. The right lung  nodule is stable. The patient is on a cardiac monitor. The lungs are clear. The  cardiac and mediastinal contours and pulmonary vascularity are normal.  The  bones and soft tissues are grossly within normal limits. Impression Appropriate line placement. No results found for this or any previous visit. Results from [place] encounter on 02/17/21   CT ABD PELV WO CONT    Narrative EXAM: CT ABD PELV WO CONT    INDICATION: Abdominal pain with nausea and vomiting    COMPARISON: November 2020    CONTRAST:  None. TECHNIQUE:   Thin axial images were obtained through the abdomen and pelvis. Coronal and  sagittal reformats were generated. Oral contrast was not administered.  CT dose  reduction was achieved through use of a standardized protocol tailored for this  examination and automatic exposure control for dose modulation. The absence of intravenous contrast material reduces the sensitivity for  evaluation of the vasculature and solid organs. FINDINGS:   LOWER THORAX: No significant abnormality in the incidentally imaged lower chest.  LIVER: No mass. BILIARY TREE: Gallbladder is within normal limits. CBD is not dilated. SPLEEN: within normal limits. PANCREAS: No focal abnormality. ADRENALS: Unremarkable. KIDNEYS/URETERS: No calculus or hydronephrosis. STOMACH: Unremarkable. SMALL BOWEL: No dilatation or wall thickening. COLON: No dilatation or wall thickening. APPENDIX: Normal.  PERITONEUM: No ascites or pneumoperitoneum. RETROPERITONEUM: There are significant vascular calcifications. REPRODUCTIVE ORGANS: Enlarged prostate. URINARY BLADDER: No mass or calculus. BONES: No destructive bone lesion. ABDOMINAL WALL: No mass or hernia. ADDITIONAL COMMENTS: There is a right hydrocele. Impression No acute findings.                    For Billing    Chief Complaint   Patient presents with    Hospital [facility]       Hospital Problems  Date Reviewed: 10/20/2020          Codes Class Noted POA    * (Principal) DKA (diabetic ketoacidoses) ([facility] ICD-10-CM: E11.10  ICD-9-CM: 250.12  2/17/2021 Yes        Metabolic acidosis SFX-12-EO: E87.2  ICD-9-CM: 276.2  2/17/2021 Yes        Hyponatremia ICD-10-CM: E87.1  ICD-9-CM: 276.1  2/17/2021 Yes        Hyperkalemia ICD-10-CM: E87.5  ICD-9-CM: 276.7  2/17/2021 Yes        SHIRA (acute kidney injury) ([facility] ICD-10-CM: N17.9  ICD-9-CM: 584.9  2/17/2021 Yes        NSTEMI (non-ST elevated myocardial infarction) [facility]) ICD-10-CM: I21.4  ICD-9-CM: 410.70  2/17/2021 Yes        Shock ([facility] ICD-10-CM: R57.9  ICD-9-CM: 785.50  2/17/2021 No        Iron deficiency anemia due to chronic blood loss ICD-10-CM: D50.0  ICD-9-CM: 280.0  2/26/2020 Yes        Seizure disorder ([facility] ICD-10-CM: G40.909  ICD-9-CM: 345.90  2/3/2016 Yes        Uncontrolled type 2 diabetes mellitus ([facility] ICD-10-CM: E11.65  ICD-9-CM: 250.02  9/3/2015 Yes    Overview Signed 10/11/2018  8:28 AM by Jameel Joe Last Assessment & Plan:    Hemoglobin A1c is elevated 8.8% this time which is up from 7.3% in December of 2016. Worsened.   This is most certainly related to his alcohol use that he has restarted in the last several months  I advised him to stop drinking in the strongest possible terms             Anxiety disorder ICD-10-CM: F41.9  ICD-9-CM: 300.00  5/21/2002 Yes        Hereditary hemorrhagic telangiectasia ([facility] ICD-10-CM: I78.0  ICD-9-CM: 448.0  1963 Yes

## 2021-02-18 NOTE — PROGRESS NOTES
Shift Change:    1530: Bedside and Verbal shift change report given to Lei RN (oncoming nurse) by Jack Cardona RN (offgoing nurse). Report included the following information SBAR, Kardex, Intake/Output, MAR, Recent Results and Cardiac Rhythm NSR.         1548: Bedside and Verbal shift change report given to Nathaniel Jaime RN (oncoming nurse) by Vinita Goldberg RN (offgoing nurse). Report included the following information SBAR, Kardex, Intake/Output, MAR, Recent Results and Cardiac Rhythm NSR.

## 2021-02-18 NOTE — CONSULTS
[address] [place] [address]. [address] [place] [address] NP  [phone]                    GASTROENTEROLOGY CONSULTATION NOTE              NAME:  Angeline Reece. :   1965   MRN:   [id number]       Referring Physician:   [facility]    Consult Date:   2021 10:50 AM    Chief Complaint:    GI Bleed     History of Present Illness:    Patient is a 60 y.o. who we have been asked to see in consultation for the above complaints. Of note the patient is unable to provide any meaningful history at this time, so this HPI has been obtained from chart review. He presented to  ED with complains of severe nasuea and vomiting which started yesterday evening. He is currently being treated for an NSTEMI, Shock, DKA, and SHIRA. He has a past medical history of  Diabetes, HHT. PMH:  Past Medical History:   Diagnosis Date    Anxiety disorder     Arthritis     Depression     Diabetes ([address]     Liver disease     Neurological disorder     neuro cognative disorder    OCD (obsessive compulsive disorder)     Psychiatric disorder     OCD    Psychiatric disorder     anxiety    Seizures ([address]     Thyroid disease        PSH:  Past Surgical History:   Procedure Laterality Date    HX CRANIOTOMY         Allergies: Allergies   Allergen Reactions    Penicillins Rash       Home Medications:  Prior to Admission Medications   Prescriptions Last Dose Informant Patient Reported? Taking?   acetaminophen (TYLENOL) 325 mg tablet   Yes Yes   Sig: Take 650 mg by mouth every four (4) hours as needed for Pain or Fever. albuterol-ipratropium (DUO-NEB) 2.5 mg-0.5 mg/3 ml nebu   Yes Yes   Sig: 3 mL by Nebulization route three (3) times daily as needed for Wheezing. atorvastatin (LIPITOR) 10 mg tablet 2021 at Unknown time  No Yes   Sig: TAKE 1 TABLET BY MOUTH ONCE DAILY   diphenhydrAMINE (BENADRYL) 25 mg capsule   Yes Yes   Sig: Take 25 mg by mouth every four (4) hours as needed for Itching. divalproex ER (Depakote ER) 500 mg ER tablet   Yes Yes   Sig: Take 1,500 mg by mouth nightly. ferrous sulfate 325 mg (65 mg iron) tablet 2021 at Unknown time  No Yes   Sig: Take 1 Tab by mouth two (2) times daily (after meals). glucagon (GLUCAGEN) 1 mg injection   Yes Yes   Si mg by IntraVENous route as needed for Hypoglycemia.   glucose 4 gram chewable tablet   Yes Yes   Sig: Take 45 g by mouth as needed (Blood glucose less than 70). guaiFENesin (ROBITUSSIN) 100 mg/5 mL liquid   Yes Yes   Sig: Take 200 mg by mouth four (4) times daily as needed for Cough. hydrocortisone (CORTAID) 1 % topical cream   Yes Yes   Sig: Apply  to affected area two (2) times daily as needed for Skin Irritation. use thin layer   ibuprofen (Motrin IB) 200 mg tablet   Yes Yes   Sig: Take 400 mg by mouth every four (4) hours as needed for Pain. insulin aspart U-100 (NovoLOG Flexpen U-100 Insulin) 100 unit/mL (3 mL) inpn 2021 at Unknown time  Yes Yes   Sig: by SubCUTAneous route Before breakfast, lunch, and dinner. Sliding Scale  Blood glucose  Less than 70 give 0 units  70 to 150 give 3 units  151 to 200 give 5 units  201 to 250 give 7 units  251 to 300 give 12 units  301 to 350 give 15 units  Greater than 350 give  20 units  If blood glucose is greater than is greater than 300 recheck in 2 hours   insulin aspart U-100 (NovoLOG Flexpen U-100 Insulin) 100 unit/mL (3 mL) inpn   Yes Yes   Sig: by SubCUTAneous route nightly. Sliding scale  Blood glucose  Less than 200 give 0 units  200 to 299 give 3 units  Greater than 300 give 6 units   insulin detemir U-100 (LEVEMIR FLEXTOUCH) 100 unit/mL (3 mL) inpn 2021 at Unknown time  Yes Yes   Si Units by SubCUTAneous route Daily (before breakfast). ketoconazole (NIZORAL) 2 % topical cream   Yes Yes   Sig: Apply  to affected area daily as needed for Skin Irritation.    lip protectant (BLISTEX) 0.6-0.5-1.1-0.5 % oint ointment   Yes Yes   Sig: Apply  to affected area as needed for Lubrication. lip protectant with sunscreen (CARMEX) crea   Yes Yes   Sig: Apply  to affected area as needed for Dry Skin. loperamide (IMMODIUM) 2 mg tablet   Yes Yes   Sig: Take 2 mg by mouth four (4) times daily as needed for Diarrhea. metFORMIN (GLUCOPHAGE) 850 mg tablet 2021 at Unknown time  Yes Yes   Sig: Take 850 mg by mouth three (3) times daily. mirtazapine (REMERON) 15 mg tablet 2021 at Unknown time  No Yes   Sig: Take 1 Tab by mouth nightly. ondansetron (Zofran ODT) 4 mg disintegrating tablet   No Yes   Si Tab by SubLINGual route every eight (8) hours as needed for Nausea or Vomiting. pantoprazole (Protonix) 40 mg tablet 2021 at Unknown time  Yes Yes   Sig: Take 40 mg by mouth Daily (before breakfast). sertraline (ZOLOFT) 100 mg tablet 2021 at Unknown time  No Yes   Sig: Take 1 Tab by mouth daily. sucralfate (Carafate) 1 gram tablet 2021 at Unknown time  Yes Yes   Sig: Take 1 g by mouth Before breakfast, lunch, dinner and at bedtime.       Facility-Administered Medications: None       Hospital Medications:  Current Facility-Administered Medications   Medication Dose Route Frequency    vancomycin (VANCOCIN) 1,000 mg in 0.9% sodium chloride 250 mL (VIAL-MATE)  1,000 mg IntraVENous Q12H    [START ON 2021] Vancomycin Trough  8:30am   Other ONCE    cefepime (MAXIPIME) 2 g in sterile water (preservative free) 10 mL IV syringe  2 g IntraVENous Q8H    insulin glargine (LANTUS) injection 23 Units  23 Units SubCUTAneous DAILY    0.9% sodium chloride infusion 250 mL  250 mL IntraVENous PRN    sodium chloride (NS) flush 5-40 mL  5-40 mL IntraVENous Q8H    sodium chloride (NS) flush 5-40 mL  5-40 mL IntraVENous PRN    acetaminophen (TYLENOL) tablet 650 mg  650 mg Oral Q4H PRN    ondansetron (ZOFRAN) injection 4 mg  4 mg IntraVENous Q4H PRN    pantoprazole (PROTONIX) 40 mg in 0.9% sodium chloride 10 mL injection  40 mg IntraVENous Q12H    NOREPINephrine (LEVOPHED) 32,000 mcg in dextrose 5% 250 mL (128 mcg/mL) infusion  0.5-16 mcg/min IntraVENous TITRATE    metroNIDAZOLE (FLAGYL) IVPB premix 500 mg  500 mg IntraVENous Q12H    aspirin chewable tablet 81 mg  81 mg Oral DAILY    valproate (DEPACON) 500 mg in 0.9% sodium chloride 50 mL IVPB  500 mg IntraVENous Q8H    glucose chewable tablet 16 g  4 Tab Oral PRN    dextrose (D50W) injection syrg 12.5-25 g  25-50 mL IntraVENous PRN    glucagon (GLUCAGEN) injection 1 mg  1 mg IntraMUSCular PRN    insulin lispro (HUMALOG) injection   SubCUTAneous AC&HS    heparin 25,000 units in D5W 250 ml infusion  12-25 Units/kg/hr IntraVENous TITRATE    0.9% sodium chloride infusion  125 mL/hr IntraVENous CONTINUOUS     Current Outpatient Medications   Medication Sig    diphenhydrAMINE (BENADRYL) 25 mg capsule Take 25 mg by mouth every four (4) hours as needed for Itching.  divalproex ER (Depakote ER) 500 mg ER tablet Take 1,500 mg by mouth nightly.  insulin detemir U-100 (LEVEMIR FLEXTOUCH) 100 unit/mL (3 mL) inpn 26 Units by SubCUTAneous route Daily (before breakfast).  metFORMIN (GLUCOPHAGE) 850 mg tablet Take 850 mg by mouth three (3) times daily.  insulin aspart U-100 (NovoLOG Flexpen U-100 Insulin) 100 unit/mL (3 mL) inpn by SubCUTAneous route Before breakfast, lunch, and dinner. Sliding Scale  Blood glucose  Less than 70 give 0 units  70 to 150 give 3 units  151 to 200 give 5 units  201 to 250 give 7 units  251 to 300 give 12 units  301 to 350 give 15 units  Greater than 350 give  20 units  If blood glucose is greater than is greater than 300 recheck in 2 hours    insulin aspart U-100 (NovoLOG Flexpen U-100 Insulin) 100 unit/mL (3 mL) inpn by SubCUTAneous route nightly. Sliding scale  Blood glucose  Less than 200 give 0 units  200 to 299 give 3 units  Greater than 300 give 6 units    pantoprazole (Protonix) 40 mg tablet Take 40 mg by mouth Daily (before breakfast).     sucralfate (Carafate) 1 gram tablet Take 1 g by mouth Before breakfast, lunch, dinner and at bedtime.  lip protectant (BLISTEX) 0.6-0.5-1.1-0.5 % oint ointment Apply  to affected area as needed for Lubrication.  lip protectant with sunscreen (CARMEX) crea Apply  to affected area as needed for Dry Skin.  glucagon (GLUCAGEN) 1 mg injection 1 mg by IntraVENous route as needed for Hypoglycemia.  glucose 4 gram chewable tablet Take 45 g by mouth as needed (Blood glucose less than 70).  guaiFENesin (ROBITUSSIN) 100 mg/5 mL liquid Take 200 mg by mouth four (4) times daily as needed for Cough.  hydrocortisone (CORTAID) 1 % topical cream Apply  to affected area two (2) times daily as needed for Skin Irritation. use thin layer    ibuprofen (Motrin IB) 200 mg tablet Take 400 mg by mouth every four (4) hours as needed for Pain.  albuterol-ipratropium (DUO-NEB) 2.5 mg-0.5 mg/3 ml nebu 3 mL by Nebulization route three (3) times daily as needed for Wheezing.  ketoconazole (NIZORAL) 2 % topical cream Apply  to affected area daily as needed for Skin Irritation.  loperamide (IMMODIUM) 2 mg tablet Take 2 mg by mouth four (4) times daily as needed for Diarrhea.  acetaminophen (TYLENOL) 325 mg tablet Take 650 mg by mouth every four (4) hours as needed for Pain or Fever.  ferrous sulfate 325 mg (65 mg iron) tablet Take 1 Tab by mouth two (2) times daily (after meals).  atorvastatin (LIPITOR) 10 mg tablet TAKE 1 TABLET BY MOUTH ONCE DAILY    ondansetron (Zofran ODT) 4 mg disintegrating tablet 1 Tab by SubLINGual route every eight (8) hours as needed for Nausea or Vomiting.  mirtazapine (REMERON) 15 mg tablet Take 1 Tab by mouth nightly.  sertraline (ZOLOFT) 100 mg tablet Take 1 Tab by mouth daily.        Social History:  Social History     Tobacco Use    Smoking status: Former Smoker    Smokeless tobacco: Never Used   Substance Use Topics    Alcohol use: No       Family History:  Family History   Problem Relation Age of Onset    Cancer Mother        Review of Systems:  Unable to Obtain    Objective:     Patient Vitals for the past 8 hrs:   BP Temp Pulse Resp SpO2   02/18/21 1046 121/65  86 14 96 %   02/18/21 0921 (!) 154/68 98.1 °F (36.7 °C) 71 14 97 %   02/18/21 0600 (!) 125/58  73 13 92 %   02/18/21 0500 126/70  72 10 99 %   02/18/21 0400 114/70  73 11 97 %     No intake/output data recorded. 02/16 1901 - 02/18 0700  In: 3098.9 [I.V.:3098.9]  Out: 250 [Urine:250]    EXAM:     NEURO-alert, oriented x3, affect appropriate   HEENT-Head: Normocephalic, no lesions, without obvious abnormality. LUNGS-clear to auscultation bilaterally    COR-regular rate and rhythym     ABD- soft, non-tender. Bowel sounds normal. No masses,  no organomegaly     EXT-no edema    Skin - No rash     Data Review     Recent Labs     02/18/21 0953 02/18/21 0241   WBC 7.8 9.1   HGB 8.2* 8.0*   HCT 25.4* 24.5*    277     Recent Labs     02/18/21 0241 02/17/21 1957 02/17/21 0251 02/17/21 0251    144   < > 125*   K 3.5 3.9   < > 6.3*    108   < > 90*   CO2 29 30   < > 5*   BUN 26* 31*   < > 44*   CREA 1.09 1.30   < > 1.94*   * 148*   < > 960*   PHOS 3.4  --   --  9.0*   CA 7.2* 7.4*   < > 8.6    < > = values in this interval not displayed.      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    Memory impairment R41.3    Obsessive-compulsive disorder F42.9    Organic mental disorder F09    Seiz[facility] disorder ([facility] G40.909    Uncontrolled type 2 diabetes mellitus ([facility] E11.65    Iron deficiency anemia due to chronic blood loss D50.0    DKA (diabetic ketoacidoses) (HCC) A77.10    Metabolic acidosis V69.7    Hyponatremia E87.1    Hyperkalemia E87.5    SHIRA (acute kidney injury) ([facility] N17.9    NSTEMI (non-ST elevated myocardial infarction) (HCC) I21.4    Shock ([facility] R57.9       Assessment and Plan:  Anemia with Heme positive stools:  Pt with history of HHT. - continue to monitor hemoglobin  - continue IV PPI  - no NSAIDs  - No plans for endoscopic evaluation at this time as there has been no active bleeding and he is recovering from NSTEMI and shock. Following      Thanks for allowing me to participate in the care of this patient.   Signed By: Dalia Stewart NP     2/18/2021  10:50 AM

## 2021-02-18 NOTE — ED NOTES
Pts family member called for update, informed her someone would try to contact her between 2-4pm with updates.  Phone number is [phone]

## 2021-02-18 NOTE — PROGRESS NOTES
Problem: Self Care Deficits Care Plan (Adult)  Goal: *Acute Goals and Plan of Care (Insert Text)  Description:   FUNCTIONAL STATUS PRIOR TO ADMISSION: Pt is a poor historian but reports he lives in a [address] [place] [address]. Per discussion with RN, pt is normally more alert and oriented, able to converse. Unclear PLOF with regards to ADL performance and mobility. Occupational Therapy Goals  Initiated 2/17/2021  1. Patient will perform grooming, seated EOB, with supervision/set-up within 7 day(s). 2.  Patient will perform upper body dressing and bathing with minimal assistance/contact guard assist within 7 day(s). 3.  Patient will perform lower body dressing with minimal assistance within 7 day(s). 4.  Patient will perform toilet transfers with minimal assistance/contact guard assist within 7 day(s). 5.  Patient will perform all aspects of toileting with minimal assistance/contact guard assist within 7 day(s). 6.  Patient will participate in upper extremity therapeutic exercise/activities with supervision/set-up for 5 minutes within 7 day(s). 7.  Patient will utilize energy conservation techniques during functional activities with verbal cues within 7 day(s). Outcome: Progressing Towards Goal   OCCUPATIONAL THERAPY TREATMENT  Patient: Jyoti Ruiz (56 y.o. male)  Date: 2/18/2021  Diagnosis: DKA (diabetic ketoacidoses) ([facility] [E11.10] DKA (diabetic ketoacidoses) ([facility]       Precautions: Fall, Skin  Chart, occupational therapy assessment, plan of care, and goals were reviewed. ASSESSMENT  Patient continues with skilled OT services and is progressing towards goals. Pt appeared more alert and oriented today. Pt received supine in bed, asking to use bathroom. Min assist x 1 with bed mobility, A x 2 with transfer from bed to [facility]. Pt presented with decrease balance and R heel pain (nursing is aware).   Max to total assist with alethea hygiene d/t decrease balance in standing to complete with L hand (non-functional R UE heri since age 6). Overall, feel pt is way below baseline and would benefit from further at discharge. Will con't to follow and address listed goals. Current Level of Function Impacting Discharge (ADLs):  Assist x 2 with transfers and max to total assist with alethea hygiene    Other factors to consider for discharge: lives at group home         PLAN :  Patient continues to benefit from skilled intervention to address the above impairments. Continue treatment per established plan of care. to address goals. Recommend with staff:  Recommend patient be OOB to chair as frequently as tolerated; Goal of 3x/day for all meals. For toileting needs, recommend transfers to [facility]. Encourage patient involvement in personal care as able. Encourage exercises frequently throughout the day. Recommend next OT session: see goals    Recommendation for discharge: (in order for the patient to meet his/her long term goals)  Therapy up to 5 days/week in SNF setting    This discharge recommendation:  A follow-up discussion with the attending provider and/or case management is planned    IF patient discharges home will need the following DME: TBD at rehab       SUBJECTIVE:   Patient stated I need to use the bathroom.     OBJECTIVE DATA SUMMARY:   Cognitive/Behavioral Status:  Neurologic State: Alert  Orientation Level: Oriented to person;Oriented to time;Disoriented to place  Cognition: Follows commands             Functional Mobility and Transfers for ADLs:  Bed Mobility:  Supine to Sit: Additional time;Assist x1  Scooting: Stand-by assistance; Additional time    Transfers:  Sit to Stand: Minimum assistance;Assist x2  Functional Transfers  Toilet Transfer : Minimum assistance;Assist x2  Adaptive Equipment: Other (comment)(HHA)       Balance:  Sitting: Intact; Without support  Sitting - Static: Good (unsupported)  Sitting - Dynamic: Fair (occasional)  Standing: Impaired; With support  Standing - Static: Poor  Standing - Dynamic : Poor    ADL Intervention:              Toileting  Toileting Assistance: Maximum assistance;Total assistance(dependent)  Bowel Hygiene: Maximum assistance;Total assistance (dependent)         Therapeutic Exercises:       Pain:  No c/o pain    Activity Tolerance:   Good    After treatment patient left in no apparent distress:   Supine in bed and Call bell within reach    COMMUNICATION/COLLABORATION:   The patient’s plan of care was discussed with: Physical therapist and Registered nurse.     Zuri Naqvi OTR/L  Time Calculation: 28 mins

## 2021-02-18 NOTE — PROGRESS NOTES
2/18/2021   2:31 PM  Received call back from pt's brother Aaron, pt previously had rehab at [facility], this  is preferred facility  HERMAN Paniagua       9:28 AM  CM completed assessment w/ pt's brother Aaron Chase (C) [phone].  Charted demographics verified, pt lies at [facility], at baseline ept is ambulatory, bathes and dresses self, staff assists w/ meals, medications and transport.  Pt also attends a day program 7 AM to 3 PM Monday- Friday through [facility].  Brother expressed frustration over disconnect between endocrinologist and staff at group home and day program in managing   pt's DM leading to multiple hospital admissions each year.   [facility] [place]  Dolores Gardner [phone]  Reason for Admission:   DKA, Sepsis  pt is 54 yo  male presents to [facility] ED c/o  N/V for several days  PMHx: seizure disorder, anxiety disorder, psychiatric disorder,   DM thyroid disease            RUR Score:  19 %  Moderate Risk                PCP: First and Last name:  Ta Kinney MD   Name of Practice: [facility]   Are you a current patient: Yes/No: Yes   Approximate date of last visit: 10/2020 Can you participate in a virtual visit if needed: Yes  Do you (patient/family) have any concerns for transition/discharge?     Pt's brother has concern for recurrent hospital admissions due to inability of staff at group home and day program to adequately manage his DM              Plan for utilizing home health:  PT, OT evals completed , recommending SNF, pt has has HH in past, as well as SNF rehab  Rx: Medicaid, all R filled through Group Home  DME: None          Transition of Care Plan:      RUR 19%  LOS 1 Day  1. Hospital admision for medical management  2. Cardiology consult  3. GI consult  4.PT, OT evals completed , recommending SNF, family to obtain name of preferred facility  5. CM to follow through for treatment/response  6. DC when stable  to SNF  7. Transport TBD WC vs stretcher vs Group HOme     Care Management Interventions  PCP Verified by CM: Renu Benito MD)  Last Visit to PCP: 10/20/20  Palliative Care Criteria Met (RRAT>21 & CHF Dx)?: No  Mode of Transport at Discharge: Self(Group Home)  Physical Therapy Consult: Yes  Occupational Therapy Consult: Yes  Current Support Network:  Adult Group Home(pt resides in [place] the [facility] home, at baseline pt ia ambualtory, indepedent w/ bathing and dressing, staff assists w/ meals,meds and transport)  Confirm Follow Up Transport: Other (see comment)(Group Home)  Discharge Location  Discharge Placement: Skilled nursing facility  HERMAN Muse

## 2021-02-18 NOTE — ED NOTES
Spoke with Paramjit Pantoja (Direct Support Supervisor at the [address] [place] [address]) [phone].  Provided her with a brief update on the patients condition

## 2021-02-18 NOTE — DIABETES MGMT
MARY QUEVEDO  CLINICAL NURSE SPECIALIST CONSULT  [facility]    FOLLOW-UP NOTE    Presentation   Juan Duron is a 60 y.o. male presented to the ED 2/17/21 with nausea and vomiting. Patient found to be in DKA by labs in ED.  and AG 30 by labs at 0251. Isolation precautions discontinued due to patient negative for COVID-19.      HX:   Past Medical History (click to expand or collapse)        Past Medical History:   Diagnosis Date    Anxiety disorder      Arthritis      Depression      Diabetes ([address]      Liver disease      Neurological disorder       neuro cognative disorder    OCD (obsessive compulsive disorder)      Psychiatric disorder       OCD    Psychiatric disorder       anxiety    Seizures (HCC)      Thyroid disease              Current clinical course has been uncomplicated.      Diabetes: Patient has known Type 2 diabetes. Home diabetes medication regimen is Metformin 850 mg three times daily with meals, Levemir 40 units daily and Novolog on a sliding scale. Admission  and A1c 8.4% (2/17/21) indicate poor diabetes control. Subjective   Patient resting with eyes closed, easily arouses to voice. Patient without complaints at this time. Patient lives in a group home. Reports they assist him with his medications, checking BG's before meals and at bedtime and also cooks meals. Patient eats 3 meals per day, drinks primarily diet soda. Objective   Physical exam  General Alert, oriented and in no acute distress. Conversant and cooperative. Vital Signs   Visit Vitals  BP (!) 154/68 (BP 1 Location: Right upper arm, BP Patient Position: At rest)   Pulse 71   Temp 98.1 °F (36.7 °C)   Resp 14   Ht 5' 6\" (1.676 m)   Wt 58.5 kg (129 lb)   SpO2 97%   BMI 20.82 kg/m²     Skin  Warm and dry  Heart   Regular rate and rhythm.  No murmurs, rubs or gallops  Lungs  Clear to auscultation without rales or rhonchi  Extremities Scabbed scratches to bilateral ankles. Right heel with healing, scabbed wound. Laboratory  Lab Results   Component Value Date/Time    Hemoglobin A1c 8.4 (H) 02/17/2021 06:40 AM     Lab Results   Component Value Date/Time    LDL, calculated 56.8 07/23/2020 09:45 AM     Lab Results   Component Value Date/Time    Creatinine (POC) 0.8 04/16/2018 02:53 PM    Creatinine 1.09 02/18/2021 02:41 AM     Lab Results   Component Value Date/Time    Sodium 144 02/18/2021 02:41 AM    Potassium 3.5 02/18/2021 02:41 AM    Chloride 108 02/18/2021 02:41 AM    CO2 29 02/18/2021 02:41 AM    Anion gap 7 02/18/2021 02:41 AM    Glucose 235 (H) 02/18/2021 02:41 AM    BUN 26 (H) 02/18/2021 02:41 AM    Creatinine 1.09 02/18/2021 02:41 AM    BUN/Creatinine ratio 24 (H) 02/18/2021 02:41 AM    GFR est AA >60 02/18/2021 02:41 AM    GFR est non-AA >60 02/18/2021 02:41 AM    Calcium 7.2 (L) 02/18/2021 02:41 AM    Bilirubin, total 0.4 02/17/2021 02:51 AM    Alk.  phosphatase 92 02/17/2021 02:51 AM    Protein, total 7.3 02/17/2021 02:51 AM    Albumin 3.1 (L) 02/17/2021 02:51 AM    Globulin 4.2 (H) 02/17/2021 02:51 AM    A-G Ratio 0.7 (L) 02/17/2021 02:51 AM    ALT (SGPT) 11 (L) 02/17/2021 02:51 AM     Lab Results   Component Value Date/Time    ALT (SGPT) 11 (L) 02/17/2021 02:51 AM     Tests 2/17/21  0251 2/17/21  0640 2/17/21  1123   A1c - 8.4 -    656 228   Anion gap 30 28 9   Serum creatinine 1.94 1.97 1.77   GFR 36 35 40   Troponin   1.99 8.05       Factors affecting BG pattern  Factor Dose Comments   Nutrition:  NPO    NPO     Drugs:  Vasopressor load  Steroids  HIV   Epogen  Blood transfusion(s)  Other: n/a    Levophed  n/a  n/a  n/a  n/a             A1cs inaccurate  A1cs inaccurate   Pain 0/10     Infection Cefepime, Flagyl, Vanc Sepsis of unknown etiology   Other: n/a n/a         Blood glucose pattern        Evaluation   This 60year old male, with Type 2 diabetes, did not achieve diabetes control prior to admission (PTA), as evidenced by admission BG of 960 and A1c of 8.4%. Based on assessment of diabetes self-care practices, interventions that warrant action while hospitalized include:  Healthy eating. The patient would also benefit from diabetes self-management education and support JOHNNIE JUARE[facility]) after discharge.     During this hospitalization, the patient has not achieved inpatient blood glucose target of 100-180mg/dl. BG's have ranged 228-960 over the past 24 hours. Factors that have played a role include:  [x]? Critical nature of illness state  [x]? Compromised insulin absorption or delivery  [x]? Kidney dysfunction  [x]? Liver disease     Basal insulin is in use. Lantus 23 units daily, first dose tonight at 1800.     Bolus insulin isn't in use. Patient isn't eating meals.     Corrective insulin is in use. Patient has required 5 units corrective insulin over the past 24 hours.      Insulin drip with glucostabilizer started 2/17/21 at 0523. Insulin drip with glucostabilizer discontinued 2/17/21 at [address]. Patient received Lantus 10 units 2/17/21 at 2028.      To optimize BG control and support a positive health outcome, would utilize the Subcutaneous Insulin Order set (4427).    Impression: It is suspected that the basal/corrective insulin regimen will not be sufficient to maintain glycemic control. Since insulin drip discontinued patient has consistently been outside of target range. It is likely that adding bolus insulin to the regimen will improve glycemic control. Assessment and Plan   Nursing Diagnosis Risk for unstable blood glucose pattern   Nursing Intervention Domain 7100 Decision-making Support   Nursing Interventions Examined current inpatient diabetes control   Explored factors facilitating and impeding inpatient management       Recommendations   Recommend:     [x]?         Use of Subcutaneous Insulin Order set (9585)  Insulin Use Recommendation   Basal                                      (Based on weight, BMI & GFR) Addresses basic metabolic needs Continue Lantus 23 units daily, first dose today   Nutritional                                      (Based on CHO load) Addresses nutrition interventions Humalog 3 units with meals, hold if patient eats < 50% CHO on meal tray. Corrective                                       (Offset gaps in dosing) Useful in adjusting insulin dosing Correctional Scale for Normal Sensitivity     200-249- 2units Humalog  [id number] Humalog  [phone] Humalog  [phone] Humalog  Over 400- 10units Humalog     Do NOT hold for NPO; give in addition to meal time insulin dose.     If patient does not eat, -give correction dose only.         [x]? Referral to     [x]? Diabetes Self-Management Training through [facility] (Phone [phone] to schedule appointment)      Time Spent/ Billing Codes     Total time spent with patient: 15 Minutes   I personally reviewed chart, notes, data and current medications in the medical record. I have personally examined and treated the patient at bedside during this period.      [x] 67031 IP subsequent hospital care - 15 minutes       Khoa Tabor CNS  Diabetes Clinical Nurse Specialist  [facility]  Access via [place]

## 2021-02-18 NOTE — PROGRESS NOTES
TRANSFER - OUT REPORT:    Verbal report given to Jazmyne(name) on William Albright.  being transferred to [facility](unit) for routine progression of care       Report consisted of patients Situation, Background, Assessment and   Recommendations(SBAR). Information from the following report(s) SBAR, Kardex, Intake/Output, MAR and Recent Results was reviewed with the receiving nurse. Lines:   Triple Lumen left subclavian  02/17/21 Left Subclavian (Active)   Central Line Being Utilized Yes 02/17/21 0615   Criteria for Appropriate Use Limited/no vessel suitable for conventional peripheral access 02/17/21 0615   Site Assessment Clean, dry, & intact 02/17/21 0615   Infiltration Assessment 0 02/17/21 0615   Affected Extremity/Extremities Color distal to insertion site pink (or appropriate for race) 02/17/21 0615   Date of Last Dressing Change 02/17/21 02/17/21 0615   Dressing Status Clean, dry, & intact 02/17/21 0615   Dressing Type Disk with Chlorhexadine gluconate (CHG); Transparent 02/17/21 0615   Action Taken Blood drawn 02/17/21 0615   Proximal Hub Color/Line Status Flushed 02/17/21 0615   Positive Blood Return (Medial Site) Yes 02/17/21 0615   Medial Hub Color/Line Status Flushed 02/17/21 0615   Positive Blood Return (Lateral Site) Yes 02/17/21 0615   Distal Hub Color/Line Status Flushed 02/17/21 0615   Positive Blood Return (Site #3) Yes 02/17/21 0615       Peripheral IV 02/17/21 Left External jugular (Active)   Site Assessment Clean, dry, & intact 02/17/21 0300   Phlebitis Assessment 0 02/17/21 0300   Infiltration Assessment 0 02/17/21 0300   Dressing Status Clean, dry, & intact 02/17/21 0300   Dressing Type Tape;Transparent 02/17/21 0300   Hub Color/Line Status Pink;Flushed; Infusing;Capped 02/17/21 0300   Action Taken Open ports on tubing capped 02/17/21 0300   Alcohol Cap Used Yes 02/17/21 0300        Opportunity for questions and clarification was provided.       Patient transported with:   Monitor  Tech 1815 rate verify with Dung Rojas. RN for heparin gtt

## 2021-02-19 LAB
ANION GAP SERPL CALC-SCNC: 5 MMOL/L (ref 5–15)
APTT PPP: 84.6 SEC (ref 22.1–31)
BACTERIA SPEC CULT: NORMAL
BASOPHILS # BLD: 0 K/UL (ref 0–0.1)
BASOPHILS # BLD: 0 K/UL (ref 0–0.1)
BASOPHILS NFR BLD: 0 % (ref 0–1)
BASOPHILS NFR BLD: 0 % (ref 0–1)
BUN SERPL-MCNC: 14 MG/DL (ref 6–20)
BUN/CREAT SERPL: 16 (ref 12–20)
CALCIUM SERPL-MCNC: 7.3 MG/DL (ref 8.5–10.1)
CHLORIDE SERPL-SCNC: 110 MMOL/L (ref 97–108)
CO2 SERPL-SCNC: 27 MMOL/L (ref 21–32)
COMMENT, HOLDF: NORMAL
CREAT SERPL-MCNC: 0.85 MG/DL (ref 0.7–1.3)
DIFFERENTIAL METHOD BLD: ABNORMAL
DIFFERENTIAL METHOD BLD: ABNORMAL
EOSINOPHIL # BLD: 0.2 K/UL (ref 0–0.4)
EOSINOPHIL # BLD: 0.3 K/UL (ref 0–0.4)
EOSINOPHIL NFR BLD: 4 % (ref 0–7)
EOSINOPHIL NFR BLD: 4 % (ref 0–7)
ERYTHROCYTE [DISTWIDTH] IN BLOOD BY AUTOMATED COUNT: 15.4 % (ref 11.5–14.5)
ERYTHROCYTE [DISTWIDTH] IN BLOOD BY AUTOMATED COUNT: 15.6 % (ref 11.5–14.5)
GLUCOSE BLD STRIP.AUTO-MCNC: 136 MG/DL (ref 65–100)
GLUCOSE BLD STRIP.AUTO-MCNC: 294 MG/DL (ref 65–100)
GLUCOSE BLD STRIP.AUTO-MCNC: 308 MG/DL (ref 65–100)
GLUCOSE BLD STRIP.AUTO-MCNC: 86 MG/DL (ref 65–100)
GLUCOSE SERPL-MCNC: 134 MG/DL (ref 65–100)
HCT VFR BLD AUTO: 22.7 % (ref 36.6–50.3)
HCT VFR BLD AUTO: 22.8 % (ref 36.6–50.3)
HCT VFR BLD AUTO: 24.4 % (ref 36.6–50.3)
HCT VFR BLD AUTO: 25.1 % (ref 36.6–50.3)
HGB BLD-MCNC: 7.3 G/DL (ref 12.1–17)
HGB BLD-MCNC: 7.4 G/DL (ref 12.1–17)
HGB BLD-MCNC: 7.9 G/DL (ref 12.1–17)
HGB BLD-MCNC: 8.1 G/DL (ref 12.1–17)
IMM GRANULOCYTES # BLD AUTO: 0 K/UL (ref 0–0.04)
IMM GRANULOCYTES # BLD AUTO: 0 K/UL (ref 0–0.04)
IMM GRANULOCYTES NFR BLD AUTO: 0 % (ref 0–0.5)
IMM GRANULOCYTES NFR BLD AUTO: 0 % (ref 0–0.5)
LYMPHOCYTES # BLD: 1.1 K/UL (ref 0.8–3.5)
LYMPHOCYTES # BLD: 1.4 K/UL (ref 0.8–3.5)
LYMPHOCYTES NFR BLD: 16 % (ref 12–49)
LYMPHOCYTES NFR BLD: 19 % (ref 12–49)
MAGNESIUM SERPL-MCNC: 1.6 MG/DL (ref 1.6–2.4)
MCH RBC QN AUTO: 32 PG (ref 26–34)
MCH RBC QN AUTO: 32 PG (ref 26–34)
MCHC RBC AUTO-ENTMCNC: 32.2 G/DL (ref 30–36.5)
MCHC RBC AUTO-ENTMCNC: 32.5 G/DL (ref 30–36.5)
MCV RBC AUTO: 98.7 FL (ref 80–99)
MCV RBC AUTO: 99.6 FL (ref 80–99)
MONOCYTES # BLD: 0.7 K/UL (ref 0–1)
MONOCYTES # BLD: 0.8 K/UL (ref 0–1)
MONOCYTES NFR BLD: 10 % (ref 5–13)
MONOCYTES NFR BLD: 11 % (ref 5–13)
NEUTS SEG # BLD: 4.6 K/UL (ref 1.8–8)
NEUTS SEG # BLD: 4.7 K/UL (ref 1.8–8)
NEUTS SEG NFR BLD: 66 % (ref 32–75)
NEUTS SEG NFR BLD: 70 % (ref 32–75)
NRBC # BLD: 0 K/UL (ref 0–0.01)
NRBC # BLD: 0 K/UL (ref 0–0.01)
NRBC BLD-RTO: 0 PER 100 WBC
NRBC BLD-RTO: 0 PER 100 WBC
PHOSPHATE SERPL-MCNC: 2.8 MG/DL (ref 2.6–4.7)
PLATELET # BLD AUTO: 198 K/UL (ref 150–400)
PLATELET # BLD AUTO: 207 K/UL (ref 150–400)
PMV BLD AUTO: 9.4 FL (ref 8.9–12.9)
PMV BLD AUTO: 9.5 FL (ref 8.9–12.9)
POTASSIUM SERPL-SCNC: 3.1 MMOL/L (ref 3.5–5.1)
RBC # BLD AUTO: 2.28 M/UL (ref 4.1–5.7)
RBC # BLD AUTO: 2.31 M/UL (ref 4.1–5.7)
SAMPLES BEING HELD,HOLD: NORMAL
SARS-COV-2, COV2: NORMAL
SERVICE CMNT-IMP: ABNORMAL
SERVICE CMNT-IMP: NORMAL
SERVICE CMNT-IMP: NORMAL
SODIUM SERPL-SCNC: 142 MMOL/L (ref 136–145)
THERAPEUTIC RANGE,PTTT: ABNORMAL SECS (ref 58–77)
WBC # BLD AUTO: 6.9 K/UL (ref 4.1–11.1)
WBC # BLD AUTO: 7.1 K/UL (ref 4.1–11.1)

## 2021-02-19 PROCEDURE — 74011636637 HC RX REV CODE- 636/637: Performed by: STUDENT IN AN ORGANIZED HEALTH CARE EDUCATION/TRAINING PROGRAM

## 2021-02-19 PROCEDURE — 84100 ASSAY OF PHOSPHORUS: CPT

## 2021-02-19 PROCEDURE — 85018 HEMOGLOBIN: CPT

## 2021-02-19 PROCEDURE — 82962 GLUCOSE BLOOD TEST: CPT

## 2021-02-19 PROCEDURE — 74011250636 HC RX REV CODE- 250/636: Performed by: INTERNAL MEDICINE

## 2021-02-19 PROCEDURE — 74011250637 HC RX REV CODE- 250/637: Performed by: STUDENT IN AN ORGANIZED HEALTH CARE EDUCATION/TRAINING PROGRAM

## 2021-02-19 PROCEDURE — C9113 INJ PANTOPRAZOLE SODIUM, VIA: HCPCS | Performed by: INTERNAL MEDICINE

## 2021-02-19 PROCEDURE — 99232 SBSQ HOSP IP/OBS MODERATE 35: CPT | Performed by: SPECIALIST

## 2021-02-19 PROCEDURE — 85730 THROMBOPLASTIN TIME PARTIAL: CPT

## 2021-02-19 PROCEDURE — 85025 COMPLETE CBC W/AUTO DIFF WBC: CPT

## 2021-02-19 PROCEDURE — 74011250636 HC RX REV CODE- 250/636: Performed by: NURSE PRACTITIONER

## 2021-02-19 PROCEDURE — 93042 RHYTHM ECG REPORT: CPT | Performed by: FAMILY MEDICINE

## 2021-02-19 PROCEDURE — 74011250636 HC RX REV CODE- 250/636: Performed by: FAMILY MEDICINE

## 2021-02-19 PROCEDURE — 74011250637 HC RX REV CODE- 250/637: Performed by: NURSE PRACTITIONER

## 2021-02-19 PROCEDURE — 99232 SBSQ HOSP IP/OBS MODERATE 35: CPT | Performed by: FAMILY MEDICINE

## 2021-02-19 PROCEDURE — 74011250636 HC RX REV CODE- 250/636: Performed by: STUDENT IN AN ORGANIZED HEALTH CARE EDUCATION/TRAINING PROGRAM

## 2021-02-19 PROCEDURE — 80048 BASIC METABOLIC PNL TOTAL CA: CPT

## 2021-02-19 PROCEDURE — 83735 ASSAY OF MAGNESIUM: CPT

## 2021-02-19 PROCEDURE — 36415 COLL VENOUS BLD VENIPUNCTURE: CPT

## 2021-02-19 PROCEDURE — U0005 INFEC AGEN DETEC AMPLI PROBE: HCPCS

## 2021-02-19 PROCEDURE — 65660000000 HC RM CCU STEPDOWN

## 2021-02-19 PROCEDURE — 74011250637 HC RX REV CODE- 250/637: Performed by: INTERNAL MEDICINE

## 2021-02-19 PROCEDURE — 99231 SBSQ HOSP IP/OBS SF/LOW 25: CPT | Performed by: CLINICAL NURSE SPECIALIST

## 2021-02-19 PROCEDURE — 74011000250 HC RX REV CODE- 250: Performed by: FAMILY MEDICINE

## 2021-02-19 RX ORDER — POTASSIUM CHLORIDE 750 MG/1
40 TABLET, FILM COATED, EXTENDED RELEASE ORAL EVERY 4 HOURS
Status: COMPLETED | OUTPATIENT
Start: 2021-02-19 | End: 2021-02-19

## 2021-02-19 RX ORDER — ATORVASTATIN CALCIUM 40 MG/1
40 TABLET, FILM COATED ORAL
Qty: 30 TAB | Refills: 0 | Status: SHIPPED
Start: 2021-02-19 | End: 2021-02-20

## 2021-02-19 RX ORDER — METOPROLOL TARTRATE 25 MG/1
12.5 TABLET, FILM COATED ORAL EVERY 12 HOURS
Status: DISCONTINUED | OUTPATIENT
Start: 2021-02-19 | End: 2021-02-20 | Stop reason: HOSPADM

## 2021-02-19 RX ORDER — INSULIN GLARGINE 100 [IU]/ML
27 INJECTION, SOLUTION SUBCUTANEOUS DAILY
Status: DISCONTINUED | OUTPATIENT
Start: 2021-02-19 | End: 2021-02-20 | Stop reason: HOSPADM

## 2021-02-19 RX ORDER — POTASSIUM CHLORIDE 750 MG/1
40 TABLET, FILM COATED, EXTENDED RELEASE ORAL
Status: COMPLETED | OUTPATIENT
Start: 2021-02-19 | End: 2021-02-19

## 2021-02-19 RX ORDER — ATORVASTATIN CALCIUM 20 MG/1
40 TABLET, FILM COATED ORAL
Status: DISCONTINUED | OUTPATIENT
Start: 2021-02-19 | End: 2021-02-20 | Stop reason: HOSPADM

## 2021-02-19 RX ORDER — GUAIFENESIN 100 MG/5ML
81 LIQUID (ML) ORAL DAILY
Qty: 30 TAB | Refills: 0 | Status: SHIPPED
Start: 2021-02-20 | End: 2021-02-20

## 2021-02-19 RX ORDER — MAGNESIUM SULFATE HEPTAHYDRATE 40 MG/ML
2 INJECTION, SOLUTION INTRAVENOUS ONCE
Status: COMPLETED | OUTPATIENT
Start: 2021-02-19 | End: 2021-02-19

## 2021-02-19 RX ADMIN — CEFEPIME HYDROCHLORIDE 2 G: 2 INJECTION, POWDER, FOR SOLUTION INTRAVENOUS at 09:18

## 2021-02-19 RX ADMIN — PANTOPRAZOLE SODIUM 40 MG: 40 INJECTION, POWDER, FOR SOLUTION INTRAVENOUS at 21:18

## 2021-02-19 RX ADMIN — Medication 2.5 MG: at 21:27

## 2021-02-19 RX ADMIN — METRONIDAZOLE 500 MG: 500 INJECTION, SOLUTION INTRAVENOUS at 21:19

## 2021-02-19 RX ADMIN — INSULIN LISPRO 3 UNITS: 100 INJECTION, SOLUTION INTRAVENOUS; SUBCUTANEOUS at 21:18

## 2021-02-19 RX ADMIN — POTASSIUM CHLORIDE 40 MEQ: 750 TABLET, FILM COATED, EXTENDED RELEASE ORAL at 11:54

## 2021-02-19 RX ADMIN — ATORVASTATIN CALCIUM 40 MG: 20 TABLET, FILM COATED ORAL at 21:16

## 2021-02-19 RX ADMIN — POTASSIUM CHLORIDE 40 MEQ: 750 TABLET, FILM COATED, EXTENDED RELEASE ORAL at 09:30

## 2021-02-19 RX ADMIN — SODIUM CHLORIDE 125 ML/HR: 9 INJECTION, SOLUTION INTRAVENOUS at 04:12

## 2021-02-19 RX ADMIN — METRONIDAZOLE 500 MG: 500 INJECTION, SOLUTION INTRAVENOUS at 09:29

## 2021-02-19 RX ADMIN — Medication 10 ML: at 06:51

## 2021-02-19 RX ADMIN — ASPIRIN 81 MG: 81 TABLET, CHEWABLE ORAL at 09:18

## 2021-02-19 RX ADMIN — DIVALPROEX SODIUM 1500 MG: 500 TABLET, EXTENDED RELEASE ORAL at 21:16

## 2021-02-19 RX ADMIN — FERROUS SULFATE TAB 325 MG (65 MG ELEMENTAL FE) 325 MG: 325 (65 FE) TAB at 09:18

## 2021-02-19 RX ADMIN — FERROUS SULFATE TAB 325 MG (65 MG ELEMENTAL FE) 325 MG: 325 (65 FE) TAB at 17:39

## 2021-02-19 RX ADMIN — SUCRALFATE 1 G: 1 TABLET ORAL at 11:54

## 2021-02-19 RX ADMIN — CEFEPIME HYDROCHLORIDE 2 G: 2 INJECTION, POWDER, FOR SOLUTION INTRAVENOUS at 15:53

## 2021-02-19 RX ADMIN — SERTRALINE 100 MG: 50 TABLET, FILM COATED ORAL at 09:18

## 2021-02-19 RX ADMIN — PANTOPRAZOLE SODIUM 40 MG: 40 INJECTION, POWDER, FOR SOLUTION INTRAVENOUS at 09:18

## 2021-02-19 RX ADMIN — POTASSIUM CHLORIDE 40 MEQ: 750 TABLET, FILM COATED, EXTENDED RELEASE ORAL at 06:50

## 2021-02-19 RX ADMIN — INSULIN LISPRO 7 UNITS: 100 INJECTION, SOLUTION INTRAVENOUS; SUBCUTANEOUS at 17:39

## 2021-02-19 RX ADMIN — Medication 10 ML: at 21:25

## 2021-02-19 RX ADMIN — INSULIN GLARGINE 27 UNITS: 100 INJECTION, SOLUTION SUBCUTANEOUS at 17:39

## 2021-02-19 RX ADMIN — Medication 10 ML: at 14:00

## 2021-02-19 RX ADMIN — MIRTAZAPINE 15 MG: 15 TABLET, FILM COATED ORAL at 21:17

## 2021-02-19 RX ADMIN — SUCRALFATE 1 G: 1 TABLET ORAL at 06:50

## 2021-02-19 RX ADMIN — MAGNESIUM SULFATE HEPTAHYDRATE 2 G: 40 INJECTION, SOLUTION INTRAVENOUS at 09:29

## 2021-02-19 RX ADMIN — SUCRALFATE 1 G: 1 TABLET ORAL at 15:53

## 2021-02-19 RX ADMIN — HEPARIN SODIUM 13 UNITS/KG/HR: 10000 INJECTION, SOLUTION INTRAVENOUS at 07:34

## 2021-02-19 RX ADMIN — SUCRALFATE 1 G: 1 TABLET ORAL at 21:16

## 2021-02-19 NOTE — PROGRESS NOTES
Patient's brother called to report that the patient is now agreeable to a blood transfusion. The patient wishes to followup with Cardiology outpatient. Brother to send copy of Medical Power of  for patient chart.

## 2021-02-19 NOTE — CARDIO/PULMONARY
[facility] Cardiopulmonary Rehab: 61 yo male admitted with AMS, N/V, and blood glucose 960. Per Dr Arta Severe, dx includes: DKA, NSTEMI, SHIRA, and macrocytic anemia. LVEF 65-70% by echo (2/17/21). Patient resides in a group home in [facility], which is in  [facility]. PMH includes: neuro cognitive disorder, memory impairment, OCD, anxiety disorder, HHT,and seizure disorder. CAD risk factors: DM, age & gender. Hgb A1c 8.4 (2/17/21). Pt is a former smoker. Pt is not a candidate for outpatient cardiac rehab, due to cognitive impairment.

## 2021-02-19 NOTE — ROUTINE PROCESS
Central line Type: Left subclavian triple lumen Central Line Insert Date: 02/17/21 Reason Central Line Placed: limited access Central Line Dressing Date: 02/17/21 Biopatch in place? Yes No: yes Tubing labeled and appropriate? Yes No: yes Alcohol caps on all open ports? Yes No: yes Last CHG bath (time&date): Patient refused CHG bath but allowed RN to clean around triple lumen with CHG wipes on 2/18/21 at 2344 Reviewed with provider and central line must stay in for the following reasons:

## 2021-02-19 NOTE — PROGRESS NOTES
Occupational Therapy:  02/19/21    Chart reviewed in prep for OT tx, spoke with RN who cleared pt for therapy. Pt received with PT present, reporting he is \"having a bad day\". Offered to assist pt to shave as he had reported this desire with other staff, but he continued to decline. Pt requesting to eat lunch and again declines to sit OOB to eat despite poor positioning in the bed. Will follow up later today as able.      Thank you,  Gema Castro, OTR/L

## 2021-02-19 NOTE — ROUTINE PROCESS
RN educated patient that he has a triple lumen access and is at a higher risk of infection and needs daily CHG bath. Patient refused. RN asked patient if we could give bath later. Patient said \"not tonight maybe tomorrow\". RN cleaned around triple lumen with CHG wipes.

## 2021-02-19 NOTE — PROGRESS NOTES
Cardiology Progress Note                              [address]. [address], [place], [address] [facility]                                 Phone [phone]; Fax [phone]        2021 11:02 AM     Admit Date:           2021  Admit Diagnosis:  DKA (diabetic ketoacidoses) ([facility] [address] [E11.10]  :          1965   MRN:          [id number]       Impression Plan/Recommendation   Diabetic ketoacidosis  NSTEMI  Metabolic acidosis with elevated anion gap, anion gap closed  Acute GI bleed, w hx of hereditary hemorrhagic telangectasia   Acute kidney injury  Macrocytic anemia with hemoglobin of 8.5  LVH, mild  Right sided contractures -chronic    · DKA resolved. No plans for GI work up at this time. Appreciate GI recommendations, if intervention is needed BMS recommended w hx of HHT. · Reviewed cardiac cath and rationale for cath w the patient he adamantly declines. Offered stress test- patient declines, but is agreeable to stress test OP. Reviewed possible risk of not doing to the cardiac cath OP  · Peak troponin 11.6- continue ASA, ACS heparin d/c now    · Echo shows pEF, no WMA  · BP stable  · Replete Mag and K  · Start asa/statin      Okay for d/c, will set up stress test and appt w cardiology OP    Future Appointments   Date Time Provider Loulou Kelley   3/5/2021  9:00 AM Gadiel Mendoza, DO [facility] AMB          CARDIOLOGY ATTENDING  Patient personally seen and examined. All the elements of history and examination were personally performed. Assessment and plan was discussed and agree as written above    No CP. Says he feels OK. Hrt RRR, no murmur. Lungs clear. No LE edema     We recommended a cardiac cath to follow-up on his NSTEMI, but patient declined.   He declines a cath, but is agreeable to an outpatient stress test.  Leah Coeegrini called and reviewed findings and plans with his mom and sister and family on board as well. Plan to outpatient FU with Dr. Davidson Segovia. Can treat medically s/p NSTEMI for presumed CAD with ASA, statin, BB. Will need to follow signs of bleeding and Hgb closely given hist of HHT. Vijay Oates MD, [facility]           02/17/21   ECHO ADULT COMPLETE 02/17/2021 2/17/2021    Narrative · LV: Estimated LVEF is 65 - 70%. Normal cavity size. Mild concentric   hypertrophy. Hyperdynamic systolic function. Wall motion: normal.   Age-appropriate left ventricular diastolic function. · RV: Not well visualized. Mildly reduced systolic function. · MV: Mitral valve non-specific thickening. Mild mitral annular   calcification. Signed by: Armaan Levin DO             No intake/output data recorded. Last 3 Recorded Weights in this Encounter    02/17/21 0152 02/17/21 1255   Weight: 129 lb (58.5 kg) 129 lb (58.5 kg)         02/17 1901 - 02/19 0700  In: 2062.4 [I.V.:2062.4]  Out: 800 [Urine:800]    SUBJECTIVE               Ronakn Maxim. denies palpitations, irregular heart beat, SOB, chest pain or LE edema. \" I am very hungry, I need to eat now\"   Discussed doing a cardiac cath this morning, reviewed rationale - he declines x 3.   Offered stress test- he declines if he cannot eat  He wants to leave AMA  He would be agreeable to stress test out patient         Current Facility-Administered Medications   Medication Dose Route Frequency    atorvastatin (LIPITOR) tablet 40 mg  40 mg Oral QHS    insulin glargine (LANTUS) injection 27 Units  27 Units SubCUTAneous DAILY    cefepime (MAXIPIME) 2 g in sterile water (preservative free) 10 mL IV syringe  2 g IntraVENous Q8H    albuterol-ipratropium (DUO-NEB) 2.5 MG-0.5 MG/3 ML  3 mL Nebulization TID PRN    mirtazapine (REMERON) tablet 15 mg  15 mg Oral QHS    sertraline (ZOLOFT) tablet 100 mg  100 mg Oral DAILY    sucralfate (CARAFATE) tablet 1 g  1 g Oral AC&HS    ferrous sulfate tablet 325 mg  325 mg Oral BIDPC    divalproex ER (DEPAKOTE ER) 24 hour tablet 1,500 mg  1,500 mg Oral QHS    melatonin (rapid dissolve) tablet 2.5 mg  2.5 mg Oral QHS PRN    sodium chloride (NS) flush 5-40 mL  5-40 mL IntraVENous Q8H    sodium chloride (NS) flush 5-40 mL  5-40 mL IntraVENous PRN    acetaminophen (TYLENOL) tablet 650 mg  650 mg Oral Q4H PRN    ondansetron (ZOFRAN) injection 4 mg  4 mg IntraVENous Q4H PRN    pantoprazole (PROTONIX) 40 mg in 0.9% sodium chloride 10 mL injection  40 mg IntraVENous Q12H    metroNIDAZOLE (FLAGYL) IVPB premix 500 mg  500 mg IntraVENous Q12H    aspirin chewable tablet 81 mg  81 mg Oral DAILY    glucose chewable tablet 16 g  4 Tab Oral PRN    dextrose (D50W) injection syrg 12.5-25 g  25-50 mL IntraVENous PRN    glucagon (GLUCAGEN) injection 1 mg  1 mg IntraMUSCular PRN    insulin lispro (HUMALOG) injection   SubCUTAneous AC&HS    heparin 25,000 units in D5W 250 ml infusion  12-25 Units/kg/hr IntraVENous TITRATE      OBJECTIVE               Intake/Output Summary (Last 24 hours) at 2/19/2021 0817  Last data filed at 2/19/2021 0320  Gross per 24 hour   Intake 503.46 ml   Output 800 ml   Net -296.54 ml       Review of Systems - History obtained from the patient AS PER  HPI    Telemetry NSR w PVCs    PHYSICAL EXAM        Visit Vitals  BP (!) 146/74 (BP 1 Location: Left arm, BP Patient Position: At rest) Comment: Notified RN   Pulse 73   Temp 98.1 °F (36.7 °C)   Resp 16   Ht 5' 6\" (1.676 m)   Wt 129 lb (58.5 kg)   SpO2 96%   BMI 20.82 kg/m²       Gen: Well-developed, thin, in no acute distress  alert and oriented x 3  HEENT:  Pink conjunctivae, Hearing grossly normal.No scleral icterus or conjunctival, moist mucous membranes  Neck: Supple,No JVD  Resp: No accessory muscle use, Clear breath sounds, No rales or rhonchi  Card: Regular Rate,Rythm,2/6murmurs lUSB, no rubs or gallop. No thrills.    GI:          soft, non-tender   MSK: No cyanosis or clubbing  Skin: No rashes or ulcers  Neuro:  Cranial nerves are grossly intact, moving all four extremities, no focal deficit, follows commands appropriately. Right upper arm and hand contracted/ rle contraction   Psych:  Good insight, oriented to person, place and time, alert, Nml Affect  LE: No edema       DATA REVIEW            Laboratory and Imaging have been reviewed by me and are notable for  Recent Labs     02/18/21  0241 02/17/21  2306 02/17/21  1545   TROIQ 5.09* 7.60* 11.60*     Recent Labs     02/19/21  0312 02/19/21  0055 02/18/21  1709 02/18/21  0953 02/18/21  0241 02/17/21  1957 02/17/21  0251 02/17/21  0251   NA  --  142  --   --  144 144   < > 125*   K  --  3.1*  --   --  3.5 3.9   < > 6.3*   CO2  --  27  --   --  29 30   < > 5*   BUN  --  14  --   --  26* 31*   < > 44*   CREA  --  0.85  --   --  1.09 1.30   < > 1.94*   GLU  --  134*  --   --  235* 148*   < > 960*   PHOS  --  2.8  --   --  3.4  --   --  9.0*   MG  --  1.6  --   --  1.5*  --   --  1.4*   WBC 7.1 6.9  --  7.8 9.1 10.4   < >  --    HGB 7.3* 7.4* 8.2* 8.2* 8.0* 8.4*   < >  --    HCT 22.7* 22.8* 25.5* 25.4* 24.5* 25.2*   < >  --     207  --  261 277 293   < >  --     < > = values in this interval not displayed.              Bernardino Frost NP

## 2021-02-19 NOTE — DIABETES MGMT
MARY QUEVEDO  CLINICAL NURSE SPECIALIST CONSULT  [facility]    FOLLOW-UP NOTE    Presentation   Cee Kwan is a 60 y.o. male presented to the ED 2/17/21 with nausea and vomiting. Patient found to be in DKA by labs in ED.  and AG 30 by labs at 0251.       HX:   Past Medical History (click to expand or collapse)        Past Medical History:   Diagnosis Date    Anxiety disorder      Arthritis      Depression      Diabetes ([facility]      Liver disease      Neurological disorder       neuro cognative disorder    OCD (obsessive compulsive disorder)      Psychiatric disorder       OCD    Psychiatric disorder       anxiety    Seizures ([facility]      Thyroid disease              Current clinical course has been uncomplicated.      Diabetes: Patient has known Type 2 diabetes. Home diabetes medication regimen is Metformin 850 mg three times daily with meals, Levemir 40 units daily and Novolog on a sliding scale. Admission  and A1c 8.4% (2/17/21) indicate poor diabetes control. Subjective   Patient sitting up in bed, alert and oriented. OT/PT at bedside to work with patient. Patient reports that he feels \"down in the dump and I just want to shave. \" OT will check with primary RN about assisting patient with shaving. Objective   Physical exam  General Alert, oriented and in no acute distress. Conversant and cooperative. Vital Signs   Visit Vitals  BP (!) 146/74 (BP 1 Location: Left arm, BP Patient Position: At rest)   Pulse 73   Temp 98.1 °F (36.7 °C)   Resp 16   Ht 5' 6\" (1.676 m)   Wt 58.5 kg (129 lb)   SpO2 96%   BMI 20.82 kg/m²     Skin  Warm and dry  Heart   Regular rate and rhythm. No murmurs, rubs or gallops  Lungs  Clear to auscultation without rales or rhonchi  Extremities Scabbed scratches to bilateral ankles. Right heel with healing, scabbed wound.      Laboratory  Lab Results   Component Value Date/Time    Hemoglobin A1c 8.4 (H) 02/17/2021 06:40 AM     Lab Results   Component Value Date/Time    LDL, calculated 57.4 02/17/2021 07:57 PM     Lab Results   Component Value Date/Time    Creatinine (POC) 0.8 04/16/2018 02:53 PM    Creatinine 0.85 02/19/2021 12:55 AM     Lab Results   Component Value Date/Time    Sodium 142 02/19/2021 12:55 AM    Potassium 3.1 (L) 02/19/2021 12:55 AM    Chloride 110 (H) 02/19/2021 12:55 AM    CO2 27 02/19/2021 12:55 AM    Anion gap 5 02/19/2021 12:55 AM    Glucose 134 (H) 02/19/2021 12:55 AM    BUN 14 02/19/2021 12:55 AM    Creatinine 0.85 02/19/2021 12:55 AM    BUN/Creatinine ratio 16 02/19/2021 12:55 AM    GFR est AA >60 02/19/2021 12:55 AM    GFR est non-AA >60 02/19/2021 12:55 AM    Calcium 7.3 (L) 02/19/2021 12:55 AM    Bilirubin, total 0.4 02/17/2021 02:51 AM    Alk. phosphatase 92 02/17/2021 02:51 AM    Protein, total 7.3 02/17/2021 02:51 AM    Albumin 3.1 (L) 02/17/2021 02:51 AM    Globulin 4.2 (H) 02/17/2021 02:51 AM    A-G Ratio 0.7 (L) 02/17/2021 02:51 AM    ALT (SGPT) 11 (L) 02/17/2021 02:51 AM     Lab Results   Component Value Date/Time    ALT (SGPT) 11 (L) 02/17/2021 02:51 AM     Tests 2/17/21  0251 2/17/21  0640 2/17/21  1123   A1c - 8.4 -    656 228   Anion gap 30 28 9   Serum creatinine 1.94 1.97 1.77   GFR 36 35 40   Troponin   1.99 8.05       Factors affecting BG pattern  Factor Dose Comments   Nutrition:  Regular    Regular     Drugs:  Vasopressor load  Steroids  HIV   Epogen  Blood transfusion(s)  Other: n/a    n/a  n/a  n/a  n/a  n/a             A1cs inaccurate  A1cs inaccurate   Pain 0/10     Infection Cefepime, Flagyl, Vanc Sepsis of unknown etiology   Other: n/a n/a         Blood glucose pattern        Evaluation   This 60year old male, with Type 2 diabetes, did not achieve diabetes control prior to admission (PTA), as evidenced by admission BG of 960 and A1c of 8.4%. Based on assessment of diabetes self-care practices, interventions that warrant action while hospitalized include:  Healthy eating. The patient would also benefit from diabetes self-management education and support J[facility]NNIE [facility]) after discharge.     During this hospitalization, the patient has not achieved inpatient blood glucose target of 100-180mg/dl. BG's have ranged  over the past 24 hours. Factors that have played a role include:  [x]? Critical nature of illness state  [x]? Compromised insulin absorption or delivery  [x]? Kidney dysfunction  [x]? Liver disease     Basal insulin is in use. Lantus 23 units daily currently ordered; however scheduled to increase to 27 units with first dose tomorrow (2/20/21).    Bolus insulin isn't in use. Patient isn't eating meals.     Corrective insulin is in use. Patient has required 4 units corrective insulin over the past 24 hours.      Insulin drip with glucostabilizer started 2/17/21 at 0523. Insulin drip with glucostabilizer discontinued 2/17/21 at [facility]  To optimize BG control and support a positive health outcome, would utilize the Subcutaneous Insulin Order set (0405).    Impression: It is suspected that patient will have better glycemic control with the increase in basal along with the current corrective insulin orders. It is likely that now that patient's diet is advancing that bolus insulin will also be required to maintain BG control in target range. Assessment and Plan   Nursing Diagnosis Risk for unstable blood glucose pattern   Nursing Intervention Domain 7552 Decision-making Support   Nursing Interventions Examined current inpatient diabetes control   Explored factors facilitating and impeding inpatient management       Recommendations   Recommend:     [x]?         Use of Subcutaneous Insulin Order set (4422)  Insulin Use Recommendation   Basal                                      (Based on weight, BMI & GFR) Addresses basic metabolic needs Continue Lantus 23 units daily, first dose today   Nutritional (Based on CHO load) Addresses nutrition interventions If patient experiences pre-prandial BG > 200 mg/dL, start Humalog 3 units with meals, hold if patient eats < 50% CHO on meal tray. Corrective                                       (Offset gaps in dosing) Useful in adjusting insulin dosing Correctional Scale for Normal Sensitivity     200-249- 2units Humalog  [phone] Humalog  [phone] Humalog  [phone] Humalog  Over 400- 10units Humalog     Do NOT hold for NPO; give in addition to meal time insulin dose.     If patient does not eat, -give correction dose only.         [x]? Referral to     [x]? Diabetes Self-Management Training through [facility] (Phone [phone] to schedule appointment)      Time Spent/ Billing Codes     Total time spent with patient: 15 Minutes   I personally reviewed chart, notes, data and current medications in the medical record. I have personally examined and treated the patient at bedside during this period.      [x] 63395 IP subsequent hospital care - 15 minutes       TEDDY Gabriel  Diabetes Clinical Nurse Specialist  [facility]  Access via [facility] [facility]

## 2021-02-19 NOTE — PROGRESS NOTES
Spoke with patient's mother and sister  Updated them on plan- they were already aware of plan - his brother Lashawn Fang called and updated   No questions   All agreeable to OP cardiac testing

## 2021-02-19 NOTE — PROGRESS NOTES
Physical Therapy Note:    PT treatment deferred. Pt declining participation in PT treatment stating he is \"not having a good day. \" Pt educated regarding importance of mobility and benefits of out of bed positioning. He verbalizes understanding and continues to decline participation. Will continue to follow per POC.     Issa Shay, PT, DPT, CEEAA  12 mins

## 2021-02-19 NOTE — ROUTINE PROCESS
Bedside shift change report given to Eder Pryor (oncoming nurse) by Ansley Posada (offgoing nurse). Report included the following information SBAR, Kardex, ED Summary, Procedure Summary, Intake/Output, MAR, Accordion, Recent Results, Med Rec Status and Cardiac Rhythm NSR.

## 2021-02-19 NOTE — PROGRESS NOTES
Transition Plan of Care  RUR 16%      Plan:  1. I spoke to the patient's brother Megan Alejandre this afternoon regarding placement. He was hesitate to make any quick decisions with SNF placement after talking with the group home [facility]  [address] Durand [phone] and they are willing to have a RN do diabetic management with the patient and having a dietician involved with the patients meal preparations to help better manage his diabetes. 2. I spoke to Dilip with the group home and let her know that the patient would be discharged tomorrow. They will provide the transportation for the patient at discharge. Weekend CM Please call early in the morning to inform them of a discharge time. 3. Patient will need a 2nd IM letter prior to discharge. 4. Group home does not require a covid test for the patient to come back to the home. A covid r/o is pending results already. 5. Case Management to follow.     Edilma Hernandez, BS

## 2021-02-19 NOTE — PROGRESS NOTES
Bedside and Verbal shift change report given to Gricelda Figueroa (oncoming nurse) by Adina Lopez (offgoing nurse). Report included the following information SBAR, Kardex, Intake/Output, MAR, Recent Results and Cardiac Rhythm NSR. Central line Type: triple lumen  Central Line Insert Date: 2/17/21  Reason Central Line Placed: levophed  Central Line Dressing Date: 2/17/21  Biopatch in place? Yes No: yes  Tubing labeled and appropriate? Yes No: yes  Alcohol caps on all open ports? Yes No: yes  Last CHG bath (time&date): 2/17/21 2155  Reviewed with provider and central line must stay in for the following reasons: N/A    1524 updated provided to [facility]    Bedside and Verbal shift change report given to Adina Lopez (oncoming nurse) by Gricelda Figueroa (offgoing nurse). Report included the following information SBAR, Kardex, Intake/Output, MAR and Recent Results.

## 2021-02-20 VITALS
HEART RATE: 73 BPM | TEMPERATURE: 97.7 F | WEIGHT: 129 LBS | HEIGHT: 66 IN | OXYGEN SATURATION: 92 % | BODY MASS INDEX: 20.73 KG/M2 | DIASTOLIC BLOOD PRESSURE: 53 MMHG | RESPIRATION RATE: 16 BRPM | SYSTOLIC BLOOD PRESSURE: 100 MMHG

## 2021-02-20 LAB
ANION GAP SERPL CALC-SCNC: 3 MMOL/L (ref 5–15)
BASOPHILS # BLD: 0 K/UL (ref 0–0.1)
BASOPHILS NFR BLD: 0 % (ref 0–1)
BUN SERPL-MCNC: 14 MG/DL (ref 6–20)
BUN/CREAT SERPL: 19 (ref 12–20)
CALCIUM SERPL-MCNC: 7.9 MG/DL (ref 8.5–10.1)
CHLORIDE SERPL-SCNC: 111 MMOL/L (ref 97–108)
CO2 SERPL-SCNC: 29 MMOL/L (ref 21–32)
CREAT SERPL-MCNC: 0.74 MG/DL (ref 0.7–1.3)
DIFFERENTIAL METHOD BLD: ABNORMAL
EOSINOPHIL # BLD: 0.3 K/UL (ref 0–0.4)
EOSINOPHIL NFR BLD: 4 % (ref 0–7)
ERYTHROCYTE [DISTWIDTH] IN BLOOD BY AUTOMATED COUNT: 15.3 % (ref 11.5–14.5)
FERRITIN SERPL-MCNC: 37 NG/ML (ref 26–388)
GLUCOSE BLD STRIP.AUTO-MCNC: 101 MG/DL (ref 65–100)
GLUCOSE BLD STRIP.AUTO-MCNC: 286 MG/DL (ref 65–100)
GLUCOSE SERPL-MCNC: 105 MG/DL (ref 65–100)
HCT VFR BLD AUTO: 22.5 % (ref 36.6–50.3)
HCT VFR BLD AUTO: 23.3 % (ref 36.6–50.3)
HGB BLD-MCNC: 7.3 G/DL (ref 12.1–17)
HGB BLD-MCNC: 7.4 G/DL (ref 12.1–17)
IMM GRANULOCYTES # BLD AUTO: 0 K/UL (ref 0–0.04)
IMM GRANULOCYTES NFR BLD AUTO: 1 % (ref 0–0.5)
IRON SATN MFR SERPL: 8 % (ref 20–50)
IRON SERPL-MCNC: 16 UG/DL (ref 35–150)
LYMPHOCYTES # BLD: 1.5 K/UL (ref 0.8–3.5)
LYMPHOCYTES NFR BLD: 25 % (ref 12–49)
MAGNESIUM SERPL-MCNC: 1.8 MG/DL (ref 1.6–2.4)
MCH RBC QN AUTO: 32.6 PG (ref 26–34)
MCHC RBC AUTO-ENTMCNC: 32.4 G/DL (ref 30–36.5)
MCV RBC AUTO: 100.4 FL (ref 80–99)
MONOCYTES # BLD: 0.5 K/UL (ref 0–1)
MONOCYTES NFR BLD: 8 % (ref 5–13)
NEUTS SEG # BLD: 3.7 K/UL (ref 1.8–8)
NEUTS SEG NFR BLD: 62 % (ref 32–75)
NRBC # BLD: 0 K/UL (ref 0–0.01)
NRBC BLD-RTO: 0 PER 100 WBC
PHOSPHATE SERPL-MCNC: 2.9 MG/DL (ref 2.6–4.7)
PLATELET # BLD AUTO: 197 K/UL (ref 150–400)
PMV BLD AUTO: 10.1 FL (ref 8.9–12.9)
POTASSIUM SERPL-SCNC: 3.8 MMOL/L (ref 3.5–5.1)
RBC # BLD AUTO: 2.24 M/UL (ref 4.1–5.7)
SARS-COV-2, COV2: NOT DETECTED
SERVICE CMNT-IMP: ABNORMAL
SERVICE CMNT-IMP: ABNORMAL
SODIUM SERPL-SCNC: 143 MMOL/L (ref 136–145)
SPECIMEN SOURCE, FCOV2M: NORMAL
TIBC SERPL-MCNC: 213 UG/DL (ref 250–450)
WBC # BLD AUTO: 5.9 K/UL (ref 4.1–11.1)

## 2021-02-20 PROCEDURE — 74011250637 HC RX REV CODE- 250/637: Performed by: INTERNAL MEDICINE

## 2021-02-20 PROCEDURE — 74011250636 HC RX REV CODE- 250/636: Performed by: INTERNAL MEDICINE

## 2021-02-20 PROCEDURE — 74011636637 HC RX REV CODE- 636/637: Performed by: STUDENT IN AN ORGANIZED HEALTH CARE EDUCATION/TRAINING PROGRAM

## 2021-02-20 PROCEDURE — 36415 COLL VENOUS BLD VENIPUNCTURE: CPT

## 2021-02-20 PROCEDURE — 74011250637 HC RX REV CODE- 250/637: Performed by: NURSE PRACTITIONER

## 2021-02-20 PROCEDURE — 74011250637 HC RX REV CODE- 250/637: Performed by: STUDENT IN AN ORGANIZED HEALTH CARE EDUCATION/TRAINING PROGRAM

## 2021-02-20 PROCEDURE — 99238 HOSP IP/OBS DSCHRG MGMT 30/<: CPT | Performed by: FAMILY MEDICINE

## 2021-02-20 PROCEDURE — 83540 ASSAY OF IRON: CPT

## 2021-02-20 PROCEDURE — 85025 COMPLETE CBC W/AUTO DIFF WBC: CPT

## 2021-02-20 PROCEDURE — 85018 HEMOGLOBIN: CPT

## 2021-02-20 PROCEDURE — 84100 ASSAY OF PHOSPHORUS: CPT

## 2021-02-20 PROCEDURE — 82728 ASSAY OF FERRITIN: CPT

## 2021-02-20 PROCEDURE — 82962 GLUCOSE BLOOD TEST: CPT

## 2021-02-20 PROCEDURE — 83735 ASSAY OF MAGNESIUM: CPT

## 2021-02-20 PROCEDURE — 74011250636 HC RX REV CODE- 250/636: Performed by: FAMILY MEDICINE

## 2021-02-20 PROCEDURE — 74011250636 HC RX REV CODE- 250/636: Performed by: STUDENT IN AN ORGANIZED HEALTH CARE EDUCATION/TRAINING PROGRAM

## 2021-02-20 PROCEDURE — C9113 INJ PANTOPRAZOLE SODIUM, VIA: HCPCS | Performed by: INTERNAL MEDICINE

## 2021-02-20 PROCEDURE — 80048 BASIC METABOLIC PNL TOTAL CA: CPT

## 2021-02-20 PROCEDURE — 74011000250 HC RX REV CODE- 250: Performed by: FAMILY MEDICINE

## 2021-02-20 RX ORDER — POTASSIUM CHLORIDE 750 MG/1
10 TABLET, FILM COATED, EXTENDED RELEASE ORAL ONCE
Status: COMPLETED | OUTPATIENT
Start: 2021-02-20 | End: 2021-02-20

## 2021-02-20 RX ORDER — SODIUM,POTASSIUM PHOSPHATES 280-250MG
2 POWDER IN PACKET (EA) ORAL ONCE
Status: COMPLETED | OUTPATIENT
Start: 2021-02-20 | End: 2021-02-20

## 2021-02-20 RX ORDER — ATORVASTATIN CALCIUM 40 MG/1
40 TABLET, FILM COATED ORAL
Qty: 30 TAB | Refills: 0 | Status: SHIPPED | OUTPATIENT
Start: 2021-02-20 | End: 2021-03-01

## 2021-02-20 RX ORDER — UREA 10 %
2 LOTION (ML) TOPICAL ONCE
Status: COMPLETED | OUTPATIENT
Start: 2021-02-20 | End: 2021-02-20

## 2021-02-20 RX ORDER — GUAIFENESIN 100 MG/5ML
81 LIQUID (ML) ORAL DAILY
Qty: 30 TAB | Refills: 0 | Status: SHIPPED | OUTPATIENT
Start: 2021-02-20 | End: 2021-03-01 | Stop reason: SDUPTHER

## 2021-02-20 RX ADMIN — CEFEPIME HYDROCHLORIDE 2 G: 2 INJECTION, POWDER, FOR SOLUTION INTRAVENOUS at 09:43

## 2021-02-20 RX ADMIN — PANTOPRAZOLE SODIUM 40 MG: 40 INJECTION, POWDER, FOR SOLUTION INTRAVENOUS at 09:43

## 2021-02-20 RX ADMIN — ASPIRIN 81 MG: 81 TABLET, CHEWABLE ORAL at 09:44

## 2021-02-20 RX ADMIN — SUCRALFATE 1 G: 1 TABLET ORAL at 06:27

## 2021-02-20 RX ADMIN — Medication 10 ML: at 06:30

## 2021-02-20 RX ADMIN — POTASSIUM CHLORIDE 10 MEQ: 750 TABLET, FILM COATED, EXTENDED RELEASE ORAL at 09:44

## 2021-02-20 RX ADMIN — CEFEPIME HYDROCHLORIDE 2 G: 2 INJECTION, POWDER, FOR SOLUTION INTRAVENOUS at 00:43

## 2021-02-20 RX ADMIN — METRONIDAZOLE 500 MG: 500 INJECTION, SOLUTION INTRAVENOUS at 09:43

## 2021-02-20 RX ADMIN — Medication 54 MG: at 09:44

## 2021-02-20 RX ADMIN — SUCRALFATE 1 G: 1 TABLET ORAL at 12:32

## 2021-02-20 RX ADMIN — POTASSIUM & SODIUM PHOSPHATES POWDER PACK 280-160-250 MG 2 PACKET: 280-160-250 PACK at 09:43

## 2021-02-20 RX ADMIN — METOPROLOL TARTRATE 12.5 MG: 25 TABLET, FILM COATED ORAL at 09:44

## 2021-02-20 RX ADMIN — SERTRALINE 100 MG: 50 TABLET, FILM COATED ORAL at 09:44

## 2021-02-20 RX ADMIN — FERROUS SULFATE TAB 325 MG (65 MG ELEMENTAL FE) 325 MG: 325 (65 FE) TAB at 09:43

## 2021-02-20 RX ADMIN — IRON SUCROSE 100 MG: 20 INJECTION, SOLUTION INTRAVENOUS at 09:43

## 2021-02-20 RX ADMIN — INSULIN LISPRO 5 UNITS: 100 INJECTION, SOLUTION INTRAVENOUS; SUBCUTANEOUS at 12:31

## 2021-02-20 RX ADMIN — Medication 10 ML: at 00:43

## 2021-02-20 NOTE — PROGRESS NOTES
Bedside and Verbal shift change report given to Reji Hoang (oncoming nurse) by Elis Haas (offgoing nurse). Report included the following information SBAR, Kardex, Intake/Output, MAR and Recent Results. 9163 subclavian triple lumen removed by [place] and [place]    9486 paged security to bring pt belonging for discharge.  Receipt number #[id number]

## 2021-02-20 NOTE — PROGRESS NOTES
[address] [facility] [address] [place] [address]   Office [phone]  Fax [phone] Residency Attending Addendum:  I saw and evaluated the patient on the day of the encounter with Dr. Elina Benitez, performing the colindres elements of the service. I discussed the findings, assessment and plan with the resident and agree with the resident's findings and plan as documented in the resident's note. Plan to discharge today at 3 PM.  Cultures negative today. Hemoglobin stable. Will need close outpatient follow-up. Brad Sheehan MD, FAAFP, CAQSM, RMSK           Assessment and Plan     Prem Rice is a 60 y.o. male w/PMH T2DM, HHT, HLD, GERD, Anxiety, Seizures, Dementia admitted for DKA. 24 Hour Events: No acute events   Tele: Sinus 68    NSTEMI: POA EKG w/ sinus tachycardia. POA Trop 1.99 > peaked at 11.6. ECHO EF 65-70%. S/p Hep gtt. - ASA 81mg daily  - Lipitor 40mg daily  - Cardiology consulted, appreciate recs   - stress test OP    Shock of unknown etiology: Possibly 2/2 IVVD vs Infection of unknown source. POA LA 4.05 (resolved), 3/4 SIRS (WBC, HR, RR). CXR and CT A/P neg. Rapid COVID neg. UCx neg. S/p Levophed (2/). S/p Vanc.   - Cefepime/flagyl  - F/u BcxNGTD     DKA: RESOLVED. s/p insulin gtt. POA AG 30, pH 7.01, BHB >4.42, glu 960. DM2: A1c 8.4. Home metformin 850mg TID, Levemir 26U + SSI.  - Lantus 27U  - POC glu ACHS + SSI  - Hypoglycemia protocol    GI bleed: FOBT+, no gross bleeding     - H&H q6H  - Patient refuses transfusion  - IV venofer   - GI consulted, no scope planned at this time, following from a distance    Hereditary Hemorrhagic Telangiectasia:  - Monitor for bleeding     PRADEEP: POA Hgb 8.9 (BL 10-14?).    - Continue home Iron 325mg BID     Anxiety:   - Continue home Mirtazapine 15 mg hs, sertraline 100 mg daily     Seizure:   - Continue home Depakote ER 1500 mg hs     Asthma:   - Duoneb prn     HLD: Tchol 122, LDL 57.4, HDL 52, TAG 63.   - Continue home Lipitor 10 mg     GERD: stable. 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 POA Cr 1.94 (BL 1.1). FEN/GI - DM   Activity - Bedrest  DVT prophylaxis - HLD  GI prophylaxis -  Protonix  Disposition - PT/OT      Chance Garvey MD  Family Medicine Resident         Subjective / Objective     Subjective: Pt was seen and examined at bedside. Afebrile and hemodynamically stable. Concerns overnight include: none. Reports fatigue. Denies chest pain, SOB, nausea, vomiting, abdominal pain, dizziness. Discussed risk/benefit of transfusion. Patient refusing transfusion. Objective:  Temp (24hrs), Av.2 °F (36.8 °C), Min:97.7 °F (36.5 °C), Max:99.1 °F (37.3 °C)     Visit Vitals  BP (!) 147/89 (BP 1 Location: Left upper arm, BP Patient Position: At rest)   Pulse 69   Temp 97.7 °F (36.5 °C)   Resp 16   Ht 5' 6\" (1.676 m)   Wt 129 lb (58.5 kg)   SpO2 93%   BMI 20.82 kg/m²     Physical Exam:  General: No acute distress. Alert. Cooperative. Respiratory: CTAB. No w/r/r/c.   Cardiovascular: Normal S1,S2. No m/r/g.   GI: Nondistended.+ bowel sounds. Nontender. No rebound tenderness or guarding. Extremities: No LE edema. Skin: Warm, dry. No rashes. Neuro: Alert and oriented    Respiratory:   O2 Device: Room air     I/O:  Date 21 - 21 - 21 0659   Shift 1058-5817 2465-8301 24 Hour Total 4990-7739 1805-3109 24 Hour Total   INTAKE   P.O. 300  300        P. O. 300  300      I. V.(mL/kg/hr)  265.9(0.4) 265.9(0.2)        Heparin Volume  45.9 45.9        Volume (metroNIDAZOLE (FLAGYL) IVPB premix 500 mg)  200 200        Volume (cefepime (MAXIPIME) 2 g in sterile water (preservative free) 10 mL IV syringe)  20 20      Shift Total(mL/kg) 300(5.1) 265.9(4.5) 565.9(9.7)      OUTPUT   Urine(mL/kg/hr) 750(1.1) 950(1.4) 1700(1.2)        Urine Output (mL) (Condom Catheter 02/18/21)       Shift Total(mL/kg) 750(12.8) 950(16.2) 1700(29.1)      NET -450 -684.2 -1134.2      Weight (kg) 58.5 58.5 58.5 58.5 58.5 58.5       Inpatient Medications  Current Facility-Administered Medications   Medication Dose Route Frequency    potassium, sodium phosphates (NEUTRA-PHOS) packet 2 Packet  2 Packet Oral ONCE    potassium chloride SR (KLOR-CON 10) tablet 10 mEq  10 mEq Oral ONCE    magnesium gluconate tablet 54 mg [elemental]  2 Tab Oral ONCE    iron sucrose (VENOFER) injection 100 mg  100 mg IntraVENous ONCE    atorvastatin (LIPITOR) tablet 40 mg  40 mg Oral QHS    insulin glargine (LANTUS) injection 27 Units  27 Units SubCUTAneous DAILY    metoprolol tartrate (LOPRESSOR) tablet 12.5 mg  12.5 mg Oral Q12H    cefepime (MAXIPIME) 2 g in sterile water (preservative free) 10 mL IV syringe  2 g IntraVENous Q8H    albuterol-ipratropium (DUO-NEB) 2.5 MG-0.5 MG/3 ML  3 mL Nebulization TID PRN    mirtazapine (REMERON) tablet 15 mg  15 mg Oral QHS    sertraline (ZOLOFT) tablet 100 mg  100 mg Oral DAILY    sucralfate (CARAFATE) tablet 1 g  1 g Oral AC&HS    ferrous sulfate tablet 325 mg  325 mg Oral BIDPC    divalproex ER (DEPAKOTE ER) 24 hour tablet 1,500 mg  1,500 mg Oral QHS    melatonin (rapid dissolve) tablet 2.5 mg  2.5 mg Oral QHS PRN    sodium chloride (NS) flush 5-40 mL  5-40 mL IntraVENous Q8H    sodium chloride (NS) flush 5-40 mL  5-40 mL IntraVENous PRN    acetaminophen (TYLENOL) tablet 650 mg  650 mg Oral Q4H PRN    ondansetron (ZOFRAN) injection 4 mg  4 mg IntraVENous Q4H PRN    pantoprazole (PROTONIX) 40 mg in 0.9% sodium chloride 10 mL injection  40 mg IntraVENous Q12H    metroNIDAZOLE (FLAGYL) IVPB premix 500 mg  500 mg IntraVENous Q12H    aspirin chewable tablet 81 mg  81 mg Oral DAILY    glucose chewable tablet 16 g  4 Tab Oral PRN    dextrose (D50W) injection syrg 12.5-25 g  25-50 mL IntraVENous PRN    glucagon (GLUCAGEN) injection 1 mg  1 mg IntraMUSCular PRN    insulin lispro (HUMALOG) injection   SubCUTAneous AC&HS         Allergies  Allergies   Allergen Reactions    Penicillins Rash         CBC:  Recent Labs     02/20/21  0257 02/19/21  1856 02/19/21  0937 02/19/21  0312 02/19/21  0055   WBC 5.9  --   --  7.1 6.9   HGB 7.3* 7.9* 8.1* 7.3* 7.4*   HCT 22.5* 24.4* 25.1* 22.7* 22.8*     --   --  198 878       Metabolic Panel:  Recent Labs     02/20/21  0257 02/19/21  0055 02/18/21  0241    142 144   K 3.8 3.1* 3.5   * 110* 108   CO2 29 27 29   BUN 14 14 26*   CREA 0.74 0.85 1.09   * 134* 235*   CA 7.9* 7.3* 7.2*   MG 1.8 1.6 1.5*   PHOS 2.9 2.8 3.4           Procedures: None    Imaging/Diagnostic Studies:  Results from Hospital Encounter encounter on 02/17/21   XR CHEST PORT    Narrative EXAM: XR CHEST PORT    INDICATION: central line placement    COMPARISON: Earlier in the day    FINDINGS: A portable AP radiograph of the chest was obtained at 0614 hours. The  central line tip is in the region of the superior vena cava. The right lung  nodule is stable. The patient is on a cardiac monitor. The lungs are clear. The  cardiac and mediastinal contours and pulmonary vascularity are normal.  The  bones and soft tissues are grossly within normal limits. Impression Appropriate line placement. No results found for this or any previous visit. Results from [place] encounter on 02/17/21   CT ABD PELV WO CONT    Narrative EXAM: CT ABD PELV WO CONT    INDICATION: Abdominal pain with nausea and vomiting    COMPARISON: November 2020    CONTRAST:  None. TECHNIQUE:   Thin axial images were obtained through the abdomen and pelvis. Coronal and  sagittal reformats were generated. Oral contrast was not administered. CT dose  reduction was achieved through use of a standardized protocol tailored for this  examination and automatic exposure control for dose modulation.      The absence of intravenous contrast material reduces the sensitivity for  evaluation of the vasculature and solid organs. FINDINGS:   LOWER THORAX: No significant abnormality in the incidentally imaged lower chest.  LIVER: No mass. BILIARY TREE: Gallbladder is within normal limits. CBD is not dilated. SPLEEN: within normal limits. PANCREAS: No focal abnormality. ADRENALS: Unremarkable. KIDNEYS/URETERS: No calculus or hydronephrosis. STOMACH: Unremarkable. SMALL BOWEL: No dilatation or wall thickening. COLON: No dilatation or wall thickening. APPENDIX: Normal.  PERITONEUM: No ascites or pneumoperitoneum. RETROPERITONEUM: There are significant vascular calcifications. REPRODUCTIVE ORGANS: Enlarged prostate. URINARY BLADDER: No mass or calculus. BONES: No destructive bone lesion. ABDOMINAL WALL: No mass or hernia. ADDITIONAL COMMENTS: There is a right hydrocele. Impression No acute findings.                    For Billing    Chief Complaint   Patient presents with   Yareli Curl Vomiting       Hospital Problems  Date Reviewed: 10/20/2020          Codes Class Noted POA    * (Principal) DKA (diabetic ketoacidoses) ([facility] ICD-10-CM: E11.10  ICD-9-CM: 250.12  2/17/2021 Yes        Metabolic acidosis LPK-99-GP: E87.2  ICD-9-CM: 276.2  2/17/2021 Yes        Hyponatremia ICD-10-CM: E87.1  ICD-9-CM: 276.1  2/17/2021 Yes        Hyperkalemia ICD-10-CM: E87.5  ICD-9-CM: 276.7  2/17/2021 Yes        SHIRA (acute kidney injury) ([facility] ICD-10-CM: N17.9  ICD-9-CM: 584.9  2/17/2021 Yes        NSTEMI (non-ST elevated myocardial infarction) [facility]) ICD-10-CM: I21.4  ICD-9-CM: 410.70  2/17/2021 Yes        Shock ([facility] ICD-10-CM: R57.9  ICD-9-CM: 785.50  2/17/2021 No        Iron deficiency anemia due to chronic blood loss ICD-10-CM: D50.0  ICD-9-CM: 280.0  2/26/2020 Yes        Seizure disorder ([facility] ICD-10-CM: G40.909  ICD-9-CM: 345.90  2/3/2016 Yes        Uncontrolled type 2 diabetes mellitus ([facility] ICD-10-CM: E11.65  ICD-9-CM: 250.02  9/3/2015 Yes Overview Signed 10/11/2018  8:28 AM by Ronald Ellison Last Assessment & Plan:    Hemoglobin A1c is elevated 8.8% this time which is up from 7.3% in December of 2016. Worsened.   This is most certainly related to his alcohol use that he has restarted in the last several months  I advised him to stop drinking in the strongest possible terms             Anxiety disorder ICD-10-CM: F41.9  ICD-9-CM: 300.00  5/21/2002 Yes        Hereditary hemorrhagic telangiectasia ([facility] ICD-10-CM: I78.0  ICD-9-CM: 448.0  1963 Yes

## 2021-02-20 NOTE — PROGRESS NOTES
Problem: Risk for Spread of Infection  Goal: Prevent transmission of infectious organism to others  Description: Prevent the transmission of infectious organisms to other patients, staff members, and visitors. Outcome: Progressing Towards Goal     Problem: Diabetes Self-Management  Goal: *Disease process and treatment process  Description: Define diabetes and identify own type of diabetes; list 3 options for treating diabetes. Outcome: Progressing Towards Goal  Goal: *Incorporating nutritional management into lifestyle  Description: Describe effect of type, amount and timing of food on blood glucose; list 3 methods for planning meals. Outcome: Progressing Towards Goal  Goal: *Incorporating physical activity into lifestyle  Description: State effect of exercise on blood glucose levels. Outcome: Progressing Towards Goal     Problem: Pressure Injury - Risk of  Goal: *Prevention of pressure injury  Description: Document Doug Scale and appropriate interventions in the flowsheet.   Outcome: Progressing Towards Goal  Note: Pressure Injury Interventions:  Sensory Interventions: Assess changes in LOC, Assess need for specialty bed, Check visual cues for pain, Discuss PT/OT consult with provider, Maintain/enhance activity level, Minimize linen layers, Monitor skin under medical devices    Moisture Interventions: Absorbent underpads, Maintain skin hydration (lotion/cream), Internal/External urinary devices    Activity Interventions: Increase time out of bed, Pressure redistribution bed/mattress(bed type), PT/OT evaluation    Mobility Interventions: Assess need for specialty bed, HOB 30 degrees or less, PT/OT evaluation    Nutrition Interventions: Document food/fluid/supplement intake    Friction and Shear Interventions: HOB 30 degrees or less, Minimize layers, Lift sheet                Problem: Falls - Risk of  Goal: *Absence of Falls  Description: Document Jeannette Fall Risk and appropriate interventions in the flowsheet.   Outcome: Progressing Towards Goal  Note: Fall Risk Interventions:  Mobility Interventions: Bed/chair exit alarm, Patient to call before getting OOB, Communicate number of staff needed for ambulation/transfer, PT Consult for mobility concerns, PT Consult for assist device competence    Mentation Interventions: Adequate sleep, hydration, pain control, Bed/chair exit alarm, Evaluate medications/consider consulting pharmacy, Door open when patient unattended, Eyeglasses and hearing aids    Medication Interventions: Assess postural VS orthostatic hypotension, Bed/chair exit alarm, Patient to call before getting OOB, Teach patient to arise slowly    Elimination Interventions: Bed/chair exit alarm, Call light in reach

## 2021-02-20 NOTE — PROGRESS NOTES
2/20/2021  Case Management Note    9:12 AM  Per previous CM note, I spoke to [facility] about discharge time, which will be around 3pm, and have informed [facility], [address] @ [facility]. She asked if the patient could be discharged any earlier as she is already in this area for another emergency, but I let her know that they were re-running the patient's Hgb and it probably wouldn't be before the afternoon.      Mason Montes, BS

## 2021-02-20 NOTE — ROUTINE PROCESS
Bedside shift change report given to Reji Hoang (oncoming nurse) by Elis Haas (offgoing nurse). Report included the following information SBAR, Kardex, ED Summary, Intake/Output, MAR, Accordion, Recent Results, Med Rec Status and Cardiac Rhythm NSR.

## 2021-02-20 NOTE — DISCHARGE SUMMARY
[address] [facility] [address] [place] [address]   Office [phone]  Fax [phone]       Discharge / Transfer / Off-Service Note     Name: Tong Calvin MRN: [id number]  Sex: Male   YOB: 1965  Age: 60 y.o. PCP: Lionel Jameson MD     Date of admission: 2/17/2021  Date of discharge/transfer: 2/20/2021    Attending physician at admission: Uche Jernigan MD     Attending physician at discharge/transfer: [address] [place] [address]    Resident physician at discharge/transfer: Dary Campbell MD     Consultants during hospitalization  IP CONSULT TO CARDIOLOGY  IP CONSULT TO [place] [id number] TO [place] [phone]     Admission diagnoses   DKA (diabetic ketoacidoses) ([facility] [E11.10]    Recommended follow-up after discharge  1. PCP  2. Cardiology  3. Endocrinology  4. Hematology & Oncology    Things to follow up on with PCP:  Final blood culture result  Referral to Hematology & Oncology if needed     Medication Changes:  1. New Medications: ASA 81 mg daily  2. Modified Medications: Lipitor 40mg daily (increased from 10mg)  3. Discontinued Medications: None        HOSPITAL COURSE    60 y.o. male w/PMH T2DM, HHT, HLD, GERD, Anxiety, Seizures, Dementia presented altered with N/V for several days. Patient was admitted for DKA and shock of unknown origin. DKA was treated with fluids and insulin drip. Shock was likely 2/2 to IVVD as infectious work up was negative. Patient was treated with pressors and broad spectrum antibiotics. Patient was diagnosed with NSTEMI and treated with heparin drip. Cardiology following during admission. Gastroenterology consulted for positive FOBT, but no intervention performed as no gross bleeding. Recieved IV Venofer for chronic PRADEEP. Hemoglobin stable day of discharge.         NSTEMI: POA EKG w/ sinus tachycardia. Troponin peaked at 11.6. ECHO EF 65-70%. S/p Hep gtt.   - Continue ASA 81mg daily  - Continue home Lipitor 40mg daily  - F/u Cardiology - stress test outpatient    Shock of unknown etiology: Likely 2/2 IVVD in the setting of DKA. POA LA 4.05 (resolved), 3/4 SIRS (WBC, HR, RR). CXR and CT A/P neg. Rapid COVID and UCx neg.   - PCP: follow up final blood culture result    DKA: RESOLVED. s/p insulin gtt. POA AG 30, pH 7.01, BHB >4.42, glu 960. DM2: A1c 8.4.   - Continue Levenur 26U + SSI  - Continue home metformin  850mg TID   - F/u Endocrinology    GI bleed: FOBT+, no gross bleeding. Hgb 7.4 at discharge. Refused colonoscopy. Follow up outpatient. Hereditary Hemorrhagic Telangiectasia:  - F/u Hematology Oncology    PRADEEP: POA Hgb 8.9. Fe 16, TIBC 214, Iron sat 8, Ferritin 37. DC Hgb 7.4.   - Continue home Iron 325mg BID   - F/u Hematology & Oncology for possible iron infusions    Anxiety:   - Continue home Mirtazapine 15 mg hs, sertraline 100 mg daily      Seizure: Stable. - Continue home Depakote ER 1500 mg hs     Asthma:   - Duoneb prn     HLD: Tchol 122, LDL 57.4, HDL 52, TAG 63.   - Continue home Lipitor 40 mg (increased from home 10mg)     GERD: stable. 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       SHIRA: RESOLVED. POA Cr 1.94 (BL 1.1).      Physical exam at discharge:    Vitals Reviewed. Visit Vitals  BP (!) 100/53 (BP 1 Location: Left arm, BP Patient Position: At rest)   Pulse 73   Temp 97.7 °F (36.5 °C)   Resp 16   Ht 5' 6\" (1.676 m)   Wt 129 lb (58.5 kg)   SpO2 92%   BMI 20.82 kg/m²        General Oriented to person, place, and time and well-developed. Appears well-nourished, no distress. Not diaphoretic. HENT Head Normocephalic and atraumatic. Eyes Conjunctivae are normal, no discharge. No scleral icterus. Neck No thyromegaly present. No cervical adenopathy. Cardio Normal rate, regular rhythm. Exam reveals no gallop and no friction rub. No murmur heard.     Pulmonary Effort normal and breath sounds normal. No respiratory distress. No wheezes, no rales. Abdominal Soft. Bowel sounds normal. No distension. No tenderness. Extremities No edema of lower extremities. No tenderness. Distal pulses intact. Neurological Alert and oriented to person, place, and time. Dermatology Skin is warm and dry. No rash noted. No erythema or pallor. Psychiatric Affect and judgment normal.          Condition at discharge: Stable    Labs  Recent Labs     02/20/21  1132 02/20/21  0257 02/19/21  1856 02/19/21  0312 02/19/21  0312 02/19/21 0055   WBC  --  5.9  --   --  7.1 6.9   HGB 7.4* 7.3* 7.9*   < > 7.3* 7.4*   HCT 23.3* 22.5* 24.4*   < > 22.7* 22.8*   PLT  --  197  --   --  198 207    < > = values in this interval not displayed. Recent Labs     02/20/21  0257 02/19/21 0055 02/18/21 0241    142 144   K 3.8 3.1* 3.5   * 110* 108   CO2 29 27 29   BUN 14 14 26*   CREA 0.74 0.85 1.09   * 134* 235*   CA 7.9* 7.3* 7.2*   MG 1.8 1.6 1.5*   PHOS 2.9 2.8 3.4     No results for input(s): ALT, AP, TBIL, TBILI, TP, ALB, GLOB, GGT, AML, LPSE in the last 72 hours. No lab exists for component: SGOT, GPT, AMYP, HLPSE  Recent Labs     02/20/21  1131 02/20/21  0735 02/20/21  0257 02/19/21  2101 02/19/21  1554 02/19/21  1139 02/19/21  0055 02/19/21  0055 02/18/21  1709 02/18/21  1709 02/18/21  9741 02/18/21  1976 02/18/21  0241 02/18/21  0241 02/17/21  2306   TROIQ  --   --   --   --   --   --   --   --   --   --   --   --   --  5.09* 7.60*   APTT  --   --   --   --   --   --   --  84.6*  --  40.5*  --  86.2*  --  49.3*  --    TIBC  --   --  213*  --   --   --   --   --   --   --   --   --   --   --   --    PSAT  --   --  8*  --   --   --   --   --   --   --   --   --   --   --   --    FERR  --   --  37  --   --   --   --   --   --   --   --   --   --   --   --    GLUCPOC 286* 101*  --  294* 308* 136*   < >  --    < >  --    < >  --    < >  --   --     < > = values in this interval not displayed. Microbiology  Results     Procedure Component Value Units Date/Time    SARS-COV-2, PCR [[id number]] Collected: 02/19/21 1157    Order Status: Completed Specimen: Nasopharyngeal Updated: 02/20/21 1028     Specimen source Nasopharyngeal        SARS-CoV-2 Not detected        Comment:      The specimen is NEGATIVE for SARS-CoV-2, the novel coronavirus associated with COVID-19. A negative result does not rule out COVID-19. This test has been authorized by the FDA under an Emergency Use Authorization (EUA) for use by authorized laboratories. Fact sheet for Healthcare Providers: kstattoo.com  Fact sheet for Patients: https://fda.gov/media/122818/download       Methodology: RT-PCR         CULTURE, MRSA [[id number]]     Order Status: Canceled Specimen: Nasal from Nares     COVID-19 RAPID TEST [[id number]] Collected: 02/17/21 0700    Order Status: Completed Specimen: Nasopharyngeal Updated: 02/17/21 0813     Specimen source Nasopharyngeal        COVID-19 rapid test Not detected        Comment: Rapid Abbott ID Now       Rapid NAAT:  The specimen is NEGATIVE for SARS-CoV-2, the novel coronavirus associated with COVID-19. Negative results should be treated as presumptive and, if inconsistent with clinical signs and symptoms or necessary for patient management, should be tested with an alternative molecular assay. Negative results do not preclude SARS-CoV-2 infection and should not be used as the sole basis for patient management decisions. This test has been authorized by the FDA under an Emergency Use Authorization (EUA) for use by authorized laboratories.    Fact sheet for Healthcare Providers: kstattoo.com  Fact sheet for Patients: kstattoo.com       Methodology: Isothermal Nucleic Acid Amplification         CULTURE, BLOOD [[id number]] Collected: 02/17/21 0630    Order Status: Completed Specimen: Blood Updated: 02/20/21 0358     Special Requests: NO SPECIAL REQUESTS        Culture result: NO GROWTH 3 DAYS       CULTURE, URINE [[id number]] Collected: 02/17/21 0531    Order Status: Completed Specimen: Urine from Clean catch Updated: 02/19/21 0854     Special Requests: NO SPECIAL REQUESTS        Culture result:       No significant growth, <10,000 CFU/mL          CULTURE, BLOOD, PAIRED [[id number]] Collected: 02/17/21 0356    Order Status: Canceled Specimen: Blood     CULTURE, BLOOD [[id number]] Collected: 02/17/21 0336    Order Status: Completed Specimen: Blood Updated: 02/20/21 0358     Special Requests: NO SPECIAL REQUESTS        Culture result: NO GROWTH 3 DAYS                Procedures / Diagnostic Studies  None    Imaging  Ct Abd Pelv Wo Cont    Result Date: 2/17/2021  No acute findings. Xr Chest Port    Result Date: 2/17/2021  Appropriate line placement. Xr Chest Port    Result Date: 2/17/2021  No acute findings.        Chronic diagnoses   Problem List as of 2/20/2021 Date Reviewed: 10/20/2020          Codes Class Noted - Resolved    * (Principal) DKA (diabetic ketoacidoses) ([facility] ICD-10-CM: E11.10  ICD-9-CM: 250.12  2/17/2021 - Present        Metabolic acidosis [id number]-HL: E87.2  ICD-9-CM: 276.2  2/17/2021 - Present        Hyponatremia ICD-10-CM: E87.1  ICD-9-CM: 276.1  2/17/2021 - Present        Hyperkalemia ICD-10-CM: E87.5  ICD-9-CM: 276.7  2/17/2021 - Present        SHIRA (acute kidney injury) ([facility] ICD-10-CM: N17.9  ICD-9-CM: 584.9  2/17/2021 - Present        NSTEMI (non-ST elevated myocardial infarction) ([facility] ICD-10-CM: I21.4  ICD-9-CM: 410.70  2/17/2021 - Present        Shock ([facility] ICD-10-CM: R57.9  ICD-9-CM: 785.50  2/17/2021 - Present        Iron deficiency anemia due to chronic blood loss ICD-10-CM: D50.0  ICD-9-CM: 280.0  2/26/2020 - Present        Memory impairment ICD-10-CM: R41.3  ICD-9-CM: 780.93  4/26/2016 - Present        Obsessive-compulsive disorder ICD-10-CM: F42.9  ICD-9-CM: 300.3  4/26/2016 - Present        Seizure disorder [facility]) ICD-10-CM: S04.959  ICD-9-CM: 345.90  2/3/2016 - Present        Uncontrolled type 2 diabetes mellitus ([facility] ICD-10-CM: E11.65  ICD-9-CM: 250.02  9/3/2015 - Present    Overview Signed 10/11/2018  8:28 AM by Celestina Hernandes Last Assessment & Plan:    Hemoglobin A1c is elevated 8.8% this time which is up from 7.3% in December of 2016. Worsened. 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 , Normal, Disp-30 Tab, R-0      atorvastatin (LIPITOR) 40 mg tablet Take 1 Tab by mouth nightly., Normal, Disp-30 Tab, R-0         CONTINUE these medications which have NOT CHANGED    Details   diphenhydrAMINE (BENADRYL) 25 mg capsule Take 25 mg by mouth every four (4) hours as needed for Itching., Historical Med      divalproex ER (Depakote ER) 500 mg ER tablet Take 1,500 mg by mouth nightly., Historical Med      insulin detemir U-100 (LEVEMIR FLEXTOUCH) 100 unit/mL (3 mL) inpn 26 Units by SubCUTAneous route Daily (before breakfast). , Historical Med      metFORMIN (GLUCOPHAGE) 850 mg tablet Take 850 mg by mouth three (3) times daily. , Historical Med      !! insulin aspart U-100 (NovoLOG Flexpen U-100 Insulin) 100 unit/mL (3 mL) inpn by SubCUTAneous route Before breakfast, lunch, and dinner. Sliding Scale  Blood glucose  Less than 70 give 0 units  70 to 150 give 3 units  151 to 200 give 5 units  201 to 250 give 7 units  251 to 300 give 12 units  301 to 350 give 15 units  Greater than  350 give  20 units  If blood glucose is greater than is greater than 300 recheck in 2 hours, Historical Med      !! insulin aspart U-100 (NovoLOG Flexpen U-100 Insulin) 100 unit/mL (3 mL) inpn by SubCUTAneous route nightly. Sliding scale  Blood glucose  Less than 200 give 0 units  200 to 299 give 3 units  Greater than 300 give 6 units, Historical Med      pantoprazole (Protonix) 40 mg tablet Take 40 mg by mouth Daily (before breakfast). , Historical Med      sucralfate (Carafate) 1 gram tablet Take 1 g by mouth Before breakfast, lunch, dinner and at bedtime. , Historical Med      lip protectant (BLISTEX) 0.6-0.5-1.1-0.5 % oint ointment Apply  to affected area as needed for Lubrication. , Historical Med      lip protectant with sunscreen (CARMEX) crea Apply  to affected area as needed for Dry Skin., Historical Med      glucagon (GLUCAGEN) 1 mg injection 1 mg by IntraVENous route as needed for Hypoglycemia., Historical Med      glucose 4 gram chewable tablet Take 45 g by mouth as needed (Blood glucose less than 70). , Historical Med      guaiFENesin (ROBITUSSIN) 100 mg/5 mL liquid Take 200 mg by mouth four (4) times daily as needed for Cough., Historical Med      hydrocortisone (CORTAID) 1 % topical cream Apply  to affected area two (2) times daily as needed for Skin Irritation. use thin layer, Historical Med      ibuprofen (Motrin IB) 200 mg tablet Take 400 mg by mouth every four (4) hours as needed for Pain., Historical Med      albuterol-ipratropium (DUO-NEB) 2.5 mg-0.5 mg/3 ml nebu 3 mL by Nebulization route three (3) times daily as needed for Wheezing., Historical Med      ketoconazole (NIZORAL) 2 % topical cream Apply  to affected area daily as needed for Skin Irritation. , Historical Med      loperamide (IMMODIUM) 2 mg tablet Take 2 mg by mouth four (4) times daily as needed for Diarrhea., Historical Med      acetaminophen (TYLENOL) 325 mg tablet Take 650 mg by mouth every four (4) hours as needed for Pain or Fever., Historical Med      ferrous sulfate 325 mg (65 mg iron) tablet Take 1 Tab by mouth two (2) times daily (after meals). , Normal, Disp-60 Tab, R-2      ondansetron (Zofran ODT) 4 mg disintegrating tablet 1 Tab by SubLINGual route every eight (8) hours as needed for Nausea or Vomiting., Normal, Disp-20 Tab, R-0      mirtazapine (REMERON) 15 mg tablet Take 1 Tab by mouth nightly., Normal, Disp-90 Tab,R-1      sertraline (ZOLOFT) 100 mg tablet Take 1 Tab by mouth daily. , Normal, Disp-90 Tab,R-1       !! - Potential duplicate medications found. Please discuss with provider. Diet:  Diabetic diet. Activity:  As tolerated    Disposition: [facility] per patient and family wishes. Nursing with diabetes management and dietician arranged outpatient by CM.      Discharge instructions to patient/family  Please seek medical attention for any new or worsening symptoms particularly fever, chest pain, shortness of breath, abdominal pain, nausea, vomiting    Follow up plans/appointments  Follow-up Information     Follow up With Specialties Details Why Contact Lai Akins DO Cardiology On 3/5/2021 9 AM [address] [place] [address] [address] follow up with yoru  [address] [facility] [address]  [phone]      Alis Painter MD Hematology and Oncology, Internal Medicine, Hematology, Oncology  Follow up with the blood doctor [address] [place] [address] [address]  [phone]      Endocrine/DM doctor   Follow up with your Diabetes/Endocrine doctor            Ayde Gill MD  Family Medicine Resident       For Billing    Chief Complaint   Patient presents with   [address] Problems  Date Reviewed: 10/20/2020          Codes Class Noted POA    * (Principal) DKA (diabetic ketoacidoses) ([place] [address] ICD-10-CM: E11.10  ICD-9-CM: 250.12  2/17/2021 Yes        Metabolic acidosis BDQ-18-DZ: E87.2  ICD-9-CM: 276.2  2/17/2021 Yes        Hyponatremia ICD-10-CM: E87.1  ICD-9-CM: 276.1  2/17/2021 Yes        Hyperkalemia ICD-10-CM: E87.5  ICD-9-CM: 276.7  2/17/2021 Yes        SHIRA (acute kidney injury) ([facility] ICD-10-CM: N17.9  ICD-9-CM: 584.9  2/17/2021 Yes        NSTEMI (non-ST elevated myocardial infarction) ([facility] ICD-10-CM: I21.4  ICD-9-CM: 410.70  2/17/2021 Yes        Shock ([facility] ICD-10-CM: R57.9  ICD-9-CM: 785.50  2/17/2021 No        Iron deficiency anemia due to chronic blood loss ICD-10-CM: D50.0  ICD-9-CM: 280.0  2/26/2020 Yes        Seizure disorder ([facility] ICD-10-CM: G40.909  ICD-9-CM: 345.90  2/3/2016 Yes        Uncontrolled type 2 diabetes mellitus ([facility] ICD-10-CM: E11.65  ICD-9-CM: 250.02  9/3/2015 Yes    Overview Signed 10/11/2018  8:28 AM by Bakari Rollins Last Assessment & Plan:    Hemoglobin A1c is elevated 8.8% this time which is up from 7.3% in December of 2016. Worsened.   This is most certainly related to his alcohol use that he has restarted in the last several months  I advised him to stop drinking in the strongest possible terms             Anxiety disorder ICD-10-CM: F41.9  ICD-9-CM: 300.00  5/21/2002 Yes        Hereditary hemorrhagic telangiectasia ([facility] ICD-10-CM: I78.0  ICD-9-CM: 448.0  1965 Yes

## 2021-02-20 NOTE — PROGRESS NOTES
CMS Note  2/20/2021    Due to patient's isolation contact, and patient mental status. CMS verbally discussed their 2nd IMM letter via phone with Angelina Myers at [address]  CMS will provide a copy for staff member upon their arrival.   Jose William, CMS

## 2021-02-20 NOTE — ROUTINE PROCESS
Central line Type: Left subclavian triple lumen Central Line Insert Date: 02/17/21 Reason Central Line Placed: limited access Central Line Dressing Date: 02/17/21 Biopatch in place? Yes No: yes Tubing labeled and appropriate? Yes No: yes Alcohol caps on all open ports? Yes No: yes Last G bath (time&date): 2/19 at 2158 Reviewed with provider and central line must stay in for the following reasons:

## 2021-02-20 NOTE — ROUTINE PROCESS
RN notified of RR of 4, however upon assessment RR was 16. Patient asleep, resting quietly. Will continue to monitor.

## 2021-02-20 NOTE — DISCHARGE INSTRUCTIONS
HOME DISCHARGE INSTRUCTIONS    Lottie Dover. / [id number] : 1965    Admission date: 2021 Discharge date: 2021 1:01 PM     Please bring this form with you to show your care provider at your follow-up appointment. Primary care provider:  Jasmyne Lam MD    Discharging provider:  Holley Head MD  - Family Medicine Resident  Molly Fink MD - Family Medicine Attending      You have been admitted to the hospital with the following diagnoses:    ACUTE DIAGNOSES:  · DKA (diabetic ketoacidoses) ([facility] [E11.10]    . . . . . . . . . . . . . . . . . . . . . . . . . . . . . . . . . . . . . . . . . . . . . . . . . . . . . . . . . . . . . . . . . . . . . . . .   You are well enough to be discharged from the hospital. However, because you were inpatient in a hospital, you are at greater risk of having been exposed to the coronavirus. PLEASE stay inside and self-quarantine for 14 days to prevent further spread of the coronavirus. . . . . . . . . . . . . . . . . . . . . . . . . . . . . . . . . . . . . . . . . . . . . . . . . . . . . . . . . . . . . . . . . . . . . . . . .     You were hospitalized for DKA, a state where your body does not have enough insulin. You were treated with insulin and fluids. Please follow up with the cardiologist for your stress test. You were given a dose of iv iron. Please follow up with your blood doctor to see if you need any further iron infusions. Please take your insulin every day to keep your blood sugar under control so you don't become very sick. . . . . . . . . . . . . . . . . . . . . . . . . . . . . . . . . . . . . . . . . . . . . . . . . . . . Marco Antonio Hodgson      FOLLOW-UP CARE RECOMMENDATIONS:     Appointments  Follow-up Information     Follow up With Specialties Details Why Contact Info    Arland [place], DO Cardiology On 3/5/2021 9 AM [facility]  [address] [facility]      J[address] follow up with bull  [address] [facility]  [phone]      Jennifer Banerjee MD Hematology and Oncology, Internal Medicine, Hematology, Oncology  Follow up with the blood doctor [facility] [address]  [phone]      Endocrine/DM doctor   Follow up with your Diabetes/Endocrine doctor              Follow-up tests needed: Discuss with heart doctor, discuss with PCP possible tests for intestine bleed    Pending test results: At the time of your discharge the following test results are still pending: blood culture (negative to date - normal)  Please make sure you review these results with your outpatient follow-up provider(s). DIET/what to eat:  Diabetic Diet    ACTIVITY:  Activity as tolerated    Wound care: None    Equipment needed:  None    Specific symptoms to watch for: chest pain, shortness of breath, fever, chills, nausea, vomiting, diarrhea, change in mentation, falling, weakness, bleeding. What to do if new or unexpected symptoms occur? If you experience any of the above symptoms (or should other concerns or questions arise after discharge) please call your primary care physician. Return to the emergency room if you cannot get hold of your doctor. · It is very important that you keep your follow-up appointment(s). · Please bring discharge papers, medication list (and/or medication bottles) to your follow-up appointments for review by your outpatient provider(s). · Please check the list of medications and be sure it includes every medication (even non-prescription medications) that your provider wants you to take. · It is important that you take the medication exactly as they are prescribed. · Keep your medication in the bottles provided by the pharmacist and keep a list of the medication names, dosages, and times to be taken in your wallet. · Do not take other medications without consulting your doctor.    · If you have any questions about your medications or other instructions, please talk to your nurse or care provider before you leave the hospital.     Information obtained by:     I understand that if any problems occur once I am at home I am to contact my physician. These instructions were explained to me and I had the opportunity to ask questions. I understand and acknowledge receipt of the instructions indicated above.                                                                                                                                                Physician's or R.N.'s Signature                                                                  Date/Time                                                                                                                                              Patient or Representative Signature                                                          Date/Time

## 2021-02-22 ENCOUNTER — TELEPHONE (OUTPATIENT)
Dept: FAMILY MEDICINE CLINIC | Age: 56
End: 2021-02-22

## 2021-02-22 ENCOUNTER — PATIENT OUTREACH (OUTPATIENT)
Dept: CASE MANAGEMENT | Age: 56
End: 2021-02-22

## 2021-02-22 DIAGNOSIS — E11.10 DIABETIC KETOACIDOSIS WITHOUT COMA ASSOCIATED WITH TYPE 2 DIABETES MELLITUS (HCC): Primary | ICD-10-CM

## 2021-02-22 LAB
ABO + RH BLD: NORMAL
BLD PROD TYP BPU: NORMAL
BLOOD GROUP ANTIBODIES SERPL: NORMAL
BPU ID: NORMAL
CROSSMATCH RESULT,%XM: NORMAL
SPECIMEN EXP DATE BLD: NORMAL
STATUS OF UNIT,%ST: NORMAL
UNIT DIVISION, %UDIV: 0

## 2021-02-22 NOTE — PROGRESS NOTES
[place] nursing, pt, ot orders placed in [address] [place] [address]. LVM to [facility] nurse of orders.

## 2021-02-22 NOTE — PROGRESS NOTES
Patient was admitted to [place] [address] on 2/17 and discharged on 2/21 for DKA/NSTEM/GI Bleed. Outreach made within 2 business days of discharge: Yes    Top Discharge Challenges to be reviewed by the provider   Additional needs identified to be addressed with provider yes      Patient did NOT have any Home Health ordered for diabetic education to assist caregivers in management---patient would benefit from [facility] SN.    CTN has contacted pt's endocrine office to inquire about OP education/resources to provide pt/caregivers. Family declined higher level of care at discharge (SNF)  as they want to give Group Home a chance to utilize more resources to increase care given to patient. Patient discharged back to his Group home. Patient may need higher level of care to manage medical needs. Patient declined blood transfusions, colonoscopy, cardiac cath, and other testing while in the hospital.    Patient will [place] OP referrals for heme/onc for iron transfusions if needed. Patient will need OP GI follow up for possible OP colonoscopy --heme positive stools per chart notes:  GI bleed: FOBT+, no gross bleeding. Hgb 7.4 at discharge. Refused colonoscopy. Follow up outpatient      Patient has OP cards appt with Dr Keila Jimenes for Stress test.  NSTEMI: POA EKG w/ sinus tachycardia. Troponin peaked at 11.6. ECHO EF 65-70%. S/p Hep gtt. - Continue ASA 81mg daily  - Continue home Lipitor 40mg daily  - F/u Cardiology - stress test outpatient      Component Value Flag Ref Range Units Status   Iron 16  Low   35 - 150 ug/dL Final   TIBC 213  Low   250 - 450 ug/dL Final   Iron % saturation 8  Low   20 - 50 % Final     Component Value Flag Ref Range Units Status   HGB 7.4  Low   12.1 - 17.0 g/dL Final   HCT 23.3  Low   36.6 - 50.3 % Final         Discussed COVID-19 related testing which was available at this time. Test results were negative.  Patient informed of results, if available? yes   Method of communication with provider : phone       Advance Care Planning:   Does patient have an Advance Directive:  yes; reviewed and current     Inpatient Readmission Risk score: 80  Was this a readmission? no     Patients top risk factors for readmission: functional cognitive ability, lack of knowledge about disease, medical condition, medication management and utilization of services  Interventions to address risk factors: Provide education and resources    Care Transition Nurse (CTN) contacted the family and caregivers by telephone to perform post hospital discharge assessment. Verified name and  with family and caregivers as identifiers. Provided introduction to self, and explanation of the CTN role. CTN reviewed discharge instructions, medical action plan and red flags with caregiver and family who verbalized understanding. Caregiver and family given an opportunity to ask questions and does not have any further questions or concerns at this time. The caregiver and familyagrees to contact the PCP office for questions related to their healthcare. Medication reconciliation was performed with caregiver, who verbalizes understanding of administration of home medications. Advised obtaining a 90-day supply of all daily and as-needed medications. Referral to Pharm D needed: no     Home Health/Outpatient orders at discharge: none    Durable Medical Equipment ordered at discharge: none    Covid Risk Education    Patient has following risk factors of: diabetes. Education provided regarding infection prevention, and signs and symptoms of COVID-19 and when to seek medical attention with caregiver who verbalized understanding. Discussed exposure protocols and quarantine From CDC: Are you at higher risk for severe illness?  and given an opportunity for questions and concerns. The caregiver agrees to contact the COVID-19 hotline [phone] or PCP office for questions related to COVID-19.      For more information on steps you can take to protect yourself, see CDC's How to Protect Yourself     Patient/family/caregiver given information for [employer] and agrees to enroll no  Patient's preferred e-mail: declines  Patient's preferred phone number: declines    Discussed follow-up appointments. If no appointment was previously scheduled, appointment scheduling offered: yes  [facility] follow up appointment(s):   Future Appointments   Date Time Provider Loulou Kelley   3/2/2021  2:05 PM Mike Salas MD [facility] BS AMB   3/5/2021  9:00 AM Bozena MCMAHON, DO CAV[facility] BS AMB     Non-[facility] follow up appointment(s): Dr Doreen Hein endocrine--3/3 at 2:20  Plan for follow-up call in 3-5 days based on severity of symptoms and risk factors. CTN provided contact information for future needs. Goals      Prevent complications post hospitalization. 02/22/21  CTN spoke to Dilip at [address] to review discharge instructions/red flags to prevent readmission. Dilip gave CTN permission to make follow up appts:    PCP--Dr Kinney--CTN scheduled appt for 3/9 at 9am in office. Appt changed from 3/2 as there was a conflicting appt and pt would not be able to attend. Dr Renetta Alston-- in office appt 3/3 at 2:20    Dr Lin Taveras, cayetano---appt scheduled in hospital for 3/5 at [address] [place] [address].    Dr Whittaker--recommended by hospitalist for possible iron infusions. Per MD office, PCP would need to send referral as pt was not seen by heme/onc while IP. CTN will let PCP know. GI---Pt will need OP referral for GI follow up if needed. Dilip at [facility] aware of all appts and pt will attend above.  CTN discussed with both Dolores and Evi Mccallum, owner of group home barriers that may cause pt to be readmitted. Both requested in home diabetes education RN to help staff better recognize how to care for patient, diet recommedations, etc.   CTN will ask PCP for [facility] nursing order, will also reach out endo MD office for resources.     CTN spoke to patient's brother Nestor French to see if he had concerns and address possible need for higher level of care. Nestor French states he has spoken to staff at group home, owner Hari Rehman, and [employer]. He states that he would like patient to stay at group home as he feels this is better placement than a nursing home, where pt would be isolated and \"left to fade away\". He states pt is physically able to perform ADLS, is ambulatory, and social---he states the group home is aware of shortcomings and if they can become more educated as to how to manage pt diabetes better then patient would be fine to stay there. He states he has spoken with other group homes and has found that higher levels of care equated to residents that were bedbound and/or near death. At time of call, he would like to give the current group home the opportunity to work with patient care needs and if they are unable to meet level of care pt requires Nestor French will look for other placement. Jie Galindo states new medications have not been delivered yet at time of call however she will call them to make sure they will be delivered today. Patient has all other medications in the home per [facility].  CTN reviewed s/sx GI bleeding to watch for, s/sx DKA to report, s/sx MI to watch for. Dolores verbalizes understanding of instructions.     CTN will follow up in 2-3 days--philiprw

## 2021-02-22 NOTE — TELEPHONE ENCOUNTER
Lsisa/George [address] [place] [address] she received an order but they don't go as far a [place].  Lissa's BMKQV:[phone]

## 2021-02-23 LAB
BACTERIA SPEC CULT: NORMAL
BACTERIA SPEC CULT: NORMAL
SERVICE CMNT-IMP: NORMAL
SERVICE CMNT-IMP: NORMAL

## 2021-02-25 ENCOUNTER — PATIENT OUTREACH (OUTPATIENT)
Dept: CASE MANAGEMENT | Age: 56
End: 2021-02-25

## 2021-02-25 DIAGNOSIS — E11.649 UNCONTROLLED TYPE 2 DIABETES MELLITUS WITH HYPOGLYCEMIA, UNSPECIFIED HYPOGLYCEMIA COMA STATUS (HCC): Primary | ICD-10-CM

## 2021-02-25 RX ORDER — ISOPROPYL ALCOHOL 70 ML/100ML
SWAB TOPICAL
Qty: 100 PAD | Refills: 5 | Status: SHIPPED | OUTPATIENT
Start: 2021-02-25 | End: 2021-05-06

## 2021-02-25 NOTE — TELEPHONE ENCOUNTER
Called and spoke to Rubén Farrell from [facility] transition team  ([phone])    Memo William contacted patient's care provider. Per Eric Coates provider states patient is in the process of moving to a new facility with skilled nursing services. Patient's care provider or transition team will advise if [facility] still required.

## 2021-02-25 NOTE — PROGRESS NOTES
Goals      Prevent complications post hospitalization. 02/22/21  CTN spoke to Dilip at [address] to review discharge instructions/red flags to prevent readmission. Dilip gave CTN permission to make follow up appts:    PCP--Dr Kinney--CTN scheduled appt for 3/9 at 9am in office. Appt changed from 3/2 as there was a conflicting appt and pt would not be able to attend. Dr Joni Calderon-- in office appt 3/3 at 2:20    Dr Taylor Frederick, cards---appt scheduled in hospital for 3/5 at [address] [place] [address].    Dr Whittaker--recommended by hospitalist for possible iron infusions. Per MD office, PCP would need to send referral as pt was not seen by heme/onc while IP. CTN will let PCP know. GI---Pt will need OP referral for GI follow up if needed. Dilip at [facility] aware of all appts and pt will attend above.  CTN discussed with both Dolores and Bette Olson, owner of [facility] barriers that may cause pt to be readmitted. Both requested in home diabetes education RN to help staff better recognize how to care for patient, diet recommedations, etc.   CTN will ask PCP for [facility] nursing order, will also reach out endo MD office for resources.  CTN spoke to patient's brother Scar Courtney to see if he had concerns and address possible need for higher level of care. Scar Courtney states he has spoken to staff at [facility], owner Bette Olson, and [employer]. He states that he would like patient to stay at group home as he feels this is better placement than a nursing home, where pt would be isolated and \"left to fade away\". He states pt is physically able to perform ADLS, is ambulatory, and social---he states the group home is aware of shortcomings and if they can become more educated as to how to manage pt diabetes better then patient would be fine to stay there. He states he has spoken with other [facility] ED and has found that higher levels of care equated to residents that were bedbound and/or near death.  At time of call, he would like to give the current group home the opportunity to work with patient care needs and if they are unable to meet level of care pt requires Pradobeltran Murray will look for other placement.  Family states pt has Nico Baig for [place] monitoring. Amadeo Hernandes states new medications have not been delivered yet at time of call however she will call them to make sure they will be delivered today. Patient has all other medications in the home per Dilip.  CTN reviewed s/sx GI bleeding to watch for, s/sx DKA to report, s/sx MI to watch for. Dolores verbalizes understanding of instructions. CTN will follow up in 2-3 days--mkrw    02/25/21  CTN received call from [address] from Dr Suzi Aguayo office. He informed me that he was having difficulty finding HH to go to [facility]. CTN attempted to call group home for address, no answer. CTN then contacted patient's brother who asked that Home health be postponed as they are moving patient from current group home to another group home due to high staff turnover and inability to care for patient needs. Patient brother tells CTN they have an appt  on 2/27/21 to tour home and that there is opening for patient to move in right away. He states that they can provide higher level of care and feel that moving patient would be beneficial with keeping patient from readmitting.     CTN will notify Naeem and Dr Suzi Aguayo of above, will follow up with patient brother next week for update---mkrw

## 2021-03-01 RX ORDER — GUAIFENESIN 100 MG/5ML
81 LIQUID (ML) ORAL DAILY
Qty: 30 TAB | Refills: 5 | Status: SHIPPED | OUTPATIENT
Start: 2021-03-01

## 2021-03-01 RX ORDER — ATORVASTATIN CALCIUM 40 MG/1
TABLET, FILM COATED ORAL
Qty: 10 TAB | Refills: 0 | Status: ON HOLD | OUTPATIENT
Start: 2021-03-01 | End: 2021-05-06 | Stop reason: ALTCHOICE

## 2021-03-09 ENCOUNTER — HOSPITAL ENCOUNTER (EMERGENCY)
Age: 56
Discharge: HOME | End: 2021-03-10
Attending: STUDENT IN AN ORGANIZED HEALTH CARE EDUCATION/TRAINING PROGRAM
Payer: MEDICARE

## 2021-03-09 VITALS
BODY MASS INDEX: 22.5 KG/M2 | WEIGHT: 140 LBS | HEART RATE: 87 BPM | TEMPERATURE: 98.2 F | DIASTOLIC BLOOD PRESSURE: 61 MMHG | SYSTOLIC BLOOD PRESSURE: 126 MMHG | OXYGEN SATURATION: 98 % | HEIGHT: 66 IN | RESPIRATION RATE: 17 BRPM

## 2021-03-09 DIAGNOSIS — E16.2 HYPOGLYCEMIA: Primary | ICD-10-CM

## 2021-03-09 DIAGNOSIS — R11.2 NAUSEA AND VOMITING, INTRACTABILITY OF VOMITING NOT SPECIFIED, UNSPECIFIED VOMITING TYPE: ICD-10-CM

## 2021-03-09 LAB
ALBUMIN SERPL-MCNC: 3.2 G/DL (ref 3.5–5)
ALBUMIN/GLOB SERPL: 0.7 {RATIO} (ref 1.1–2.2)
ALP SERPL-CCNC: 90 U/L (ref 45–117)
ALT SERPL-CCNC: 14 U/L (ref 12–78)
ANION GAP SERPL CALC-SCNC: 9 MMOL/L (ref 5–15)
AST SERPL W P-5'-P-CCNC: 13 U/L (ref 15–37)
BASOPHILS # BLD: 0 K/UL (ref 0–0.1)
BASOPHILS NFR BLD: 0 % (ref 0–1)
BILIRUB SERPL-MCNC: 0.1 MG/DL (ref 0.2–1)
BUN SERPL-MCNC: 24 MG/DL (ref 6–20)
BUN/CREAT SERPL: 24 (ref 12–20)
CA-I BLD-MCNC: 9.2 MG/DL (ref 8.5–10.1)
CHLORIDE SERPL-SCNC: 103 MMOL/L (ref 97–108)
CO2 SERPL-SCNC: 28 MMOL/L (ref 21–32)
CREAT SERPL-MCNC: 1 MG/DL (ref 0.7–1.3)
DIFFERENTIAL METHOD BLD: ABNORMAL
EOSINOPHIL # BLD: 0.1 K/UL (ref 0–0.4)
EOSINOPHIL NFR BLD: 3 % (ref 0–7)
ERYTHROCYTE [DISTWIDTH] IN BLOOD BY AUTOMATED COUNT: 15 % (ref 11.5–14.5)
GLOBULIN SER CALC-MCNC: 4.7 G/DL (ref 2–4)
GLUCOSE BLD STRIP.AUTO-MCNC: 298 MG/DL (ref 65–100)
GLUCOSE BLD STRIP.AUTO-MCNC: 79 MG/DL (ref 65–100)
GLUCOSE SERPL-MCNC: 60 MG/DL (ref 65–100)
HCT VFR BLD AUTO: 29.2 % (ref 36.6–50.3)
HGB BLD-MCNC: 9 G/DL (ref 12.1–17)
IMM GRANULOCYTES # BLD AUTO: 0 K/UL (ref 0–0.04)
IMM GRANULOCYTES NFR BLD AUTO: 1 % (ref 0–0.5)
LYMPHOCYTES # BLD: 1 K/UL (ref 0.8–3.5)
LYMPHOCYTES NFR BLD: 25 % (ref 12–49)
MCH RBC QN AUTO: 30.6 PG (ref 26–34)
MCHC RBC AUTO-ENTMCNC: 30.8 G/DL (ref 30–36.5)
MCV RBC AUTO: 99.3 FL (ref 80–99)
MONOCYTES # BLD: 0.5 K/UL (ref 0–1)
MONOCYTES NFR BLD: 13 % (ref 5–13)
NEUTS SEG # BLD: 2.4 K/UL (ref 1.8–8)
NEUTS SEG NFR BLD: 58 % (ref 32–75)
NRBC # BLD: 0 K/UL (ref 0–0.01)
NRBC BLD-RTO: 0 PER 100 WBC
PERFORMED BY, TECHID: ABNORMAL
PERFORMED BY, TECHID: NORMAL
PLATELET # BLD AUTO: 446 K/UL (ref 150–400)
PMV BLD AUTO: 9.6 FL (ref 8.9–12.9)
POTASSIUM SERPL-SCNC: 5.1 MMOL/L (ref 3.5–5.1)
PROT SERPL-MCNC: 7.9 G/DL (ref 6.4–8.2)
RBC # BLD AUTO: 2.94 M/UL (ref 4.1–5.7)
SODIUM SERPL-SCNC: 140 MMOL/L (ref 136–145)
WBC # BLD AUTO: 4.1 K/UL (ref 4.1–11.1)

## 2021-03-09 PROCEDURE — 82962 GLUCOSE BLOOD TEST: CPT

## 2021-03-09 PROCEDURE — 36415 COLL VENOUS BLD VENIPUNCTURE: CPT

## 2021-03-09 PROCEDURE — 99284 EMERGENCY DEPT VISIT MOD MDM: CPT

## 2021-03-09 PROCEDURE — 80053 COMPREHEN METABOLIC PANEL: CPT

## 2021-03-09 PROCEDURE — 85025 COMPLETE CBC W/AUTO DIFF WBC: CPT

## 2021-03-09 NOTE — ED TRIAGE NOTES
EMS dispatched for person with low blood sugar. Pt also vomited while at group home. Currently pt alert and denies any discomfort.

## 2021-03-10 NOTE — ED PROVIDER NOTES
EMERGENCY DEPARTMENT HISTORY AND PHYSICAL EXAM      Date: 3/9/2021  Patient Name: William Albright. History of Presenting Illness     Chief Complaint   Patient presents with    Low Blood Sugar    Vomiting       History Provided By: Patient    HPI: William Durbin, 60 y.o. male with a past medical history significant diabetes presents to the ED with cc of low blood sugar, nausea vomiting. Patient states that he was eating dinner today with coresidents from a group home, states that he felt very weak, thought his sugar was low, and started feeling nauseous. EMS was called. Patient his sugar was 20s to 30s. Patient denies any abdominal pain, no fevers chills, no chest pain shortness of breath. Patient takes insulin and Metformin. Patient currently feels well, does not feel weak or dizzy, fingerstick was 70 on arrival.  Patient ate hot dog and [employer] fries while in the ER thus far. There are no other complaints, changes, or physical findings at this time. PCP: Zuleyka Casey MD    No current facility-administered medications on file prior to encounter. Current Outpatient Medications on File Prior to Encounter   Medication Sig Dispense Refill    atorvastatin (LIPITOR) 40 mg tablet TAKE 1 TABLET BY MOUTH EACH NIGHT AT BEDTIME 10 Tab 0    aspirin 81 mg chewable tablet Take 1 Tab by mouth daily. 30 Tab 5    alcohol swabs padm Apply externally as needed 100 Pad 5    diphenhydrAMINE (BENADRYL) 25 mg capsule Take 25 mg by mouth every four (4) hours as needed for Itching.  divalproex ER (Depakote ER) 500 mg ER tablet Take 1,500 mg by mouth nightly.  insulin detemir U-100 (LEVEMIR FLEXTOUCH) 100 unit/mL (3 mL) inpn 26 Units by SubCUTAneous route Daily (before breakfast).  metFORMIN (GLUCOPHAGE) 850 mg tablet Take 850 mg by mouth three (3) times daily.       insulin aspart U-100 (NovoLOG Flexpen U-100 Insulin) 100 unit/mL (3 mL) inpn by SubCUTAneous route Before breakfast, lunch, and dinner. Sliding Scale  Blood glucose  Less than 70 give 0 units  70 to 150 give 3 units  151 to 200 give 5 units  201 to 250 give 7 units  251 to 300 give 12 units  301 to 350 give 15 units  Greater than 350 give  20 units  If blood glucose is greater than is greater than 300 recheck in 2 hours      insulin aspart U-100 (NovoLOG Flexpen U-100 Insulin) 100 unit/mL (3 mL) inpn by SubCUTAneous route nightly. Sliding scale  Blood glucose  Less than 200 give 0 units  200 to 299 give 3 units  Greater than 300 give 6 units      pantoprazole (Protonix) 40 mg tablet Take 40 mg by mouth Daily (before breakfast).  sucralfate (Carafate) 1 gram tablet Take 1 g by mouth Before breakfast, lunch, dinner and at bedtime.  lip protectant (BLISTEX) 0.6-0.5-1.1-0.5 % oint ointment Apply  to affected area as needed for Lubrication.  lip protectant with sunscreen (CARMEX) crea Apply  to affected area as needed for Dry Skin.  glucagon (GLUCAGEN) 1 mg injection 1 mg by IntraVENous route as needed for Hypoglycemia.  glucose 4 gram chewable tablet Take 45 g by mouth as needed (Blood glucose less than 70).  guaiFENesin (ROBITUSSIN) 100 mg/5 mL liquid Take 200 mg by mouth four (4) times daily as needed for Cough.  hydrocortisone (CORTAID) 1 % topical cream Apply  to affected area two (2) times daily as needed for Skin Irritation. use thin layer      ibuprofen (Motrin IB) 200 mg tablet Take 400 mg by mouth every four (4) hours as needed for Pain.  albuterol-ipratropium (DUO-NEB) 2.5 mg-0.5 mg/3 ml nebu 3 mL by Nebulization route three (3) times daily as needed for Wheezing.  ketoconazole (NIZORAL) 2 % topical cream Apply  to affected area daily as needed for Skin Irritation.  loperamide (IMMODIUM) 2 mg tablet Take 2 mg by mouth four (4) times daily as needed for Diarrhea.       acetaminophen (TYLENOL) 325 mg tablet Take 650 mg by mouth every four (4) hours as needed for Pain or Fever.      ferrous sulfate 325 mg (65 mg iron) tablet Take 1 Tab by mouth two (2) times daily (after meals). 60 Tab 2    ondansetron (Zofran ODT) 4 mg disintegrating tablet 1 Tab by SubLINGual route every eight (8) hours as needed for Nausea or Vomiting. 20 Tab 0    mirtazapine (REMERON) 15 mg tablet Take 1 Tab by mouth nightly. 90 Tab 1    sertraline (ZOLOFT) 100 mg tablet Take 1 Tab by mouth daily. 80 Tab 1       Past History     Past Medical History:  Past Medical History:   Diagnosis Date    Anxiety disorder     Arthritis     Depression     Diabetes ([facility] [address]     Liver disease     Neurological disorder     neuro cognative disorder    OCD (obsessive compulsive disorder)     Psychiatric disorder     OCD    Psychiatric disorder     anxiety    Seizures ([facility] [address]     Thyroid disease        Past Surgical History:  Past Surgical History:   Procedure Laterality Date    HX CRANIOTOMY         Family History:  Family History   Problem Relation Age of Onset    Cancer Mother        Social History:  Social History     Tobacco Use    Smoking status: Former Smoker    Smokeless tobacco: Never Used   Substance Use Topics    Alcohol use: No    Drug use: No       Allergies: Allergies   Allergen Reactions    Penicillins Rash         Review of Systems     Review of Systems   Constitutional: Positive for fatigue. Negative for activity change, appetite change, chills and fever. HENT: Negative for congestion, sore throat and trouble swallowing. Eyes: Negative for photophobia and visual disturbance. Respiratory: Negative for cough, chest tightness and shortness of breath. Cardiovascular: Negative for chest pain and palpitations. Gastrointestinal: Positive for nausea and vomiting. Negative for abdominal pain and diarrhea. Genitourinary: Negative for dysuria and flank pain. Musculoskeletal: Negative for arthralgias and neck pain. Skin: Negative for color change and pallor.    Neurological: Positive for weakness. Negative for dizziness, numbness and headaches. Physical Exam     Physical Exam  Vitals signs and nursing note reviewed. Constitutional:       Appearance: Normal appearance. He is normal weight. HENT:      Head: Normocephalic and atraumatic. Nose: Nose normal.      Mouth/Throat:      Mouth: Mucous membranes are moist.   Eyes:      Extraocular Movements: Extraocular movements intact. Pupils: Pupils are equal, round, and reactive to light. Neck:      Musculoskeletal: Normal range of motion and neck supple. Cardiovascular:      Rate and Rhythm: Normal rate and regular rhythm. Pulses: Normal pulses. Heart sounds: Normal heart sounds. Pulmonary:      Breath sounds: Normal breath sounds. Abdominal:      General: Abdomen is flat. Bowel sounds are normal.      Palpations: Abdomen is soft. Musculoskeletal: Normal range of motion. General: No swelling or tenderness. Skin:     General: Skin is warm and dry. Capillary Refill: Capillary refill takes less than 2 seconds. Neurological:      General: No focal deficit present. Mental Status: He is alert and oriented to person, place, and time. Cranial Nerves: No cranial nerve deficit. Sensory: No sensory deficit. Motor: No weakness.    Psychiatric:         Mood and Affect: Mood normal.         Behavior: Behavior normal.         Diagnostic Study Results     Labs -     Recent Results (from the past 12 hour(s))   GLUCOSE, POC    Collection Time: 03/09/21  4:55 PM   Result Value Ref Range    Glucose (POC) 79 65 - 100 mg/dL    Performed by Alyssa Garcia    CBC WITH AUTOMATED DIFF    Collection Time: 03/09/21  5:20 PM   Result Value Ref Range    WBC 4.1 4.1 - 11.1 K/uL    RBC 2.94 (L) 4.10 - 5.70 M/uL    HGB 9.0 (L) 12.1 - 17.0 g/dL    HCT 29.2 (L) 36.6 - 50.3 %    MCV 99.3 (H) 80.0 - 99.0 FL    MCH 30.6 26.0 - 34.0 PG    MCHC 30.8 30.0 - 36.5 g/dL    RDW 15.0 (H) 11.5 - 14.5 %    PLATELET 041 (H) 957 - 400 K/uL    MPV 9.6 8.9 - 12.9 FL    NRBC 0.0 0  WBC    ABSOLUTE NRBC 0.00 0.00 - 0.01 K/uL    NEUTROPHILS 58 32 - 75 %    LYMPHOCYTES 25 12 - 49 %    MONOCYTES 13 5 - 13 %    EOSINOPHILS 3 0 - 7 %    BASOPHILS 0 0 - 1 %    IMMATURE GRANULOCYTES 1 (H) 0.0 - 0.5 %    ABS. NEUTROPHILS 2.4 1.8 - 8.0 K/UL    ABS. LYMPHOCYTES 1.0 0.8 - 3.5 K/UL    ABS. MONOCYTES 0.5 0.0 - 1.0 K/UL    ABS. EOSINOPHILS 0.1 0.0 - 0.4 K/UL    ABS. BASOPHILS 0.0 0.0 - 0.1 K/UL    ABS. IMM. GRANS. 0.0 0.00 - 0.04 K/UL    DF AUTOMATED     METABOLIC PANEL, COMPREHENSIVE    Collection Time: 03/09/21  5:20 PM   Result Value Ref Range    Sodium 140 136 - 145 mmol/L    Potassium 5.1 3.5 - 5.1 mmol/L    Chloride 103 97 - 108 mmol/L    CO2 28 21 - 32 mmol/L    Anion gap 9 5 - 15 mmol/L    Glucose 60 (L) 65 - 100 mg/dL    BUN 24 (H) 6 - 20 mg/dL    Creatinine 1.00 0.70 - 1.30 mg/dL    BUN/Creatinine ratio 24 (H) 12 - 20      GFR est AA >60 >60 ml/min/1.73m2    GFR est non-AA >60 >60 ml/min/1.73m2    Calcium 9.2 8.5 - 10.1 mg/dL    Bilirubin, total 0.1 (L) 0.2 - 1.0 mg/dL    AST (SGOT) 13 (L) 15 - 37 U/L    ALT (SGPT) 14 12 - 78 U/L    Alk. phosphatase 90 45 - 117 U/L    Protein, total 7.9 6.4 - 8.2 g/dL    Albumin 3.2 (L) 3.5 - 5.0 g/dL    Globulin 4.7 (H) 2.0 - 4.0 g/dL    A-G Ratio 0.7 (L) 1.1 - 2.2     GLUCOSE, POC    Collection Time: 03/09/21  8:01 PM   Result Value Ref Range    Glucose (POC) 298 (H) 65 - 100 mg/dL    Performed by Danice Port        Radiologic Studies -   @lastxrresult@  CT Results  (Last 48 hours)    None        CXR Results  (Last 48 hours)    None            Medical Decision Making   I am the first provider for this patient. I reviewed the vital signs, available nursing notes, past medical history, past surgical history, family history and social history. Vital Signs-Reviewed the patient's vital signs.   Patient Vitals for the past 12 hrs:   Temp Pulse Resp BP SpO2   03/09/21 2030  87 17 126/61 98 %   03/09/21 1800  84 14 123/63 99 %   03/09/21 1700 98.2 °F (36.8 °C) 77 21 122/68 98 %   03/09/21 1600  76 16 (!) 141/72 98 %       Records Reviewed: Nursing Notes    The patient presents with weakness nausea vomiting with a differential diagnosis of hypoglycemia, insulin reaction, sepsis, gastritis, dysfunction, renal dysfunction,      Provider Notes (Medical Decision Making):     MDM   73-year-old male, past medical history of diabetes, hypertension, presents emergency department with weakness nausea vomiting, suspected hypoglycemia, patient on Metformin and insulin for diabetes    Sickle exam shows well-appearing male, no distress, abdomen soft nontender nondistended. Basic lab work drawn, patient given dinner tray, tolerating p.o. food, will continue to assess    ED Course:   Initial assessment performed. The patients presenting problems have been discussed, and they are in agreement with the care plan formulated and outlined with them. I have encouraged them to ask questions as they arise throughout their visit. ED Course as of Mar 09 2126   Tue Mar 09, 2021   2018 Patient's lab work reviewed, no leukocytosis, metabolic panel shows normal renal function, metabolic panel glucose was 60, however repeat fingerstick glucose was 298. Patient observed in emergency department for proximally 3 and half hours, no hypoglycemia, at this point feel comfortable discharging patient home. Patient has been nauseous, tolerating p.o., will discharge patient home instructed to follow-up with primary care physician as needed. [PZ]      ED Course User Index  [PZ] Freddy Zurita MD         PROCEDURES  Procedures         PLAN:  1. Current Discharge Medication List        2.    Follow-up Information     Follow up With Specialties Details Why Contact Lai Jaime MD Family Medicine In 1 week As needed [id number] [facility] [phone] [id number]      [address] [facility] EMERGENCY DEPT Emergency Medicine  If symptoms worsen [address] [facility] [address] [phone]        Return to ED if worse     Diagnosis     Clinical Impression:   1. Hypoglycemia    2.  Nausea and vomiting, intractability of vomiting not specified, unspecified vomiting type

## 2021-03-11 ENCOUNTER — PATIENT OUTREACH (OUTPATIENT)
Dept: CASE MANAGEMENT | Age: 56
End: 2021-03-11

## 2021-03-11 NOTE — PROGRESS NOTES
Goals      Prevent complications post hospitalization. 02/22/21  CTN spoke to [place] at [address] to review discharge instructions/red flags to prevent readmission. [place] gave CTN permission to make follow up appts:    PCP--Dr Kinney--CTN scheduled appt for 3/9 at 9am in office. Appt changed from 3/2 as there was a conflicting appt and pt would not be able to attend. Dr Isaias Rand-- in office appt 3/3 at 2:20    Dr Claudette Ibarra, cards---appt scheduled in hospital for 3/5 at [address] [place] [address].    Dr Whittaker--recommended by hospitalist for possible iron infusions. Per MD office, PCP would need to send referral as pt was not seen by heme/onc while IP. CTN will let PCP know. GI---Pt will need OP referral for GI follow up if needed. [place] Cabe[place]as at [facility] aware of all appts and pt will attend above.  CTN discussed with both Dolores and Iza Fisher, owner of [facility] barriers that may cause pt to be readmitted. Both requested in home diabetes education RN to help staff better recognize how to care for patient, diet recommedations, etc.   CT[facility], will also reach out endo MD office for resources.  CTN spoke to patient's brother Lisandro Haynes to see if he had concerns and address possible need for higher level of care. Lisandro Haynes states he has spoken to staff at [facility], owner Iza Fisher, and [employer]. He states that he would like patient to stay at group home as he feels this is better placement than a nursing home, where pt would be isolated and \"left to fade away\". He states pt is physically able to perform ADLS, is ambulatory, and social---he states the group home is aware of shortcomings and if they can become more educated as to how to manage pt diabetes better then patient would be fine to stay there. He states he has spoken with other group homes and has found that higher levels of care equated to residents that were bedbound and/or near death.  At time of call, he would like to give the current group home the opportunity to work with patient care needs and if they are unable to meet level of care pt requires Mitch Bryce will look for other placement.  Family states pt has [employer] [facility] for [facility] monitoring. Francis Grant states new medications have not been delivered yet at time of call however she will call them to make sure they will be delivered today. Patient has all other medications in the home per [facility] [address].  CTN reviewed s/sx GI bleeding to watch for, s/sx DKA to report, s/sx MI to watch for. Dolores verbalizes understanding of instructions. CTN will follow up in 2-3 days--r    02/25/21  CTN received call from [place] [address] from Dr Sammy Sanchez office. He informed me that he was having difficulty finding HH to go to [place] [address]. CTN attempted to call group home for address, no answer. CTN then contacted patient's brother who asked that Home health be postponed as they are moving patient from current group home to another group home due to high staff turnover and inability to care for patient needs. Patient brother tells CTN they have an appt  on 2/27/21 to tour home and that there is opening for patient to move in right away. He states that they can provide higher level of care and feel that moving patient would be beneficial with keeping patient from readmitting. CTN will notify Rosalio Cuevas and Dr Sammy Sanchez of above, will follow up with patient brother next week for update---mkrw    03/11/21  CTN unable to reach pt brother to follow up on ED visit , left messge on  requesting return call. Per chart review, pt sent to ED by Group Home as pt vomited and BS reported low. Was normal at ER, pt was fed and monitored for 3 hours and then discharged to follow up with PCP.     CTN will review follow up with brother at next call---rw

## 2021-03-18 ENCOUNTER — TELEPHONE (OUTPATIENT)
Dept: FAMILY MEDICINE CLINIC | Age: 56
End: 2021-03-18

## 2021-03-18 RX ORDER — ONDANSETRON 4 MG/1
4 TABLET, ORALLY DISINTEGRATING ORAL
Qty: 20 TAB | Refills: 0 | Status: SHIPPED | OUTPATIENT
Start: 2021-03-18 | End: 2021-04-01

## 2021-03-22 ENCOUNTER — TRANSCRIBE ORDER (OUTPATIENT)
Dept: SCHEDULING | Age: 56
End: 2021-03-22

## 2021-03-22 DIAGNOSIS — E10.65 TYPE 1 DIABETES MELLITUS WITH HYPERGLYCEMIA (HCC): Primary | ICD-10-CM

## 2021-03-29 ENCOUNTER — DOCUMENTATION ONLY (OUTPATIENT)
Dept: FAMILY MEDICINE CLINIC | Age: 56
End: 2021-03-29

## 2021-03-29 NOTE — PROGRESS NOTES
Home Care Delivered CMN for durable medical was put on [facility][note 2] AYAH desk to process

## 2021-03-30 ENCOUNTER — OFFICE VISIT (OUTPATIENT)
Dept: FAMILY MEDICINE CLINIC | Age: 56
End: 2021-03-30
Payer: MEDICARE

## 2021-03-30 VITALS
OXYGEN SATURATION: 97 % | HEART RATE: 88 BPM | TEMPERATURE: 97.3 F | RESPIRATION RATE: 18 BRPM | BODY MASS INDEX: 23.47 KG/M2 | HEIGHT: 66 IN | WEIGHT: 146.06 LBS | DIASTOLIC BLOOD PRESSURE: 74 MMHG | SYSTOLIC BLOOD PRESSURE: 145 MMHG

## 2021-03-30 DIAGNOSIS — D50.0 IRON DEFICIENCY ANEMIA DUE TO CHRONIC BLOOD LOSS: ICD-10-CM

## 2021-03-30 DIAGNOSIS — Z00.00 ROUTINE GENERAL MEDICAL EXAMINATION AT A HEALTH CARE FACILITY: Primary | ICD-10-CM

## 2021-03-30 DIAGNOSIS — G40.909 SEIZURE DISORDER (HCC): ICD-10-CM

## 2021-03-30 DIAGNOSIS — E11.649 UNCONTROLLED TYPE 2 DIABETES MELLITUS WITH HYPOGLYCEMIA, UNSPECIFIED HYPOGLYCEMIA COMA STATUS (HCC): ICD-10-CM

## 2021-03-30 PROCEDURE — 3017F COLORECTAL CA SCREEN DOC REV: CPT | Performed by: FAMILY MEDICINE

## 2021-03-30 PROCEDURE — G8420 CALC BMI NORM PARAMETERS: HCPCS | Performed by: FAMILY MEDICINE

## 2021-03-30 PROCEDURE — 3052F HG A1C>EQUAL 8.0%<EQUAL 9.0%: CPT | Performed by: FAMILY MEDICINE

## 2021-03-30 PROCEDURE — 99213 OFFICE O/P EST LOW 20 MIN: CPT | Performed by: FAMILY MEDICINE

## 2021-03-30 PROCEDURE — G0463 HOSPITAL OUTPT CLINIC VISIT: HCPCS | Performed by: FAMILY MEDICINE

## 2021-03-30 PROCEDURE — G8427 DOCREV CUR MEDS BY ELIG CLIN: HCPCS | Performed by: FAMILY MEDICINE

## 2021-03-30 PROCEDURE — G8510 SCR DEP NEG, NO PLAN REQD: HCPCS | Performed by: FAMILY MEDICINE

## 2021-03-30 PROCEDURE — G0439 PPPS, SUBSEQ VISIT: HCPCS | Performed by: FAMILY MEDICINE

## 2021-03-30 PROCEDURE — 2022F DILAT RTA XM EVC RTNOPTHY: CPT | Performed by: FAMILY MEDICINE

## 2021-03-30 NOTE — PROGRESS NOTES
1. Have you been to the ER, urgent care clinic since your last visit? Hospitalized since your last visit? No    2. Have you seen or consulted any other health care providers outside of the [address] [place] [address] since your last visit? Include any pap smears or colon screening. No     Chief Complaint   Patient presents with    Complete Physical     group home physical    Labs     labs pt has eaten breakfast       Chief Complaint   Patient presents with    Complete Physical     group home physical    Labs     labs pt has eaten breakfast     he is a 60y.o. year old male who presents for evalution. Reviewed PmHx, RxHx, FmHx, SocHx, AllgHx and updated and dated in the chart. Patient Active Problem List    Diagnosis    DKA (diabetic ketoacidoses) ([facility]    Metabolic acidosis    Hyponatremia    Hyperkalemia    SHIRA (acute kidney injury) ([facility]    NSTEMI (non-ST elevated myocardial infarction) ([facility]    Shock ([facility]    Iron deficiency anemia due to chronic blood loss    Memory impairment    Obsessive-compulsive disorder    [facility]    Uncontrolled type 2 diabetes mellitus ([facility]     Last Assessment & Plan:    Hemoglobin A1c is elevated 8.8% this time which is up from 7.3% in December of 2016. Worsened.   This is most certainly related to his alcohol use that he has restarted in the last several months  I advised him to stop drinking in the strongest possible terms      Organic mental disorder    Anxiety disorder    Congenital anomaly of pulmonary artery    Hereditary hemorrhagic telangiectasia (HCC)       Review of Systems - negative except as listed above in the HPI    Objective:     Vitals:    03/30/21 1002   BP: (!) 145/74   Pulse: 88   Resp: 18   Temp: 97.3 °F (36.3 °C)   TempSrc: Oral   SpO2: 97%   Weight: 146 lb 1 oz (66.3 kg)   Height: 5' 6\" (1.676 m)     Physical Examination: General appearance - alert, well appearing, and in no distress  Chest - clear to auscultation, no wheezes, rales or rhonchi, symmetric air entry  Heart - normal rate, regular rhythm, normal S1, S2, no murmurs, rubs, clicks or gallops    Assessment/ Plan:   Diagnoses and all orders for this visit:    1. Routine general medical examination at a health care facility  -see below  [facility]    2. Uncontrolled type 2 diabetes mellitus with hypoglycemia, unspecified hypoglycemia coma status ([facility]  Lab Results   Component Value Date/Time    Hemoglobin A1c 8.4 (H) 02/17/2021 06:40 AM     -above goal  -seeing endocrine    3. Iron deficiency anemia due to chronic blood loss  -see labs    4. Seizure disorder ([facility]  -no recent       -Patient is in good health  -Discussed with patient cancer risk factors and screens needed  -Colonoscopy was recommended based on current guidelines for screening.  -Labs from previous visits were discussed with patient yes  -Discussed with patient diet and exercise  -Immunizations appropriate for age were discussed with pt and updated  -  Follow-up and Dispositions    · Return in about 6 months (around 9/30/2021). I have discussed the diagnosis with the patient and the intended plan as seen in the above orders. The patient understands and agrees with the plan. The patient has received an after-visit summary and questions were answered concerning future plans. Medication Side Effects and Warnings were discussed with patient  Patient Labs were reviewed and or requested  Patient Past Records were reviewed and or requested     There are no Patient Instructions on file for this visit.         Emiliano Rdz M.D.

## 2021-04-01 ENCOUNTER — TELEPHONE (OUTPATIENT)
Dept: FAMILY MEDICINE CLINIC | Age: 56
End: 2021-04-01

## 2021-04-01 RX ORDER — ONDANSETRON 4 MG/1
TABLET, ORALLY DISINTEGRATING ORAL
Qty: 20 TAB | Refills: 0 | Status: SHIPPED | OUTPATIENT
Start: 2021-04-01 | End: 2021-04-20

## 2021-04-01 NOTE — TELEPHONE ENCOUNTER
Home Care Delivered CMN for durable medical was signed & faxed to [phone],ok,Copy placed in scan folder to be scanned to chart.

## 2021-04-07 ENCOUNTER — TRANSCRIBE ORDER (OUTPATIENT)
Dept: SCHEDULING | Age: 56
End: 2021-04-07

## 2021-04-07 DIAGNOSIS — E10.610: Primary | ICD-10-CM

## 2021-04-20 RX ORDER — ONDANSETRON 4 MG/1
TABLET, ORALLY DISINTEGRATING ORAL
Qty: 20 TAB | Refills: 0 | Status: SHIPPED | OUTPATIENT
Start: 2021-04-20 | End: 2021-05-24

## 2021-04-29 ENCOUNTER — TELEPHONE (OUTPATIENT)
Dept: FAMILY MEDICINE CLINIC | Age: 56
End: 2021-04-29

## 2021-04-29 RX ORDER — ATORVASTATIN CALCIUM 10 MG/1
TABLET, FILM COATED ORAL
Qty: 30 TAB | Refills: 0 | Status: ON HOLD | OUTPATIENT
Start: 2021-04-29 | End: 2021-05-07 | Stop reason: SDUPTHER

## 2021-04-29 RX ORDER — SERTRALINE HYDROCHLORIDE 100 MG/1
TABLET, FILM COATED ORAL
Qty: 30 TAB | Refills: 0 | Status: SHIPPED | OUTPATIENT
Start: 2021-04-29 | End: 2021-05-26

## 2021-04-29 RX ORDER — METFORMIN HYDROCHLORIDE 850 MG/1
TABLET ORAL
Qty: 90 TAB | Refills: 0 | Status: ON HOLD | OUTPATIENT
Start: 2021-04-29 | End: 2021-05-06 | Stop reason: ALTCHOICE

## 2021-04-29 RX ORDER — ADHESIVE BANDAGE
BANDAGE TOPICAL
Qty: 355 ML | Refills: 0 | Status: ON HOLD | OUTPATIENT
Start: 2021-04-29 | End: 2021-05-06 | Stop reason: ALTCHOICE

## 2021-04-29 RX ORDER — MIRTAZAPINE 15 MG/1
TABLET, FILM COATED ORAL
Qty: 30 TAB | Refills: 0 | Status: SHIPPED | OUTPATIENT
Start: 2021-04-29 | End: 2021-05-26

## 2021-04-29 NOTE — TELEPHONE ENCOUNTER
Melodie Gardner/[facility] requesting a DC order as provider discontinued 6 of patients medications.  Ms Carrera Bottom phone: [phone] Fax: [phone] Attn: Yelena Hein

## 2021-05-05 RX ORDER — PANTOPRAZOLE SODIUM 40 MG/1
40 TABLET, DELAYED RELEASE ORAL
Qty: 30 TAB | Refills: 3 | Status: SHIPPED | OUTPATIENT
Start: 2021-05-05

## 2021-05-05 RX ORDER — SUCRALFATE 1 G/1
1 TABLET ORAL
Qty: 120 TAB | Refills: 3 | Status: SHIPPED | OUTPATIENT
Start: 2021-05-05

## 2021-05-06 ENCOUNTER — APPOINTMENT (OUTPATIENT)
Dept: GENERAL RADIOLOGY | Age: 56
DRG: 271 | End: 2021-05-06
Attending: NURSE PRACTITIONER
Payer: MEDICARE

## 2021-05-06 ENCOUNTER — HOSPITAL ENCOUNTER (INPATIENT)
Age: 56
LOS: 18 days | Discharge: NURSING, REHAB OR LONG-TERM CARE | DRG: 271 | End: 2021-05-24
Attending: INTERNAL MEDICINE | Admitting: INTERNAL MEDICINE
Payer: MEDICARE

## 2021-05-06 ENCOUNTER — HOSPITAL ENCOUNTER (OUTPATIENT)
Dept: WOUND CARE | Age: 56
Discharge: HOME | End: 2021-05-06
Admitting: ORTHOPAEDIC SURGERY
Payer: MEDICARE

## 2021-05-06 VITALS
HEIGHT: 66 IN | RESPIRATION RATE: 18 BRPM | BODY MASS INDEX: 23.78 KG/M2 | HEART RATE: 99 BPM | SYSTOLIC BLOOD PRESSURE: 108 MMHG | WEIGHT: 148 LBS | DIASTOLIC BLOOD PRESSURE: 53 MMHG | TEMPERATURE: 97.8 F

## 2021-05-06 DIAGNOSIS — R52 PAIN: ICD-10-CM

## 2021-05-06 DIAGNOSIS — S31.819D BUTTOCK WOUND, RIGHT, SUBSEQUENT ENCOUNTER: ICD-10-CM

## 2021-05-06 DIAGNOSIS — I73.9 PAD (PERIPHERAL ARTERY DISEASE) (HCC): ICD-10-CM

## 2021-05-06 DIAGNOSIS — N17.9 AKI (ACUTE KIDNEY INJURY) (HCC): ICD-10-CM

## 2021-05-06 DIAGNOSIS — E11.628 DIABETIC FOOT INFECTION (HCC): ICD-10-CM

## 2021-05-06 DIAGNOSIS — S91.301A NON-HEALING WOUND OF RIGHT HEEL: ICD-10-CM

## 2021-05-06 DIAGNOSIS — D64.9 SEVERE ANEMIA: Primary | ICD-10-CM

## 2021-05-06 DIAGNOSIS — L08.9 DIABETIC FOOT INFECTION (HCC): ICD-10-CM

## 2021-05-06 DIAGNOSIS — M79.604 PAIN OF RIGHT LOWER EXTREMITY: ICD-10-CM

## 2021-05-06 LAB
ALBUMIN SERPL-MCNC: 3 G/DL (ref 3.5–5)
ALBUMIN/GLOB SERPL: 0.7 {RATIO} (ref 1.1–2.2)
ALP SERPL-CCNC: 76 U/L (ref 45–117)
ALT SERPL-CCNC: 12 U/L (ref 12–78)
ANION GAP SERPL CALC-SCNC: 8 MMOL/L (ref 5–15)
APPEARANCE UR: CLEAR
AST SERPL W P-5'-P-CCNC: <3 U/L (ref 15–37)
BACTERIA URNS QL MICRO: NEGATIVE /HPF
BASOPHILS # BLD: 0 K/UL (ref 0–0.1)
BASOPHILS NFR BLD: 0 % (ref 0–1)
BILIRUB SERPL-MCNC: 0.2 MG/DL (ref 0.2–1)
BILIRUB UR QL: NEGATIVE
BUN SERPL-MCNC: 25 MG/DL (ref 6–20)
BUN/CREAT SERPL: 28 (ref 12–20)
CA-I BLD-MCNC: 9.1 MG/DL (ref 8.5–10.1)
CHLORIDE SERPL-SCNC: 105 MMOL/L (ref 97–108)
CO2 SERPL-SCNC: 27 MMOL/L (ref 21–32)
COLOR UR: NORMAL
CREAT SERPL-MCNC: 0.88 MG/DL (ref 0.7–1.3)
CRP SERPL-MCNC: 1.98 MG/DL (ref 0–0.6)
DIFFERENTIAL METHOD BLD: ABNORMAL
EOSINOPHIL # BLD: 0.1 K/UL (ref 0–0.4)
EOSINOPHIL NFR BLD: 3 % (ref 0–7)
ERYTHROCYTE [DISTWIDTH] IN BLOOD BY AUTOMATED COUNT: 16.2 % (ref 11.5–14.5)
GLOBULIN SER CALC-MCNC: 4.6 G/DL (ref 2–4)
GLUCOSE BLD STRIP.AUTO-MCNC: 106 MG/DL (ref 65–100)
GLUCOSE BLD STRIP.AUTO-MCNC: 174 MG/DL (ref 65–100)
GLUCOSE SERPL-MCNC: 54 MG/DL (ref 65–100)
GLUCOSE UR STRIP.AUTO-MCNC: NEGATIVE MG/DL
HCT VFR BLD AUTO: 17.7 % (ref 36.6–50.3)
HCT VFR BLD AUTO: 22.7 % (ref 36.6–50.3)
HGB BLD-MCNC: 4.7 G/DL (ref 12.1–17)
HGB BLD-MCNC: 6.3 G/DL (ref 12.1–17)
HGB UR QL STRIP: NEGATIVE
IMM GRANULOCYTES # BLD AUTO: 0 K/UL (ref 0–0.04)
IMM GRANULOCYTES NFR BLD AUTO: 1 % (ref 0–0.5)
KETONES UR QL STRIP.AUTO: NEGATIVE MG/DL
LACTATE SERPL-SCNC: 3.7 MMOL/L (ref 0.4–2)
LEUKOCYTE ESTERASE UR QL STRIP.AUTO: NEGATIVE
LYMPHOCYTES # BLD: 1.2 K/UL (ref 0.8–3.5)
LYMPHOCYTES NFR BLD: 29 % (ref 12–49)
MCH RBC QN AUTO: 23.5 PG (ref 26–34)
MCHC RBC AUTO-ENTMCNC: 26.6 G/DL (ref 30–36.5)
MCV RBC AUTO: 88.5 FL (ref 80–99)
MONOCYTES # BLD: 0.4 K/UL (ref 0–1)
MONOCYTES NFR BLD: 9 % (ref 5–13)
NEUTS SEG # BLD: 2.4 K/UL (ref 1.8–8)
NEUTS SEG NFR BLD: 58 % (ref 32–75)
NITRITE UR QL STRIP.AUTO: NEGATIVE
NRBC # BLD: 0 K/UL (ref 0–0.01)
NRBC BLD-RTO: 0 PER 100 WBC
PERFORMED BY, TECHID: ABNORMAL
PERFORMED BY, TECHID: ABNORMAL
PH UR STRIP: 7 [PH] (ref 5–8)
PLATELET # BLD AUTO: 464 K/UL (ref 150–400)
PMV BLD AUTO: 9.1 FL (ref 8.9–12.9)
POTASSIUM SERPL-SCNC: 3.9 MMOL/L (ref 3.5–5.1)
PROCALCITONIN SERPL-MCNC: 0.4 NG/ML
PROT SERPL-MCNC: 7.6 G/DL (ref 6.4–8.2)
PROT UR STRIP-MCNC: NEGATIVE MG/DL
RBC # BLD AUTO: 2 M/UL (ref 4.1–5.7)
RBC #/AREA URNS HPF: NORMAL /HPF (ref 0–5)
SODIUM SERPL-SCNC: 140 MMOL/L (ref 136–145)
SP GR UR REFRACTOMETRY: 1.01 (ref 1–1.03)
UA: UC IF INDICATED,UAUC: NORMAL
UROBILINOGEN UR QL STRIP.AUTO: 0.1 EU/DL (ref 0.1–1)
WBC # BLD AUTO: 4.1 K/UL (ref 4.1–11.1)
WBC URNS QL MICRO: NORMAL /HPF (ref 0–4)

## 2021-05-06 PROCEDURE — 99285 EMERGENCY DEPT VISIT HI MDM: CPT

## 2021-05-06 PROCEDURE — 81001 URINALYSIS AUTO W/SCOPE: CPT

## 2021-05-06 PROCEDURE — 74011250636 HC RX REV CODE- 250/636: Performed by: INTERNAL MEDICINE

## 2021-05-06 PROCEDURE — 83605 ASSAY OF LACTIC ACID: CPT

## 2021-05-06 PROCEDURE — 82962 GLUCOSE BLOOD TEST: CPT

## 2021-05-06 PROCEDURE — 73630 X-RAY EXAM OF FOOT: CPT

## 2021-05-06 PROCEDURE — 36430 TRANSFUSION BLD/BLD COMPNT: CPT

## 2021-05-06 PROCEDURE — 74011250637 HC RX REV CODE- 250/637: Performed by: INTERNAL MEDICINE

## 2021-05-06 PROCEDURE — 36415 COLL VENOUS BLD VENIPUNCTURE: CPT

## 2021-05-06 PROCEDURE — 99203 OFFICE O/P NEW LOW 30 MIN: CPT

## 2021-05-06 PROCEDURE — 84145 PROCALCITONIN (PCT): CPT

## 2021-05-06 PROCEDURE — 86923 COMPATIBILITY TEST ELECTRIC: CPT

## 2021-05-06 PROCEDURE — 83540 ASSAY OF IRON: CPT

## 2021-05-06 PROCEDURE — 80053 COMPREHEN METABOLIC PANEL: CPT

## 2021-05-06 PROCEDURE — P9016 RBC LEUKOCYTES REDUCED: HCPCS

## 2021-05-06 PROCEDURE — 74011250636 HC RX REV CODE- 250/636: Performed by: NURSE PRACTITIONER

## 2021-05-06 PROCEDURE — 85018 HEMOGLOBIN: CPT

## 2021-05-06 PROCEDURE — 86901 BLOOD TYPING SEROLOGIC RH(D): CPT

## 2021-05-06 PROCEDURE — 74011000250 HC RX REV CODE- 250: Performed by: NURSE PRACTITIONER

## 2021-05-06 PROCEDURE — 87186 SC STD MICRODIL/AGAR DIL: CPT

## 2021-05-06 PROCEDURE — 86140 C-REACTIVE PROTEIN: CPT

## 2021-05-06 PROCEDURE — 74011000250 HC RX REV CODE- 250: Performed by: INTERNAL MEDICINE

## 2021-05-06 PROCEDURE — 65270000029 HC RM PRIVATE

## 2021-05-06 PROCEDURE — 96375 TX/PRO/DX INJ NEW DRUG ADDON: CPT

## 2021-05-06 PROCEDURE — 87040 BLOOD CULTURE FOR BACTERIA: CPT

## 2021-05-06 PROCEDURE — 87205 SMEAR GRAM STAIN: CPT

## 2021-05-06 PROCEDURE — 96374 THER/PROPH/DIAG INJ IV PUSH: CPT

## 2021-05-06 PROCEDURE — 85025 COMPLETE CBC W/AUTO DIFF WBC: CPT

## 2021-05-06 PROCEDURE — 87077 CULTURE AEROBIC IDENTIFY: CPT

## 2021-05-06 PROCEDURE — 30233N1 TRANSFUSION OF NONAUTOLOGOUS RED BLOOD CELLS INTO PERIPHERAL VEIN, PERCUTANEOUS APPROACH: ICD-10-PCS | Performed by: INTERNAL MEDICINE

## 2021-05-06 PROCEDURE — 11042 DBRDMT SUBQ TIS 1ST 20SQCM/<: CPT

## 2021-05-06 RX ORDER — IPRATROPIUM BROMIDE AND ALBUTEROL SULFATE 2.5; .5 MG/3ML; MG/3ML
3 SOLUTION RESPIRATORY (INHALATION)
Status: DISCONTINUED | OUTPATIENT
Start: 2021-05-06 | End: 2021-05-25 | Stop reason: HOSPADM

## 2021-05-06 RX ORDER — ENOXAPARIN SODIUM 100 MG/ML
40 INJECTION SUBCUTANEOUS EVERY 24 HOURS
Status: DISCONTINUED | OUTPATIENT
Start: 2021-05-06 | End: 2021-05-12

## 2021-05-06 RX ORDER — PANTOPRAZOLE SODIUM 40 MG/1
40 TABLET, DELAYED RELEASE ORAL
Status: DISCONTINUED | OUTPATIENT
Start: 2021-05-07 | End: 2021-05-25 | Stop reason: HOSPADM

## 2021-05-06 RX ORDER — SODIUM CHLORIDE 9 MG/ML
250 INJECTION, SOLUTION INTRAVENOUS AS NEEDED
Status: DISCONTINUED | OUTPATIENT
Start: 2021-05-06 | End: 2021-05-25 | Stop reason: HOSPADM

## 2021-05-06 RX ORDER — MAGNESIUM SULFATE 100 %
16 CRYSTALS MISCELLANEOUS AS NEEDED
Status: DISCONTINUED | OUTPATIENT
Start: 2021-05-06 | End: 2021-05-19

## 2021-05-06 RX ORDER — DIVALPROEX SODIUM 500 MG/1
1500 TABLET, DELAYED RELEASE ORAL
Status: DISCONTINUED | OUTPATIENT
Start: 2021-05-06 | End: 2021-05-25 | Stop reason: HOSPADM

## 2021-05-06 RX ORDER — SODIUM CHLORIDE 0.9 % (FLUSH) 0.9 %
5-40 SYRINGE (ML) INJECTION AS NEEDED
Status: DISCONTINUED | OUTPATIENT
Start: 2021-05-06 | End: 2021-05-25 | Stop reason: HOSPADM

## 2021-05-06 RX ORDER — GUAIFENESIN 100 MG/5ML
81 LIQUID (ML) ORAL DAILY
Status: DISCONTINUED | OUTPATIENT
Start: 2021-05-07 | End: 2021-05-25 | Stop reason: HOSPADM

## 2021-05-06 RX ORDER — SERTRALINE HYDROCHLORIDE 50 MG/1
100 TABLET, FILM COATED ORAL DAILY
Status: DISCONTINUED | OUTPATIENT
Start: 2021-05-07 | End: 2021-05-25 | Stop reason: HOSPADM

## 2021-05-06 RX ORDER — ONDANSETRON 4 MG/1
4 TABLET, ORALLY DISINTEGRATING ORAL
Status: DISCONTINUED | OUTPATIENT
Start: 2021-05-06 | End: 2021-05-25 | Stop reason: HOSPADM

## 2021-05-06 RX ORDER — MIRTAZAPINE 30 MG/1
30 TABLET, FILM COATED ORAL
Status: DISCONTINUED | OUTPATIENT
Start: 2021-05-06 | End: 2021-05-25 | Stop reason: HOSPADM

## 2021-05-06 RX ORDER — LIDOCAINE HYDROCHLORIDE 20 MG/ML
15 SOLUTION OROPHARYNGEAL AS NEEDED
Status: DISCONTINUED | OUTPATIENT
Start: 2021-05-06 | End: 2021-05-08 | Stop reason: HOSPADM

## 2021-05-06 RX ORDER — FUROSEMIDE 10 MG/ML
20 INJECTION INTRAMUSCULAR; INTRAVENOUS ONCE
Status: COMPLETED | OUTPATIENT
Start: 2021-05-06 | End: 2021-05-06

## 2021-05-06 RX ORDER — INSULIN GLARGINE 100 [IU]/ML
22 INJECTION, SOLUTION SUBCUTANEOUS
Status: DISCONTINUED | OUTPATIENT
Start: 2021-05-07 | End: 2021-05-08

## 2021-05-06 RX ORDER — SUCRALFATE 1 G/1
1 TABLET ORAL
Status: DISCONTINUED | OUTPATIENT
Start: 2021-05-06 | End: 2021-05-25 | Stop reason: HOSPADM

## 2021-05-06 RX ORDER — HYDROCODONE BITARTRATE AND ACETAMINOPHEN 5; 325 MG/1; MG/1
1 TABLET ORAL
Status: DISCONTINUED | OUTPATIENT
Start: 2021-05-06 | End: 2021-05-19

## 2021-05-06 RX ORDER — ATORVASTATIN CALCIUM 20 MG/1
20 TABLET, FILM COATED ORAL DAILY
Status: DISCONTINUED | OUTPATIENT
Start: 2021-05-07 | End: 2021-05-25 | Stop reason: HOSPADM

## 2021-05-06 RX ORDER — SODIUM CHLORIDE 0.9 % (FLUSH) 0.9 %
5-40 SYRINGE (ML) INJECTION EVERY 8 HOURS
Status: DISCONTINUED | OUTPATIENT
Start: 2021-05-06 | End: 2021-05-08

## 2021-05-06 RX ADMIN — DIVALPROEX SODIUM 1500 MG: 500 TABLET, DELAYED RELEASE ORAL at 22:41

## 2021-05-06 RX ADMIN — VANCOMYCIN HYDROCHLORIDE 1000 MG: 1 INJECTION, POWDER, LYOPHILIZED, FOR SOLUTION INTRAVENOUS at 22:43

## 2021-05-06 RX ADMIN — SUCRALFATE 1 G: 1 TABLET ORAL at 22:41

## 2021-05-06 RX ADMIN — ENOXAPARIN SODIUM 40 MG: 40 INJECTION SUBCUTANEOUS at 22:41

## 2021-05-06 RX ADMIN — Medication 10 ML: at 22:44

## 2021-05-06 RX ADMIN — VANCOMYCIN HYDROCHLORIDE 1000 MG: 1 INJECTION, POWDER, LYOPHILIZED, FOR SOLUTION INTRAVENOUS at 14:42

## 2021-05-06 RX ADMIN — FUROSEMIDE 20 MG: 10 INJECTION, SOLUTION INTRAMUSCULAR; INTRAVENOUS at 22:42

## 2021-05-06 RX ADMIN — MEROPENEM 1 G: 1 INJECTION, POWDER, FOR SOLUTION INTRAVENOUS at 22:42

## 2021-05-06 RX ADMIN — CEFTRIAXONE 1 G: 1 INJECTION, POWDER, FOR SOLUTION INTRAMUSCULAR; INTRAVENOUS at 14:41

## 2021-05-06 RX ADMIN — MIRTAZAPINE 30 MG: 30 TABLET, FILM COATED ORAL at 22:43

## 2021-05-06 NOTE — ROUTINE PROCESS
TRANSFER - OUT REPORT: 
 
Verbal report given to [facility], RN(name) on Hue Enamorado.  being transferred to [facility](unit) for routine progression of care Report consisted of patients Situation, Background, Assessment and  
Recommendations(SBAR). Information from the following report(s) ED Summary was reviewed with the receiving nurse. Lines:  
Peripheral IV 05/06/21 Left Wrist (Active) Opportunity for questions and clarification was provided. Patient transported with: 
 [employer]

## 2021-05-06 NOTE — WOUND CARE
[address] Progress Note and Procedure Note Porsche Johns MEDICAL RECORD NUMBER:  [id number] AGE: 60 y.o. RACE WHITE  GENDER: male  : 1965 EPISODE DATE:  2021 Subjective: Chief Complaint Patient presents with  Wound Check R heel HISTORY of PRESENT ILLNESS HPI Porsche Johns is a 60 y.o. male who presents today for wound/ulcer evaluation. History of Wound Context: Patient has been having wound in the right heel and right buttock for last 2 weeks, according to his brother has not been feeling well, he has a history of hereditary hemorrhagic telangiectasia, he had brain bleeds causing him right hemiparesis, used to work at [employer] but now he is in a group home. He has history of diabetes, the control is unknown, he has history of chronic anemia, he has refused blood transfusions in the past, is mainly in the wheelchair, he is a poor historian, Wound/Ulcer Pain Timing/Severity: None Quality of pain: none Severity:  0/ 10 Modifying Factors: None Associated Signs/Symptoms: none PAST MEDICAL HISTORY Past Medical History:  
Diagnosis Date  Anxiety disorder  Arthritis  Depression  Diabetes ([address]  Liver disease  Neurological disorder   
 neuro cognative disorder  OCD (obsessive compulsive disorder)  Psychiatric disorder OCD  Psychiatric disorder   
 anxiety  Seizures ([facility]  Thyroid disease PAST SURGICAL HISTORY Past Surgical History:  
Procedure Laterality Date  HX CRANIOTOMY FAMILY HISTORY Family History Problem Relation Age of Onset  Cancer Mother SOCIAL HISTORY Social History Tobacco Use  Smoking status: Former Smoker  Smokeless tobacco: Never Used Substance Use Topics  Alcohol use: No  
 Drug use: No  
 
 
ALLERGIES Allergies Allergen Reactions  Penicillins Rash MEDICATIONS No current facility-administered medications on file prior to encounter. Current Outpatient Medications on File Prior to Encounter Medication Sig Dispense Refill  pantoprazole (Protonix) 40 mg tablet Take 1 Tab by mouth Daily (before breakfast). 30 Tab 3  
 sucralfate (Carafate) 1 gram tablet Take 1 Tab by mouth Before breakfast, lunch, dinner and at bedtime. 120 Tab 3  
 atorvastatin (LIPITOR) 10 mg tablet TAKE ONE TABLET BY MOUTH DAILY 30 Tab 0  
 metFORMIN (GLUCOPHAGE) 850 mg tablet TAKE ONE TABLET BY MOUTH THREE TIMES DAILY WITH MEALS. 90 Tab 0  
 mirtazapine (REMERON) 15 mg tablet TAKE ONE TABLET BY MOUTH AT BEDTIME. 30 Tab 0  
 sertraline (ZOLOFT) 100 mg tablet TAKE ONE TABLET BY MOUTH DAILY 30 Tab 0  
 atorvastatin (LIPITOR) 40 mg tablet TAKE 1 TABLET BY MOUTH EACH NIGHT AT BEDTIME 10 Tab 0  
 aspirin 81 mg chewable tablet Take 1 Tab by mouth daily. 30 Tab 5  divalproex ER (Depakote ER) 500 mg ER tablet Take 1,500 mg by mouth nightly.  insulin detemir U-100 (LEVEMIR FLEXTOUCH) 100 unit/mL (3 mL) inpn 26 Units by SubCUTAneous route Daily (before breakfast).  insulin aspart U-100 (NovoLOG Flexpen U-100 Insulin) 100 unit/mL (3 mL) inpn by SubCUTAneous route Before breakfast, lunch, and dinner. Sliding Scale Blood glucose Less than 70 give 0 units 70 to 150 give 3 units 151 to 200 give 5 units 201 to 250 give 7 units 251 to 300 give 12 units 301 to 350 give 15 units Greater than 350 give  20 units If blood glucose is greater than is greater than 300 recheck in 2 hours  insulin aspart U-100 (NovoLOG Flexpen U-100 Insulin) 100 unit/mL (3 mL) inpn by SubCUTAneous route nightly. Sliding scale Blood glucose Less than 200 give 0 units 200 to 299 give 3 units Greater than 300 give 6 units  ferrous sulfate 325 mg (65 mg iron) tablet Take 1 Tab by mouth two (2) times daily (after meals).  60 Tab 2  
 magnesium hydroxide (MILK OF MAGNESIA) 400 mg/5 mL suspension TAKE 2 TABLESPOONFULS (30 ML) BY MOUTH AS NEEDED IF NO STOOL IN 3 DAYS. IF NO RESULTS, CONTACT  mL 0  
 ondansetron (ZOFRAN ODT) 4 mg disintegrating tablet DISSOLVE 1 TABLET UNDER THE TONGUE EVERY 8 HOURS AS NEEDED FOR NAUSEA OR VOMITING 20 Tab 0  
 alcohol swabs padm Apply externally as needed 100 Pad 5  
 diphenhydrAMINE (BENADRYL) 25 mg capsule Take 25 mg by mouth every four (4) hours as needed for Itching.  lip protectant (BLISTEX) 0.6-0.5-1.1-0.5 % oint ointment Apply  to affected area as needed for Lubrication.  lip protectant with sunscreen (CARMEX) crea Apply  to affected area as needed for Dry Skin.  glucagon (GLUCAGEN) 1 mg injection 1 mg by IntraVENous route as needed for Hypoglycemia.  glucose 4 gram chewable tablet Take 45 g by mouth as needed (Blood glucose less than 70).  guaiFENesin (ROBITUSSIN) 100 mg/5 mL liquid Take 200 mg by mouth four (4) times daily as needed for Cough.  hydrocortisone (CORTAID) 1 % topical cream Apply  to affected area two (2) times daily as needed for Skin Irritation. use thin layer  ibuprofen (Motrin IB) 200 mg tablet Take 400 mg by mouth every four (4) hours as needed for Pain.  albuterol-ipratropium (DUO-NEB) 2.5 mg-0.5 mg/3 ml nebu 3 mL by Nebulization route three (3) times daily as needed for Wheezing.  ketoconazole (NIZORAL) 2 % topical cream Apply  to affected area daily as needed for Skin Irritation.  loperamide (IMMODIUM) 2 mg tablet Take 2 mg by mouth four (4) times daily as needed for Diarrhea.  acetaminophen (TYLENOL) 325 mg tablet Take 650 mg by mouth every four (4) hours as needed for Pain or Fever. REVIEW OF SYSTEMS A comprehensive review of systems was negative except for that written in the HPI. Objective:  
 
Visit Vitals BP (!) 108/53 (BP 1 Location: Right upper arm, BP Patient Position: At rest;Lying) Pulse 99 Temp 97.8 °F (36.6 °C) Resp 18 Ht 5' 6\" (1.676 m) Wt 67.1 kg (148 lb) BMI 23.89 kg/m² Wt Readings from Last 3 Encounters:  
05/06/21 67.1 kg (148 lb) 05/06/21 67.1 kg (148 lb)  
03/30/21 66.3 kg (146 lb 1 oz) PHYSICAL EXAM 
 
Constitutional: Patient is awake, ambulating with no devices , no acute distress Head: normocephalic, atraumatic. Behavioral/Psych: patient is pleasant, cooperative, awake and alert Debridement Wound Care Problem List Items Addressed This Visit None Procedure Note Indications:  Based on my examination of this patient's wound(s)/ulcer(s) today, debridement is required to promote healing and evaluate the wound base. Performed by: Madiha Hurley MD 
 
Consent obtained: yes Time out taken: yes Debridement: excisional 
 
Using curette/scalpel the wound(s)/ulcer(s) was/were sharply debrided down through and including the removal of   
subcutaneous tissue Devitalized Tissue Debrided: slough Pre Debridement Measurements: 
Are located in the [place]  Documentation Flow Sheet Wound/Ulcer #: 1 Post Debridement Measurements: 
Wound/Ulcer Descriptions are Pre Debridement except measurements: 
 
Wound Heel Right #1 05/06/21 (Active) Wound Image   05/06/21 0841 Wound Etiology Pressure Unstageable 05/06/21 0841 Dressing Status Clean;Dry; Intact 05/06/21 0841 Cleansed Cleansed with saline 05/06/21 0841 Wound Length (cm) 0.8 cm 05/06/21 0841 Wound Width (cm) 0.9 cm 05/06/21 0841 Wound Depth (cm) 0.1 cm 05/06/21 0841 Wound Surface Area (cm^2) 0.72 cm^2 05/06/21 4524 Wound Volume (cm^3) 0.07 cm^3 05/06/21 0841 Wound Assessment Eschar dry 05/06/21 0841 Drainage Amount None 05/06/21 0841 Wound Odor None 05/06/21 0841 Iwona-Wound/Incision Assessment Intact 05/06/21 0841 Edges Attached edges 05/06/21 9723 Number of days: 0 Wound Buttocks Right #2 05/06/21 (Active) Wound Image   05/06/21 4644 Wound Etiology Pressure Unstageable 05/06/21 0843 Cleansed Cleansed with saline 05/06/21 2249 Wound Length (cm) 1.2 cm 05/06/21 0843 Wound Width (cm) 1.5 cm 05/06/21 0843 Wound Depth (cm) 0.1 cm 05/06/21 0843 Wound Surface Area (cm^2) 1.8 cm^2 05/06/21 0033 Wound Volume (cm^3) 0.18 cm^3 05/06/21 5774 Post-Procedure Length (cm) 1.4 cm 05/06/21 0843 Post-Procedure Width (cm) 1.7 cm 05/06/21 0843 Post-Procedure Depth (cm) 1 cm 05/06/21 0843 Post-Procedure Surface Area (cm^2) 2.38 cm^2 05/06/21 4307 Post-Procedure Volume (cm^3) 2.38 cm^3 05/06/21 0291 Wound Assessment Slough;Eschar moist 05/06/21 0843 Drainage Amount None 05/06/21 0843 Wound Odor None 05/06/21 0843 Iwona-Wound/Incision Assessment Intact 05/06/21 0843 Edges Attached edges 05/06/21 6447 Number of days: 0 Total Surface Area Debrided:  2 sq cm Estimated Blood Loss:  Minimal  
 
Hemostasis Achieved: with pressure Procedural Pain: 0/ 10 Post Procedural Pain: 0/ 10 Response to treatment: Well-tolerated by the patient Patient would not let me debride the heel, Vascular studies shows noncompressible vessels so MIGUEL could not be determined, Patient has significant pallor, he has bilateral pedal edema, he has right hemiparesis Assessment:  
  
Problem List Items Addressed This Visit None Diabetes with right buttock wound, right heel wound History of hereditary hemorrhagic telangiectasia Anemia Pedal edema, malnutrition Peripheral vascular disease POP IN PROCEDURE TYPE (DEBRIDEMENT, BIOPSY, I&D, SKIN SUB, CHEMICAL CAUERTY) Plan:  
 
Treatment Note please see attached Discharge Instructions Patient has abscess, is uncontrolled diabetes, he has malnutrition and is significantly pale, he needs a work-up for sepsis, will need general surgery consultation for his gluteal abscess, he will need peripheral vascular disease evaluation, he will need his treatment of anemia. Patient is referred to the emergency room for possible admission Written patient dismissal instructions given to patient or POA.       
 
Electronically signed by Khadra Quezada MD on 5/6/2021 at 10:41 AM

## 2021-05-06 NOTE — Clinical Note
Sheath #1: Sheath: removed. Upon evaluation of the common femoral artery stick using fluoroscopy, the access site puncture was within the safe zone.

## 2021-05-06 NOTE — WOUND CARE
Discharge Instructions for [facility], [address] [place] [address] Telephone: [phone] NAME:  Arnav Musa. YOB: 1965 MEDICAL RECORD NUMBER:  [id number] DATE:  5/6/2021 Return Appointment: 
[] Dressing supply provider:  
[] Home Healthcare: 
[x] Return Appointment:  After discharged from the hospital 
 
[] Nurse Visit:   [facility]) 
 
[] Discharge from [facility] [x] Ordered tests: PVR and Venous Reflux at [facility] [x] Referrals: Sent to ER for right buttock for abscess 
[] Rx:  
 
Wound Cleansing: Do not scrub or use excessive force. Cleanse wound prior to applying a clean dressing with: 
[x] Normal Saline  
[x] Mild Soap & Water   
[] Keep Wound Dry in Shower 
[] Other:   
 
Topical Treatments: Do not apply lotions, creams, or ointments to wound bed unless directed. [] Apply moisturizing lotion to skin surrounding the wound prior to dressing change. 
[] Apply antifungal ointment to skin surrounding the wound prior to dressing change. 
[] Apply thin film of moisture barrier ointment to skin immediately around wound. [] Other:   
  
Dressings:           Wound Location right heel and right buttock [x] Apply Primary Dressing:     
 [] MediHoney Gel    [] MediHoney Alginate  
            [] Calcium Alginate      [] Calcium Alginate with Silver 
 [] Collagen                   [] Collagen with Silver   
            [] Santyl with Moistened saline gauze 
            [] Hydrofera Blue (cut to size and moistened)  [] Hydrofera Blue Ready (Cut to size) [x] NS wet to dry    [] Betadine wet to dry 
            [] Hydrogel                [] Mepitel   
 [] Bactroban/Mupirocin  
            [] Other:  
 
[x] Cover and Secure with:   
 [x] Gauze [x] Christopher [] Kerlix [] Foam [] Super Absorbant [] ABD [] ACE Wrap            [] Other:  
 Avoid contact of tape with skin.  
 
[x] Change dressing: [x] Daily    [] Every Other Day [] Once per week   [] Twice per week 
 [] Three times per week [] Do Not Change Dressing   [] Other: 
  
Negative Pressure:           Wound Location:  
[] Pressure @  mm/Hg  []Continuous []Intermittent 
 [] Black  [] White Foam 
            [] Bridge:  
            [] Change vac dressing three times per week Pressure Relief: 
[] When sitting, shift position or do seat lifts every 15 minutes. 
[] Wheelchair cushion [] Specialty Bed/Mattress 
[] Turn every 2 hours when in bed. Avoid direct pressure on wound site. Limit side lying to 30 degree tilt. Limit HOB elevation to 30 degrees. Edema Control: 
Apply: [] Compression Stocking []Right Leg []Left Leg 
 [] Tubigrip []Right Leg Double Layer []Left Leg Double Layer []Right Leg Single Layer []Left Leg Single Layer 
 
 [] Elevate leg(s) above the level of the heart when sitting. [] Avoid prolonged standing in one place. Compression: 
Apply: [] Multilayer Compression Wrap: []RightLeg []Left Leg 
                               []Profore  []Profore Lite             []Unna 
 
 [] Do not get leg(s) with wrap wet. If wraps become too tight call the center or completely remove the wrap. [] Elevate leg(s) above the level of the heart when sitting. [] Avoid prolonged standing in one place. Off-Loading:  
[] Off-loading when [] walking  [] in bed [] sitting 
[] Total non-weight bearing  [] Right Leg  [] Left Leg  
[] Assistive Device [] Walker [] Cane      [] Wheelchair  [] Crutches 
 [] Surgical shoe    [] Podus Boot(s)   [] Foam Boot(s)  [] Roll About 
  [] Cast Boot [] CROW Boot  [] Wedge Shoe  [] Other: 
 
Contact Cast: Apply: [] Total Contact Cast Applied in Clinic []RightLeg []Left Leg 
 [] Do not get cast wet. Contact center or go to emergency room if there is a foul odor or becomes uncomfortable due to feeling tight or swelling. Do not use objects inside of cast to scratch.    
 
Dietary: 
[] Diet as tolerated: [x] Calorie Diabetic Diet: [] No Added Salt: 
[] Increase Protein: [] Other:  
 
Activity: 
[x] Activity as tolerated:   
[] Patient has no activity restrictions [] Strict Bedrest:  
[] Remain off Work:      
[] May return to full duty work:                                    
[] Return to work with restrictions:  
         
 
   
 
[address] Information: Should you experience any significant changes in your wound(s) or have questions about your wound care, please contact Broder[facility] [address] at [place] [address] 8:00 am - 4:30. If you need help with your wound outside of these hours and cannot wait until we are again available, contact your PCP or go to the hospital emergency room. PLEASE NOTE: IF YOU ARE UNABLE TO OBTAIN WOUND SUPPLIES, CONTINUE TO USE THE SUPPLIES YOU HAVE AVAILABLE UNTIL YOU ARE ABLE TO REACH US. IT IS MOST IMPORTANT TO KEEP THE WOUND COVERED AT ALL TIMES. [] Dr. Kinga Byrne  
[] Dr. Berhane Rodas [x] Dr. Paresh Holt 
[] Dr. Kentrell Smiley

## 2021-05-06 NOTE — Clinical Note
Sheath #1: Sheath: inserted. Sheath inserted/placed in the left femoral  artery. Upon evaluation of the common femoral artery stick using fluoroscopy, the access site puncture was within the safe zone.

## 2021-05-06 NOTE — ED TRIAGE NOTES
Pt sent from [address] [facility],[address], has wounds to buttocks and right heel, Dr Melva Erickson attempted to open wound in the clinic however pt refused

## 2021-05-06 NOTE — DISCHARGE INSTRUCTIONS
Discharge Instructions for  [address] [place] [address], [address] [place] [address]  Telephone: [phone]     NAME:  Joann Orellana. YOB: 1965  MEDICAL RECORD NUMBER:  [id number]  DATE:  5/6/2021        Return Appointment:  []? Dressing supply provider:   []? Home Healthcare:  [x]? Return Appointment:  After discharged from the hospital     []? Nurse Visit:   Week(s)     []? Discharge from [facility]  [x]? Ordered tests: PVR and Venous Reflux at [facility]  [x]? Referrals: Sent to ER for right buttock for abscess  []? Rx:      Wound Cleansing:   Do not scrub or use excessive force. Cleanse wound prior to applying a clean dressing with:  [x]? Normal Saline          [x]? Mild Soap & Water    []? Keep Wound Dry in Shower  []? Other:       Topical Treatments:  Do not apply lotions, creams, or ointments to wound bed unless directed. []? Apply moisturizing lotion to skin surrounding the wound prior to dressing change. []? Apply antifungal ointment to skin surrounding the wound prior to dressing change. []? Apply thin film of moisture barrier ointment to skin immediately around wound. []? Other:                  Dressings:                  Wound Location right heel and right buttock                [x]? Apply Primary Dressing:                                          []? MediHoney Gel         []? MediHoney Alginate                     []? Calcium Alginate      []? Calcium Alginate with Silver              []? Collagen                   []? Collagen with Silver                []? Santyl with Moistened saline gauze              []? Hydrofera Blue (cut to size and moistened)  []? Hydrofera Blue Ready (Cut to size)              [x]? NS wet to dry            []? Betadine wet to dry              []? Hydrogel                   []? Mepitel                   []? Bactroban/Mupirocin                       []? Other:      [x]?  Cover and Secure with: [x]? Gauze        [x]? Tonya Haley           []? Kerlix              []? Foam          []? Super Absorbant    []? ABD                                      []? ACE Wrap            []? Other:               Avoid contact of tape with skin.     [x]? Change dressing:  [x]? Daily           []? Every Other Day    []? Once per week   []? Twice per week              []? Three times per week        []? Do Not Change Dressing    []? Other:                Negative Pressure:           Wound Location:   []? Pressure @  mm/Hg                      []?Continuous  []? Intermittent              []? Black          []? White Foam              []? Bridge:               []? Change vac dressing three times per week     Pressure Relief:  []? When sitting, shift position or do seat lifts every 15 minutes. []? Wheelchair cushion           []? Specialty Bed/Mattress  []? Turn every 2 hours when in bed. Avoid direct pressure on wound site. Limit side lying to 30 degree tilt. Limit HOB elevation to 30 degrees. Edema Control:  Apply:  []? Compression Stocking      []? Right Leg     []? Left Leg              []? Tubigrip      []? Right Leg Double Layer      []? Left Leg Double Layer                                      []?Right Leg Single Layer       []? Left Leg Single Layer                 []? Elevate leg(s) above the level of the heart when sitting. []? Avoid prolonged standing in one place.         Compression:  Apply:  []? Multilayer Compression Wrap:      []? RightLeg      []? Left Leg                                 []?Profore            []? Profore Lite             []?Unna                 []? Do not get leg(s) with wrap wet. If wraps become too tight call the center or completely remove the wrap. []? Elevate leg(s) above the level of the heart when sitting. []? Avoid prolonged standing in one place.     Off-Loading:   []? Off-loading when   []? walking       []? in bed         []? sitting  []? Total non-weight bearing  []? Right Leg  []? Left Leg         []? Assistive Device    []? March Camps        []? Cane      []? Wheelchair      []? Crutches              []? Surgical shoe    []? Podus Boot(s)   []? Foam Boot(s)  []? Roll About              []? Cast Boot   []? CROW Boot  []? Wedge Shoe  []? Other:     Contact Cast:  Apply:  []? Total Contact Cast Applied in Clinic          []? RightLeg      []? Left Leg              []? Do not get cast wet. Contact center or go to emergency room if there is a foul odor or becomes uncomfortable due to feeling tight or swelling. Do not use objects inside of cast to scratch.                  Dietary:  []? Diet as tolerated:   [x]? Calorie Diabetic Diet:         []? No Added Salt:  []? Increase Protein:   []? Other:              Activity:  [x]? Activity as tolerated:    []? Patient has no activity restrictions       []? Strict Bedrest:          []? Remain off Work:       []? May return to full duty work:                                               []? Return to work with restrictions:      Johnny Kumar [address] Information: Should you experience any significant changes in your wound(s) or have questions about your wound care, please contact Broderick Garcia [address] at [place] [address] 8:00 am - 4:30. If you need help with your wound outside of these hours and cannot wait until we are again available, contact your PCP or go to the hospital emergency room.      PLEASE NOTE: [facility], CONTINUE TO USE THE SUPPLIES YOU HAVE AVAILABLE UNTIL YOU ARE ABLE TO [facility]. IT IS MOST IMPORTANT TO KEEP THE WOUND COVERED AT ALL TIMES.     []? Dr. Carlin Yao   []? Dr. Sunday Rojas  [x]? Dr. Bakari Hernandes  []?  Dr. Jimenez Mean

## 2021-05-06 NOTE — Clinical Note
Status[de-identified] INPATIENT [101]   Type of Bed: Remote Telemetry [29]   Cardiac Monitoring Required?: Yes   Inpatient Hospitalization Certified Necessary for the Following Reasons: 3.  Patient receiving treatment that can only be provided in an inpatient setting (further clarification in H&P documentation)   Admitting Diagnosis: Severe anemia [6014489]   Admitting Physician: Linwood Méndez   Attending Physician: Linwood Méndez   Estimated Length of Stay: 3-4 Midnights   Discharge Plan[de-identified] Home with Office Follow-up

## 2021-05-06 NOTE — Clinical Note
Peripheral Lesion 1. Lesion #1: Pressure = 10 stephanie; Duration = 120 sec. Lesion #2: Pressure = 10 stephanie; Duration = 120 sec.

## 2021-05-06 NOTE — ED PROVIDER NOTES
EMERGENCY DEPARTMENT HISTORY AND PHYSICAL EXAM      Date: 5/6/2021  Patient Name: Jaye Mann. History of Presenting Illness     Chief Complaint   Patient presents with    Buttocks pain    Wound Check       History Provided By: Patient    HPI: Jaye Cantu, 60 y.o. male with a past medical history significant Anemia, MI, cerebral palsy, intellectual disability presents to the ED with cc of nonhealing wounds, pallor. Patient sent over from the wound care center for admission. No labs were drawn at the wound care center but patient is very pale. Patient also refused debridement of his right heel wound. Patient has no complaints at this time. Moderate severity, no known exacerbating or relieving factors, no other associated signs and symptoms. Patient specifically denies fever, chills, chest pain, shortness of breath, abdominal pain, nausea, vomiting, diarrhea. There are no other complaints, changes, or physical findings at this time. PCP: Angelika Hernandez MD    Current Facility-Administered Medications on File Prior to Encounter   Medication Dose Route Frequency Provider Last Rate Last Admin    lidocaine (XYLOCAINE) 2 % viscous solution 15 mL  15 mL Topical PRN Cindy Jones MD         Current Outpatient Medications on File Prior to Encounter   Medication Sig Dispense Refill    pantoprazole (Protonix) 40 mg tablet Take 1 Tab by mouth Daily (before breakfast). 30 Tab 3    sucralfate (Carafate) 1 gram tablet Take 1 Tab by mouth Before breakfast, lunch, dinner and at bedtime. 120 Tab 3    atorvastatin (LIPITOR) 10 mg tablet TAKE ONE TABLET BY MOUTH DAILY 30 Tab 0    metFORMIN (GLUCOPHAGE) 850 mg tablet TAKE ONE TABLET BY MOUTH THREE TIMES DAILY WITH MEALS. 90 Tab 0    mirtazapine (REMERON) 15 mg tablet TAKE ONE TABLET BY MOUTH AT BEDTIME.  30 Tab 0    sertraline (ZOLOFT) 100 mg tablet TAKE ONE TABLET BY MOUTH DAILY 30 Tab 0    atorvastatin (LIPITOR) 40 mg tablet TAKE 1 TABLET BY MOUTH EACH NIGHT AT BEDTIME 10 Tab 0    aspirin 81 mg chewable tablet Take 1 Tab by mouth daily. 30 Tab 5    divalproex ER (Depakote ER) 500 mg ER tablet Take 1,500 mg by mouth nightly.  insulin detemir U-100 (LEVEMIR FLEXTOUCH) 100 unit/mL (3 mL) inpn 22 Units by SubCUTAneous route Daily (before breakfast).  insulin aspart U-100 (NovoLOG Flexpen U-100 Insulin) 100 unit/mL (3 mL) inpn by SubCUTAneous route Before breakfast, lunch, and dinner. Sliding Scale  Blood glucose  Less than 70 give 0 units  70 to 150 give 3 units  151 to 200 give 5 units  201 to 250 give 7 units  251 to 300 give 12 units  301 to 350 give 15 units  Greater than 350 give  20 units  If blood glucose is greater than is greater than 300 recheck in 2 hours      ferrous sulfate 325 mg (65 mg iron) tablet Take 1 Tab by mouth two (2) times daily (after meals). 60 Tab 2    magnesium hydroxide (MILK OF MAGNESIA) 400 mg/5 mL suspension TAKE 2 TABLESPOONFULS (30 ML) BY MOUTH AS NEEDED IF NO STOOL IN 3 DAYS. IF NO RESULTS, CONTACT  mL 0    ondansetron (ZOFRAN ODT) 4 mg disintegrating tablet DISSOLVE 1 TABLET UNDER THE TONGUE EVERY 8 HOURS AS NEEDED FOR NAUSEA OR VOMITING 20 Tab 0    [DISCONTINUED] alcohol swabs padm Apply externally as needed 100 Pad 5    diphenhydrAMINE (BENADRYL) 25 mg capsule Take 25 mg by mouth every four (4) hours as needed for Itching.  insulin aspart U-100 (NovoLOG Flexpen U-100 Insulin) 100 unit/mL (3 mL) inpn by SubCUTAneous route nightly. Sliding scale  Blood glucose  Less than 200 give 0 units  200 to 299 give 3 units  Greater than 300 give 6 units      lip protectant (BLISTEX) 0.6-0.5-1.1-0.5 % oint ointment Apply  to affected area as needed for Lubrication.  lip protectant with sunscreen (CARMEX) crea Apply  to affected area as needed for Dry Skin.  glucagon (GLUCAGEN) 1 mg injection 1 mg by IntraVENous route as needed for Hypoglycemia.       glucose 4 gram chewable tablet Take 45 g by mouth as needed (Blood glucose less than 70).  guaiFENesin (ROBITUSSIN) 100 mg/5 mL liquid Take 200 mg by mouth four (4) times daily as needed for Cough.  hydrocortisone (CORTAID) 1 % topical cream Apply  to affected area two (2) times daily as needed for Skin Irritation. use thin layer      ibuprofen (Motrin IB) 200 mg tablet Take 400 mg by mouth every four (4) hours as needed for Pain.  albuterol-ipratropium (DUO-NEB) 2.5 mg-0.5 mg/3 ml nebu 3 mL by Nebulization route three (3) times daily as needed for Wheezing.  ketoconazole (NIZORAL) 2 % topical cream Apply  to affected area daily as needed for Skin Irritation.  loperamide (IMMODIUM) 2 mg tablet Take 2 mg by mouth four (4) times daily as needed for Diarrhea.  acetaminophen (TYLENOL) 325 mg tablet Take 650 mg by mouth every four (4) hours as needed for Pain or Fever. Past History     Past Medical History:  Past Medical History:   Diagnosis Date    Anxiety disorder     Arthritis     Depression     Diabetes ([address]     Liver disease     Neurological disorder     neuro cognative disorder    OCD (obsessive compulsive disorder)     Psychiatric disorder     OCD    Psychiatric disorder     anxiety    Seizures ([address]     Thyroid disease        Past Surgical History:  Past Surgical History:   Procedure Laterality Date    HX CRANIOTOMY         Family History:  Family History   Problem Relation Age of Onset    Cancer Mother        Social History:  Social History     Tobacco Use    Smoking status: Former Smoker    Smokeless tobacco: Never Used   Substance Use Topics    Alcohol use: No    Drug use: No       Allergies: Allergies   Allergen Reactions    Penicillins Rash         Review of Systems     Review of Systems   Constitutional: Negative for chills and fever. HENT: Negative for dental problem and sore throat. Eyes: Negative for pain and visual disturbance.    Respiratory: Negative for cough and chest tightness. Cardiovascular: Negative for chest pain. Gastrointestinal: Negative for diarrhea and nausea. Genitourinary: Negative for difficulty urinating and frequency. Musculoskeletal: Negative for gait problem and joint swelling. Skin: Positive for color change, pallor and wound. Neurological: Negative for numbness and headaches. Hematological: Negative for adenopathy. Does not bruise/bleed easily. Psychiatric/Behavioral: Negative for behavioral problems and suicidal ideas. Physical Exam     Physical Exam  Constitutional:       General: He is not in acute distress. Appearance: Normal appearance. He is not ill-appearing or toxic-appearing. HENT:      Head: Normocephalic and atraumatic. Nose: Nose normal.      Mouth/Throat:      Mouth: Mucous membranes are moist.   Eyes:      Extraocular Movements: Extraocular movements intact. Pupils: Pupils are equal, round, and reactive to light. Neck:      Musculoskeletal: Normal range of motion and neck supple. Cardiovascular:      Rate and Rhythm: Normal rate and regular rhythm. Pulmonary:      Effort: Pulmonary effort is normal.      Breath sounds: Normal breath sounds. Abdominal:      General: Bowel sounds are normal.   Musculoskeletal: Normal range of motion. Skin:     General: Skin is warm and dry. Capillary Refill: Capillary refill takes less than 2 seconds. Findings: Erythema and wound present. Neurological:      General: No focal deficit present. Mental Status: He is alert and oriented to person, place, and time.    Psychiatric:         Mood and Affect: Mood normal.         Behavior: Behavior normal.         Lab and Diagnostic Study Results     Labs -     Recent Results (from the past 12 hour(s))   TYPE & SCREEN    Collection Time: 05/06/21 12:45 PM   Result Value Ref Range    Crossmatch Expiration 05/09/2021,2359     ABO/Rh(D) A Positive     Antibody screen Negative     Comment CERNER  NO HISTORY Unit number [id number]     Blood component type [facility]     Unit division 00     Status of unit [address] to transfuse     Crossmatch result Compatible     Unit number [id number]     Blood component type [facility]     Unit division 00     Status of unit Allocated     TRANSFUSION STATUS Ok to transfuse     Crossmatch result Compatible    CBC WITH AUTOMATED DIFF    Collection Time: 05/06/21 12:48 PM   Result Value Ref Range    WBC 4.1 4.1 - 11.1 K/uL    RBC 2.00 (L) 4.10 - 5.70 M/uL    HGB 4.7 (LL) 12.1 - 17.0 g/dL    HCT 17.7 (LL) 36.6 - 50.3 %    MCV 88.5 80.0 - 99.0 FL    MCH 23.5 (L) 26.0 - 34.0 PG    MCHC 26.6 (L) 30.0 - 36.5 g/dL    RDW 16.2 (H) 11.5 - 14.5 %    PLATELET 179 (H) 628 - 400 K/uL    MPV 9.1 8.9 - 12.9 FL    NRBC 0.0 0.0  WBC    ABSOLUTE NRBC 0.00 0.00 - 0.01 K/uL    NEUTROPHILS 58 32 - 75 %    LYMPHOCYTES 29 12 - 49 %    MONOCYTES 9 5 - 13 %    EOSINOPHILS 3 0 - 7 %    BASOPHILS 0 0 - 1 %    IMMATURE GRANULOCYTES 1 (H) 0 - 0.5 %    ABS. NEUTROPHILS 2.4 1.8 - 8.0 K/UL    ABS. LYMPHOCYTES 1.2 0.8 - 3.5 K/UL    ABS. MONOCYTES 0.4 0.0 - 1.0 K/UL    ABS. EOSINOPHILS 0.1 0.0 - 0.4 K/UL    ABS. BASOPHILS 0.0 0.0 - 0.1 K/UL    ABS. IMM. GRANS. 0.0 0.00 - 0.04 K/UL    DF AUTOMATED     METABOLIC PANEL, COMPREHENSIVE    Collection Time: 05/06/21 12:48 PM   Result Value Ref Range    Sodium 140 136 - 145 mmol/L    Potassium 3.9 3.5 - 5.1 mmol/L    Chloride 105 97 - 108 mmol/L    CO2 27 21 - 32 mmol/L    Anion gap 8 5 - 15 mmol/L    Glucose 54 (L) 65 - 100 mg/dL    BUN 25 (H) 6 - 20 mg/dL    Creatinine 0.88 0.70 - 1.30 mg/dL    BUN/Creatinine ratio 28 (H) 12 - 20      GFR est AA >60 >60 ml/min/1.73m2    GFR est non-AA >60 >60 ml/min/1.73m2    Calcium 9.1 8.5 - 10.1 mg/dL    Bilirubin, total 0.2 0.2 - 1.0 mg/dL    AST (SGOT) <3 (L) 15 - 37 U/L    ALT (SGPT) 12 12 - 78 U/L    Alk.  phosphatase 76 45 - 117 U/L    Protein, total 7.6 6.4 - 8.2 g/dL    Albumin 3.0 (L) 3.5 - 5.0 g/dL Globulin 4.6 (H) 2.0 - 4.0 g/dL    A-G Ratio 0.7 (L) 1.1 - 2.2     LACTIC ACID    Collection Time: 05/06/21 12:48 PM   Result Value Ref Range    Lactic acid 3.7 (HH) 0.4 - 2.0 mmol/L   PROCALCITONIN    Collection Time: 05/06/21 12:48 PM   Result Value Ref Range    Procalcitonin 0.40 (H) 0 ng/mL   URINALYSIS W/ REFLEX CULTURE    Collection Time: 05/06/21  1:49 PM    Specimen: Urine   Result Value Ref Range    Color Yellow/Straw      Appearance Clear Clear      Specific gravity 1.012 1.003 - 1.030      pH (UA) 7.0 5.0 - 8.0      Protein Negative Negative mg/dL    Glucose Negative Negative mg/dL    Ketone Negative Negative mg/dL    Bilirubin Negative Negative      Blood Negative Negative      Urobilinogen 0.1 0.1 - 1.0 EU/dL    Nitrites Negative Negative      Leukocyte Esterase Negative Negative      UA:UC IF INDICATED Culture not indicated by UA result Culture not indicated by UA result      WBC 0-4 0 - 4 /hpf    RBC 0-5 0 - 5 /hpf    Bacteria Negative Negative /hpf       Radiologic Studies -   @lastxrresult@  CT Results  (Last 48 hours)    None        CXR Results  (Last 48 hours)    None            Medical Decision Making   - I am the first provider for this patient. - I reviewed the vital signs, available nursing notes, past medical history, past surgical history, family history and social history. - Initial assessment performed. The patients presenting problems have been discussed, and they are in agreement with the care plan formulated and outlined with them. I have encouraged them to ask questions as they arise throughout their visit. Vital Signs-Reviewed the patient's vital signs. Patient Vitals for the past 12 hrs:   Temp Pulse Resp BP SpO2   05/06/21 1027 98.2 °F (36.8 °C) 100 17 116/63 99 %       Records Reviewed: Nursing Notes and Old Medical Records          ED Course:          Provider Notes (Medical Decision Making):   Presents from the wound care center.   Patient has complaints of a wound on his right buttocks a wound on his right heel and pallor. Differential diagnosis include anemia, sepsis, nonhealing wound. Patient will be admitted for his severe anemia. Patient will have wound care done while in the hospital.  MDM       Procedures   Medical Decision Makingedical Decision Making  Performed by: Phil Woo NP  PROCEDURES:  Procedures       Disposition   Disposition: DC- Adult Discharges: All of the diagnostic tests were reviewed and questions answered. Diagnosis, care plan and treatment options were discussed. The patient understands the instructions and will follow up as directed. The patients results have been reviewed with them. They have been counseled regarding their diagnosis. The patient verbally convey understanding and agreement of the signs, symptoms, diagnosis, treatment and prognosis and additionally agrees to follow up as recommended with their PCP in 24 - 48 hours. They also agree with the care-plan and convey that all of their questions have been answered. I have also put together some discharge instructions for them that include: 1) educational information regarding their diagnosis, 2) how to care for their diagnosis at home, as well a 3) list of reasons why they would want to return to the ED prior to their follow-up appointment, should their condition change. Admitted          Diagnosis     Clinical Impression:   1. Severe anemia    2. Buttock wound, right, subsequent encounter    3. Non-healing wound of right heel        Attestations:    Phil Woo NP    Please note that this dictation was completed with [employer], the computer voice recognition software. Quite often unanticipated grammatical, syntax, homophones, and other interpretive errors are inadvertently transcribed by the computer software. Please disregard these errors. Please excuse any errors that have escaped final proofreading. Thank you.

## 2021-05-07 LAB
ANION GAP SERPL CALC-SCNC: 6 MMOL/L (ref 5–15)
BUN SERPL-MCNC: 26 MG/DL (ref 6–20)
BUN/CREAT SERPL: 25 (ref 12–20)
CA-I BLD-MCNC: 8.9 MG/DL (ref 8.5–10.1)
CHLORIDE SERPL-SCNC: 100 MMOL/L (ref 97–108)
CO2 SERPL-SCNC: 28 MMOL/L (ref 21–32)
CREAT SERPL-MCNC: 1.02 MG/DL (ref 0.7–1.3)
ERYTHROCYTE [DISTWIDTH] IN BLOOD BY AUTOMATED COUNT: 15.9 % (ref 11.5–14.5)
GLUCOSE BLD STRIP.AUTO-MCNC: 370 MG/DL (ref 65–100)
GLUCOSE BLD STRIP.AUTO-MCNC: 371 MG/DL (ref 65–100)
GLUCOSE BLD STRIP.AUTO-MCNC: 422 MG/DL (ref 65–100)
GLUCOSE BLD STRIP.AUTO-MCNC: 88 MG/DL (ref 65–100)
GLUCOSE SERPL-MCNC: 375 MG/DL (ref 65–100)
HCT VFR BLD AUTO: 29.9 % (ref 36.6–50.3)
HGB BLD-MCNC: 9.1 G/DL (ref 12.1–17)
IRON SATN MFR SERPL: 6 % (ref 20–50)
IRON SERPL-MCNC: 24 UG/DL (ref 35–150)
MCH RBC QN AUTO: 25.6 PG (ref 26–34)
MCHC RBC AUTO-ENTMCNC: 30.4 G/DL (ref 30–36.5)
MCV RBC AUTO: 84.2 FL (ref 80–99)
NRBC # BLD: 0 K/UL (ref 0–0.01)
NRBC BLD-RTO: 0 PER 100 WBC
PERFORMED BY, TECHID: ABNORMAL
PERFORMED BY, TECHID: NORMAL
PLATELET # BLD AUTO: 392 K/UL (ref 150–400)
PMV BLD AUTO: 9.3 FL (ref 8.9–12.9)
POTASSIUM SERPL-SCNC: 4.2 MMOL/L (ref 3.5–5.1)
RBC # BLD AUTO: 3.55 M/UL (ref 4.1–5.7)
SODIUM SERPL-SCNC: 134 MMOL/L (ref 136–145)
TIBC SERPL-MCNC: 377 UG/DL (ref 250–450)
WBC # BLD AUTO: 4.5 K/UL (ref 4.1–11.1)

## 2021-05-07 PROCEDURE — 65270000029 HC RM PRIVATE

## 2021-05-07 PROCEDURE — 74011000250 HC RX REV CODE- 250: Performed by: INTERNAL MEDICINE

## 2021-05-07 PROCEDURE — 74011250637 HC RX REV CODE- 250/637: Performed by: INTERNAL MEDICINE

## 2021-05-07 PROCEDURE — 74011636637 HC RX REV CODE- 636/637: Performed by: INTERNAL MEDICINE

## 2021-05-07 PROCEDURE — 99222 1ST HOSP IP/OBS MODERATE 55: CPT | Performed by: INTERNAL MEDICINE

## 2021-05-07 PROCEDURE — 82962 GLUCOSE BLOOD TEST: CPT

## 2021-05-07 PROCEDURE — 85027 COMPLETE CBC AUTOMATED: CPT

## 2021-05-07 PROCEDURE — 74011250636 HC RX REV CODE- 250/636: Performed by: INTERNAL MEDICINE

## 2021-05-07 PROCEDURE — 80048 BASIC METABOLIC PNL TOTAL CA: CPT

## 2021-05-07 PROCEDURE — 36415 COLL VENOUS BLD VENIPUNCTURE: CPT

## 2021-05-07 PROCEDURE — 99222 1ST HOSP IP/OBS MODERATE 55: CPT | Performed by: SURGERY

## 2021-05-07 RX ORDER — INSULIN LISPRO 100 [IU]/ML
INJECTION, SOLUTION INTRAVENOUS; SUBCUTANEOUS
Status: DISCONTINUED | OUTPATIENT
Start: 2021-05-07 | End: 2021-05-12 | Stop reason: ALTCHOICE

## 2021-05-07 RX ORDER — MAGNESIUM SULFATE 100 %
4 CRYSTALS MISCELLANEOUS AS NEEDED
Status: DISCONTINUED | OUTPATIENT
Start: 2021-05-07 | End: 2021-05-25 | Stop reason: HOSPADM

## 2021-05-07 RX ORDER — DEXTROSE 50 % IN WATER (D50W) INTRAVENOUS SYRINGE
25-50 AS NEEDED
Status: DISCONTINUED | OUTPATIENT
Start: 2021-05-07 | End: 2021-05-25 | Stop reason: HOSPADM

## 2021-05-07 RX ADMIN — INSULIN LISPRO 15 UNITS: 100 INJECTION, SOLUTION INTRAVENOUS; SUBCUTANEOUS at 16:44

## 2021-05-07 RX ADMIN — Medication 10 ML: at 15:31

## 2021-05-07 RX ADMIN — INSULIN LISPRO 15 UNITS: 100 INJECTION, SOLUTION INTRAVENOUS; SUBCUTANEOUS at 12:09

## 2021-05-07 RX ADMIN — VANCOMYCIN HYDROCHLORIDE 1000 MG: 1 INJECTION, POWDER, LYOPHILIZED, FOR SOLUTION INTRAVENOUS at 22:35

## 2021-05-07 RX ADMIN — Medication 10 ML: at 22:36

## 2021-05-07 RX ADMIN — DIVALPROEX SODIUM 1500 MG: 500 TABLET, DELAYED RELEASE ORAL at 22:35

## 2021-05-07 RX ADMIN — MIRTAZAPINE 30 MG: 30 TABLET, FILM COATED ORAL at 22:34

## 2021-05-07 RX ADMIN — SUCRALFATE 1 G: 1 TABLET ORAL at 12:02

## 2021-05-07 RX ADMIN — ASPIRIN 81 MG: 81 TABLET, CHEWABLE ORAL at 09:34

## 2021-05-07 RX ADMIN — MEROPENEM 1 G: 1 INJECTION, POWDER, FOR SOLUTION INTRAVENOUS at 09:35

## 2021-05-07 RX ADMIN — ENOXAPARIN SODIUM 40 MG: 40 INJECTION SUBCUTANEOUS at 22:35

## 2021-05-07 RX ADMIN — MEROPENEM 1 G: 1 INJECTION, POWDER, FOR SOLUTION INTRAVENOUS at 22:35

## 2021-05-07 RX ADMIN — PANTOPRAZOLE SODIUM 40 MG: 40 TABLET, DELAYED RELEASE ORAL at 09:34

## 2021-05-07 RX ADMIN — ATORVASTATIN CALCIUM 20 MG: 20 TABLET, FILM COATED ORAL at 09:34

## 2021-05-07 RX ADMIN — HYDROCODONE BITARTRATE AND ACETAMINOPHEN 1 TABLET: 5; 325 TABLET ORAL at 09:52

## 2021-05-07 RX ADMIN — SUCRALFATE 1 G: 1 TABLET ORAL at 22:35

## 2021-05-07 RX ADMIN — INSULIN GLARGINE 22 UNITS: 100 INJECTION, SOLUTION SUBCUTANEOUS at 09:35

## 2021-05-07 RX ADMIN — SERTRALINE HYDROCHLORIDE 100 MG: 50 TABLET ORAL at 09:34

## 2021-05-07 RX ADMIN — HYDROCODONE BITARTRATE AND ACETAMINOPHEN 1 TABLET: 5; 325 TABLET ORAL at 22:34

## 2021-05-07 RX ADMIN — SUCRALFATE 1 G: 1 TABLET ORAL at 16:44

## 2021-05-07 RX ADMIN — SUCRALFATE 1 G: 1 TABLET ORAL at 09:34

## 2021-05-07 RX ADMIN — VANCOMYCIN HYDROCHLORIDE 1000 MG: 1 INJECTION, POWDER, LYOPHILIZED, FOR SOLUTION INTRAVENOUS at 12:02

## 2021-05-07 NOTE — PROGRESS NOTES
Pt arrived to the floor accompanied by ED staff. Alert and oriented, denies any discomfort. Skin assessement done by myself and Sandrine ZHU. Pt has abraisons to both shins and upper thighs, also a small opening to rt buttocks which appears to be tunneling.

## 2021-05-07 NOTE — PROGRESS NOTES
Spoke with Dr. Soheila Worley, since pt hmg 6.3 from receiving 1uPRBC\"s, he says to discontinue the other order for 3 additional units.

## 2021-05-07 NOTE — PROGRESS NOTES
Progress Note    Patient: Lexa Martinez. MRN: [id number]  SSN: [id number]    YOB: 1965  Age: 60 y.o. Sex: male      Admit Date: 5/6/2021    LOS: 1 day     Subjective:     60years old with diabetic foot ulcer and decubitus ulcer of the sacrum and severe anemia transfuse with packed red blood cells with hemoglobin up to 9.1    Objective:     Vitals:    05/07/21 0101 05/07/21 0234 05/07/21 0800 05/07/21 1200   BP: 125/72 (!) 149/80     Pulse:  91 89 82   Resp: 18 18     Temp: 98.5 °F (36.9 °C) 98.4 °F (36.9 °C)     SpO2: 98% 97%     Weight:       Height:            Intake and Output:  Current Shift: No intake/output data recorded. Last three shifts: 05/05 1901 - 05/07 0700  In: 920   Out: -     Physical Exam:   General:  Alert, cooperative, no distress, appears stated age. Eyes:  Conjunctivae/corneas clear. PERRL, EOMs intact. Fundi benign   Ears:  Normal TMs and external ear canals both ears. Nose: Nares normal. Septum midline. Mucosa normal. No drainage or sinus tenderness. Mouth/Throat: Lips, mucosa, and tongue normal. Teeth and gums normal.   Neck: Supple, symmetrical, trachea midline, no adenopathy, thyroid: no enlargment/tenderness/nodules, no carotid bruit and no JVD. Back:   Symmetric, no curvature. ROM normal. No CVA tenderness. Lungs:   Clear to auscultation bilaterally. Heart:  Regular rate and rhythm, S1, S2 normal, no murmur, click, rub or gallop. Abdomen:   Soft, non-tender. Bowel sounds normal. No masses,  No organomegaly. Extremities:  Diabetic foot ulcer, atraumatic, no cyanosis or edema. Pulses: 2+ and symmetric all extremities. Skin: Skin color, texture, turgor normal. No rashes or lesions   Lymph nodes: Cervical, supraclavicular, and axillary nodes normal.   Neurologic: CNII-XII intact. Normal strength, sensation and reflexes throughout. Lab/Data Review: All lab results for the last 24 hours reviewed.      Recent Results (from the past 24 hour(s))   GLUCOSE, POC    Collection Time: 05/06/21  5:46 PM   Result Value Ref Range    Glucose (POC) 106 (H) 65 - 100 mg/dL    Performed by Savanna Braun    GLUCOSE, POC    Collection Time: 05/06/21  8:20 PM   Result Value Ref Range    Glucose (POC) 174 (H) 65 - 100 mg/dL    Performed by MYRTLE GREENBERG    C REACTIVE PROTEIN, QT    Collection Time: 05/06/21  9:13 PM   Result Value Ref Range    C-Reactive protein 1.98 (H) 0.00 - 0.60 mg/dL   HGB & HCT    Collection Time: 05/06/21  9:13 PM   Result Value Ref Range    HGB 6.3 (L) 12.1 - 17.0 g/dL    HCT 22.7 (L) 36.6 - 50.3 %   GLUCOSE, POC    Collection Time: 05/07/21  9:01 AM   Result Value Ref Range    Glucose (POC) 371 (H) 65 - 100 mg/dL    Performed by Jd Millard    METABOLIC PANEL, BASIC    Collection Time: 05/07/21  9:05 AM   Result Value Ref Range    Sodium 134 (L) 136 - 145 mmol/L    Potassium 4.2 3.5 - 5.1 mmol/L    Chloride 100 97 - 108 mmol/L    CO2 28 21 - 32 mmol/L    Anion gap 6 5 - 15 mmol/L    Glucose 375 (H) 65 - 100 mg/dL    BUN 26 (H) 6 - 20 mg/dL    Creatinine 1.02 0.70 - 1.30 mg/dL    BUN/Creatinine ratio 25 (H) 12 - 20      GFR est AA >60 >60 ml/min/1.73m2    GFR est non-AA >60 >60 ml/min/1.73m2    Calcium 8.9 8.5 - 10.1 mg/dL   CBC W/O DIFF    Collection Time: 05/07/21  9:05 AM   Result Value Ref Range    WBC 4.5 4.1 - 11.1 K/uL    RBC 3.55 (L) 4.10 - 5.70 M/uL    HGB 9.1 (L) 12.1 - 17.0 g/dL    HCT 29.9 (L) 36.6 - 50.3 %    MCV 84.2 80.0 - 99.0 FL    MCH 25.6 (L) 26.0 - 34.0 PG    MCHC 30.4 30.0 - 36.5 g/dL    RDW 15.9 (H) 11.5 - 14.5 %    PLATELET 698 990 - 673 K/uL    MPV 9.3 8.9 - 12.9 FL    NRBC 0.0 0.0  WBC    ABSOLUTE NRBC 0.00 0.00 - 0.01 K/uL   GLUCOSE, POC    Collection Time: 05/07/21 10:34 AM   Result Value Ref Range    Glucose (POC) 422 (H) 65 - 100 mg/dL    Performed by Jd Millard          Assessment:     Active Problems:    Severe anemia (5/6/2021)      Diabetic foot infection ([facility] (5/6/2021)    Acute on chronic diabetic foot ulcer rule out osteomyelitis  Severe anemia  Decubitus ulcer on his sacrum      Plan:     Patient with IV antibiotics Ortho for possible debridement vascular poor evaluation of PAD    Signed By: Renu Trinh MD     May 7, 2021

## 2021-05-07 NOTE — PROGRESS NOTES
Infectious Disease Consult Note    Reason for Consult: Right heel ulcer, right buttock ulcer   Date of Consultation: May 7, 2021  Date of Admission: 5/6/2021  Referring Physician: Dr Eliza Sargent     HPI: 62-y.o [address] Blaise Aleman admitted on 5/06 as a transfer from [address] [place] [address] wound healing center with right buttock ulcer and right heel ulcer for which ID has been consulted. Per records wounds have been present for about 2 wks, appear to be related to pressure injury. 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 He is a long-term resident of a group home, has ambulatory difficulty and is chronically wheelchair-bound. He has been afebrile since presentation w normal WBC of 4.5 on today's labs, hgb was 4.7 improved to 9.1 after PRBC transfusion. X-ray of his right foot showed a moderate edema, foreign body and no bony destruction suggestive of osteomyelitis. He has a documented PCN allergy, currently on Vanc and meropenem. Blood Cx is pending, wound Cx has not been sent.      Review of Systems:     Gen: Negative for chills, fevers, weight loss, weight gain   CV:  Negative for chest pain, dyspnea on exertion, leg edema   Lungs: Negative for shortness of breath, cough, wheezing   Abdomen: Negative for abdominal pain, nausea, vomiting, diarrhea, constipation   Genitourinary: Negative for genital pain or genital discharge     Neuro: Negative for headache, numbness, tingling, extremity weakness   Skin: Negative for rash, sores/open wounds   Musculoskeletal: Right heel pain, right buttock pain    Psych: Negative for manic behavior     Past Medical History:  Past Medical History:   Diagnosis Date    Anxiety disorder     Arthritis     Depression     Diabetes ([facility] [address]     Liver disease     Neurological disorder     neuro cognative disorder    OCD (obsessive compulsive disorder)     Psychiatric disorder OCD    Psychiatric disorder     anxiety    Seizures ([facility] [address]     Thyroid disease        Past surgical history   Past Surgical History:   Procedure Laterality Date    HX CRANIOTOMY          Social History   Social History     Tobacco Use    Smoking status: Former Smoker    Smokeless tobacco: Never Used   Substance Use Topics    Alcohol use: No    Drug use: No        Family history   Family History   Problem Relation Age of Onset    Cancer Mother           Allergies: Allergies   Allergen Reactions    Penicillins Rash       Medications:  Current Facility-Administered Medications on File Prior to Encounter   Medication Dose Route Frequency Provider Last Rate Last Admin    lidocaine (XYLOCAINE) 2 % viscous solution 15 mL  15 mL Topical PRN Magdalene Haq MD         Current Outpatient Medications on File Prior to Encounter   Medication Sig Dispense Refill    pantoprazole (Protonix) 40 mg tablet Take 1 Tab by mouth Daily (before breakfast). 30 Tab 3    sucralfate (Carafate) 1 gram tablet Take 1 Tab by mouth Before breakfast, lunch, dinner and at bedtime. 120 Tab 3    atorvastatin (LIPITOR) 10 mg tablet TAKE ONE TABLET BY MOUTH DAILY 30 Tab 0    mirtazapine (REMERON) 15 mg tablet TAKE ONE TABLET BY MOUTH AT BEDTIME. 30 Tab 0    sertraline (ZOLOFT) 100 mg tablet TAKE ONE TABLET BY MOUTH DAILY 30 Tab 0    aspirin 81 mg chewable tablet Take 1 Tab by mouth daily. 30 Tab 5    divalproex ER (Depakote ER) 500 mg ER tablet Take 1,500 mg by mouth nightly.  insulin detemir U-100 (LEVEMIR FLEXTOUCH) 100 unit/mL (3 mL) inpn 22 Units by SubCUTAneous route Daily (before breakfast).  insulin aspart U-100 (NovoLOG Flexpen U-100 Insulin) 100 unit/mL (3 mL) inpn by SubCUTAneous route Before breakfast, lunch, and dinner.  Sliding Scale  Blood glucose  Less than 70 give 0 units  70 to 150 give 3 units  151 to 200 give 5 units  201 to 250 give 7 units  251 to 300 give 12 units  301 to 350 give 15 units  Greater than 350 give  20 units  If blood glucose is greater than is greater than 300 recheck in 2 hours      ferrous sulfate 325 mg (65 mg iron) tablet Take 1 Tab by mouth two (2) times daily (after meals). 60 Tab 2    ondansetron (ZOFRAN ODT) 4 mg disintegrating tablet DISSOLVE 1 TABLET UNDER THE TONGUE EVERY 8 HOURS AS NEEDED FOR NAUSEA OR VOMITING 20 Tab 0    insulin aspart U-100 (NovoLOG Flexpen U-100 Insulin) 100 unit/mL (3 mL) inpn by SubCUTAneous route nightly. Sliding scale  Blood glucose  Less than 200 give 0 units  200 to 299 give 3 units  Greater than 300 give 6 units      glucagon (GLUCAGEN) 1 mg injection 1 mg by IntraVENous route as needed for Hypoglycemia.  glucose 4 gram chewable tablet Take 45 g by mouth as needed (Blood glucose less than 70).  guaiFENesin (ROBITUSSIN) 100 mg/5 mL liquid Take 200 mg by mouth four (4) times daily as needed for Cough.  hydrocortisone (CORTAID) 1 % topical cream Apply  to affected area two (2) times daily as needed for Skin Irritation. use thin layer      ibuprofen (Motrin IB) 200 mg tablet Take 400 mg by mouth every four (4) hours as needed for Pain.  albuterol-ipratropium (DUO-NEB) 2.5 mg-0.5 mg/3 ml nebu 3 mL by Nebulization route three (3) times daily as needed for Wheezing.          Physical Exam:    Vitals:   Patient Vitals for the past 24 hrs:   Temp Pulse Resp BP SpO2   05/07/21 0800  89      05/07/21 0234 98.4 °F (36.9 °C) 91 18 (!) 149/80 97 %   05/07/21 0101 98.5 °F (36.9 °C)  18 125/72 98 %   05/07/21 0000 98.2 °F (36.8 °C)  18 137/83 97 %   05/06/21 2319 98.2 °F (36.8 °C) 91 18 (!) 141/86 98 %   05/06/21 2317 98.5 °F (36.9 °C) 99 18 123/63 98 %   05/06/21 2250 97.5 °F (36.4 °C) 91 18 138/78 98 %   05/06/21 1936 97.5 °F (36.4 °C) 92 18 134/72 98 %   05/06/21 1855 98.1 °F (36.7 °C) 91 16 (!) 140/86 100 %   05/06/21 1836 98.2 °F (36.8 °C) 93 16 130/65 98 %   05/06/21 1736 98 °F (36.7 °C) 92 16 132/66 98 %   05/06/21 1706 97.9 °F (36.6 °C) 93 20 120/62 98 %   05/06/21 1636 97.8 °F (36.6 °C) 92 16 125/68 99 %   05/06/21 1606 97.9 °F (36.6 °C) 96 16 126/69 99 %   05/06/21 1551 98 °F (36.7 °C) 93 16 126/65 99 %   05/06/21 1536 98.1 °F (36.7 °C) 98 16 127/64 100 %   ·   · GEN: NAD, AAO x 3 (Person, place and time)   · HEENT: NCAT, PERRLA  · HEART: S1, S2+, RRR, No murmur   · Lungs: CTA B/l, decreased at the bases wheeze/rhonchi   · Abdomen: soft, ND, NT, +BS   · Genitourinary: no genital discharge, no pacheco  · Extremities: Bulging right heel, + eschar, + tenderness no drainage   · Skin: Right buttock chick ulcer, some tunneling, surrounding induration,     Labs:   Recent Labs     05/07/21  0905 05/06/21 2113 05/06/21  1248   WBC 4.5  --  4.1   HGB 9.1* 6.3* 4.7*   HCT 29.9* 22.7* 17.7*     --  464*     Recent Labs     05/07/21  0905 05/06/21  1248   BUN 26* 25*   CREA 1.02 0.88       Lab Results   Component Value Date/Time    C-Reactive protein 1.98 (H) 05/06/2021 09:13 PM      Microbiology Data:  - Blood Cx 05/06: Pending     Imaging:   CXR of right foot 056/06: Moderate edema about the midfoot and forefoot. Linear metallic foreign body  lodged in the soft tissues below the second MTP joint. History is of a heel  wound and there is no foreign body, fracture or bone destruction over the heel. Tiny spurs and advanced arterial calcification noted    Assessment / Plan:     1. Right buttock ulcer, suspected abscess. Ulcerated area w surrounding induration       Area very tender to touch w serosanguineous drainage       Afebrile w a normal wbc on routine labs       Continue on Vanc and Meropenem, wound Cx sent       May need I&D of area, to eval for deep tissue infection     2.  Right heel ulcer, chronic, + eschar w exquisite tenderness on exam      X-ray neg for osteomyelitis but revealed linear metallic foreign body  lodged in the soft tissues below the second MTP joint (Not area of concern)       Foreign body precludes MRI to r/o osteomyelitis, Ceretec scan ordered, will not be done until 05/10      Continue antibiotics as above     3. Chronic on chronic anemia w hgb of 4.7 on admission, improved after PRBC transfusion     4. H/o hemorrhagic telangiectasis/ hemorrhagic CVA w Right hemiparesis     5. H/o DM: Will defer BS mgt to primary     6.  Other chronic problems per HPI      Lashay Escobar MD     5/7/2021

## 2021-05-07 NOTE — PROGRESS NOTES
Consult for Vancomycin Dosing by Pharmacy by Dr. Nadira Maria provided for this 60y.o. year old male , for indication of SSTI    Day of Therapy: 01  Goal of Level(s): 15-20mcg/dL    Other Current Antibiotics: Meropenam    Significant Cultures:     Results       Procedure Component Value Units Date/Time    CULTURE, WOUND [[id number]]     Order Status: Sent Specimen: Wound Drainage     CULTURE, BLOOD, PAIRED [[id number]] Collected: 05/06/21 1245    Order Status: Completed Specimen: Blood Updated: 05/06/21 1305            Previous Regimen: Vancomycin 1g in ED on 05/06 at 1400    New Regimen:   Patient will be started on Vancomycin 1000g q12hrs from [facility]. Trough has been scheduled on 04/08 at 0900. Pharmacy to follow daily and will make changes to dose and/or frequency based on clinical status.   _________________________________     Pharmacist Miguel Wilkerson PHARMD

## 2021-05-07 NOTE — H&P
History and Physical    Patient: Porsche Johns MRN: [id number]  SSN: [id number]    YOB: 1965  Age: 60 y.o. Sex: male      Subjective:      Porsche Johns is a 60 y.o. male who has a history of diabetes, liver disease, obsessive-compulsive disorder, depression, anxiety, history of seizures was directed to the hospital for admission for nonhealing diabetic foot ulcer and severe anemia with hemoglobin of 4.0 patient is a poor historian denies any passage of black stool denies any chest pain or shortness of breath    Past Medical History:   Diagnosis Date    Anxiety disorder     Arthritis     Depression     [facility] ([facility] [address]     Liver disease     Neurological disorder     neuro cognative disorder    OCD (obsessive compulsive disorder)     Psychiatric disorder     OCD    Psychiatric disorder     anxiety    Seizures ([facility] [address]     Thyroid disease      Past Surgical History:   Procedure Laterality Date    HX CRANIOTOMY        Family History   Problem Relation Age of Onset    Cancer Mother      Social History     Tobacco Use    Smoking status: Former Smoker    Smokeless tobacco: Never Used   Substance Use Topics    Alcohol use: No      Prior to Admission medications    Medication Sig Start Date End Date Taking? Authorizing Provider   pantoprazole (Protonix) 40 mg tablet Take 1 Tab by mouth Daily (before breakfast). 5/5/21  Yes Marcell Angeles MD   sucralfate (Carafate) 1 gram tablet Take 1 Tab by mouth Before breakfast, lunch, dinner and at bedtime. 5/5/21  Yes Marcell Angeles MD   atorvastatin (LIPITOR) 10 mg tablet TAKE ONE TABLET BY MOUTH DAILY 4/29/21  Yes Marcell Angeles MD   mirtazapine (REMERON) 15 mg tablet TAKE ONE TABLET BY MOUTH AT BEDTIME. 4/29/21  Yes Marcell Angeles MD   sertraline (ZOLOFT) 100 mg tablet TAKE ONE TABLET BY MOUTH DAILY 4/29/21  Yes Marcell Angeles MD   aspirin 81 mg chewable tablet Take 1 Tab by mouth daily.  3/1/21  Yes Marcell Angeles MD divalproex ER (Depakote ER) 500 mg ER tablet Take 1,500 mg by mouth nightly. Yes Provider, Historical   insulin detemir U-100 (LEVEMIR FLEXTOUCH) 100 unit/mL (3 mL) inpn 22 Units by SubCUTAneous route Daily (before breakfast). Yes Provider, Historical   insulin aspart U-100 (NovoLOG Flexpen U-100 Insulin) 100 unit/mL (3 mL) inpn by SubCUTAneous route Before breakfast, lunch, and dinner. Sliding Scale  Blood glucose  Less than 70 give 0 units  70 to 150 give 3 units  151 to 200 give 5 units  201 to 250 give 7 units  251 to 300 give 12 units  301 to 350 give 15 units  Greater than 350 give  20 units  If blood glucose is greater than is greater than 300 recheck in 2 hours   Yes Provider, Historical   ferrous sulfate 325 mg (65 mg iron) tablet Take 1 Tab by mouth two (2) times daily (after meals). 1/20/21  Yes Keene Severs, MD   ondansetron (ZOFRAN ODT) 4 mg disintegrating tablet DISSOLVE 1 TABLET UNDER THE TONGUE EVERY 8 HOURS AS NEEDED FOR NAUSEA OR VOMITING 4/20/21   Keene Severs, MD   insulin aspart U-100 (NovoLOG Flexpen U-100 Insulin) 100 unit/mL (3 mL) inpn by SubCUTAneous route nightly. Sliding scale  Blood glucose  Less than 200 give 0 units  200 to 299 give 3 units  Greater than 300 give 6 units    Provider, Historical   glucagon (GLUCAGEN) 1 mg injection 1 mg by IntraVENous route as needed for Hypoglycemia. Provider, Historical   glucose 4 gram chewable tablet Take 45 g by mouth as needed (Blood glucose less than 70). Provider, Historical   guaiFENesin (ROBITUSSIN) 100 mg/5 mL liquid Take 200 mg by mouth four (4) times daily as needed for Cough. Provider, Historical   hydrocortisone (CORTAID) 1 % topical cream Apply  to affected area two (2) times daily as needed for Skin Irritation. use thin layer    Provider, Historical   ibuprofen (Motrin IB) 200 mg tablet Take 400 mg by mouth every four (4) hours as needed for Pain.     Provider, Historical   albuterol-ipratropium (DUO-NEB) 2.5 mg-0.5 mg/3 ml nebu 3 mL by Nebulization route three (3) times daily as needed for Wheezing. Provider, Historical        Allergies   Allergen Reactions    Penicillins Rash       Review of Systems:  A comprehensive review of systems was negative except for that written in the History of Present Illness. Objective:     Vitals:    05/06/21 1736 05/06/21 1836 05/06/21 1855 05/06/21 1936   BP: 132/66 130/65 (!) 140/86 134/72   Pulse: 92 93 91 92   Resp: 16 16 16 18   Temp: 98 °F (36.7 °C) 98.2 °F (36.8 °C) 98.1 °F (36.7 °C) 97.5 °F (36.4 °C)   SpO2: 98% 98% 100% 98%   Weight:       Height:            Physical Exam:  General:  Alert, cooperative, no distress, appears stated age. Eyes:  Conjunctivae/corneas clear. PERRL, EOMs intact. Fundi benign   Ears:  Normal TMs and external ear canals both ears. Nose: Nares normal. Septum midline. Mucosa normal. No drainage or sinus tenderness. Mouth/Throat: Lips, mucosa, and tongue normal. Teeth and gums normal.   Neck: Supple, symmetrical, trachea midline, no adenopathy, thyroid: no enlargment/tenderness/nodules, no carotid bruit and no JVD. Back:   Symmetric, no curvature. ROM normal. No CVA tenderness. Lungs:   Clear to auscultation bilaterally. Heart:  Regular rate and rhythm, S1, S2 normal, no murmur, click, rub or gallop. Abdomen:   Soft, non-tender. Bowel sounds normal. No masses,  No organomegaly. Extremities: Extremities normal, atraumatic, no cyanosis or edema. Pulses: 2+ and symmetric all extremities. Skin: Skin color, texture, turgor normal. No rashes or lesions   Lymph nodes: Cervical, supraclavicular, and axillary nodes normal.   Neurologic: CNII-XII intact. Normal strength, sensation and reflexes throughout.        Assessment:     Hospital Problems  Date Reviewed: 3/30/2021          Codes Class Noted POA    Severe anemia ICD-10-CM: D64.9  ICD-9-CM: 285.9  5/6/2021 Unknown        Di[facility] ICD-10-CM: E11.628, L08.9  ICD-9-CM: 250.80, 686.9  5/6/2021 Unknown          Rule out osteomyelitis consult ID and Ortho for debridement  Consult vascular surgeon for PAD evaluation  Obtain stool Hemoccult and transfuse with packed red blood cells    Plan:     Antibiotics IV    Signed By: Erin Mora MD     May 6, 2021

## 2021-05-08 ENCOUNTER — APPOINTMENT (OUTPATIENT)
Dept: NON INVASIVE DIAGNOSTICS | Age: 56
DRG: 271 | End: 2021-05-08
Attending: SURGERY
Payer: MEDICARE

## 2021-05-08 LAB
ANION GAP SERPL CALC-SCNC: 6 MMOL/L (ref 5–15)
BASOPHILS # BLD: 0 K/UL (ref 0–0.1)
BASOPHILS NFR BLD: 0 % (ref 0–1)
BUN SERPL-MCNC: 30 MG/DL (ref 6–20)
BUN/CREAT SERPL: 28 (ref 12–20)
CA-I BLD-MCNC: 8.5 MG/DL (ref 8.5–10.1)
CHLORIDE SERPL-SCNC: 104 MMOL/L (ref 97–108)
CO2 SERPL-SCNC: 27 MMOL/L (ref 21–32)
CREAT SERPL-MCNC: 1.06 MG/DL (ref 0.7–1.3)
CRP SERPL-MCNC: 1.64 MG/DL (ref 0–0.6)
DATE LAST DOSE: ABNORMAL
DIFFERENTIAL METHOD BLD: ABNORMAL
EOSINOPHIL # BLD: 0.1 K/UL (ref 0–0.4)
EOSINOPHIL NFR BLD: 1 % (ref 0–7)
ERYTHROCYTE [DISTWIDTH] IN BLOOD BY AUTOMATED COUNT: 16.2 % (ref 11.5–14.5)
ERYTHROCYTE [SEDIMENTATION RATE] IN BLOOD: 13 MM/HR
GLUCOSE BLD STRIP.AUTO-MCNC: 311 MG/DL (ref 65–100)
GLUCOSE BLD STRIP.AUTO-MCNC: 369 MG/DL (ref 65–100)
GLUCOSE BLD STRIP.AUTO-MCNC: 371 MG/DL (ref 65–100)
GLUCOSE BLD STRIP.AUTO-MCNC: 380 MG/DL (ref 65–100)
GLUCOSE BLD STRIP.AUTO-MCNC: 401 MG/DL (ref 65–100)
GLUCOSE BLD STRIP.AUTO-MCNC: 54 MG/DL (ref 65–100)
GLUCOSE BLD STRIP.AUTO-MCNC: 97 MG/DL (ref 65–100)
GLUCOSE SERPL-MCNC: 279 MG/DL (ref 65–100)
HCT VFR BLD AUTO: 28.6 % (ref 36.6–50.3)
HGB BLD-MCNC: 8.7 G/DL (ref 12.1–17)
IMM GRANULOCYTES # BLD AUTO: 0.1 K/UL (ref 0–0.04)
IMM GRANULOCYTES NFR BLD AUTO: 1 % (ref 0–0.5)
LYMPHOCYTES # BLD: 0.4 K/UL (ref 0.8–3.5)
LYMPHOCYTES NFR BLD: 3 % (ref 12–49)
MCH RBC QN AUTO: 25.5 PG (ref 26–34)
MCHC RBC AUTO-ENTMCNC: 30.4 G/DL (ref 30–36.5)
MCV RBC AUTO: 83.9 FL (ref 80–99)
MONOCYTES # BLD: 0.7 K/UL (ref 0–1)
MONOCYTES NFR BLD: 6 % (ref 5–13)
NEUTS SEG # BLD: 11.9 K/UL (ref 1.8–8)
NEUTS SEG NFR BLD: 89 % (ref 32–75)
NRBC # BLD: 0 K/UL (ref 0–0.01)
NRBC BLD-RTO: 0 PER 100 WBC
PERFORMED BY, TECHID: ABNORMAL
PERFORMED BY, TECHID: NORMAL
PLATELET # BLD AUTO: 463 K/UL (ref 150–400)
PMV BLD AUTO: 9.5 FL (ref 8.9–12.9)
POTASSIUM SERPL-SCNC: 4.1 MMOL/L (ref 3.5–5.1)
RBC # BLD AUTO: 3.41 M/UL (ref 4.1–5.7)
REPORTED DOSE,DOSE: ABNORMAL UNITS
SODIUM SERPL-SCNC: 137 MMOL/L (ref 136–145)
VANCOMYCIN TROUGH SERPL-MCNC: 20 UG/ML (ref 5–10)
WBC # BLD AUTO: 13.2 K/UL (ref 4.1–11.1)

## 2021-05-08 PROCEDURE — 74011636637 HC RX REV CODE- 636/637: Performed by: NURSE PRACTITIONER

## 2021-05-08 PROCEDURE — 74011000250 HC RX REV CODE- 250: Performed by: INTERNAL MEDICINE

## 2021-05-08 PROCEDURE — 65270000029 HC RM PRIVATE

## 2021-05-08 PROCEDURE — 93923 UPR/LXTR ART STDY 3+ LVLS: CPT

## 2021-05-08 PROCEDURE — 36415 COLL VENOUS BLD VENIPUNCTURE: CPT

## 2021-05-08 PROCEDURE — 85025 COMPLETE CBC W/AUTO DIFF WBC: CPT

## 2021-05-08 PROCEDURE — 82962 GLUCOSE BLOOD TEST: CPT

## 2021-05-08 PROCEDURE — 0JB90ZZ EXCISION OF BUTTOCK SUBCUTANEOUS TISSUE AND FASCIA, OPEN APPROACH: ICD-10-PCS | Performed by: SURGERY

## 2021-05-08 PROCEDURE — 74011000250 HC RX REV CODE- 250: Performed by: SURGERY

## 2021-05-08 PROCEDURE — 74011250637 HC RX REV CODE- 250/637: Performed by: INTERNAL MEDICINE

## 2021-05-08 PROCEDURE — 86140 C-REACTIVE PROTEIN: CPT

## 2021-05-08 PROCEDURE — 80048 BASIC METABOLIC PNL TOTAL CA: CPT

## 2021-05-08 PROCEDURE — 74011250636 HC RX REV CODE- 250/636: Performed by: INTERNAL MEDICINE

## 2021-05-08 PROCEDURE — 80202 ASSAY OF VANCOMYCIN: CPT

## 2021-05-08 PROCEDURE — 85652 RBC SED RATE AUTOMATED: CPT

## 2021-05-08 PROCEDURE — 74011636637 HC RX REV CODE- 636/637: Performed by: INTERNAL MEDICINE

## 2021-05-08 RX ORDER — INSULIN GLARGINE 100 [IU]/ML
10 INJECTION, SOLUTION SUBCUTANEOUS
Status: DISCONTINUED | OUTPATIENT
Start: 2021-05-08 | End: 2021-05-09

## 2021-05-08 RX ORDER — LIDOCAINE HYDROCHLORIDE 20 MG/ML
0.3 INJECTION, SOLUTION INFILTRATION; PERINEURAL ONCE
Status: COMPLETED | OUTPATIENT
Start: 2021-05-08 | End: 2021-05-08

## 2021-05-08 RX ADMIN — VANCOMYCIN HYDROCHLORIDE 1000 MG: 1 INJECTION, POWDER, LYOPHILIZED, FOR SOLUTION INTRAVENOUS at 12:10

## 2021-05-08 RX ADMIN — INSULIN LISPRO 15 UNITS: 100 INJECTION, SOLUTION INTRAVENOUS; SUBCUTANEOUS at 17:22

## 2021-05-08 RX ADMIN — HYDROCODONE BITARTRATE AND ACETAMINOPHEN 1 TABLET: 5; 325 TABLET ORAL at 21:23

## 2021-05-08 RX ADMIN — ATORVASTATIN CALCIUM 20 MG: 20 TABLET, FILM COATED ORAL at 08:35

## 2021-05-08 RX ADMIN — SUCRALFATE 1 G: 1 TABLET ORAL at 06:03

## 2021-05-08 RX ADMIN — ENOXAPARIN SODIUM 40 MG: 40 INJECTION SUBCUTANEOUS at 21:23

## 2021-05-08 RX ADMIN — INSULIN GLARGINE 10 UNITS: 100 INJECTION, SOLUTION SUBCUTANEOUS at 21:24

## 2021-05-08 RX ADMIN — SERTRALINE HYDROCHLORIDE 100 MG: 50 TABLET ORAL at 08:35

## 2021-05-08 RX ADMIN — INSULIN LISPRO 15 UNITS: 100 INJECTION, SOLUTION INTRAVENOUS; SUBCUTANEOUS at 12:10

## 2021-05-08 RX ADMIN — DIVALPROEX SODIUM 1500 MG: 500 TABLET, DELAYED RELEASE ORAL at 21:23

## 2021-05-08 RX ADMIN — MEROPENEM 1 G: 1 INJECTION, POWDER, FOR SOLUTION INTRAVENOUS at 08:35

## 2021-05-08 RX ADMIN — PANTOPRAZOLE SODIUM 40 MG: 40 TABLET, DELAYED RELEASE ORAL at 06:03

## 2021-05-08 RX ADMIN — MIRTAZAPINE 30 MG: 30 TABLET, FILM COATED ORAL at 21:23

## 2021-05-08 RX ADMIN — SUCRALFATE 1 G: 1 TABLET ORAL at 21:23

## 2021-05-08 RX ADMIN — SUCRALFATE 1 G: 1 TABLET ORAL at 12:10

## 2021-05-08 RX ADMIN — ASPIRIN 81 MG: 81 TABLET, CHEWABLE ORAL at 08:35

## 2021-05-08 RX ADMIN — INSULIN LISPRO 15 UNITS: 100 INJECTION, SOLUTION INTRAVENOUS; SUBCUTANEOUS at 21:24

## 2021-05-08 RX ADMIN — LIDOCAINE HYDROCHLORIDE 6 MG: 20 INJECTION, SOLUTION INFILTRATION; PERINEURAL at 11:27

## 2021-05-08 RX ADMIN — MEROPENEM 1 G: 1 INJECTION, POWDER, FOR SOLUTION INTRAVENOUS at 21:24

## 2021-05-08 RX ADMIN — SUCRALFATE 1 G: 1 TABLET ORAL at 17:22

## 2021-05-08 NOTE — CONSULTS
History and Physical    Chief complaints: Right heel wound   History of Presenting Illness:  Marianela King is a 60 y.o. pleasant man currently hospitalized with right foot heel wound. I was consulted for vascular evaluation. Patient examined. Patient is awake and alert. Patient complains of pain in the right heel area. Patient claims he had his wound a few weeks now. Patient does not recall how the wound started. Patient also severely anemic as well. Patient is a blood transfusion as well. Patient denies chest pain shortness of breath denies abdominal pain. On examination: Patient has palpable femoral pulses bilaterally. Patient has Doppler signal noted on dorsalis pedis in both feet. Nothing palpable on posterior tibial.  Patient has blackened area of dime size necrotic area on right heel. .      Past Medical History:   Diagnosis Date    Anxiety disorder     Arthritis     Depression     Diabetes ([facility] [address] [facility]     Liver disease     Neurological disorder     neuro cognative disorder    OCD (obsessive compulsive disorder)     Psychiatric disorder     OCD    Psychiatric disorder     anxiety    Seizures ([facility] [address]     Thyroid disease       Past Surgical History:   Procedure Laterality Date    HX CRANIOTOMY       Family History   Problem Relation Age of Onset    Cancer Mother       Social History     Tobacco Use    Smoking status: Former Smoker    Smokeless tobacco: Never Used   Substance Use Topics    Alcohol use: No       Prior to Admission medications    Medication Sig Start Date End Date Taking? Authorizing Provider   pantoprazole (Protonix) 40 mg tablet Take 1 Tab by mouth Daily (before breakfast). 5/5/21  Yes Derick Whitaker MD   sucralfate (Carafate) 1 gram tablet Take 1 Tab by mouth Before breakfast, lunch, dinner and at bedtime.  5/5/21  Yes Derick Whitaker MD   atorvastatin (LIPITOR) 10 mg tablet TAKE ONE TABLET BY MOUTH DAILY 4/29/21  Yes Derick Whitaker MD   mirtazapine (REMERON) 15 mg tablet TAKE ONE TABLET BY MOUTH AT BEDTIME. 4/29/21  Yes Christen Rausch MD   sertraline (ZOLOFT) 100 mg tablet TAKE ONE TABLET BY MOUTH DAILY 4/29/21  Yes Christen Rausch MD   aspirin 81 mg chewable tablet Take 1 Tab by mouth daily. 3/1/21  Yes Christen Rausch MD   divalproex ER (Depakote ER) 500 mg ER tablet Take 1,500 mg by mouth nightly. Yes Provider, Historical   insulin detemir U-100 (LEVEMIR FLEXTOUCH) 100 unit/mL (3 mL) inpn 22 Units by SubCUTAneous route Daily (before breakfast). Yes Provider, Historical   insulin aspart U-100 (NovoLOG Flexpen U-100 Insulin) 100 unit/mL (3 mL) inpn by SubCUTAneous route Before breakfast, lunch, and dinner. Sliding Scale  Blood glucose  Less than 70 give 0 units  70 to 150 give 3 units  151 to 200 give 5 units  201 to 250 give 7 units  251 to 300 give 12 units  301 to 350 give 15 units  Greater than 350 give  20 units  If blood glucose is greater than is greater than 300 recheck in 2 hours   Yes Provider, Historical   ferrous sulfate 325 mg (65 mg iron) tablet Take 1 Tab by mouth two (2) times daily (after meals). 1/20/21  Yes Christen Rausch MD   ondansetron (ZOFRAN ODT) 4 mg disintegrating tablet DISSOLVE 1 TABLET UNDER THE TONGUE EVERY 8 HOURS AS NEEDED FOR NAUSEA OR VOMITING 4/20/21   Christen Rausch MD   insulin aspart U-100 (NovoLOG Flexpen U-100 Insulin) 100 unit/mL (3 mL) inpn by SubCUTAneous route nightly. Sliding scale  Blood glucose  Less than 200 give 0 units  200 to 299 give 3 units  Greater than 300 give 6 units    Provider, Historical   glucagon (GLUCAGEN) 1 mg injection 1 mg by IntraVENous route as needed for Hypoglycemia. Provider, Historical   glucose 4 gram chewable tablet Take 45 g by mouth as needed (Blood glucose less than 70). Provider, Historical   guaiFENesin (ROBITUSSIN) 100 mg/5 mL liquid Take 200 mg by mouth four (4) times daily as needed for Cough.     Provider, Historical   hydrocortisone (CORTAID) 1 % topical cream Apply  to affected area two (2) times daily as needed for Skin Irritation. use thin layer    Provider, Historical   ibuprofen (Motrin IB) 200 mg tablet Take 400 mg by mouth every four (4) hours as needed for Pain. Provider, Historical   albuterol-ipratropium (DUO-NEB) 2.5 mg-0.5 mg/3 ml nebu 3 mL by Nebulization route three (3) times daily as needed for Wheezing. Provider, Historical     Allergies   Allergen Reactions    Penicillins Rash        Review of Systems:  Pertinent review of systems discussed in HPI, and rest of organ systems personally reviewed and they are negative. Objective:   Vital signs reviewed:    Visit Vitals  BP (!) 142/80 (BP 1 Location: Right upper arm, BP Patient Position: At rest)   Pulse 80   Temp 97.9 °F (36.6 °C)   Resp 16   Ht 5' 6\" (1.676 m)   Wt 148 lb (67.1 kg)   SpO2 98%   BMI 23.89 kg/m²     Physical Exam:   General appearance:   Patient is awake and alert. Head and neck atraumatic normocephalic. Eyes pupil equal gaze appropriate. ENT oral mucosa is normal.  There is no hoarse voice. Carbocisteine regular rate rhythm. Pulmonary no audible wheeze. Chest wall normal excursion with respiration cycle. Abdomen soft nontender distended. Neurologic nonfocal, cranial nerves intact. Musculoskeletal system moving all 4 extremities. Skin is warm and moist.  Hematology is no bruising. Psychosocial appropriate cooperative. Data Review: Labs are reviewed.  Discussed  Recent Results (from the past 24 hour(s))   GLUCOSE, POC    Collection Time: 05/07/21  9:01 AM   Result Value Ref Range    Glucose (POC) 371 (H) 65 - 100 mg/dL    Performed by [address] [place] [address], [facility]    Collection Time: 05/07/21  9:05 AM   Result Value Ref Range    Sodium 134 (L) 136 - 145 mmol/L    Potassium 4.2 3.5 - 5.1 mmol/L    Chloride 100 97 - 108 mmol/L    CO2 28 21 - 32 mmol/L    Anion gap 6 5 - 15 mmol/L    Glucose 375 (H) 65 - 100 mg/dL    BUN 26 (H) 6 - 20 mg/dL Creatinine 1.02 0.70 - 1.30 mg/dL    BUN/Creatinine ratio 25 (H) 12 - 20      GFR est AA >60 >60 ml/min/1.73m2    GFR est non-AA >60 >60 ml/min/1.73m2    Calcium 8.9 8.5 - 10.1 mg/dL   CBC W/O DIFF    Collection Time: 05/07/21  9:05 AM   Result Value Ref Range    WBC 4.5 4.1 - 11.1 K/uL    RBC 3.55 (L) 4.10 - 5.70 M/uL    HGB 9.1 (L) 12.1 - 17.0 g/dL    HCT 29.9 (L) 36.6 - 50.3 %    MCV 84.2 80.0 - 99.0 FL    MCH 25.6 (L) 26.0 - 34.0 PG    MCHC 30.4 30.0 - 36.5 g/dL    RDW 15.9 (H) 11.5 - 14.5 %    PLATELET 397 754 - 769 K/uL    MPV 9.3 8.9 - 12.9 FL    NRBC 0.0 0.0  WBC    ABSOLUTE NRBC 0.00 0.00 - 0.01 K/uL   GLUCOSE, POC    Collection Time: 05/07/21 10:34 AM   Result Value Ref Range    Glucose (POC) 422 (H) 65 - 100 mg/dL    Performed by [address] [place] [address], [facility]    Collection Time: 05/07/21  4:40 PM   Result Value Ref Range    Glucose (POC) 370 (H) 65 - 100 mg/dL    Performed by Ermelinda Carson [facility])    GLUCOSE, POC    Collection Time: 05/07/21 10:25 PM   Result Value Ref Range    Glucose (POC) 88 65 - 100 mg/dL    Performed by [address] [place] [address], [facility]    Collection Time: 05/08/21 12:43 AM   Result Value Ref Range    Glucose (POC) 97 65 - 100 mg/dL    Performed by [address] [place] [address] reviewed: discussed as below. Assessment:     Active Problems:    Severe anemia (5/6/2021)      Diabetic foot infection ([address] (5/6/2021)        Plan:     Patient will require local wound care including heel protectors, iodine soaked 2 x 2 gauze with Kerlix rolls. I will order PVR, segmental pressure with MIGUEL examination. I explained the patient's vascular status on setting of the right heel wound. Patient is agreeable for further imaging studies. Discussed with the patient care plans and prognosis. I will make further recommendations. Most likely patient will need angiographic examination of the right lower extremity.   I thank you for consultation and let me participate in the care of this patient

## 2021-05-08 NOTE — PROGRESS NOTES
Progress Note  Date:2021       Room:[facility]  Patient Spencer Loera. YOB: 1965     Age:55 y.o. Subjective    Subjective Patient seen in room denies any complaints at this time. Review of Systems   Constitutional: Negative. Respiratory: Negative. Cardiovascular: Negative. Gastrointestinal: Negative. Genitourinary: Negative. Musculoskeletal: Negative. Neurological: Negative. Objective         Vitals Last 24 Hours:  TEMPERATURE:  Temp  Av °F (36.7 °C)  Min: 97.8 °F (36.6 °C)  Max: 98.3 °F (36.8 °C)  RESPIRATIONS RANGE: Resp  Av.5  Min: 16  Max: 17  PULSE OXIMETRY RANGE: SpO2  Av.8 %  Min: 95 %  Max: 100 %  PULSE RANGE: Pulse  Av.1  Min: 76  Max: 100  BLOOD PRESSURE RANGE: Systolic (80XHL), IBF:764 , Min:138 , XKS:851   ; Diastolic (54IDM), AUX:18, Min:73, Max:81    I/O (24Hr): Intake/Output Summary (Last 24 hours) at 2021 1037  Last data filed at 2021 0830  Gross per 24 hour   Intake 360 ml   Output    Net 360 ml     Objective:  General Appearance:  Comfortable, in no acute distress and not in pain. Vital signs: (most recent): Blood pressure (!) 159/81, pulse 100, temperature 98 °F (36.7 °C), resp. rate 17, height 5' 6\" (1.676 m), weight 67.1 kg (148 lb), SpO2 95 %. Vital signs are normal.    Output: Producing urine. Labs/Imaging/Diagnostics    Labs:  CBC:  Recent Labs     21  1248   WBC 4.5  --  4.1   RBC 3.55*  --  2.00*   HGB 9.1* 6.3* 4.7*   HCT 29.9* 22.7* 17.7*   MCV 84.2  --  88.5   RDW 15.9*  --  16.2*     --  464*     CHEMISTRIES:  Recent Labs     21  0921  1248   * 140   K 4.2 3.9    105   CO2 28 27   BUN 26* 25*   CA 8.9 9.1   PT/INR:No results for input(s): INR, INREXT in the last 72 hours. No lab exists for component: PROTIME  APTT:No results for input(s): APTT in the last 72 hours.   LIVER PROFILE:  Recent Labs     05/06/21  1248   AST <3*   ALT 12     Lab Results   Component Value Date/Time    ALT (SGPT) 12 05/06/2021 12:48 PM    AST (SGOT) <3 (L) 05/06/2021 12:48 PM    Alk. phosphatase 76 05/06/2021 12:48 PM    Bilirubin, direct 0.1 11/10/2020 12:00 PM    Bilirubin, total 0.2 05/06/2021 12:48 PM       Imaging Last 24 Hours:  No results found.   Assessment//Plan   Active Problems:    Severe anemia (5/6/2021)      Diabetic foot infection ([facility] [address] (5/6/2021)      Assessment & Plan    Electronically signed by Acacia Verde NP on 5/8/2021 at 10:37 AM

## 2021-05-08 NOTE — OP NOTES
This is the op report on Mr. Mukund Tee. Date of operation May 8, 2021. Preoperative procedure: Right gluteal infected wound or ulcer. Postoperative procedure: same    Procedure performed: Excisional debridement of right gluteal infected wound or ulcer up to subcutaneous tissue, 3 cm x 3 cm x 0.5 cm. Surface area debrided 9 cm². Surgeon: Ephraim Saeed    Assistant: Ms. Jami Bennett. Anesthesia: Local 1% lidocaine 10 mL. Depth of debridement: Subcutaneous tissue    Debridement of: Slough necrotic tissue and biofilm. Indication:    Mr. Jalyn Garcia is a 24-year-old male transferred from wound healing [place] because of nonhealing right buttock wound for the past 3 weeks associated with pain and discomfort and drainage. Clinically he had a nonhealing infected right buttock wound which was approximately 3 cm x 3 cm x 0.5 cm in size. I recommended a debridement of the same. Risk, benefits & complications were discussed and consent obtained. Procedure in detail:    Informed consent was obtained. Timeout was completed. Right buttock area was prepped and draped in usual sterile fashion. Local anesthesia was infiltrated. I used the following instruments for debridement #11 blade, hemostat, scissors, pickups with teeth.,    Slough, necrotic tissue, and biofilm were sharply debrided and excised up to subcutaneous tissue. Hemostasis achieved with pressure. The pain during the procedure was 0 and after the procedure was 0. Patient tolerated procedure very well there were no complication. Post debridement measurement was 3 cm x 3 cm x 0.5 cm. Total surface area debrided was 9 cm2. The site was then packed with gauze soaked in Betadine, 4 x 4 dressing gauze was applied. Thank you    Copy of the dictation to be CCed to primary care doctor Dr. Mackenzie Lei.

## 2021-05-09 LAB
BASOPHILS # BLD: 0 K/UL (ref 0–0.1)
BASOPHILS NFR BLD: 1 % (ref 0–1)
DIFFERENTIAL METHOD BLD: ABNORMAL
EOSINOPHIL # BLD: 0.1 K/UL (ref 0–0.4)
EOSINOPHIL NFR BLD: 2 % (ref 0–7)
ERYTHROCYTE [DISTWIDTH] IN BLOOD BY AUTOMATED COUNT: 16.9 % (ref 11.5–14.5)
GLUCOSE BLD STRIP.AUTO-MCNC: 272 MG/DL (ref 65–100)
GLUCOSE BLD STRIP.AUTO-MCNC: 301 MG/DL (ref 65–100)
GLUCOSE BLD STRIP.AUTO-MCNC: 339 MG/DL (ref 65–100)
GLUCOSE BLD STRIP.AUTO-MCNC: 34 MG/DL (ref 65–100)
GLUCOSE BLD STRIP.AUTO-MCNC: 420 MG/DL (ref 65–100)
GLUCOSE BLD STRIP.AUTO-MCNC: 50 MG/DL (ref 65–100)
GLUCOSE BLD STRIP.AUTO-MCNC: 89 MG/DL (ref 65–100)
GLUCOSE BLD STRIP.AUTO-MCNC: 96 MG/DL (ref 65–100)
HCT VFR BLD AUTO: 24 % (ref 36.6–50.3)
HGB BLD-MCNC: 7.3 G/DL (ref 12.1–17)
IMM GRANULOCYTES # BLD AUTO: 0 K/UL (ref 0–0.04)
IMM GRANULOCYTES NFR BLD AUTO: 0 % (ref 0–0.5)
LYMPHOCYTES # BLD: 0.9 K/UL (ref 0.8–3.5)
LYMPHOCYTES NFR BLD: 16 % (ref 12–49)
MCH RBC QN AUTO: 26 PG (ref 26–34)
MCHC RBC AUTO-ENTMCNC: 30.4 G/DL (ref 30–36.5)
MCV RBC AUTO: 85.4 FL (ref 80–99)
MONOCYTES # BLD: 0.6 K/UL (ref 0–1)
MONOCYTES NFR BLD: 10 % (ref 5–13)
NEUTS SEG # BLD: 4.1 K/UL (ref 1.8–8)
NEUTS SEG NFR BLD: 71 % (ref 32–75)
NRBC # BLD: 0.02 K/UL (ref 0–0.01)
NRBC BLD-RTO: 0.3 PER 100 WBC
PERFORMED BY, TECHID: ABNORMAL
PERFORMED BY, TECHID: NORMAL
PERFORMED BY, TECHID: NORMAL
PLATELET # BLD AUTO: 217 K/UL (ref 150–400)
PMV BLD AUTO: 10.7 FL (ref 8.9–12.9)
RBC # BLD AUTO: 2.81 M/UL (ref 4.1–5.7)
WBC # BLD AUTO: 5.7 K/UL (ref 4.1–11.1)

## 2021-05-09 PROCEDURE — 74011250637 HC RX REV CODE- 250/637: Performed by: INTERNAL MEDICINE

## 2021-05-09 PROCEDURE — 74011000250 HC RX REV CODE- 250: Performed by: INTERNAL MEDICINE

## 2021-05-09 PROCEDURE — 74011250636 HC RX REV CODE- 250/636: Performed by: INTERNAL MEDICINE

## 2021-05-09 PROCEDURE — 74011636637 HC RX REV CODE- 636/637: Performed by: NURSE PRACTITIONER

## 2021-05-09 PROCEDURE — 82962 GLUCOSE BLOOD TEST: CPT

## 2021-05-09 PROCEDURE — 65270000029 HC RM PRIVATE

## 2021-05-09 PROCEDURE — 74011636637 HC RX REV CODE- 636/637: Performed by: INTERNAL MEDICINE

## 2021-05-09 PROCEDURE — 85025 COMPLETE CBC W/AUTO DIFF WBC: CPT

## 2021-05-09 PROCEDURE — 36415 COLL VENOUS BLD VENIPUNCTURE: CPT

## 2021-05-09 RX ORDER — INSULIN GLARGINE 100 [IU]/ML
12 INJECTION, SOLUTION SUBCUTANEOUS DAILY
Status: DISCONTINUED | OUTPATIENT
Start: 2021-05-10 | End: 2021-05-10

## 2021-05-09 RX ADMIN — DIVALPROEX SODIUM 1500 MG: 500 TABLET, DELAYED RELEASE ORAL at 21:45

## 2021-05-09 RX ADMIN — INSULIN GLARGINE 10 UNITS: 100 INJECTION, SOLUTION SUBCUTANEOUS at 08:08

## 2021-05-09 RX ADMIN — INSULIN LISPRO 9 UNITS: 100 INJECTION, SOLUTION INTRAVENOUS; SUBCUTANEOUS at 12:03

## 2021-05-09 RX ADMIN — PANTOPRAZOLE SODIUM 40 MG: 40 TABLET, DELAYED RELEASE ORAL at 05:05

## 2021-05-09 RX ADMIN — INSULIN LISPRO 12 UNITS: 100 INJECTION, SOLUTION INTRAVENOUS; SUBCUTANEOUS at 21:45

## 2021-05-09 RX ADMIN — SUCRALFATE 1 G: 1 TABLET ORAL at 16:58

## 2021-05-09 RX ADMIN — SUCRALFATE 1 G: 1 TABLET ORAL at 05:05

## 2021-05-09 RX ADMIN — SUCRALFATE 1 G: 1 TABLET ORAL at 21:45

## 2021-05-09 RX ADMIN — MEROPENEM 1 G: 1 INJECTION, POWDER, FOR SOLUTION INTRAVENOUS at 21:46

## 2021-05-09 RX ADMIN — ATORVASTATIN CALCIUM 20 MG: 20 TABLET, FILM COATED ORAL at 08:07

## 2021-05-09 RX ADMIN — ASPIRIN 81 MG: 81 TABLET, CHEWABLE ORAL at 08:07

## 2021-05-09 RX ADMIN — INSULIN LISPRO 15 UNITS: 100 INJECTION, SOLUTION INTRAVENOUS; SUBCUTANEOUS at 08:08

## 2021-05-09 RX ADMIN — SODIUM CHLORIDE 750 MG: 9 INJECTION, SOLUTION INTRAVENOUS at 02:05

## 2021-05-09 RX ADMIN — SUCRALFATE 1 G: 1 TABLET ORAL at 11:03

## 2021-05-09 RX ADMIN — MEROPENEM 1 G: 1 INJECTION, POWDER, FOR SOLUTION INTRAVENOUS at 11:02

## 2021-05-09 RX ADMIN — MIRTAZAPINE 30 MG: 30 TABLET, FILM COATED ORAL at 21:45

## 2021-05-09 RX ADMIN — SODIUM CHLORIDE 750 MG: 9 INJECTION, SOLUTION INTRAVENOUS at 15:26

## 2021-05-09 RX ADMIN — ENOXAPARIN SODIUM 40 MG: 40 INJECTION SUBCUTANEOUS at 21:45

## 2021-05-09 RX ADMIN — HYDROCODONE BITARTRATE AND ACETAMINOPHEN 1 TABLET: 5; 325 TABLET ORAL at 21:45

## 2021-05-09 RX ADMIN — SERTRALINE HYDROCHLORIDE 100 MG: 50 TABLET ORAL at 08:07

## 2021-05-09 RX ADMIN — DEXTROSE MONOHYDRATE 12.5 G: 25 INJECTION, SOLUTION INTRAVENOUS at 02:16

## 2021-05-09 NOTE — PROGRESS NOTES
Progress Note  Date:2021       Room:[facility]  Patient Romie Terry. YOB: 1965     Age:55 y.o. Subjective    Subjective Patient lying in bed denies any complaints. Blood sugars dropped during night. Review of Systems   Constitutional: Negative. Respiratory: Negative. Cardiovascular: Negative. Gastrointestinal: Negative. Genitourinary: Negative. Musculoskeletal: Negative. Objective         Vitals Last 24 Hours:  TEMPERATURE:  Temp  Av.1 °F (36.7 °C)  Min: 97.9 °F (36.6 °C)  Max: 98.3 °F (36.8 °C)  RESPIRATIONS RANGE: Resp  Av  Min: 16  Max: 18  PULSE OXIMETRY RANGE: SpO2  Av.3 %  Min: 94 %  Max: 98 %  PULSE RANGE: Pulse  Av.4  Min: 76  Max: 108  BLOOD PRESSURE RANGE: Systolic (42HEZ), LZM:983 , Min:131 , AQO:068   ; Diastolic (03FGO), LQ, Min:58, Max:77    I/O (24Hr): Intake/Output Summary (Last 24 hours) at 2021 1036  Last data filed at 2021 1857  Gross per 24 hour   Intake    Output 700 ml   Net -700 ml     Objective:  General Appearance:  Comfortable, in no acute distress and not in pain. Vital signs: (most recent): Blood pressure 131/69, pulse 76, temperature 97.9 °F (36.6 °C), resp. rate 17, height 5' 6\" (1.676 m), weight 67.1 kg (148 lb), SpO2 94 %. Vital signs are normal.    Output: Producing urine. HEENT: Normal HEENT exam.    Lungs:  Normal effort and normal respiratory rate. Breath sounds clear to auscultation. Heart: Normal rate. Regular rhythm. S1 normal and S2 normal.    Abdomen: Abdomen is soft. Bowel sounds are normal.   There is no abdominal tenderness. (Right gluteal wound ). Extremities: Normal range of motion. (Dark eschar area noted right heel)  Pulses: Distal pulses are intact. Neurological: Patient is alert. Pupils:  Pupils are equal, round, and reactive to light. Skin:  Warm and dry.       Labs/Imaging/Diagnostics    Labs:  CBC:  Recent Labs 05/08/21  1150 05/07/21  0905 05/06/21  2113 05/06/21  1248   WBC 13.2* 4.5  --  4.1   RBC 3.41* 3.55*  --  2.00*   HGB 8.7* 9.1* 6.3* 4.7*   HCT 28.6* 29.9* 22.7* 17.7*   MCV 83.9 84.2  --  88.5   RDW 16.2* 15.9*  --  16.2*   * 392  --  464*     CHEMISTRIES:  Recent Labs     05/08/21  1150 05/07/21  0905 05/06/21  1248    134* 140   K 4.1 4.2 3.9    100 105   CO2 27 28 27   BUN 30* 26* 25*   CA 8.5 8.9 9.1   PT/INR:No results for input(s): INR, INREXT in the last 72 hours. No lab exists for component: PROTIME  APTT:No results for input(s): APTT in the last 72 hours. LIVER PROFILE:  Recent Labs     05/06/21  1248   AST <3*   ALT 12     Lab Results   Component Value Date/Time    ALT (SGPT) 12 05/06/2021 12:48 PM    AST (SGOT) <3 (L) 05/06/2021 12:48 PM    Alk. phosphatase 76 05/06/2021 12:48 PM    Bilirubin, direct 0.1 11/10/2020 12:00 PM    Bilirubin, total 0.2 05/06/2021 12:48 PM       Imaging Last 24 Hours:  No results found. Assessment//Plan   Active Problems:    Severe anemia (5/6/2021)      Diabetic foot infection ([address] (5/6/2021)      Assessment:  (Diabetes change lantus to 12 units daily.   hypoglycemia episode adjust insulin    Diabetic foot infection    leukocytosis on multiple antibitotics  Acute on chronic diabetic foot ulcer rule out osteomyelitis  Severe anemia improved  Decubitus ulcer on his sacrum s/p debridement   ). Plan:   (Repeat labs in a m  Continue current regimen  Case discussed with Dr. Thelma Waddell).        Electronically signed by Frederick Monteiro NP on 5/9/2021 at 10:36 AM

## 2021-05-09 NOTE — PROGRESS NOTES
[address]   Progress Note          Chief Complaint: None    Subjective:  No new issues. No fever or chills. Past Medical History:   Past Medical History:   Diagnosis Date    Anxiety disorder     Arthritis     Depression     Diabetes ([facility]     Liver disease     Neurological disorder     neuro cognative disorder    OCD (obsessive compulsive disorder)     Psychiatric disorder     OCD    Psychiatric disorder     anxiety    [facility]     Thyroid disease        Past Surgical History:   Past Surgical History:   Procedure Laterality Date    HX CRANIOTOMY          Allergy:  Allergies   Allergen Reactions    Penicillins Rash       Social History:  reports that he has quit smoking. He has never used smokeless tobacco. He reports that he does not drink alcohol or use drugs.      Family History:  Family History   Problem Relation Age of Onset    Cancer Mother         Current Medications:  Current Facility-Administered Medications:     [START ON 5/10/2021] insulin glargine (LANTUS) injection 12 Units, 12 Units, SubCUTAneous, DAILY, Nelida James NP    vancomycin (VANCOCIN) 750 mg in 0.9% sodium chloride 250 mL (VIAL-MATE), 750 mg, IntraVENous, Q12H, Edmundo Li MD, 750 mg at 05/09/21 0205    [START ON 5/10/2021] VANCOMYCIN Trough at 1200 on 5/10/21, , Other, Edmundo Espinosa MD    insulin lispro (HUMALOG) injection, , SubCUTAneous, AC&HS, Karen Carlisle MD, 9 Units at 05/09/21 1203    glucose chewable tablet 16 g, 4 Tab, Oral, PRN, Jennifer LAI MD    glucagon (GLUCAGEN) injection 1 mg, 1 mg, IntraMUSCular, PRN, Herrera Estrada MD    dextrose (D50W) injection syrg 12.5-25 g, 25-50 mL, IntraVENous, PRN, Herrera Borja MD, 12.5 g at 05/09/21 0216    0.9% sodium chloride infusion 250 mL, 250 mL, IntraVENous, PRN, Cathryn Fraser NP    albuterol-ipratropium (DUO-NEB) 2.5 MG-0.5 MG/3 ML, 3 mL, Nebulization, Q6H PRN, Karen Carlisle MD  [facility] aspirin chewable tablet 81 mg, 81 mg, Oral, DAILY, Herrera Borja MD, 81 mg at 05/09/21 0807    atorvastatin (LIPITOR) tablet 20 mg, 20 mg, Oral, DAILY, Herrera Borja MD, 20 mg at 05/09/21 0807    divalproex DR (DEPAKOTE) tablet 1,500 mg, 1,500 mg, Oral, QHS, Herrera Borja MD, 1,500 mg at 05/08/21 2123    mirtazapine (REMERON) tablet 30 mg, 30 mg, Oral, QHS, Herrera Borja MD, 30 mg at 05/08/21 2123    ondansetron (ZOFRAN ODT) tablet 4 mg, 4 mg, Oral, Q6H PRN, Manuel LAI MD    pantoprazole (PROTONIX) tablet 40 mg, 40 mg, Oral, ACB, Herrera Borja MD, 40 mg at 05/09/21 0505    sertraline (ZOLOFT) tablet 100 mg, 100 mg, Oral, DAILY, Ekta Vargas MD, 100 mg at 05/09/21 0807    sucralfate (CARAFATE) tablet 1 g, 1 g, Oral, AC&HS, Ekta Vargas MD, 1 g at 05/09/21 1103    sodium chloride (NS) flush 5-40 mL, 5-40 mL, IntraVENous, PRN, Herrera Coats MD    meropenem (MERREM) 1 g in sterile water (preservative free) 20 mL IV syringe, 1 g, IntraVENous, Q12H, Herrera Borja MD, 1 g at 05/09/21 1102    enoxaparin (LOVENOX) injection 40 mg, 40 mg, SubCUTAneous, Q24H, Herrera Borja MD, 40 mg at 05/08/21 2123    HYDROcodone-acetaminophen (NORCO) 5-325 mg per tablet 1 Tab, 1 Tab, Oral, Q4H PRN, Ekta Vargas MD, 1 Tab at 05/08/21 2123    0.9% sodium chloride infusion 250 mL, 250 mL, IntraVENous, PRN, Herrera Coats MD    glucose chewable tablet 16 g, 16 g, Oral, PRN, Manuel LAI MD    glucagon (GLUCAGEN) injection 1 mg, 1 mg, IntraMUSCular, PRN, Ekta Vargas MD    VANCOMYCIN INFORMATION NOTE 1 Each, 1 Each, Other, Rx Dosing/Monitoring, Ekta Vargas MD     Immunization History:   Most Recent Immunizations   Administered Date(s) Administered    Influenza Vaccine 10/03/2014    Influenza Vaccine (Quad) Mdck Pf (>4 Yrs Flucelvax QUAD [id number]) 11/07/2019    Influenza Vaccine [employer]) PF (>6 Mo Flulaval, Fluarix, and >3 Yrs [address] [place] [address], Fluzone [id number]) 10/11/2018  Pneumococcal Polysaccharide (PPSV-23) 10/31/2007    TB Skin Test (PPD) Intradermal 10/17/2018    Td 10/31/2007      Complete    Review of Systems:     Constitutional:  no fever,  no chills,  no sweats, No weakness, No fatigue, No decreased activity. Eye: No recent visual problem, No icterus, No discharge, No double vision. Ear/Nose/Mouth/Throat: No decreased hearing, No ear pain, No nasal congestion, No sore throat. Respiratory: No shortness of breath, No cough, No sputum production, No hemoptysis, No wheezing, No cyanosis. Cardiovascular: No chest pain, No palpitations, No bradycardia, No tachycardia, No peripheral edema, No syncope. Gastrointestinal: No nausea,  No vomiting, No diarrhea, No constipation, No heartburn,  No abdominal pain. Genitourinary: No dysuria, No hematuria, No change in urine stream, No urethral discharge, No lesions. Hematology/Lymphatics: No bruising tendency, No bleeding tendency, No petechiae, No swollen lymph glands. Endocrine: No excessive thirst, No polyuria, No cold intolerance, No heat intolerance, No excessive hunger. Immunologic: Not immunocompromised, No recurrent fevers, No recurrent infections. Musculoskeletal: No back pain, No neck pain, No joint pain, No muscle pain, No claudication, No decreased range of motion, No trauma. Integumentary: No rash, No pruritus, No abrasions. Neurologic: Alert and oriented X4, No abnormal balance, No headache, No confusion, No numbness, No tingling. Psychiatric: No anxiety, No depression, No nadia. Physical Exam:     Vitals & Measurements:     Wt Readings from Last 3 Encounters:   05/06/21 67.1 kg (148 lb)   05/06/21 67.1 kg (148 lb)   03/30/21 66.3 kg (146 lb 1 oz)     Temp Readings from Last 3 Encounters:   05/09/21 97.9 °F (36.6 °C)   05/06/21 97.8 °F (36.6 °C)   03/30/21 97.3 °F (36.3 °C) (Oral)     BP Readings from Last 3 Encounters:   05/09/21 131/69   05/06/21 (!) 108/53   03/30/21 (!) 145/74     Pulse Readings from Last 3 Encounters:   05/09/21 76   05/06/21 99   03/30/21 88      Ht Readings from Last 3 Encounters:   05/06/21 5' 6\" (1.676 m)   05/06/21 5' 6\" (1.676 m)   03/30/21 5' 6\" (1.676 m)          General: well appearing, no acute distress  Head: Normal  Face: Nornal  HEENT: atraumatic, PERRLA, moist mucosa, normal pharynx, normal tonsils and adenoids, normal tongue, no fluid in sinuses  Neck: Trachea midline, no carotid bruit, no masses  Chest: Normal.  Respiratory: Normal chest wall expansion, CTA B, no r/r/w, no rubs  Cardiovascular: RRR, no m/r/g, Normal S1 and S2  Abdomen: Soft, non tender, non-distended, normal bowel sounds in all quadrants, no hepatosplenomegaly, no tympany. Incision scar:   Genitourinary: No inguinal hernia, normal external gentalia, Testis & scrotum normal, no renal angle tenderness  Rectal: deferred  Musculoskeletal: normal ROM in upper and lower extremities, No joint swelling. Integumentary: Warm, dry, and pink, with no rash, purpura, or petechia  Heme/Lymph: No lymphadenopathy, no bruises  Neurological:Cranial Nerves II-XII grossly intact, no gross motor or sensory deficit  Psychiatric: Cooperative with normal mood, affect, and cognition    Problem List Items Addressed This Visit        Endocrine    Diabetic foot infection ([address]       Urinary    SHIRA (acute kidney injury) ([address]       Other    Severe anemia - Primary      Other Visit Diagnoses     Buttock wound, right, subsequent encounter        Non-healing wound of right heel                Wound/Ulcer #: 2      Wound Heel Right #1 05/06/21 (Active)   Wound Image   05/06/21 0841   Wound Etiology Pressure Unstageable 05/06/21 0841   Dressing Status Clean;Dry; Intact 05/06/21 0841   Cleansed Cleansed with saline 05/06/21 0841   Wound Length (cm) 0.8 cm 05/06/21 0841   Wound Width (cm) 0.9 cm 05/06/21 0841   Wound Depth (cm) 0.1 cm 05/06/21 0841   Wound Surface Area (cm^2) 0.72 cm^2 05/06/21 0841   Wound Volume (cm^3) 0.07 cm^3 05/06/21 0841 Wound Assessment Eschar dry 05/06/21 0841   Drainage Amount None 05/06/21 0841   Wound Odor None 05/06/21 0841   Iwona-Wound/Incision Assessment Intact 05/06/21 0841   Edges Attached edges 05/06/21 0841   Number of days: 3       Wound Buttocks Right #2 05/06/21 (Active)   Wound Image   05/06/21 0843   Wound Etiology Pressure Unstageable 05/06/21 0843   Cleansed Cleansed with saline 05/06/21 0843   Wound Length (cm) 1.2 cm 05/06/21 0843   Wound Width (cm) 1.5 cm 05/06/21 0843   Wound Depth (cm) 0.1 cm 05/06/21 0843   Wound Surface Area (cm^2) 1.8 cm^2 05/06/21 0843   Wound Volume (cm^3) 0.18 cm^3 05/06/21 0843   Post-Procedure Length (cm) 1.4 cm 05/06/21 0843   Post-Procedure Width (cm) 1.7 cm 05/06/21 0843   Post-Procedure Depth (cm) 1 cm 05/06/21 0843   Post-Procedure Surface Area (cm^2) 2.38 cm^2 05/06/21 0843   Post-Procedure Volume (cm^3) 2.38 cm^3 05/06/21 0843   Wound Assessment Slough;Eschar moist 05/06/21 0843   Drainage Amount None 05/06/21 0843   Wound Odor None 05/06/21 0843   Iwona-Wound/Incision Assessment Intact 05/06/21 0843   Edges Attached edges 05/06/21 0843   Number of days: 3          Laboratory Values:   Recent Results (from the past 24 hour(s))   LOWER EXT ART PVR MULT LEVEL SEG PRESSURES    Collection Time: 05/08/21  4:07 PM   Result Value Ref Range    Left arm  mmHg    Left posterior tibial 222 mmHg    Right posterior tibial 108 mmHg    Left anterior tibial 226 mmHg    Right anterior tibial 216 mmHg    Left MIGUEL 1.53     Right MIGUEL 1.46     Left toe pressure 64 mmHg    Right toe pressure 55 mmHg    Left TBI 0.43     Right TBI 0.37    GLUCOSE, POC    Collection Time: 05/08/21  4:10 PM   Result Value Ref Range    Glucose (POC) 371 (H) 65 - 100 mg/dL    Performed by Demario Barr [facility])    GLUCOSE, POC    Collection Time: 05/08/21  7:51 PM   Result Value Ref Range    Glucose (POC) 369 (H) 65 - 100 mg/dL    Performed by Navi Urrutia, POC    Collection Time: 05/09/21  2:12 AM Result Value Ref Range    Glucose (POC) 34 (LL) 65 - 100 mg/dL    Performed by [id number], POC    Collection Time: 05/09/21  2:39 AM   Result Value Ref Range    Glucose (POC) 50 (L) 65 - 100 mg/dL    Performed by [id number] [employer], POC    Collection Time: 05/09/21  3:13 AM   Result Value Ref Range    Glucose (POC) 96 65 - 100 mg/dL    Performed by [facility] [employer], POC    Collection Time: 05/09/21  5:52 AM   Result Value Ref Range    Glucose (POC) 301 (H) 65 - 100 mg/dL    Performed by [id number], POC    Collection Time: 05/09/21  7:45 AM   Result Value Ref Range    Glucose (POC) 420 (H) 65 - 100 mg/dL    Performed by Pola Hebert    GLUCOSE, POC    Collection Time: 05/09/21 11:14 AM   Result Value Ref Range    Glucose (POC) 272 (H) 65 - 100 mg/dL    Performed by Pola Hebert    CBC WITH AUTOMATED DIFF    Collection Time: 05/09/21 12:05 PM   Result Value Ref Range    WBC 5.7 4.1 - 11.1 K/uL    RBC 2.81 (L) 4.10 - 5.70 M/uL    HGB 7.3 (L) 12.1 - 17.0 g/dL    HCT 24.0 (L) 36.6 - 50.3 %    MCV 85.4 80.0 - 99.0 FL    MCH 26.0 26.0 - 34.0 PG    MCHC 30.4 30.0 - 36.5 g/dL    RDW 16.9 (H) 11.5 - 14.5 %    PLATELET 146 229 - 043 K/uL    MPV 10.7 8.9 - 12.9 FL    NRBC 0.3 (H) 0.0  WBC    ABSOLUTE NRBC 0.02 (H) 0.00 - 0.01 K/uL    NEUTROPHILS 71 32 - 75 %    LYMPHOCYTES 16 12 - 49 %    MONOCYTES 10 5 - 13 %    EOSINOPHILS 2 0 - 7 %    BASOPHILS 1 0 - 1 %    IMMATURE GRANULOCYTES 0 0 - 0.5 %    ABS. NEUTROPHILS 4.1 1.8 - 8.0 K/UL    ABS. LYMPHOCYTES 0.9 0.8 - 3.5 K/UL    ABS. MONOCYTES 0.6 0.0 - 1.0 K/UL    ABS. EOSINOPHILS 0.1 0.0 - 0.4 K/UL    ABS. BASOPHILS 0.0 0.0 - 0.1 K/UL    ABS. IMM.  GRANS. 0.0 0.00 - 0.04 K/UL    DF AUTOMATED           XR FOOT RT MIN 3 V   Final Result      NM INFLAM PROC LTD    (Results Pending)       Assessment:  Problem List Items Addressed This Visit        Endocrine    Diabetic foot infection ([facility] [facility]       Urinary    SHIRA (acute kidney injury) ([facility] [address]       Other    Severe anemia - Primary      Other Visit Diagnoses     Buttock wound, right, subsequent encounter        Non-healing wound of right heel               Plan:  Nutritious diet. Local wound care of right gluteal area. Abx per ID    Vascular eval by Dr. Bhupendra Figueroa. For foot & heel wound. Thank you for the consultation & allowing me to participate in the care of this patient.

## 2021-05-10 ENCOUNTER — APPOINTMENT (OUTPATIENT)
Dept: NUCLEAR MEDICINE | Age: 56
DRG: 271 | End: 2021-05-10
Attending: INTERNAL MEDICINE
Payer: MEDICARE

## 2021-05-10 LAB
ABO + RH BLD: NORMAL
BACTERIA SPEC CULT: NORMAL
BACTERIA SPEC CULT: NORMAL
BLD PROD TYP BPU: NORMAL
BLOOD BANK CMNT PATIENT-IMP: NORMAL
BLOOD GROUP ANTIBODIES SERPL: NEGATIVE
BPU ID: NORMAL
CROSSMATCH RESULT,%XM: NORMAL
DATE LAST DOSE: ABNORMAL
GLUCOSE BLD STRIP.AUTO-MCNC: 131 MG/DL (ref 65–100)
GLUCOSE BLD STRIP.AUTO-MCNC: 175 MG/DL (ref 65–100)
GLUCOSE BLD STRIP.AUTO-MCNC: 204 MG/DL (ref 65–100)
GLUCOSE BLD STRIP.AUTO-MCNC: 385 MG/DL (ref 65–100)
GLUCOSE BLD STRIP.AUTO-MCNC: 427 MG/DL (ref 65–100)
GRAM STN SPEC: NORMAL
GRAM STN SPEC: NORMAL
LEFT ABI: 1.53
LEFT ANTERIOR TIBIAL: 226 MMHG
LEFT ARM BP: 148 MMHG
LEFT POSTERIOR TIBIAL: 222 MMHG
LEFT TBI: 0.43
LEFT TOE PRESSURE: 64 MMHG
PERFORMED BY, TECHID: ABNORMAL
REPORTED DOSE,DOSE: ABNORMAL UNITS
RIGHT ABI: 1.46
RIGHT ANTERIOR TIBIAL: 216 MMHG
RIGHT POSTERIOR TIBIAL: 108 MMHG
RIGHT TBI: 0.37
RIGHT TOE PRESSURE: 55 MMHG
SPECIAL REQUESTS,SREQ: NORMAL
SPECIMEN EXP DATE BLD: NORMAL
STATUS OF UNIT,%ST: NORMAL
TRANSFUSION STATUS PATIENT QL: NORMAL
UNIT DIVISION, %UDIV: 0
VANCOMYCIN TROUGH SERPL-MCNC: 15.9 UG/ML (ref 5–10)

## 2021-05-10 PROCEDURE — 99232 SBSQ HOSP IP/OBS MODERATE 35: CPT | Performed by: SURGERY

## 2021-05-10 PROCEDURE — 93923 UPR/LXTR ART STDY 3+ LVLS: CPT | Performed by: SURGERY

## 2021-05-10 PROCEDURE — 74011636637 HC RX REV CODE- 636/637: Performed by: NURSE PRACTITIONER

## 2021-05-10 PROCEDURE — 74011000250 HC RX REV CODE- 250: Performed by: INTERNAL MEDICINE

## 2021-05-10 PROCEDURE — A9521 TC99M EXAMETAZIME: HCPCS

## 2021-05-10 PROCEDURE — 36415 COLL VENOUS BLD VENIPUNCTURE: CPT

## 2021-05-10 PROCEDURE — 74011636637 HC RX REV CODE- 636/637: Performed by: INTERNAL MEDICINE

## 2021-05-10 PROCEDURE — 82962 GLUCOSE BLOOD TEST: CPT

## 2021-05-10 PROCEDURE — 74011250636 HC RX REV CODE- 250/636: Performed by: INTERNAL MEDICINE

## 2021-05-10 PROCEDURE — 74011250637 HC RX REV CODE- 250/637: Performed by: INTERNAL MEDICINE

## 2021-05-10 PROCEDURE — 80202 ASSAY OF VANCOMYCIN: CPT

## 2021-05-10 PROCEDURE — 99232 SBSQ HOSP IP/OBS MODERATE 35: CPT | Performed by: INTERNAL MEDICINE

## 2021-05-10 PROCEDURE — 65270000029 HC RM PRIVATE

## 2021-05-10 RX ORDER — ATORVASTATIN CALCIUM 10 MG/1
TABLET, FILM COATED ORAL
Qty: 30 TAB | Refills: 5 | Status: SHIPPED | OUTPATIENT
Start: 2021-05-10 | End: 2021-05-24

## 2021-05-10 RX ORDER — INSULIN GLARGINE 100 [IU]/ML
25 INJECTION, SOLUTION SUBCUTANEOUS DAILY
Status: DISCONTINUED | OUTPATIENT
Start: 2021-05-11 | End: 2021-05-11

## 2021-05-10 RX ADMIN — INSULIN LISPRO 3 UNITS: 100 INJECTION, SOLUTION INTRAVENOUS; SUBCUTANEOUS at 22:02

## 2021-05-10 RX ADMIN — INSULIN LISPRO 14 UNITS: 100 INJECTION, SOLUTION INTRAVENOUS; SUBCUTANEOUS at 16:41

## 2021-05-10 RX ADMIN — ENOXAPARIN SODIUM 40 MG: 40 INJECTION SUBCUTANEOUS at 20:34

## 2021-05-10 RX ADMIN — PANTOPRAZOLE SODIUM 40 MG: 40 TABLET, DELAYED RELEASE ORAL at 05:56

## 2021-05-10 RX ADMIN — ATORVASTATIN CALCIUM 20 MG: 20 TABLET, FILM COATED ORAL at 08:49

## 2021-05-10 RX ADMIN — SUCRALFATE 1 G: 1 TABLET ORAL at 16:41

## 2021-05-10 RX ADMIN — SUCRALFATE 1 G: 1 TABLET ORAL at 05:56

## 2021-05-10 RX ADMIN — SUCRALFATE 1 G: 1 TABLET ORAL at 22:02

## 2021-05-10 RX ADMIN — ASPIRIN 81 MG: 81 TABLET, CHEWABLE ORAL at 08:49

## 2021-05-10 RX ADMIN — INSULIN GLARGINE 12 UNITS: 100 INJECTION, SOLUTION SUBCUTANEOUS at 08:48

## 2021-05-10 RX ADMIN — MEROPENEM 1 G: 1 INJECTION, POWDER, FOR SOLUTION INTRAVENOUS at 08:49

## 2021-05-10 RX ADMIN — MEROPENEM 1 G: 1 INJECTION, POWDER, FOR SOLUTION INTRAVENOUS at 20:34

## 2021-05-10 RX ADMIN — MIRTAZAPINE 30 MG: 30 TABLET, FILM COATED ORAL at 22:02

## 2021-05-10 RX ADMIN — DIVALPROEX SODIUM 1500 MG: 500 TABLET, DELAYED RELEASE ORAL at 20:34

## 2021-05-10 RX ADMIN — SUCRALFATE 1 G: 1 TABLET ORAL at 12:06

## 2021-05-10 RX ADMIN — SERTRALINE HYDROCHLORIDE 100 MG: 50 TABLET ORAL at 08:49

## 2021-05-10 RX ADMIN — INSULIN LISPRO 14 UNITS: 100 INJECTION, SOLUTION INTRAVENOUS; SUBCUTANEOUS at 12:06

## 2021-05-10 RX ADMIN — SODIUM CHLORIDE 750 MG: 9 INJECTION, SOLUTION INTRAVENOUS at 02:14

## 2021-05-10 RX ADMIN — SODIUM CHLORIDE 750 MG: 9 INJECTION, SOLUTION INTRAVENOUS at 14:43

## 2021-05-10 NOTE — PROGRESS NOTES
Infectious Disease Progress Note           Subjective:   Pt seen and examined at bedside. Doing well, denies new complaints. No acute events since last seen. Underwent debridement of right buttock ulcer/abscess on . Reports right heel pain and discomfort  Objective:   Physical Exam:     Visit Vitals  /69 (BP Patient Position: At rest)   Pulse 80   Temp 97.8 °F (36.6 °C)   Resp 18   Ht 5' 6\" (1.676 m)   Wt 148 lb (67.1 kg)   SpO2 95%   BMI 23.89 kg/m²      O2 Device: None (Room air)    Temp (24hrs), Av.8 °F (36.6 °C), Min:97.8 °F (36.6 °C), Max:97.8 °F (36.6 °C)    05/10 0701 - 05/10 1900  In: -   Out: 200 [Urine:200]   1901 - 05/10 07  In: 720 [P.O.:720]  Out: -     General: NAD, AAO x 4  HEENT: OTILIA, Moist mucosa   Lungs: CTA b/l, no wheeze/rhonchi  Heart: S1S2+, RRR, no murmur  Abdo: Soft, NT, ND, +BS   Exts: right heel ulcer, tenderness on palpation, right hemiparesis   Skin: Right buttock chick ulcer, decreased surrounding induration, and tenderness     Data Review:       Recent Days:  Recent Labs     21  1205 21  1150   WBC 5.7 13.2*   HGB 7.3* 8.7*   HCT 24.0* 28.6*    463*     Recent Labs     21  1150   BUN 30*   CREA 1.06       Lab Results   Component Value Date/Time    C-Reactive protein 1.64 (H) 2021 11:50 AM      Microbiology   - Right buttock wound Cx : MRSA, and coag neg staph       Diagnostics   CXR Results  (Last 48 hours)    None           Assessment/Plan     1. Right buttock ulcer, suspected abscess. S/p I&D on       MRSA/staph epidermidis isolated from wound Cx on       Afebrile, WBC normalized on routine labs       Continue on Vanc for empiric MRSA coverage, will leave on Meropenem for now       Routine labs in the morning       2.  Right heel ulcer, chronic, boggy:  Reports ongoing right heel pain and discomfort      Ceretec tagged WBC scan neg for osteomyelitis (05/10)       Reports ongoing right heel pain and discomfort. Being evaluated by Dr Armani Knutson for PAD      3.  Chronic on chronic anemia blood loss anemia, due to recurrent nose bleeds       Hgb of 4.7 on admission, improved after PRBC transfusion      4. 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 H/o DM: BS control per primary      Jessica Dc MD    5/10/2021

## 2021-05-10 NOTE — PROGRESS NOTES
Progress Note    Patient: Eren Carballo. MRN: [id number]  SSN: [id number]    YOB: 1965  Age: 60 y.o. Sex: male      Admit Date: 5/6/2021    LOS: 4 days     Subjective:     No complaints. Patient is on IV antibiotics for infected wound    Objective:     Vitals:    05/09/21 0759 05/09/21 1533 05/10/21 0732 05/10/21 1529   BP: 131/69 127/70 128/69 115/63   Pulse: 76 84 80 86   Resp: 17 17 18 18   Temp: 97.9 °F (36.6 °C) 97.8 °F (36.6 °C)  97.9 °F (36.6 °C)   SpO2: 94% 95% 95% 96%   Weight:       Height:            Intake and Output:  Current Shift: 05/10 0701 - 05/10 1900  In: -   Out: 200 [Urine:200]  Last three shifts: 05/08 1901 - 05/10 0700  In: 720 [P.O.:720]  Out: -     Physical Exam:   General:  Alert, cooperative, no distress, appears stated age. Eyes:  Conjunctivae/corneas clear. PERRL, EOMs intact. Fundi benign   Ears:  Normal TMs and external ear canals both ears. Nose: Nares normal. Septum midline. Mucosa normal. No drainage or sinus tenderness. Mouth/Throat: Lips, mucosa, and tongue normal. Teeth and gums normal.   Neck: Supple, symmetrical, trachea midline, no adenopathy, thyroid: no enlargment/tenderness/nodules, no carotid bruit and no JVD. Back:   Symmetric, no curvature. ROM normal. No CVA tenderness. Lungs:   Clear to auscultation bilaterally. Heart:  Regular rate and rhythm, S1, S2 normal, no murmur, click, rub or gallop. Abdomen:   Soft, non-tender. Bowel sounds normal. No masses,  No organomegaly. Extremities: Extremities normal, atraumatic, no cyanosis or edema. Pulses: 2+ and symmetric all extremities. Skin: Skin color, texture, turgor normal. No rashes or lesions   Lymph nodes: Cervical, supraclavicular, and axillary nodes normal.   Neurologic: CNII-XII intact. Normal strength, sensation and reflexes throughout. Lab/Data Review: All lab results for the last 24 hours reviewed.      Recent Results (from the past 24 hour(s))   GLUCOSE, POC Collection Time: 05/09/21  8:01 PM   Result Value Ref Range    Glucose (POC) 339 (H) 65 - 100 mg/dL    Performed by Navi Urrutia, POC    Collection Time: 05/10/21  2:17 AM   Result Value Ref Range    Glucose (POC) 204 (H) 65 - 100 mg/dL    Performed by Allie Poag, POC    Collection Time: 05/10/21  7:36 AM   Result Value Ref Range    Glucose (POC) 131 (H) 65 - 100 mg/dL    Performed by Chris Jama    GLUCOSE, POC    Collection Time: 05/10/21 11:36 AM   Result Value Ref Range    Glucose (POC) 427 (H) 65 - 100 mg/dL    Performed by Anushka Hernandez, TROUGH    Collection Time: 05/10/21  1:30 PM   Result Value Ref Range    Vancomycin,trough 15.9 (H) 5.0 - 10.0 ug/mL    Reported dose date Not available      Reported dose: Not available Units   GLUCOSE, POC    Collection Time: 05/10/21  4:24 PM   Result Value Ref Range    Glucose (POC) 385 (H) 65 - 100 mg/dL    Performed by [facility])          Assessment:     Active Problems:    Severe anemia (5/6/2021)      Diabetic foot infection ([address] (5/6/2021)    sepsis    PAD  Plan:     Continue treatment    Signed By: Andrew Wade MD     May 10, 2021

## 2021-05-10 NOTE — PROGRESS NOTES
Notified Dr. Ronald White of [facility].  Order to give 14units of humalog x1 now

## 2021-05-10 NOTE — WOUND CARE
IP WOUND CONSULT Aby Kingston. MEDICAL RECORD NUMBER:  [id number] AGE: 60 y.o. GENDER: male  : 1965 TODAY'S DATE:  5/10/2021 GENERAL  
 
[] Follow-up [x] New Consult Aby Flores is a 60 y.o. male referred by:  
[x] Physician 
[] Nursing 
[] Other: PAST MEDICAL HISTORY Past Medical History:  
Diagnosis Date  Anxiety disorder  Arthritis  Depression  Diabetes ([facility] [address]  Liver disease  Neurological disorder   
 neuro cognative disorder  OCD (obsessive compulsive disorder)  Psychiatric disorder OCD  Psychiatric disorder   
 anxiety  Seizures ([facility] [address]  Thyroid disease PAST SURGICAL HISTORY Past Surgical History:  
Procedure Laterality Date  HX CRANIOTOMY FAMILY HISTORY Family History Problem Relation Age of Onset  Cancer Mother ALLERGIES Allergies Allergen Reactions  Penicillins Rash MEDICATIONS No current facility-administered medications on file prior to encounter. Current Outpatient Medications on File Prior to Encounter Medication Sig Dispense Refill  pantoprazole (Protonix) 40 mg tablet Take 1 Tab by mouth Daily (before breakfast). 30 Tab 3  
 sucralfate (Carafate) 1 gram tablet Take 1 Tab by mouth Before breakfast, lunch, dinner and at bedtime. 120 Tab 3  
 atorvastatin (LIPITOR) 10 mg tablet TAKE ONE TABLET BY MOUTH DAILY 30 Tab 0  
 mirtazapine (REMERON) 15 mg tablet TAKE ONE TABLET BY MOUTH AT BEDTIME. 30 Tab 0  
 sertraline (ZOLOFT) 100 mg tablet TAKE ONE TABLET BY MOUTH DAILY 30 Tab 0  
 aspirin 81 mg chewable tablet Take 1 Tab by mouth daily. 30 Tab 5  divalproex ER (Depakote ER) 500 mg ER tablet Take 1,500 mg by mouth nightly.  insulin detemir U-100 (LEVEMIR FLEXTOUCH) 100 unit/mL (3 mL) inpn 22 Units by SubCUTAneous route Daily (before breakfast).     
 insulin aspart U-100 (NovoLOG Flexpen U-100 Insulin) 100 unit/mL (3 mL) inpn by SubCUTAneous route Before breakfast, lunch, and dinner. Sliding Scale Blood glucose Less than 70 give 0 units 70 to 150 give 3 units 151 to 200 give 5 units 201 to 250 give 7 units 251 to 300 give 12 units 301 to 350 give 15 units Greater than 350 give  20 units If blood glucose is greater than is greater than 300 recheck in 2 hours  ferrous sulfate 325 mg (65 mg iron) tablet Take 1 Tab by mouth two (2) times daily (after meals). 60 Tab 2  
 ondansetron (ZOFRAN ODT) 4 mg disintegrating tablet DISSOLVE 1 TABLET UNDER THE TONGUE EVERY 8 HOURS AS NEEDED FOR NAUSEA OR VOMITING 20 Tab 0  
 insulin aspart U-100 (NovoLOG Flexpen U-100 Insulin) 100 unit/mL (3 mL) inpn by SubCUTAneous route nightly. Sliding scale Blood glucose Less than 200 give 0 units 200 to 299 give 3 units Greater than 300 give 6 units  glucagon (GLUCAGEN) 1 mg injection 1 mg by IntraVENous route as needed for Hypoglycemia.  glucose 4 gram chewable tablet Take 45 g by mouth as needed (Blood glucose less than 70).  guaiFENesin (ROBITUSSIN) 100 mg/5 mL liquid Take 200 mg by mouth four (4) times daily as needed for Cough.  hydrocortisone (CORTAID) 1 % topical cream Apply  to affected area two (2) times daily as needed for Skin Irritation. use thin layer  ibuprofen (Motrin IB) 200 mg tablet Take 400 mg by mouth every four (4) hours as needed for Pain.  albuterol-ipratropium (DUO-NEB) 2.5 mg-0.5 mg/3 ml nebu 3 mL by Nebulization route three (3) times daily as needed for Wheezing. [unfilled] Visit Vitals /69 (BP Patient Position: At rest) Pulse 80 Temp 97.8 °F (36.6 °C) Resp 18 Ht 5' 6\" (1.676 m) Wt 67.1 kg (148 lb) SpO2 95% BMI 23.89 kg/m² ASSESSMENT Wound Identification & Type: Unstageable PI to right heel; blanchable erythema to left heel; and surgical incision to right gluteal 
Dressing change: Yes; packed gluteal with alginate Ag and cover with Mepilex Sacrum Verbal consent for picture: Yes Contributing Factors: anticoagulation therapy, diabetes, poor glucose control, chronic pressure, decreased mobility, shear force, incontinence of stool and incontinence of urine Wound Heel Right #1 05/06/21 (Active) Wound Image   05/10/21 1051 Wound Etiology Pressure Unstageable 05/10/21 1051 Dressing Status Clean;Dry; Intact; Other (Comment) 05/10/21 8433 Cleansed Cleansed with saline 05/10/21 1051 Dressing/Treatment Foam 05/10/21 1051 Wound Length (cm) 0.8 cm 05/10/21 1051 Wound Width (cm) 0.8 cm 05/10/21 1051 Wound Depth (cm) 0.1 cm 05/06/21 0841 Wound Surface Area (cm^2) 0.64 cm^2 05/10/21 1051 Change in Wound Size % 11.11 05/10/21 1051 Wound Volume (cm^3) 0.07 cm^3 05/06/21 0841 Wound Assessment Dry;Eschar dry 05/10/21 1051 Drainage Amount None 05/10/21 1051 Wound Odor None 05/10/21 1051 Iwona-Wound/Incision Assessment Blanchable erythema 05/10/21 1051 Edges Attached edges 05/10/21 1051 Number of days: 4 Wound Buttocks Right #2 05/06/21 (Active) Wound Image   05/10/21 1052 Wound Etiology Surgical 05/10/21 1052 Dressing Status Breakthrough drainage noted;New dressing applied 05/10/21 1052 Cleansed Cleansed with saline 05/10/21 1052 Dressing/Treatment Alginate with Ag; Foam 05/10/21 1052 Dressing Change Due 05/11/21 05/10/21 1052 Wound Length (cm) 1.3 cm 05/10/21 1052 Wound Width (cm) 1.4 cm 05/10/21 1052 Wound Depth (cm) 0.4 cm 05/10/21 1052 Wound Surface Area (cm^2) 1.82 cm^2 05/10/21 1052 Change in Wound Size % -1.11 05/10/21 1052 Wound Volume (cm^3) 0.73 cm^3 05/10/21 1052 Wound Healing % -306 05/10/21 1052 Post-Procedure Length (cm) 1.4 cm 05/06/21 0843 Post-Procedure Width (cm) 1.7 cm 05/06/21 0843 Post-Procedure Depth (cm) 1 cm 05/06/21 0843 Post-Procedure Surface Area (cm^2) 2.38 cm^2 05/06/21 7282 Post-Procedure Volume (cm^3) 2.38 cm^3 05/06/21 0142 Wound Assessment Pink/red;Subcutaneous 05/10/21 1052 Drainage Amount Small 05/10/21 1052 Drainage Description Serosanguinous 05/10/21 1052 Wound Odor None 05/10/21 1052 Iwona-Wound/Incision Assessment Warm 05/10/21 1052 Edges Defined edges 05/10/21 1052 Wound Thickness Description Full thickness 05/10/21 1052 Number of days: 4 PLAN Skin Care & Pressure Relief Recommendations Minimize layers of linen Turn/reposition approximately every 2 hours Pillow wedges Manage incontinence Promote continence; Skin Protective lotion/cream to buttocks and sacrum daily and as needed with incontinence care Offloading boots Doug 14 Blood Glucose: 131 on 5/10/21 Albumin: 3.0 on 5/6/21 WBCs: 5.7 on 5/9/21 Support Surface: Gel mattress Physician/Provider notified:  
Recommendations: Nutrition consult for wounds, may need protein supplements. Dr. Dean Petty has ordered dressing changes for right gluteal.  I changed the dressing as ordered and pictures are uploaded. Patient knows when he has to have a BM and urinate but cannot hold it for long. Apply condom catheter or PrimoFit Male  incontinence management system to manage  incontinence. Patient requested bed pan after wound evaluation. I placed patient on bedpan and informed Ck Nichols. Patient needs boots for both feet. Use foam wedge to turn q2h at 30 degrees or more if not contraindicated. Maintain HOB at 30 degrees or less if not contraindicated. Will continue to follow. Teaching completed with:  
[] Patient          
[] Family member      
[] Caregiver         
[] Nursing 
[] Other Patient/Caregiver Teaching: 
Level of patient/caregiver understanding able to:  
[] Indicates understanding       [] Needs reinforcement 
[] Unsuccessful      [] Verbal Understanding 
[] Demonstrated understanding       [] No evidence of learning 
[] Refused teaching         [] N/A Electronically signed by Navid Rossi RN on 5/10/2021 at 10:59 AM

## 2021-05-10 NOTE — PROGRESS NOTES
PROGRESS NOTE      Chief Complaints:  Denies. HPI and  Objective:    Patient examined this morning. Patient is somewhat confused about needing vascular intervention on the right leg. Patient was explained about the vascular lab test as well. Denies chest pain shortness of breath. Review of Systems:  Rest of review of system negative. EXAM:  Visit Vitals  /69 (BP Patient Position: At rest)   Pulse 80   Temp 97.8 °F (36.6 °C)   Resp 18   Ht 5' 6\" (1.676 m)   Wt 148 lb (67.1 kg)   SpO2 95%   BMI 23.89 kg/m²     Patient is awake. Cardiac system regular rate. Head and neck atraumatic. Pulmonary no audible wheeze. Abdomen soft. Neurologic nonfocal.  Vascular examination: No changes. Wound appears no changes. Recent Results (from the past 24 hour(s))   GLUCOSE, POC    Collection Time: 05/09/21  7:45 AM   Result Value Ref Range    Glucose (POC) 420 (H) 65 - 100 mg/dL    Performed by [facility]    GLUCOSE, POC    Collection Time: 05/09/21 11:14 AM   Result Value Ref Range    Glucose (POC) 272 (H) 65 - 100 mg/dL    Performed by [facility]    CBC WITH AUTOMATED DIFF    Collection Time: 05/09/21 12:05 PM   Result Value Ref Range    WBC 5.7 4.1 - 11.1 K/uL    RBC 2.81 (L) 4.10 - 5.70 M/uL    HGB 7.3 (L) 12.1 - 17.0 g/dL    HCT 24.0 (L) 36.6 - 50.3 %    MCV 85.4 80.0 - 99.0 FL    MCH 26.0 26.0 - 34.0 PG    MCHC 30.4 30.0 - 36.5 g/dL    RDW 16.9 (H) 11.5 - 14.5 %    PLATELET 441 523 - 512 K/uL    MPV 10.7 8.9 - 12.9 FL    NRBC 0.3 (H) 0.0  WBC    ABSOLUTE NRBC 0.02 (H) 0.00 - 0.01 K/uL    NEUTROPHILS 71 32 - 75 %    LYMPHOCYTES 16 12 - 49 %    MONOCYTES 10 5 - 13 %    EOSINOPHILS 2 0 - 7 %    BASOPHILS 1 0 - 1 %    IMMATURE GRANULOCYTES 0 0 - 0.5 %    ABS. NEUTROPHILS 4.1 1.8 - 8.0 K/UL    ABS. LYMPHOCYTES 0.9 0.8 - 3.5 K/UL    ABS. MONOCYTES 0.6 0.0 - 1.0 K/UL    ABS. EOSINOPHILS 0.1 0.0 - 0.4 K/UL    ABS. BASOPHILS 0.0 0.0 - 0.1 K/UL    ABS. IMM.  GRANS. 0.0 0.00 - 0.04 K/UL    DF AUTOMATED GLUCOSE, POC    Collection Time: 05/09/21  4:06 PM   Result Value Ref Range    Glucose (POC) 89 65 - 100 mg/dL    Performed by Ponce, POC    Collection Time: 05/09/21  8:01 PM   Result Value Ref Range    Glucose (POC) 339 (H) 65 - 100 mg/dL    Performed by Navi Urrutia, POC    Collection Time: 05/10/21  2:17 AM   Result Value Ref Range    Glucose (POC) 204 (H) 65 - 100 mg/dL    Performed by SHAWNA BRUCE        ASSESSMENT:   Patient is 60 y.o. with diagnosis of : Active Problems:    Severe anemia (5/6/2021)      Diabetic foot infection ([address] (5/6/2021)        PLAN:                 I will organize right leg angiogram sometime this week probably tomorrow if not probably Wednesday.

## 2021-05-10 NOTE — PROGRESS NOTES
Notified Dr. Eliza Sargent of [facility]. Order to give 14 units of novolog.

## 2021-05-10 NOTE — PROGRESS NOTES
Consult for Vancomycin Dosing by Pharmacy by Dr. Paul Nation provided for this 60y.o. year old male , for indication of SSTI. Day of Therapy: 5  Goal of Level(s): 15-20mcg/dL    Other Current Antibiotics: Merrem      Results       Procedure Component Value Units Date/Time    CULTURE, Gonzalo Yan [[id number]] Collected: 05/07/21 1200    Order Status: Canceled Specimen: Wound from Ulcer     CULTURE, WOUND [[id number]]  (Susceptibility) Collected: 05/06/21 2015    Order Status: Completed Specimen: Wound Drainage Updated: 05/10/21 1107     Special Requests: No Special Requests        GRAM STAIN Few WBCs seen         No organisms seen        Culture result:       Few * methicillin resistant staphylococcus aureus *                  Few Staphylococcus species, coagulase negative          Susceptibility        Staphylococcus aureus Methcillin Resistant     SARINA     Ciprofloxacin ($) Susceptible     Clindamycin ($) Susceptible     Daptomycin ($$$$$) Susceptible     Doxycycline ($$) Susceptible     Erythromycin ($$$$) Resistant     Gentamicin ($) Susceptible     Levofloxacin ($) Susceptible     Linezolid ($$$$$) Susceptible     Oxacillin Resistant     Rifampin ($$$$) Susceptible [1]      Tetracycline Susceptible     Trimeth/Sulfa Susceptible     Vancomycin ($) Susceptible              [1]   Rifampin is not to be used for mono-therapy. CULTURE, BLOOD, PAIRED [[id number]] Collected: 05/06/21 1245    Order Status: Completed Specimen: Blood Updated: 05/09/21 0745     Special Requests: No Special Requests        Culture result: No growth 2 days                 Serum Creatinine Creatinine   Date Value Ref Range Status   05/08/2021 1.06 0.70 - 1.30 mg/dL Final   05/07/2021 1.02 0.70 - 1.30 mg/dL Final   05/06/2021 0.88 0.70 - 1.30 mg/dL Final      Creatinine Clearance Estimated Creatinine Clearance: 69.4 mL/min (by C-G formula based on SCr of 1.06 mg/dL).    BUN Lab Results   Component Value Date/Time BUN 30 (H) 05/08/2021 11:50 AM      WBC Lab Results   Component Value Date/Time    WBC 5.7 05/09/2021 12:05 PM      Temp 97.9 °F (36.6 °C)     Last Level:   Vancomycin,trough   Date Value Ref Range Status   05/10/2021 15.9 (H) 5.0 - 10.0 ug/mL Final     Comment:        Trough levels of 15-20 ug/mL should be targeted for patients with coagulase negative Staphylococcus and MRSA pneumonia, endocarditis,osteomyelitis, meningitis, and bacteremia, as well as patients not responding to lower levels. Trough levels of 10-15 ug/mL for infections from other sources (e.g. urinary tract, cellulitis) are appropriate. All patients receiving concomitant nephrotoxic therapies should have their function closely monitored regardless of peak or trough levels. No results found for: VANCR    Ht Readings from Last 1 Encounters:   05/06/21 167.6 cm (66\")        Wt Readings from Last 1 Encounters:   05/06/21 67.1 kg (148 lb)     Ideal body weight: 63.8 kg (140 lb 10.5 oz)  Adjusted ideal body weight: 65.1 kg (143 lb 9.5 oz)      Regimen:   Continue Vancomycin 750 mg IV q12h. Draw Vancomycin trough level 5/12 at 1200. Pharmacy to follow daily and will make changes to dose and/or frequency based on clinical status.   _________________________________     Pharmacist Dmitry Torre PHARMD

## 2021-05-10 NOTE — PROGRESS NOTES
Nutrition Note    RD consulted for wounds. Per [facility] note, wounds small- not nutritionally significant. RD to order supplementation to promote adequate protein intake for wound healing. Please reconsult if PO intakes poor, or otherwise indicated.     Recommendations  Continue Diabetic diet  Add Yuniel BID, Benepro in applesauce TID    Electronically signed by Laure Zavala on 5/10/2021 at 11:57 AM    Contact:

## 2021-05-11 LAB
ALBUMIN SERPL-MCNC: 2.1 G/DL (ref 3.5–5)
ALBUMIN/GLOB SERPL: 0.6 {RATIO} (ref 1.1–2.2)
ALP SERPL-CCNC: 65 U/L (ref 45–117)
ALT SERPL-CCNC: 18 U/L (ref 12–78)
ANION GAP SERPL CALC-SCNC: 3 MMOL/L (ref 5–15)
AST SERPL W P-5'-P-CCNC: 17 U/L (ref 15–37)
BASOPHILS # BLD: 0 K/UL (ref 0–0.1)
BASOPHILS NFR BLD: 1 % (ref 0–1)
BILIRUB SERPL-MCNC: 0.2 MG/DL (ref 0.2–1)
BUN SERPL-MCNC: 30 MG/DL (ref 6–20)
BUN/CREAT SERPL: 32 (ref 12–20)
CA-I BLD-MCNC: 7.8 MG/DL (ref 8.5–10.1)
CHLORIDE SERPL-SCNC: 108 MMOL/L (ref 97–108)
CO2 SERPL-SCNC: 29 MMOL/L (ref 21–32)
CREAT SERPL-MCNC: 0.94 MG/DL (ref 0.7–1.3)
DIFFERENTIAL METHOD BLD: ABNORMAL
EOSINOPHIL # BLD: 0.2 K/UL (ref 0–0.4)
EOSINOPHIL NFR BLD: 6 % (ref 0–7)
ERYTHROCYTE [DISTWIDTH] IN BLOOD BY AUTOMATED COUNT: 16.8 % (ref 11.5–14.5)
GLOBULIN SER CALC-MCNC: 3.6 G/DL (ref 2–4)
GLUCOSE BLD STRIP.AUTO-MCNC: 171 MG/DL (ref 65–117)
GLUCOSE BLD STRIP.AUTO-MCNC: 224 MG/DL (ref 65–117)
GLUCOSE BLD STRIP.AUTO-MCNC: 293 MG/DL (ref 65–117)
GLUCOSE BLD STRIP.AUTO-MCNC: 453 MG/DL (ref 65–117)
GLUCOSE SERPL-MCNC: 240 MG/DL (ref 65–100)
HCT VFR BLD AUTO: 22.4 % (ref 36.6–50.3)
HGB BLD-MCNC: 6.6 G/DL (ref 12.1–17)
IMM GRANULOCYTES # BLD AUTO: 0 K/UL (ref 0–0.04)
IMM GRANULOCYTES NFR BLD AUTO: 1 % (ref 0–0.5)
LYMPHOCYTES # BLD: 0.9 K/UL (ref 0.8–3.5)
LYMPHOCYTES NFR BLD: 21 % (ref 12–49)
MCH RBC QN AUTO: 25.4 PG (ref 26–34)
MCHC RBC AUTO-ENTMCNC: 29.5 G/DL (ref 30–36.5)
MCV RBC AUTO: 86.2 FL (ref 80–99)
MONOCYTES # BLD: 0.4 K/UL (ref 0–1)
MONOCYTES NFR BLD: 9 % (ref 5–13)
NEUTS SEG # BLD: 2.5 K/UL (ref 1.8–8)
NEUTS SEG NFR BLD: 62 % (ref 32–75)
NRBC # BLD: 0 K/UL (ref 0–0.01)
NRBC BLD-RTO: 0 PER 100 WBC
PERFORMED BY, TECHID: ABNORMAL
PLATELET # BLD AUTO: 458 K/UL (ref 150–400)
PMV BLD AUTO: 9.4 FL (ref 8.9–12.9)
POTASSIUM SERPL-SCNC: 4.1 MMOL/L (ref 3.5–5.1)
PROT SERPL-MCNC: 5.7 G/DL (ref 6.4–8.2)
RBC # BLD AUTO: 2.6 M/UL (ref 4.1–5.7)
SODIUM SERPL-SCNC: 140 MMOL/L (ref 136–145)
WBC # BLD AUTO: 4 K/UL (ref 4.1–11.1)

## 2021-05-11 PROCEDURE — 65270000029 HC RM PRIVATE

## 2021-05-11 PROCEDURE — P9016 RBC LEUKOCYTES REDUCED: HCPCS

## 2021-05-11 PROCEDURE — 74011636637 HC RX REV CODE- 636/637: Performed by: INTERNAL MEDICINE

## 2021-05-11 PROCEDURE — 74011250637 HC RX REV CODE- 250/637: Performed by: INTERNAL MEDICINE

## 2021-05-11 PROCEDURE — 74011250636 HC RX REV CODE- 250/636: Performed by: INTERNAL MEDICINE

## 2021-05-11 PROCEDURE — 99232 SBSQ HOSP IP/OBS MODERATE 35: CPT | Performed by: INTERNAL MEDICINE

## 2021-05-11 PROCEDURE — 80053 COMPREHEN METABOLIC PANEL: CPT

## 2021-05-11 PROCEDURE — 36415 COLL VENOUS BLD VENIPUNCTURE: CPT

## 2021-05-11 PROCEDURE — 36430 TRANSFUSION BLD/BLD COMPNT: CPT

## 2021-05-11 PROCEDURE — 99232 SBSQ HOSP IP/OBS MODERATE 35: CPT | Performed by: SURGERY

## 2021-05-11 PROCEDURE — 82962 GLUCOSE BLOOD TEST: CPT

## 2021-05-11 PROCEDURE — 86923 COMPATIBILITY TEST ELECTRIC: CPT

## 2021-05-11 PROCEDURE — 85025 COMPLETE CBC W/AUTO DIFF WBC: CPT

## 2021-05-11 PROCEDURE — 74011000250 HC RX REV CODE- 250: Performed by: INTERNAL MEDICINE

## 2021-05-11 PROCEDURE — 86900 BLOOD TYPING SEROLOGIC ABO: CPT

## 2021-05-11 RX ORDER — SODIUM CHLORIDE 9 MG/ML
250 INJECTION, SOLUTION INTRAVENOUS AS NEEDED
Status: DISCONTINUED | OUTPATIENT
Start: 2021-05-11 | End: 2021-05-25 | Stop reason: HOSPADM

## 2021-05-11 RX ORDER — INSULIN GLARGINE 100 [IU]/ML
35 INJECTION, SOLUTION SUBCUTANEOUS DAILY
Status: DISCONTINUED | OUTPATIENT
Start: 2021-05-12 | End: 2021-05-13

## 2021-05-11 RX ORDER — FUROSEMIDE 10 MG/ML
20 INJECTION INTRAMUSCULAR; INTRAVENOUS ONCE
Status: COMPLETED | OUTPATIENT
Start: 2021-05-11 | End: 2021-05-12

## 2021-05-11 RX ADMIN — INSULIN LISPRO 15 UNITS: 100 INJECTION, SOLUTION INTRAVENOUS; SUBCUTANEOUS at 22:37

## 2021-05-11 RX ADMIN — INSULIN GLARGINE 25 UNITS: 100 INJECTION, SOLUTION SUBCUTANEOUS at 08:36

## 2021-05-11 RX ADMIN — ASPIRIN 81 MG: 81 TABLET, CHEWABLE ORAL at 08:36

## 2021-05-11 RX ADMIN — DIVALPROEX SODIUM 1500 MG: 500 TABLET, DELAYED RELEASE ORAL at 22:37

## 2021-05-11 RX ADMIN — PANTOPRAZOLE SODIUM 40 MG: 40 TABLET, DELAYED RELEASE ORAL at 08:36

## 2021-05-11 RX ADMIN — SUCRALFATE 1 G: 1 TABLET ORAL at 22:37

## 2021-05-11 RX ADMIN — SUCRALFATE 1 G: 1 TABLET ORAL at 08:36

## 2021-05-11 RX ADMIN — INSULIN LISPRO 3 UNITS: 100 INJECTION, SOLUTION INTRAVENOUS; SUBCUTANEOUS at 08:36

## 2021-05-11 RX ADMIN — SERTRALINE HYDROCHLORIDE 100 MG: 50 TABLET ORAL at 08:36

## 2021-05-11 RX ADMIN — SODIUM CHLORIDE 750 MG: 9 INJECTION, SOLUTION INTRAVENOUS at 13:55

## 2021-05-11 RX ADMIN — SUCRALFATE 1 G: 1 TABLET ORAL at 16:36

## 2021-05-11 RX ADMIN — MIRTAZAPINE 30 MG: 30 TABLET, FILM COATED ORAL at 22:37

## 2021-05-11 RX ADMIN — SODIUM CHLORIDE 750 MG: 9 INJECTION, SOLUTION INTRAVENOUS at 02:09

## 2021-05-11 RX ADMIN — MEROPENEM 1 G: 1 INJECTION, POWDER, FOR SOLUTION INTRAVENOUS at 08:36

## 2021-05-11 RX ADMIN — ENOXAPARIN SODIUM 40 MG: 40 INJECTION SUBCUTANEOUS at 22:37

## 2021-05-11 RX ADMIN — ATORVASTATIN CALCIUM 20 MG: 20 TABLET, FILM COATED ORAL at 08:36

## 2021-05-11 RX ADMIN — INSULIN LISPRO 6 UNITS: 100 INJECTION, SOLUTION INTRAVENOUS; SUBCUTANEOUS at 16:36

## 2021-05-11 RX ADMIN — INSULIN LISPRO 9 UNITS: 100 INJECTION, SOLUTION INTRAVENOUS; SUBCUTANEOUS at 12:09

## 2021-05-11 RX ADMIN — SUCRALFATE 1 G: 1 TABLET ORAL at 12:09

## 2021-05-11 NOTE — PROGRESS NOTES
Notified Dr. Erickson Pelayo of hgb 6.6. order to transfuse 2 units of PRBC and give lasix in between units

## 2021-05-11 NOTE — PROGRESS NOTES
Reason for Admission:   Severe Anemia & Diabetic foot infection                    RUR Score:   25% Moderate               PCP: First and Last name:   Jean Murrieta MD     Name of Practice:    Are you a current patient: Yes/No:    Approximate date of last visit: x 2 weeks ago    Can you participate in a virtual visit if needed:     Do you (patient/family) have any concerns for transition/discharge? POA               Plan for utilizing home health:   Prior [facility] in the past.      Current Advanced Directive/Advance Care Plan:  Full Code      Healthcare Decision Maker:     Hawa Yost, @ [phone]. Advanced Medical Directive is in the EMR, and a copy is on the bedside chart. Click here to complete [place] including selection of the Healthcare Decision Maker Relationship (ie \"Primary\")              Transition of Care Plan:            Lives in a group home ([place]) w/three other residents. Last seen PCP on March 30th, 2021. Requires some assistance from staff at the group home. No home O2. DME (walker/wheelchair). Patient doesn't drive anymore. Prior [facility] in the past.  No prior SNF. POA informed writer the group home transports patient to all medical appointment's.  is Ronel Naylor [address] @ [phone]. POA gave verbal consent for writer to send [facility] referrals out on his brother's behalf. No preference given. POA is requesting patient be evaluated by PT/OT to see what discharge recommendation is. Referral to be sent via [facility]. Care Management Interventions  PCP Verified by CM: Yes  Last Visit to PCP: 03/30/21  Mode of Transport at Discharge: Other (see comment)(Transport)  Transition of Care Consult (CM Consult): Discharge Planning  Discharge Durable Medical Equipment: (No home O2. DME (wheelchair and walker))  Current Support Network:  Adult Group Home(Single story home)  Confirm Follow Up Transport: Other (see comment)(Transport)  Discharge Location  Discharge Placement: (TBD)      BRO Mata

## 2021-05-11 NOTE — PROGRESS NOTES
Progress Note    Patient: Mallie Hammans. MRN: [id number]  SSN: [id number]    YOB: 1965  Age: 60 y.o. Sex: male      Admit Date: 5/6/2021    LOS: 5 days     Subjective:     Patient is hemoglobin is low. Objective:     Vitals:    05/10/21 1529 05/10/21 1945 05/11/21 0211 05/11/21 0740   BP: 115/63 (!) 114/59 (!) 143/75 139/71   Pulse: 86 88 82 81   Resp: 18 17 16 18   Temp: 97.9 °F (36.6 °C) 97 °F (36.1 °C) 97 °F (36.1 °C) (!) 96.2 °F (35.7 °C)   SpO2: 96% 96% 96% 95%   Weight:       Height:            Intake and Output:  Current Shift: No intake/output data recorded. Last three shifts: 05/09 1901 - 05/11 0700  In: 120 [P.O.:120]  Out: 500 [Urine:500]    Physical Exam:   General:  Alert, cooperative, no distress, appears stated age. Eyes:  Conjunctivae/corneas clear. PERRL, EOMs intact. Fundi benign   Ears:  Normal TMs and external ear canals both ears. Nose: Nares normal. Septum midline. Mucosa normal. No drainage or sinus tenderness. Mouth/Throat: Lips, mucosa, and tongue normal. Teeth and gums normal.   Neck: Supple, symmetrical, trachea midline, no adenopathy, thyroid: no enlargment/tenderness/nodules, no carotid bruit and no JVD. Back:   Symmetric, no curvature. ROM normal. No CVA tenderness. Lungs:   Clear to auscultation bilaterally. Heart:  Regular rate and rhythm, S1, S2 normal, no murmur, click, rub or gallop. Abdomen:   Soft, non-tender. Bowel sounds normal. No masses,  No organomegaly. Extremities:  Right upper extremity contraction l, atraumatic, no cyanosis or edema. Pulses: 2+ and symmetric all extremities. Skin: Skin color, texture, turgor normal. No rashes or lesions   Lymph nodes: Cervical, supraclavicular, and axillary nodes normal.   Neurologic: CNII-XII intact. Normal strength, sensation and reflexes throughout. Lab/Data Review: All lab results for the last 24 hours reviewed.      Recent Results (from the past 24 hour(s))   GLUCOSE, POC Collection Time: 05/10/21  9:17 PM   Result Value Ref Range    Glucose (POC) 175 (H) 65 - 100 mg/dL    Performed by [address] [place] [address] Drive, POC    Collection Time: 05/11/21  7:29 AM   Result Value Ref Range    Glucose (POC) 171 (H) 65 - 117 mg/dL    Performed by Amalia Pa    GLUCOSE, POC    Collection Time: 05/11/21 12:06 PM   Result Value Ref Range    Glucose (POC) 293 (H) 65 - 117 mg/dL    Performed by Amalia Pa    METABOLIC PANEL, COMPREHENSIVE    Collection Time: 05/11/21 12:44 PM   Result Value Ref Range    Sodium 140 136 - 145 mmol/L    Potassium 4.1 3.5 - 5.1 mmol/L    Chloride 108 97 - 108 mmol/L    CO2 29 21 - 32 mmol/L    Anion gap 3 (L) 5 - 15 mmol/L    Glucose 240 (H) 65 - 100 mg/dL    BUN 30 (H) 6 - 20 mg/dL    Creatinine 0.94 0.70 - 1.30 mg/dL    BUN/Creatinine ratio 32 (H) 12 - 20      GFR est AA >60 >60 ml/min/1.73m2    GFR est non-AA >60 >60 ml/min/1.73m2    Calcium 7.8 (L) 8.5 - 10.1 mg/dL    Bilirubin, total 0.2 0.2 - 1.0 mg/dL    AST (SGOT) 17 15 - 37 U/L    ALT (SGPT) 18 12 - 78 U/L    Alk. phosphatase 65 45 - 117 U/L    Protein, total 5.7 (L) 6.4 - 8.2 g/dL    Albumin 2.1 (L) 3.5 - 5.0 g/dL    Globulin 3.6 2.0 - 4.0 g/dL    A-G Ratio 0.6 (L) 1.1 - 2.2     CBC WITH AUTOMATED DIFF    Collection Time: 05/11/21 12:44 PM   Result Value Ref Range    WBC 4.0 (L) 4.1 - 11.1 K/uL    RBC 2.60 (L) 4.10 - 5.70 M/uL    HGB 6.6 (L) 12.1 - 17.0 g/dL    HCT 22.4 (L) 36.6 - 50.3 %    MCV 86.2 80.0 - 99.0 FL    MCH 25.4 (L) 26.0 - 34.0 PG    MCHC 29.5 (L) 30.0 - 36.5 g/dL    RDW 16.8 (H) 11.5 - 14.5 %    PLATELET 316 (H) 495 - 400 K/uL    MPV 9.4 8.9 - 12.9 FL    NRBC 0.0 0.0  WBC    ABSOLUTE NRBC 0.00 0.00 - 0.01 K/uL    NEUTROPHILS 62 32 - 75 %    LYMPHOCYTES 21 12 - 49 %    MONOCYTES 9 5 - 13 %    EOSINOPHILS 6 0 - 7 %    BASOPHILS 1 0 - 1 %    IMMATURE GRANULOCYTES 1 (H) 0 - 0.5 %    ABS. NEUTROPHILS 2.5 1.8 - 8.0 K/UL    ABS. LYMPHOCYTES 0.9 0.8 - 3.5 K/UL    ABS.  MONOCYTES 0.4 0.0 - 1.0 K/UL    ABS. EOSINOPHILS 0.2 0.0 - 0.4 K/UL    ABS. BASOPHILS 0.0 0.0 - 0.1 K/UL    ABS. IMM.  GRANS. 0.0 0.00 - 0.04 K/UL    DF AUTOMATED     TYPE & SCREEN    Collection Time: 05/11/21  3:18 PM   Result Value Ref Range    Crossmatch Expiration 05/14/2021,2359     ABO/Rh(D) A Positive     Antibody screen Negative     Unit number [id number]     Blood component type [facility]     Unit division 00     Status of unit [place] to transfuse     Crossmatch result Compatible     Unit number [id number]     Blood component type [facility]     Unit division 00     Status of unit [place] to transfuse     Crossmatch result Compatible    GLUCOSE, POC    Collection Time: 05/11/21  3:42 PM   Result Value Ref Range    Glucose (POC) 224 (H) 65 - 117 mg/dL    Performed by Minor Ordaz          Assessment:     Active Problems:    Severe anemia (5/6/2021)      Diabetic foot infection ([address] (5/6/2021)      Acute severe anemia secondary to multiple multiple factors  For blood transfusion  Decubitus ulcer which is affected PAD  Diabetes mellitus type 2  Plan:     Continue present treatment transfuse packed red blood cells    Signed By: Jessica Morrell MD     May 11, 2021

## 2021-05-11 NOTE — PROGRESS NOTES
Infectious Disease Progress Note           Subjective:   Assessed pt at bedside, some what confused,  Denies new complaints. Scheduled for arteriogram on 512 by Dr Desmond Stover   Objective:   Physical Exam:     Visit Vitals  /71 (BP 1 Location: Right upper arm, BP Patient Position: At rest)   Pulse 81   Temp (!) 96.2 °F (35.7 °C)   Resp 18   Ht 5' 6\" (1.676 m)   Wt 148 lb (67.1 kg)   SpO2 95%   BMI 23.89 kg/m²      O2 Device: None (Room air)    Temp (24hrs), Av.7 °F (35.9 °C), Min:96.2 °F (35.7 °C), Max:97 °F (36.1 °C)    No intake/output data recorded.  1901 -  0700  In: 120 [P.O.:120]  Out: 500 [Urine:500]    General: NAD, AAO x 4  HEENT: OTILIA, Moist mucosa   Lungs: CTA b/l, no wheeze/rhonchi  Heart: S1S2+, RRR, no murmur  Abdo: Soft, NT, ND, +BS   Exts: right heel ulcer, tenderness on palpation, right hemiparesis   Skin: Right buttock chick ulcer, decreased surrounding induration, and tenderness     Data Review:       Recent Days:  Recent Labs     21  1244 21  1205   WBC 4.0* 5.7   HGB 6.6* 7.3*   HCT 22.4* 24.0*   * 217     Recent Labs     21  1244   BUN 30*   CREA 0.94       Lab Results   Component Value Date/Time    C-Reactive protein 1.64 (H) 2021 11:50 AM      Microbiology   - Right buttock wound Cx : MRSA, and coag neg staph       Diagnostics   CXR Results  (Last 48 hours)    None           Assessment/Plan     1. Right buttock ulcer, suspected abscess. S/p I&D on       MRSA/staph epidermidis isolated from wound Cx on       Leukopenic on todays labs, otherwise afebrile       Continue on Vanc for MRSA and staph epidermidis coverage      Will d/c Meropenem since no isolation of GNR on current admit       2. Right heel ulcer, chronic, boggy:  Reports ongoing right heel pain and discomfort      Ceretec tagged WBC scan neg for osteomyelitis (05/10)       Scheduled for arteriogram by Dr Desmond Stover         3.  Chronic on chronic anemia blood loss anemia, due to recurrent nose bleeds       Hgb of 4.7 on admission, improved after PRBC transfusion      4. 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 H/o DM:  Blood sugars are uncontrolled      Brent Street MD    5/11/2021

## 2021-05-11 NOTE — PROGRESS NOTES
Problem: Pressure Injury - Risk of  Goal: *Prevention of pressure injury  Description: Document Doug Scale and appropriate interventions in the flowsheet. Outcome: Progressing Towards Goal  Note: Pressure Injury Interventions:  Sensory Interventions: Assess changes in LOC, Chair cushion    Moisture Interventions: Absorbent underpads, Check for incontinence Q2 hours and as needed    Activity Interventions: Chair cushion    Mobility Interventions: Chair cushion, HOB 30 degrees or less    Nutrition Interventions: Document food/fluid/supplement intake, Discuss nutritional consult with provider    Friction and Shear Interventions: Apply protective barrier, creams and emollients, Lift team/patient mobility team                Problem: Falls - Risk of  Goal: *Absence of Falls  Description: Document Jeannette Fall Risk and appropriate interventions in the flowsheet.   Outcome: Progressing Towards Goal  Note: Fall Risk Interventions:       Mentation Interventions: Bed/chair exit alarm    Medication Interventions: Bed/chair exit alarm    Elimination Interventions: Call light in reach, Bed/chair exit alarm    History of Falls Interventions: Bed/chair exit alarm         Problem: Impaired Skin Integrity/Pressure Injury Treatment  Goal: *Improvement of Existing Pressure Injury  Outcome: Progressing Towards Goal

## 2021-05-11 NOTE — PROGRESS NOTES
PROGRESS NOTE      Chief Complaints:  Patient voices no new complaints. HPI and  Objective: Is awake and alert. He denies nausea or pain other than some mild right foot pain. Denies chest pain shortness of breath  Review of Systems:  Rest of review of systems negative. EXAM:  Visit Vitals  /71 (BP 1 Location: Right upper arm, BP Patient Position: At rest)   Pulse 81   Temp (!) 96.2 °F (35.7 °C)   Resp 18   Ht 5' 6\" (1.676 m)   Wt 148 lb (67.1 kg)   SpO2 95%   BMI 23.89 kg/m²     Patient is awake and alert. Head and neck atraumatic. Cardiac system regular rate. Pulmonary no audible wheeze. Abdomen soft. Neurologically intact. Recent Results (from the past 24 hour(s))   VANCOMYCIN, TROUGH    Collection Time: 05/10/21  1:30 PM   Result Value Ref Range    Vancomycin,trough 15.9 (H) 5.0 - 10.0 ug/mL    Reported dose date Not available      Reported dose: Not available Units   GLUCOSE, POC    Collection Time: 05/10/21  4:24 PM   Result Value Ref Range    Glucose (POC) 385 (H) 65 - 100 mg/dL    Performed by [facility])    GLUCOSE, POC    Collection Time: 05/10/21  9:17 PM   Result Value Ref Range    Glucose (POC) 175 (H) 65 - 100 mg/dL    Performed by [address] [place] [address], POC    Collection Time: 05/11/21  7:29 AM   Result Value Ref Range    Glucose (POC) 171 (H) 65 - 117 mg/dL    Performed by Beth Lee    GLUCOSE, POC    Collection Time: 05/11/21 12:06 PM   Result Value Ref Range    Glucose (POC) 293 (H) 65 - 117 mg/dL    Performed by Beth Lee        ASSESSMENT:   Patient is 60 y.o. with diagnosis of : Active Problems:    Severe anemia (5/6/2021)      Diabetic foot infection ([address] (5/6/2021)        PLAN:                   I have reviewed the vascular lab report on the right leg. I discussed with patient details about the findings. I offered the patient angiographic examination of the right leg patient is scheduled for procedure tomorrow morning.   Discussed questionable procedure risk benefits and complication. Patient is agreeable for this procedure tomorrow.

## 2021-05-12 LAB
DATE LAST DOSE: ABNORMAL
GLUCOSE BLD STRIP.AUTO-MCNC: 100 MG/DL (ref 65–117)
GLUCOSE BLD STRIP.AUTO-MCNC: 125 MG/DL (ref 65–117)
GLUCOSE BLD STRIP.AUTO-MCNC: 234 MG/DL (ref 65–117)
GLUCOSE BLD STRIP.AUTO-MCNC: 320 MG/DL (ref 65–117)
GLUCOSE BLD STRIP.AUTO-MCNC: 59 MG/DL (ref 65–117)
PERFORMED BY, TECHID: ABNORMAL
PERFORMED BY, TECHID: NORMAL
REPORTED DOSE,DOSE: ABNORMAL UNITS
VANCOMYCIN TROUGH SERPL-MCNC: 16.6 UG/ML (ref 5–10)

## 2021-05-12 PROCEDURE — C1887 CATHETER, GUIDING: HCPCS | Performed by: SURGERY

## 2021-05-12 PROCEDURE — 04CM3ZZ EXTIRPATION OF MATTER FROM RIGHT POPLITEAL ARTERY, PERCUTANEOUS APPROACH: ICD-10-PCS | Performed by: SURGERY

## 2021-05-12 PROCEDURE — 77030013516 HC DEV INFL ANGI MRTM -B: Performed by: SURGERY

## 2021-05-12 PROCEDURE — 75710 ARTERY X-RAYS ARM/LEG: CPT | Performed by: SURGERY

## 2021-05-12 PROCEDURE — 74011000250 HC RX REV CODE- 250: Performed by: INTERNAL MEDICINE

## 2021-05-12 PROCEDURE — C2623 CATH, TRANSLUMIN, DRUG-COAT: HCPCS | Performed by: SURGERY

## 2021-05-12 PROCEDURE — 36430 TRANSFUSION BLD/BLD COMPNT: CPT

## 2021-05-12 PROCEDURE — 047M3Z1 DILATION OF RIGHT POPLITEAL ARTERY USING DRUG-COATED BALLOON, PERCUTANEOUS APPROACH: ICD-10-PCS | Performed by: SURGERY

## 2021-05-12 PROCEDURE — 77030006078 HC TAPE GLOW N TEL LEMV -B: Performed by: SURGERY

## 2021-05-12 PROCEDURE — 37225 HC ARTHERC FEMPOP UNI +/-PTA: CPT | Performed by: SURGERY

## 2021-05-12 PROCEDURE — 74011250637 HC RX REV CODE- 250/637: Performed by: INTERNAL MEDICINE

## 2021-05-12 PROCEDURE — 99152 MOD SED SAME PHYS/QHP 5/>YRS: CPT | Performed by: SURGERY

## 2021-05-12 PROCEDURE — C1769 GUIDE WIRE: HCPCS | Performed by: SURGERY

## 2021-05-12 PROCEDURE — 99232 SBSQ HOSP IP/OBS MODERATE 35: CPT | Performed by: INTERNAL MEDICINE

## 2021-05-12 PROCEDURE — 36245 INS CATH ABD/L-EXT ART 1ST: CPT | Performed by: SURGERY

## 2021-05-12 PROCEDURE — C1714 CATH, TRANS ATHERECTOMY, DIR: HCPCS | Performed by: SURGERY

## 2021-05-12 PROCEDURE — C1725 CATH, TRANSLUMIN NON-LASER: HCPCS | Performed by: SURGERY

## 2021-05-12 PROCEDURE — 99153 MOD SED SAME PHYS/QHP EA: CPT | Performed by: SURGERY

## 2021-05-12 PROCEDURE — 76210000016 HC OR PH I REC 1 TO 1.5 HR: Performed by: SURGERY

## 2021-05-12 PROCEDURE — C1894 INTRO/SHEATH, NON-LASER: HCPCS | Performed by: SURGERY

## 2021-05-12 PROCEDURE — 36415 COLL VENOUS BLD VENIPUNCTURE: CPT

## 2021-05-12 PROCEDURE — 76937 US GUIDE VASCULAR ACCESS: CPT | Performed by: SURGERY

## 2021-05-12 PROCEDURE — 74011250636 HC RX REV CODE- 250/636: Performed by: SURGERY

## 2021-05-12 PROCEDURE — 74011250636 HC RX REV CODE- 250/636: Performed by: INTERNAL MEDICINE

## 2021-05-12 PROCEDURE — 74011636637 HC RX REV CODE- 636/637: Performed by: INTERNAL MEDICINE

## 2021-05-12 PROCEDURE — C1760 CLOSURE DEV, VASC: HCPCS | Performed by: SURGERY

## 2021-05-12 PROCEDURE — 80202 ASSAY OF VANCOMYCIN: CPT

## 2021-05-12 PROCEDURE — 82962 GLUCOSE BLOOD TEST: CPT

## 2021-05-12 PROCEDURE — P9016 RBC LEUKOCYTES REDUCED: HCPCS

## 2021-05-12 PROCEDURE — C1884 EMBOLIZATION PROTECT SYST: HCPCS | Performed by: SURGERY

## 2021-05-12 PROCEDURE — 75625 CONTRAST EXAM ABDOMINL AORTA: CPT | Performed by: SURGERY

## 2021-05-12 PROCEDURE — B41D1ZZ FLUOROSCOPY OF AORTA AND BILATERAL LOWER EXTREMITY ARTERIES USING LOW OSMOLAR CONTRAST: ICD-10-PCS | Performed by: SURGERY

## 2021-05-12 PROCEDURE — 65270000032 HC RM SEMIPRIVATE

## 2021-05-12 RX ORDER — MIDAZOLAM HYDROCHLORIDE 1 MG/ML
INJECTION INTRAMUSCULAR; INTRAVENOUS AS NEEDED
Status: DISCONTINUED | OUTPATIENT
Start: 2021-05-12 | End: 2021-05-12 | Stop reason: HOSPADM

## 2021-05-12 RX ORDER — PREDNISONE 5 MG/1
20 TABLET ORAL
Status: DISCONTINUED | OUTPATIENT
Start: 2021-05-13 | End: 2021-05-25 | Stop reason: HOSPADM

## 2021-05-12 RX ORDER — INSULIN LISPRO 100 [IU]/ML
10 INJECTION, SOLUTION INTRAVENOUS; SUBCUTANEOUS
Status: DISCONTINUED | OUTPATIENT
Start: 2021-05-12 | End: 2021-05-20

## 2021-05-12 RX ORDER — FENTANYL CITRATE 50 UG/ML
INJECTION, SOLUTION INTRAMUSCULAR; INTRAVENOUS AS NEEDED
Status: DISCONTINUED | OUTPATIENT
Start: 2021-05-12 | End: 2021-05-12 | Stop reason: HOSPADM

## 2021-05-12 RX ORDER — SODIUM CHLORIDE 9 MG/ML
250 INJECTION, SOLUTION INTRAVENOUS AS NEEDED
Status: DISCONTINUED | OUTPATIENT
Start: 2021-05-12 | End: 2021-05-25 | Stop reason: HOSPADM

## 2021-05-12 RX ORDER — HEPARIN SODIUM 1000 [USP'U]/ML
INJECTION, SOLUTION INTRAVENOUS; SUBCUTANEOUS AS NEEDED
Status: DISCONTINUED | OUTPATIENT
Start: 2021-05-12 | End: 2021-05-12 | Stop reason: HOSPADM

## 2021-05-12 RX ADMIN — SUCRALFATE 1 G: 1 TABLET ORAL at 17:08

## 2021-05-12 RX ADMIN — MIRTAZAPINE 30 MG: 30 TABLET, FILM COATED ORAL at 19:56

## 2021-05-12 RX ADMIN — SODIUM CHLORIDE 750 MG: 9 INJECTION, SOLUTION INTRAVENOUS at 03:58

## 2021-05-12 RX ADMIN — DIVALPROEX SODIUM 1500 MG: 500 TABLET, DELAYED RELEASE ORAL at 19:56

## 2021-05-12 RX ADMIN — SODIUM CHLORIDE 750 MG: 9 INJECTION, SOLUTION INTRAVENOUS at 17:09

## 2021-05-12 RX ADMIN — DEXTROSE MONOHYDRATE 25 G: 25 INJECTION, SOLUTION INTRAVENOUS at 08:20

## 2021-05-12 RX ADMIN — INSULIN LISPRO 10 UNITS: 100 INJECTION, SOLUTION INTRAVENOUS; SUBCUTANEOUS at 17:07

## 2021-05-12 RX ADMIN — FUROSEMIDE 20 MG: 10 INJECTION, SOLUTION INTRAMUSCULAR; INTRAVENOUS at 03:58

## 2021-05-12 RX ADMIN — SUCRALFATE 1 G: 1 TABLET ORAL at 19:56

## 2021-05-12 NOTE — PROGRESS NOTES
Consult for Vancomycin Dosing by Pharmacy by Dr. Uli Peraza provided for this 60y.o. year old male , for indication of SSTI. Day of Therapy: 7  Goal of Level(s): 15-20mcg/dL    Other Current Antibiotics: Merrem    Serum Creatinine Creatinine   Date Value Ref Range Status   05/11/2021 0.94 0.70 - 1.30 mg/dL Final   05/08/2021 1.06 0.70 - 1.30 mg/dL Final   05/07/2021 1.02 0.70 - 1.30 mg/dL Final      Creatinine Clearance Estimated Creatinine Clearance: 78.2 mL/min (based on SCr of 0.94 mg/dL). BUN Lab Results   Component Value Date/Time    BUN 30 (H) 05/11/2021 12:44 PM      WBC Lab Results   Component Value Date/Time    WBC 4.0 (L) 05/11/2021 12:44 PM      Temp 97.4 °F (36.3 °C)     Last Level:   Vancomycin,trough   Date Value Ref Range Status   05/12/2021 16.6 (H) 5.0 - 10.0 ug/mL Final     Comment:        Trough levels of 15-20 ug/mL should be targeted for patients with coagulase negative Staphylococcus and MRSA pneumonia, endocarditis,osteomyelitis, meningitis, and bacteremia, as well as patients not responding to lower levels. Trough levels of 10-15 ug/mL for infections from other sources (e.g. urinary tract, cellulitis) are appropriate. All patients receiving concomitant nephrotoxic therapies should have their function closely monitored regardless of peak or trough levels. No results found for: VANCR    Ht Readings from Last 1 Encounters:   05/06/21 167.6 cm (66\")        Wt Readings from Last 1 Encounters:   05/06/21 67.1 kg (148 lb)     Ideal body weight: 63.8 kg (140 lb 10.5 oz)  Adjusted ideal body weight: 65.1 kg (143 lb 9.5 oz)      Regimen:   Trough therapeutic at 16.6. Continue Vancomycin 750 mg IV q12h. Next trough ordered for 5/15 at 1500. Pharmacy to follow daily and will make changes to dose and/or frequency based on clinical status.   _________________________________     Pharmacist ANIL JudgeD

## 2021-05-12 NOTE — PROGRESS NOTES
Pt received 2u PRBC overnight with no complications. Pt resting. Will ask provider if he would like a CBC. Pt VS stable and has poor safety awareness but is redirectable. Pt NPO throughout the night per order.

## 2021-05-12 NOTE — PROGRESS NOTES
KJ recv'd an incoming call from the patient's POA (brother) Ferdinandfrancis Roxiearturo. POA voiced concern regarding \"Dr. Martínez, calling our 80year old mother giving her medical updates, and discussing procedures possibly needed. I'm here locally and the POA please ensure all physicians' call me, and not our mother to discuss medical status/updates. I know my brother will say to call our mother and provide the phone number; again she's not to be called. \"  KJ acknowledged understanding. POA Jose Abora Isiah @ [phone].         Olamide Baeza, MSW

## 2021-05-12 NOTE — PROGRESS NOTES
Problem: Pressure Injury - Risk of  Goal: *Prevention of pressure injury  Description: Document Doug Scale and appropriate interventions in the flowsheet. Outcome: Progressing Towards Goal  Note: Pressure Injury Interventions:  Sensory Interventions: Assess changes in LOC    Moisture Interventions: Absorbent underpads, Apply protective barrier, creams and emollients, Check for incontinence Q2 hours and as needed    Activity Interventions: Chair cushion    Mobility Interventions: HOB 30 degrees or less    Nutrition Interventions: Document food/fluid/supplement intake    Friction and Shear Interventions: Apply protective barrier, creams and emollients, HOB 30 degrees or less                Problem: Patient Education: Go to Patient Education Activity  Goal: Patient/Family Education  Outcome: Progressing Towards Goal     Problem: Falls - Risk of  Goal: *Absence of Falls  Description: Document Jeannette Fall Risk and appropriate interventions in the flowsheet. Outcome: Progressing Towards Goal  Note: Fall Risk Interventions:  Mobility Interventions: Bed/chair exit alarm    Mentation Interventions: Bed/chair exit alarm    Medication Interventions: Assess postural VS orthostatic hypotension, Bed/chair exit alarm    Elimination Interventions: Bed/chair exit alarm, Call light in reach    History of Falls Interventions: Bed/chair exit alarm         Problem: Patient Education: Go to Patient Education Activity  Goal: Patient/Family Education  Outcome: Progressing Towards Goal     Problem: Impaired Skin Integrity/Pressure Injury Treatment  Goal: *Improvement of Existing Pressure Injury  Outcome: Progressing Towards Goal  Goal: *Prevention of pressure injury  Description: Document Doug Scale and appropriate interventions in the flowsheet.   Outcome: Progressing Towards Goal  Note: Pressure Injury Interventions:  Sensory Interventions: Assess changes in LOC    Moisture Interventions: Absorbent underpads, Apply protective barrier, creams and emollients, Check for incontinence Q2 hours and as needed    Activity Interventions: Chair cushion    Mobility Interventions: HOB 30 degrees or less    Nutrition Interventions: Document food/fluid/supplement intake    Friction and Shear Interventions: Apply protective barrier, creams and emollients, HOB 30 degrees or less                Problem: Patient Education: Go to Patient Education Activity  Goal: Patient/Family Education  Outcome: Progressing Towards Goal     Problem: Risk for Spread of Infection  Goal: Prevent transmission of infectious organism to others  Description: Prevent the transmission of infectious organisms to other patients, staff members, and visitors.   Outcome: Progressing Towards Goal     Problem: Patient Education:  Go to Education Activity  Goal: Patient/Family Education  Outcome: Progressing Towards Goal

## 2021-05-12 NOTE — PROGRESS NOTES
Blood sugar 59, call to Dr Tony Tidwell to notify. RN to give D50 and will recheck sugar is 1 hour. Patient stable, asymptomatic.

## 2021-05-12 NOTE — PROGRESS NOTES
Infectious Disease Progress Note           Subjective:   Pt seen and examined at bedside. Underwent arteriogram today by Dr Prasad Joy. Reports right heel pain and feeling hungry   Objective:   Physical Exam:     Visit Vitals  BP (!) 138/56 (BP 1 Location: Left lower arm, BP Patient Position: At rest;Lying)   Pulse 76   Temp 97.4 °F (36.3 °C)   Resp 17   Ht 5' 6\" (1.676 m)   Wt 148 lb (67.1 kg)   SpO2 96%   BMI 23.89 kg/m²      O2 Device: None (Room air)    Temp (24hrs), Av.9 °F (36.6 °C), Min:97 °F (36.1 °C), Max:98.4 °F (36.9 °C)    No intake/output data recorded. 05/10 1901 -  0700  In: 860 [P.O.:240]  Out: 1100 [Urine:1100]    General: NAD, AAO x 4  HEENT: OTILIA, Moist mucosa   Lungs: CTA b/l, no wheeze/rhonchi  Heart: S1S2+, RRR, no murmur  Abdo: Soft, NT, ND, +BS   Exts: right heel ulcer, tenderness on palpation, right hemiparesis   Skin: Right buttock chick ulcer, decreased surrounding induration, and tenderness     Data Review:       Recent Days:  Recent Labs     21  1244   WBC 4.0*   HGB 6.6*   HCT 22.4*   *     Recent Labs     21  1244   BUN 30*   CREA 0.94       Lab Results   Component Value Date/Time    C-Reactive protein 1.64 (H) 2021 11:50 AM      Microbiology   - Right buttock wound Cx : MRSA, and coag neg staph     Diagnostics   CXR Results  (Last 48 hours)    None           Assessment/Plan     1. Right buttock ulcer, suspected abscess. S/p I&D on       MRSA/staph epidermidis isolated from wound Cx on       Remains afebrile, routine labs not done today       Continue on Vanc for MRSA and staph Epi coverage, x 2 wks post debridement       Routine wound care and frequent turns         2. Right heel ulcer, chronic, boggy:  Reports ongoing right heel pain and discomfort      Ceretec tagged WBC scan neg for osteomyelitis (05/10)       S/p arteriogram today by Dr Prasad Joy, will follow op report         3.  Chronic on chronic anemia blood loss anemia, due to recurrent nose bleeds       Hgb down to 6.6 on todays labs, brother requesting hemeOnc eval      4. 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 H/o DM:  Blood sugars are uncontrolled      Rocio Kinsey MD    5/12/2021

## 2021-05-12 NOTE — PROGRESS NOTES
Progress Note    Patient: Cheko Lizarraga. MRN: [id number]  SSN: [id number]    YOB: 1965  Age: 60 y.o. Sex: male      Admit Date: 5/6/2021    LOS: 6 days     Subjective:     60years old with chronic anemia procedure with vascular surgeon yesterday. Has a history of chronic anemia with talengietasia    Objective:     Vitals:    05/12/21 1230 05/12/21 1245 05/12/21 1300 05/12/21 1542   BP: 131/70 114/84 (!) 144/86 (!) 138/56   Pulse: 69 82 75 76   Resp: 10 15 14 17   Temp:    97.4 °F (36.3 °C)   SpO2: 97% 97% 97% 96%   Weight:       Height:            Intake and Output:  Current Shift: No intake/output data recorded. Last three shifts: 05/10 1901 - 05/12 0700  In: 860 [P.O.:240]  Out: 1100 [Urine:1100]    Physical Exam:   General:  Alert, cooperative, no distress, appears stated age. Eyes:  Conjunctivae/corneas clear. PERRL, EOMs intact. Fundi benign   Ears:  Normal TMs and external ear canals both ears. Nose: Nares normal. Septum midline. Mucosa normal. No drainage or sinus tenderness. Mouth/Throat: Lips, mucosa, and tongue normal. Teeth and gums normal.   Neck: Supple, symmetrical, trachea midline, no adenopathy, thyroid: no enlargment/tenderness/nodules, no carotid bruit and no JVD. Back:   Symmetric, no curvature. ROM normal. No CVA tenderness. Lungs:   Clear to auscultation bilaterally. Heart:  Regular rate and rhythm, S1, S2 normal, no murmur, click, rub or gallop. Abdomen:   Soft, non-tender. Bowel sounds normal. No masses,  No organomegaly. Extremities: Extremities normal, atraumatic, no cyanosis or edema. Pulses: 2+ and symmetric all extremities. Skin: Skin color, texture, turgor normal. No rashes or lesions   Lymph nodes: Cervical, supraclavicular, and axillary nodes normal.   Neurologic: CNII-XII intact. Normal strength, sensation and reflexes throughout. Lab/Data Review: All lab results for the last 24 hours reviewed.      Recent Results (from the past 24 hour(s))   GLUCOSE, POC    Collection Time: 05/11/21 10:27 PM   Result Value Ref Range    Glucose (POC) 453 (H) 65 - 117 mg/dL    Performed by [id number] , POC    Collection Time: 05/12/21  7:59 AM   Result Value Ref Range    Glucose (POC) 59 (L) 65 - 117 mg/dL    Performed by Rena Trivedi (PCT)    GLUCOSE, POC    Collection Time: 05/12/21  9:11 AM   Result Value Ref Range    Glucose (POC) 125 (H) 65 - 117 mg/dL    Performed by [id number], POC    Collection Time: 05/12/21 11:56 AM   Result Value Ref Range    Glucose (POC) 100 65 - 117 mg/dL    Performed by Wendi Adams, TROUGH    Collection Time: 05/12/21  2:04 PM   Result Value Ref Range    Vancomycin,trough 16.6 (H) 5.0 - 10.0 ug/mL    Reported dose date Not provided      Reported dose: Not provided Units   GLUCOSE, POC    Collection Time: 05/12/21  4:02 PM   Result Value Ref Range    Glucose (POC) 234 (H) 65 - 117 mg/dL    Performed by Kacey Vicente (PCT)          Assessment:     Active Problems:    Severe anemia (5/6/2021)      Diabetic foot infection ([address] (5/6/2021)    buttocks suspected abscess  Right heel ulcer no evidence of osteomyelitis  Chronic anemia telengietasis consult hematology  Acute anemia transfuse packed red blood cells  Diabetes mellitus type 2 uncontrolled      Plan:     Patient was placed on Lovenox subcu twice a day this was discussed with the vascular surgeon who advised discontinuation of Lovenox.  Transfuse  patient with packed red blood cells    Signed By: Patrizia Velez MD     May 12, 2021

## 2021-05-13 LAB
BACTERIA SPEC CULT: NORMAL
BASOPHILS # BLD: 0.1 K/UL (ref 0–0.1)
BASOPHILS NFR BLD: 2 % (ref 0–1)
DIFFERENTIAL METHOD BLD: ABNORMAL
EOSINOPHIL # BLD: 0.2 K/UL (ref 0–0.4)
EOSINOPHIL NFR BLD: 4 % (ref 0–7)
ERYTHROCYTE [DISTWIDTH] IN BLOOD BY AUTOMATED COUNT: 15 % (ref 11.5–14.5)
GLUCOSE BLD STRIP.AUTO-MCNC: 212 MG/DL (ref 65–117)
GLUCOSE BLD STRIP.AUTO-MCNC: 256 MG/DL (ref 65–117)
GLUCOSE BLD STRIP.AUTO-MCNC: 297 MG/DL (ref 65–117)
GLUCOSE BLD STRIP.AUTO-MCNC: 350 MG/DL (ref 65–117)
HCT VFR BLD AUTO: 34.9 % (ref 36.6–50.3)
HGB BLD-MCNC: 11.4 G/DL (ref 12.1–17)
IMM GRANULOCYTES # BLD AUTO: 0 K/UL (ref 0–0.04)
IMM GRANULOCYTES NFR BLD AUTO: 0 % (ref 0–0.5)
LYMPHOCYTES # BLD: 1.4 K/UL (ref 0.8–3.5)
LYMPHOCYTES NFR BLD: 28 % (ref 12–49)
MCH RBC QN AUTO: 27.7 PG (ref 26–34)
MCHC RBC AUTO-ENTMCNC: 32.7 G/DL (ref 30–36.5)
MCV RBC AUTO: 84.9 FL (ref 80–99)
MONOCYTES # BLD: 0.8 K/UL (ref 0–1)
MONOCYTES NFR BLD: 15 % (ref 5–13)
NEUTS SEG # BLD: 2.6 K/UL (ref 1.8–8)
NEUTS SEG NFR BLD: 51 % (ref 32–75)
NRBC # BLD: 0 K/UL (ref 0–0.01)
NRBC BLD-RTO: 0 PER 100 WBC
PERFORMED BY, TECHID: ABNORMAL
PLATELET # BLD AUTO: 385 K/UL (ref 150–400)
PMV BLD AUTO: 9.5 FL (ref 8.9–12.9)
RBC # BLD AUTO: 4.11 M/UL (ref 4.1–5.7)
SPECIAL REQUESTS,SREQ: NORMAL
WBC # BLD AUTO: 5.1 K/UL (ref 4.1–11.1)

## 2021-05-13 PROCEDURE — 74011636637 HC RX REV CODE- 636/637: Performed by: INTERNAL MEDICINE

## 2021-05-13 PROCEDURE — P9016 RBC LEUKOCYTES REDUCED: HCPCS

## 2021-05-13 PROCEDURE — 74011250636 HC RX REV CODE- 250/636: Performed by: INTERNAL MEDICINE

## 2021-05-13 PROCEDURE — 99232 SBSQ HOSP IP/OBS MODERATE 35: CPT | Performed by: INTERNAL MEDICINE

## 2021-05-13 PROCEDURE — 36415 COLL VENOUS BLD VENIPUNCTURE: CPT

## 2021-05-13 PROCEDURE — 99232 SBSQ HOSP IP/OBS MODERATE 35: CPT | Performed by: SURGERY

## 2021-05-13 PROCEDURE — 85025 COMPLETE CBC W/AUTO DIFF WBC: CPT

## 2021-05-13 PROCEDURE — 36430 TRANSFUSION BLD/BLD COMPNT: CPT

## 2021-05-13 PROCEDURE — 65270000032 HC RM SEMIPRIVATE

## 2021-05-13 PROCEDURE — 74011250637 HC RX REV CODE- 250/637: Performed by: INTERNAL MEDICINE

## 2021-05-13 PROCEDURE — 74011000250 HC RX REV CODE- 250: Performed by: SURGERY

## 2021-05-13 PROCEDURE — 82962 GLUCOSE BLOOD TEST: CPT

## 2021-05-13 RX ORDER — INSULIN GLARGINE 100 [IU]/ML
40 INJECTION, SOLUTION SUBCUTANEOUS DAILY
Status: DISCONTINUED | OUTPATIENT
Start: 2021-05-14 | End: 2021-05-20

## 2021-05-13 RX ORDER — LIDOCAINE HYDROCHLORIDE 10 MG/ML
10 INJECTION INFILTRATION; PERINEURAL ONCE
Status: COMPLETED | OUTPATIENT
Start: 2021-05-13 | End: 2021-05-13

## 2021-05-13 RX ORDER — SODIUM CHLORIDE 0.9 % (FLUSH) 0.9 %
5-40 SYRINGE (ML) INJECTION EVERY 8 HOURS
Status: DISCONTINUED | OUTPATIENT
Start: 2021-05-13 | End: 2021-05-20

## 2021-05-13 RX ORDER — SODIUM CHLORIDE 0.9 % (FLUSH) 0.9 %
5-40 SYRINGE (ML) INJECTION AS NEEDED
Status: DISCONTINUED | OUTPATIENT
Start: 2021-05-13 | End: 2021-05-25 | Stop reason: HOSPADM

## 2021-05-13 RX ORDER — HYDROCODONE BITARTRATE AND ACETAMINOPHEN 7.5; 325 MG/1; MG/1
1 TABLET ORAL
Status: DISCONTINUED | OUTPATIENT
Start: 2021-05-13 | End: 2021-05-25 | Stop reason: HOSPADM

## 2021-05-13 RX ADMIN — PANTOPRAZOLE SODIUM 40 MG: 40 TABLET, DELAYED RELEASE ORAL at 08:53

## 2021-05-13 RX ADMIN — DIVALPROEX SODIUM 1500 MG: 500 TABLET, DELAYED RELEASE ORAL at 22:56

## 2021-05-13 RX ADMIN — SUCRALFATE 1 G: 1 TABLET ORAL at 17:06

## 2021-05-13 RX ADMIN — SODIUM CHLORIDE 750 MG: 9 INJECTION, SOLUTION INTRAVENOUS at 07:51

## 2021-05-13 RX ADMIN — INSULIN LISPRO 10 UNITS: 100 INJECTION, SOLUTION INTRAVENOUS; SUBCUTANEOUS at 08:51

## 2021-05-13 RX ADMIN — INSULIN LISPRO 10 UNITS: 100 INJECTION, SOLUTION INTRAVENOUS; SUBCUTANEOUS at 17:06

## 2021-05-13 RX ADMIN — SUCRALFATE 1 G: 1 TABLET ORAL at 12:38

## 2021-05-13 RX ADMIN — INSULIN GLARGINE 35 UNITS: 100 INJECTION, SOLUTION SUBCUTANEOUS at 08:52

## 2021-05-13 RX ADMIN — PREDNISONE 20 MG: 20 TABLET ORAL at 08:52

## 2021-05-13 RX ADMIN — LIDOCAINE HYDROCHLORIDE 1 ML: 10 INJECTION, SOLUTION INFILTRATION; PERINEURAL at 12:40

## 2021-05-13 RX ADMIN — HYDROCODONE BITARTRATE AND ACETAMINOPHEN 1 TABLET: 5; 325 TABLET ORAL at 08:52

## 2021-05-13 RX ADMIN — Medication 10 ML: at 23:06

## 2021-05-13 RX ADMIN — ASPIRIN 81 MG: 81 TABLET, CHEWABLE ORAL at 08:52

## 2021-05-13 RX ADMIN — Medication 10 ML: at 17:06

## 2021-05-13 RX ADMIN — Medication 10 ML: at 08:53

## 2021-05-13 RX ADMIN — SERTRALINE HYDROCHLORIDE 100 MG: 50 TABLET ORAL at 08:52

## 2021-05-13 RX ADMIN — HYDROCODONE BITARTRATE AND ACETAMINOPHEN 1 TABLET: 5; 325 TABLET ORAL at 22:56

## 2021-05-13 RX ADMIN — SODIUM CHLORIDE 750 MG: 9 INJECTION, SOLUTION INTRAVENOUS at 17:06

## 2021-05-13 RX ADMIN — MIRTAZAPINE 30 MG: 30 TABLET, FILM COATED ORAL at 22:56

## 2021-05-13 RX ADMIN — INSULIN LISPRO 10 UNITS: 100 INJECTION, SOLUTION INTRAVENOUS; SUBCUTANEOUS at 12:38

## 2021-05-13 RX ADMIN — SUCRALFATE 1 G: 1 TABLET ORAL at 08:52

## 2021-05-13 RX ADMIN — HYDROCODONE BITARTRATE AND ACETAMINOPHEN 1 TABLET: 5; 325 TABLET ORAL at 00:48

## 2021-05-13 RX ADMIN — ATORVASTATIN CALCIUM 20 MG: 20 TABLET, FILM COATED ORAL at 08:52

## 2021-05-13 RX ADMIN — SUCRALFATE 1 G: 1 TABLET ORAL at 22:56

## 2021-05-13 NOTE — PROGRESS NOTES
PT consult received and acknowledged . However , per nsg , hold PT eval for today as pt is actively bleeding /oozing from groin venous site and not appropriate for PT eval. at this time . Complete bed rest orders as per Dr Cayden Singh. PT will continue to monitor and reattempt for PT eval when medically appropriate .  Thanks

## 2021-05-13 NOTE — PROGRESS NOTES
Bedside shift change report given to Michael Apodaca RN (oncoming nurse) by Fabian Duckworth RN (offgoing nurse). Report included the following information SBAR, Procedure Summary, Intake/Output, MAR and Recent Results.

## 2021-05-13 NOTE — PROGRESS NOTES
Comprehensive Nutrition Assessment    Type and Reason for Visit: Wound    Nutrition Recommendations/Plan:   Continue current diet  Nursing to monitor BM, I/Os, %PO and ONS    Nutrition Assessment:  Admitted 2/2 severe anemia. RN reports pt buttocks wound dressing is not staying on but wound is healing fine. Reports she changed the dressing x3+/d. Reports heel wound is improving gradually. RN reports pt appetite is good and will eat anything you send him. % PO DMCC intake. Pt receiving yuniel BID, beneprotein TID. No addtitional nutrition intervention needed at this time. Labs: TP 5.7. CRP 1.64. BG . HH 11.4/34.9. BUN 30. Alb 2. Meds reviewed. Malnutrition Assessment:  Malnutrition Status:  No malnutrition      Nutrition Related Findings:  No NFPE performed, nourished per RN. RN denies N/V/C/D. BM 5/13 normal. RN denies C/S issues. No edema documented. Wounds:    Pressure injury(Heel and buttocks pressure injury)       Current Nutrition Therapies:  DIET NUTRITIONAL SUPPLEMENTS Lunch, Dinner; Yuniel  DIET NUTRITIONAL SUPPLEMENTS Breakfast, Lunch, Dinner; Beneprotein  DIET DIABETIC CONSISTENT CARB Regular    Anthropometric Measures:  · Height:  5' 6\" (167.6 cm)  · Current Body Wt:  68.8 kg (151 lb 10.8 oz)   · Admission Body Wt:  148 lb    · Usual Body Wt:   unknown per RN, Pt poor historian     · Ideal Body Wt:  142 lbs:  106.8 %   · BMI Category:  Normal weight (BMI 18.5-24. 9)     Wt Readings from Last 3 Encounters:   05/13/21 68.8 kg (151 lb 11.2 oz)   05/06/21 67.1 kg (148 lb)   03/30/21 66.3 kg (146 lb 1 oz)   ·     Nutrition Diagnosis:   · Inadequate protein intake related to increased demand for energy/nutrients as evidenced by wounds      Nutrition Interventions:   Food and/or Nutrient Delivery: Continue current diet  Nutrition Education and Counseling: No recommendations at this time  Coordination of Nutrition Care: Continue to monitor while inpatient      Nutrition Monitoring and Evaluation: Behavioral-Environmental Outcomes: None identified  Food/Nutrient Intake Outcomes: Food and nutrient intake, Supplement intake  Physical Signs/Symptoms Outcomes: Weight, Skin    Discharge Planning:    Continue current diet     Electronically signed by Fernanda Ferrera RD on 5/13/2021 at 2:28 PM    Contact: Ext [phone]

## 2021-05-13 NOTE — PROGRESS NOTES
Progress Note    Patient: Donna Ballesteros. MRN: [id number]  SSN: [id number]    YOB: 1965  Age: 60 y.o. Sex: male      Admit Date: 5/6/2021    LOS: 7 days     Subjective:     Patient had blood transfusion yesterday hemoglobin is up to 11. Patient was seen by vascular surgeon    Objective:     Vitals:    05/13/21 0548 05/13/21 0650 05/13/21 0658 05/13/21 0834   BP: 135/71 (!) 148/75 (!) 145/79 (!) 145/75   Pulse: 71 72 71 89   Resp: 18 18 18 18   Temp: 98 °F (36.7 °C) 97.4 °F (36.3 °C) 97.4 °F (36.3 °C) 97.8 °F (36.6 °C)   SpO2: 95% 96% 96% 95%   Weight:       Height:            Intake and Output:  Current Shift: No intake/output data recorded. Last three shifts: 05/11 1901 - 05/13 0700  In: 1458.8 [P.O.:120]  Out: 800 [Urine:800]    Physical Exam:   General:  Alert, cooperative, no distress, appears stated age. Eyes:  Conjunctivae/corneas clear. PERRL, EOMs intact. Fundi benign   Ears:  Normal TMs and external ear canals both ears. Nose: Nares normal. Septum midline. Mucosa normal. No drainage or sinus tenderness. Mouth/Throat: Lips, mucosa, and tongue normal. Teeth and gums normal.   Neck: Supple, symmetrical, trachea midline, no adenopathy, thyroid: no enlargment/tenderness/nodules, no carotid bruit and no JVD. Back:   Symmetric, no curvature. ROM normal. No CVA tenderness. Lungs:   Clear to auscultation bilaterally. Heart:  Regular rate and rhythm, S1, S2 normal, no murmur, click, rub or gallop. Abdomen:   Soft, non-tender. Bowel sounds normal. No masses,  No organomegaly. Extremities: Extremities normal, atraumatic, no cyanosis or edema. Pulses: 2+ and symmetric all extremities. Skin:  Ulcer    Lymph nodes: Cervical, supraclavicular, and axillary nodes normal.   Neurologic: CNII-XII intact. Normal strength, sensation and reflexes throughout. Lab/Data Review: All lab results for the last 24 hours reviewed.      Recent Results (from the past 24 hour(s)) GLUCOSE, POC    Collection Time: 05/12/21 11:56 AM   Result Value Ref Range    Glucose (POC) 100 65 - 117 mg/dL    Performed by Rajwinder Snare, TROUGH    Collection Time: 05/12/21  2:04 PM   Result Value Ref Range    Vancomycin,trough 16.6 (H) 5.0 - 10.0 ug/mL    Reported dose date Not provided      Reported dose: Not provided Units   GLUCOSE, POC    Collection Time: 05/12/21  4:02 PM   Result Value Ref Range    Glucose (POC) 234 (H) 65 - 117 mg/dL    Performed by Zara Calix (PCT)    GLUCOSE, POC    Collection Time: 05/12/21  7:50 PM   Result Value Ref Range    Glucose (POC) 320 (H) 65 - 117 mg/dL    Performed by Arpit SAXENA    CBC WITH AUTOMATED DIFF    Collection Time: 05/13/21  7:49 AM   Result Value Ref Range    WBC 5.1 4.1 - 11.1 K/uL    RBC 4.11 4.10 - 5.70 M/uL    HGB 11.4 (L) 12.1 - 17.0 g/dL    HCT 34.9 (L) 36.6 - 50.3 %    MCV 84.9 80.0 - 99.0 FL    MCH 27.7 26.0 - 34.0 PG    MCHC 32.7 30.0 - 36.5 g/dL    RDW 15.0 (H) 11.5 - 14.5 %    PLATELET 227 220 - 421 K/uL    MPV 9.5 8.9 - 12.9 FL    NRBC 0.0 0.0  WBC    ABSOLUTE NRBC 0.00 0.00 - 0.01 K/uL    NEUTROPHILS 51 32 - 75 %    LYMPHOCYTES 28 12 - 49 %    MONOCYTES 15 (H) 5 - 13 %    EOSINOPHILS 4 0 - 7 %    BASOPHILS 2 (H) 0 - 1 %    IMMATURE GRANULOCYTES 0 0 - 0.5 %    ABS. NEUTROPHILS 2.6 1.8 - 8.0 K/UL    ABS. LYMPHOCYTES 1.4 0.8 - 3.5 K/UL    ABS. MONOCYTES 0.8 0.0 - 1.0 K/UL    ABS. EOSINOPHILS 0.2 0.0 - 0.4 K/UL    ABS. BASOPHILS 0.1 0.0 - 0.1 K/UL    ABS. IMM. GRANS. 0.0 0.00 - 0.04 K/UL    DF AUTOMATED     GLUCOSE, POC    Collection Time: 05/13/21  8:39 AM   Result Value Ref Range    Glucose (POC) 297 (H) 65 - 117 mg/dL    Performed by Hal Johnson          Assessment:     Active Problems:    Severe anemia (5/6/2021)      Diabetic foot infection ([address] (5/6/2021)    Infected leg MRSA continue with IV vancomycin.   PICC line for home IV antibiotics  Diabetic mellitus type II uncontrolled adjust medications  Plan:   Is meant for home antibiotics if okay with Infectious disease    Signed By: Ashu Ortega MD     May 13, 2021

## 2021-05-13 NOTE — PROGRESS NOTES
PROGRESS NOTE      Chief Complaints:  Examined this morning. HPI and  Objective:    Patient is left groin is still oozing this morning. Pressure dressings applied. Patient denies chest pain shortness of breath. Patient's left foot is peripheral.  Foot is warm now. Review of Systems:  Dizzy spell chest pain shortness of breath. Rest of review of system negative. EXAM:  Visit Vitals  BP (!) 145/79   Pulse 71   Temp 97.4 °F (36.3 °C)   Resp 18   Ht 5' 6\" (1.676 m)   Wt 148 lb (67.1 kg)   SpO2 96%   BMI 23.89 kg/m²     Is awake and alert  Patient looks mildly pale  Head and neck atraumatic  Cardiovascular regular rate  Pulmonary no wheeze  Abdomen soft  Vascular examination right foot exam and described as above. Neurologically intact. Recent Results (from the past 24 hour(s))   GLUCOSE, POC    Collection Time: 05/12/21  9:11 AM   Result Value Ref Range    Glucose (POC) 125 (H) 65 - 117 mg/dL    Performed by [address] [place] [address], POC    Collection Time: 05/12/21 11:56 AM   Result Value Ref Range    Glucose (POC) 100 65 - 117 mg/dL    Performed by Víctor Shield, TROUGH    Collection Time: 05/12/21  2:04 PM   Result Value Ref Range    Vancomycin,trough 16.6 (H) 5.0 - 10.0 ug/mL    Reported dose date Not provided      Reported dose: Not provided Units   GLUCOSE, POC    Collection Time: 05/12/21  4:02 PM   Result Value Ref Range    Glucose (POC) 234 (H) 65 - 117 mg/dL    Performed by Jennifer Godinez (PCT)    GLUCOSE, POC    Collection Time: 05/12/21  7:50 PM   Result Value Ref Range    Glucose (POC) 320 (H) 65 - 117 mg/dL    Performed by Von Silvestre        ASSESSMENT:   Patient is 60 y.o. with diagnosis of : Active Problems:    Severe anemia (5/6/2021)      Diabetic foot infection ([address] (5/6/2021)        PLAN:                   Left groin puncture site looks like a venous ooze. So I will stop by later if is still oozing probably have to place small sutures in the puncture site.   Meanwhile we will check H&H this morning. After groin oozing improved most likely he can go home tomorrow.

## 2021-05-13 NOTE — PROGRESS NOTES
Left message on answering/paging system for Dr. Omar Reynoso for new consult for chronic anemia, pt with history of HHT.

## 2021-05-13 NOTE — PROGRESS NOTES
Infectious Disease Progress Note           Subjective:   Pt seen and examined at bedside. Stable, denies complaints. No acute events since last seen. Some bleeding from left groin   Objective:   Physical Exam:     Visit Vitals  /66 (BP Patient Position: At rest;Supine)   Pulse 80   Temp 97.6 °F (36.4 °C)   Resp 18   Ht 5' 6\" (1.676 m)   Wt 151 lb 11.2 oz (68.8 kg)   SpO2 95%   BMI 24.49 kg/m²      O2 Device: None (Room air)    Temp (24hrs), Av.8 °F (36.6 °C), Min:97.4 °F (36.3 °C), Max:98.2 °F (36.8 °C)    No intake/output data recorded.  1901 -  0700  In: 1458.8 [P.O.:120]  Out: 800 [Urine:800]    General: NAD, resting comfortably   HEENT: OTILIA, Moist mucosa   Lungs: CTA b/l, no wheeze/rhonchi  Heart: S1S2+, RRR, no murmur  Abdo: Soft, NT, ND, +BS   Exts: right heel ulcer, tenderness on palpation, right hemiparesis   Skin: Right buttock chick ulcer, decreased surrounding induration, and tenderness     Data Review:       Recent Days:  Recent Labs     21  0749 21  1244   WBC 5.1 4.0*   HGB 11.4* 6.6*   HCT 34.9* 22.4*    458*     Recent Labs     21  1244   BUN 30*   CREA 0.94       Lab Results   Component Value Date/Time    C-Reactive protein 1.64 (H) 2021 11:50 AM      Microbiology   - Right buttock wound Cx : MRSA, and coag neg staph     Diagnostics   CXR Results  (Last 48 hours)    None           Assessment/Plan     1. Right buttock ulcer, suspected abscess. S/p I&D on       MRSA/staph epidermidis isolated from wound Cx on       Healing incisional wound w/o evidence of worsening infection       WBC trending up but remains WNL, afebrile       Continue on Vanc for MRSA and staph epidermidis coverage          2. Right heel ulcer, chronic, boggy:  Reports ongoing right heel pain and discomfort      Ceretec tagged WBC scan neg for osteomyelitis (05/10)       S/p arteriogram on , op report/finding is pending         3.  Chronic on chronic anemia blood loss anemia, due to recurrent nose bleeds       Hgb staying stable after PRBC transfusion      4. 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 H/o DM:  Blood sugars are uncontrolled      Tomasa Farrar MD    5/13/2021

## 2021-05-14 LAB
GLUCOSE BLD STRIP.AUTO-MCNC: 202 MG/DL (ref 65–117)
GLUCOSE BLD STRIP.AUTO-MCNC: 229 MG/DL (ref 65–117)
GLUCOSE BLD STRIP.AUTO-MCNC: 312 MG/DL (ref 65–117)
GLUCOSE BLD STRIP.AUTO-MCNC: 69 MG/DL (ref 65–117)
PERFORMED BY, TECHID: ABNORMAL
PERFORMED BY, TECHID: NORMAL

## 2021-05-14 PROCEDURE — 82962 GLUCOSE BLOOD TEST: CPT

## 2021-05-14 PROCEDURE — 99232 SBSQ HOSP IP/OBS MODERATE 35: CPT | Performed by: SURGERY

## 2021-05-14 PROCEDURE — 74011250637 HC RX REV CODE- 250/637: Performed by: INTERNAL MEDICINE

## 2021-05-14 PROCEDURE — 99232 SBSQ HOSP IP/OBS MODERATE 35: CPT | Performed by: INTERNAL MEDICINE

## 2021-05-14 PROCEDURE — 74011000258 HC RX REV CODE- 258: Performed by: INTERNAL MEDICINE

## 2021-05-14 PROCEDURE — 74011250636 HC RX REV CODE- 250/636: Performed by: INTERNAL MEDICINE

## 2021-05-14 PROCEDURE — 97161 PT EVAL LOW COMPLEX 20 MIN: CPT

## 2021-05-14 PROCEDURE — 65270000032 HC RM SEMIPRIVATE

## 2021-05-14 PROCEDURE — 74011636637 HC RX REV CODE- 636/637: Performed by: INTERNAL MEDICINE

## 2021-05-14 PROCEDURE — 97530 THERAPEUTIC ACTIVITIES: CPT

## 2021-05-14 RX ADMIN — HYDROCODONE BITARTRATE AND ACETAMINOPHEN 1 TABLET: 5; 325 TABLET ORAL at 20:44

## 2021-05-14 RX ADMIN — SODIUM CHLORIDE 750 MG: 9 INJECTION, SOLUTION INTRAVENOUS at 17:02

## 2021-05-14 RX ADMIN — SUCRALFATE 1 G: 1 TABLET ORAL at 10:44

## 2021-05-14 RX ADMIN — MIRTAZAPINE 30 MG: 30 TABLET, FILM COATED ORAL at 20:46

## 2021-05-14 RX ADMIN — SODIUM CHLORIDE 125 MG: 9 INJECTION, SOLUTION INTRAVENOUS at 11:41

## 2021-05-14 RX ADMIN — Medication 10 ML: at 06:09

## 2021-05-14 RX ADMIN — SUCRALFATE 1 G: 1 TABLET ORAL at 12:46

## 2021-05-14 RX ADMIN — SUCRALFATE 1 G: 1 TABLET ORAL at 20:46

## 2021-05-14 RX ADMIN — ASPIRIN 81 MG: 81 TABLET, CHEWABLE ORAL at 10:44

## 2021-05-14 RX ADMIN — SUCRALFATE 1 G: 1 TABLET ORAL at 17:02

## 2021-05-14 RX ADMIN — Medication 10 ML: at 20:46

## 2021-05-14 RX ADMIN — PREDNISONE 20 MG: 20 TABLET ORAL at 10:57

## 2021-05-14 RX ADMIN — Medication 10 ML: at 12:47

## 2021-05-14 RX ADMIN — DIVALPROEX SODIUM 1500 MG: 500 TABLET, DELAYED RELEASE ORAL at 20:44

## 2021-05-14 RX ADMIN — INSULIN GLARGINE 40 UNITS: 100 INJECTION, SOLUTION SUBCUTANEOUS at 10:42

## 2021-05-14 RX ADMIN — INSULIN LISPRO 10 UNITS: 100 INJECTION, SOLUTION INTRAVENOUS; SUBCUTANEOUS at 12:46

## 2021-05-14 RX ADMIN — SODIUM CHLORIDE 750 MG: 9 INJECTION, SOLUTION INTRAVENOUS at 05:55

## 2021-05-14 RX ADMIN — ATORVASTATIN CALCIUM 20 MG: 20 TABLET, FILM COATED ORAL at 10:44

## 2021-05-14 RX ADMIN — SERTRALINE HYDROCHLORIDE 100 MG: 50 TABLET ORAL at 10:44

## 2021-05-14 RX ADMIN — PANTOPRAZOLE SODIUM 40 MG: 40 TABLET, DELAYED RELEASE ORAL at 10:57

## 2021-05-14 RX ADMIN — INSULIN LISPRO 10 UNITS: 100 INJECTION, SOLUTION INTRAVENOUS; SUBCUTANEOUS at 17:02

## 2021-05-14 NOTE — DISCHARGE INSTRUCTIONS
Patient Education        Learning About ACE Inhibitors and ARBs for Diabetes  Introduction     ACE inhibitors and ARBs are medicines used to control blood pressure. They allow blood vessels to relax and open up. This lowers your blood pressure. When you have diabetes, taking an ACE inhibitor or ARB can help to:  · Treat high blood pressure. Your risk of problems from diabetes goes up when you have high blood pressure. · Prevent or slow kidney damage. Diabetes can damage the blood vessels in the kidneys. High blood pressure can damage the kidneys, too. · Lower the risks of stroke and heart attack. Your risks go up when you have high blood pressure, heart disease, or both. An ACE inhibitor or ARB is a good choice for people with diabetes. Unlike some medicines, these don't affect blood sugar levels. Examples  ACE inhibitors include:  · Benazepril. · Lisinopril. · Ramipril. ARBs include:  · Irbesartan. · Losartan. · Telmisartan. Possible side effects  All medicines can cause side effects. Some side effects of ACE inhibitors include:  · Low blood pressure. You may feel dizzy and weak. · A cough. · High potassium levels. · Swelling of your lips, tongue, or face. If the swelling is severe, you may need treatment right away. Severe swelling can make it hard to breathe, but this is rare. Some side effects of ARBs include:  · Low blood pressure. You may feel dizzy and weak. · High potassium levels. You may have other side effects or reactions not listed here. Check the information that comes with your medicine. What to know about taking this medicine  · Be safe with medicines. Take your medicines exactly as prescribed. Call your doctor if you think you are having a problem with your medicine. · Before starting an ACE inhibitor or ARB, tell your doctor if you:  ? Use a salt substitute. ? Take diuretics or potassium tablets. · These medicines are not safe for pregnancy.  If you are pregnant or planning to be, talk to your doctor about a safe blood pressure medicine. · ACE inhibitors can cause a dry cough. If the cough is bad, talk to your doctor. Switching to an ARB is likely to help. · Taking some medicines together can cause problems. Tell your doctor or pharmacist all the medicines you take. This includes over-the-counter medicines, vitamins, herbal products, and supplements. · You may need regular blood and urine tests. Where can you learn more? Go to http://www.fowler.com/  Enter M316 in the search box to learn more about \"Learning About ACE Inhibitors and ARBs for Diabetes. \"  Current as of: August 31, 2020               Content Version: 12.8  © 7664-4125 Healthwise, [employer]. Care instructions adapted under license by [employer] (which disclaims liability or warranty for this information). If you have questions about a medical condition or this instruction, always ask your healthcare professional. [address] any warranty or liability for your use of this information.

## 2021-05-14 NOTE — PROGRESS NOTES
Infectious Disease Progress Note           Subjective:   Doing well, denies new complaints, no acute events since last seen, more alert and following commands. Objective:   Physical Exam:     Visit Vitals  BP (!) 184/81 (BP 1 Location: Left lower arm, BP Patient Position: At rest;Lying)   Pulse 77   Temp 97.2 °F (36.2 °C)   Resp 18   Ht 5' 6\" (1.676 m)   Wt 151 lb 11.2 oz (68.8 kg)   SpO2 96%   BMI 24.49 kg/m²      O2 Device: None (Room air)    Temp (24hrs), Av.7 °F (36.5 °C), Min:97.2 °F (36.2 °C), Max:97.9 °F (36.6 °C)    No intake/output data recorded.  1901 -  0700  In: 718.8   Out: 1200 [Urine:1200]    General: NAD, resting comfortably   HEENT: OTILIA, Moist mucosa   Lungs: CTA b/l, no wheeze/rhonchi  Heart: S1S2+, RRR, no murmur  Abdo: Soft, NT, ND, +BS   Exts: right heel ulcer, tenderness on palpation, right hemiparesis   Skin: Right buttock chick ulcer, decreased surrounding induration, and tenderness     Data Review:       Recent Days:  Recent Labs     21  0749 21  1244   WBC 5.1 4.0*   HGB 11.4* 6.6*   HCT 34.9* 22.4*    458*     Recent Labs     21  1244   BUN 30*   CREA 0.94     Lab Results   Component Value Date/Time    C-Reactive protein 1.64 (H) 2021 11:50 AM      Microbiology   - Right buttock wound Cx : MRSA, and coag neg staph     Diagnostics   CXR Results  (Last 48 hours)    None           Assessment/Plan     1. Right buttock ulcer, suspected abscess. S/p I&D on       MRSA/staph epidermidis isolated from wound Cx on       Healing wound w/o evidence of superinfection       Continue on Vanc while hospitalized, may transition to Doxycycline upon for the remainder of therapy, 2 wks from . Continue routine turns to avoid worsening of ulcer     2. Right heel ulcer: Decreased tenderness on exam, heel less boggy       Ceretec tagged WBC scan neg for osteomyelitis (05/10). S/p arteriogram on     3.  Chronic on chronic anemia blood loss anemia, due to recurrent nose bleeds       Hgb staying stable after PRBC transfusion      4. 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  Urinary incontinence: at high risk for pressure ulcers, may benefit from long term pacheco      Hood Hernandez MD    5/14/2021

## 2021-05-14 NOTE — PROGRESS NOTES
PROGRESS NOTE      Chief Complaints:  Patient has no new complaints. HPI and  Objective:    Patient has some bleeding complications from left groin from a puncture site from catheter insertion. Bleeding has finally stopped. His looks comfortable he denies any pain. He denies chest pain shortness of breath. Review of Systems:  Rest of review of systems negative. EXAM:  Visit Vitals  BP (!) 185/81 (BP 1 Location: Left lower arm, BP Patient Position: At rest;Lying)   Pulse 77   Temp 97.7 °F (36.5 °C)   Resp 18   Ht 5' 6\" (1.676 m)   Wt 151 lb 11.2 oz (68.8 kg)   SpO2 98%   BMI 24.49 kg/m²     Patient is awake alert. Head and neck atraumatic. Cardiovascular regular rate. Pulmonary no audible wheeze abdomen soft. Neurologically intact. Vascular examinations right foot is well-perfused. With a warm up foot. Recent Results (from the past 24 hour(s))   GLUCOSE, POC    Collection Time: 05/13/21  3:34 PM   Result Value Ref Range    Glucose (POC) 212 (H) 65 - 117 mg/dL    Performed by Rupa Garden    GLUCOSE, POC    Collection Time: 05/13/21  8:04 PM   Result Value Ref Range    Glucose (POC) 256 (H) 65 - 117 mg/dL    Performed by Rupa Garden    GLUCOSE, POC    Collection Time: 05/14/21  9:06 AM   Result Value Ref Range    Glucose (POC) 69 65 - 117 mg/dL    Performed by [address], POC    Collection Time: 05/14/21 12:24 PM   Result Value Ref Range    Glucose (POC) 229 (H) 65 - 117 mg/dL    Performed by Nohemi Leslie [facility]        ASSESSMENT:   Patient is 60 y.o. with diagnosis of : Active Problems:    Severe anemia (5/6/2021)      Diabetic foot infection ([address] (5/6/2021)        PLAN:                 Continue to protectors, continue antibiotics. Patient's vascular status overall looks to be fair. Patient had right popliteal artery 80% occlusion this was treated with atherectomy with balloon PT angioplasty. We will continue monitor his progress.   Patient should be able to go home later today or tomorrow.

## 2021-05-14 NOTE — PROGRESS NOTES
NAVDEEP received call from owner of [facility], Marcos Solorio, at [phone]. He voiced concerns that the patient's needs may be too complex to return to their group home. He reported they do not have nurses on site and has spoken with the patient's brother/POA and they think that SNF/nursing home placement may be the best option. Per Marcos Solorio the patient has a TBI and Intellectual Disability which would mean he needs a level 2 for placement. Patient will need a psych consult for diagnosis in order for CM to initiate the level 2. ADDENDUM:  NAVDEEP reached out to the patient's [facility], at [phone] to discuss the above. He is agreeable to this plan. He would like the patient placed in a nursing facility closer to him in [facility]. NAVDEEP explained level 2 assessment and need. He acknowledged his understanding of this. NAVDEEP notified nurse need of psych consult to initiate level 2.

## 2021-05-14 NOTE — CONSULTS
Hematology/Oncology Consult    Patient: Joann Killian MRN: [id number]     YOB: 1965  Age: 60 y.o. Sex: male      HPI      Chief Complaint   Patient presents with    Buttocks pain    Wound Check       Joann Killian is a 60 y.o. male who is being seen for recurrent anemia. He has recurrent admissions to the hospital for severe anemia requiring blood transfusions. 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 Patient was in the hospital in February 2021. He is now admitted to the hospital with a hemoglobin of 4.7 g/dL last week. He needed a blood transfusion. Continues to have recurring anemia so hematology consult is requested. Patient is not a great historian, most of the history is obtained by reviewing the chart. He now has problems with right buttock ulcers with abscess. Status post I&D on May8. He also has MRSA staph epi wound infection on vancomycin. He has uncontrolled diabetes.     Past Medical History:   Diagnosis Date    Anxiety disorder     Arthritis     Depression     Diabetes ([address]     Liver disease     Neurological disorder     neuro cognative disorder    OCD (obsessive compulsive disorder)     Psychiatric disorder     OCD    Psychiatric disorder     anxiety    Seizures ([facility]     Thyroid disease      Past Surgical History:   Procedure Laterality Date    HX CRANIOTOMY        Family History   Problem Relation Age of Onset    Cancer Mother      Social History     Tobacco Use    Smoking status: Former Smoker    Smokeless tobacco: Never Used   Substance Use Topics    Alcohol use: No      Current Facility-Administered Medications   Medication Dose Route Frequency Provider Last Rate Last Admin    sodium chloride (NS) flush 5-40 mL  5-40 mL IntraVENous Q8H Kelvin Martínez MD   10 mL at 05/13/21 1706    sodium chloride (NS) flush 5-40 mL  5-40 mL IntraVENous PRN Ulisses Martínez MD        HYDROcodone-acetaminophen [facility]) 7.5-325 mg per tablet 1 Tab  1 Tab Oral Q4H PRN Ulisses Martínez MD        [START ON 5/14/2021] insulin glargine (LANTUS) injection 40 Units  40 Units SubCUTAneous DAILY Delroy LAI MD        insulin lispro (HUMALOG) injection 10 Units  10 Units SubCUTAneous TIDAC Delroy LAI MD   10 Units at 05/13/21 1706    [START ON 5/15/2021] DUE: Vancomycin Trough 5/15 at 1500 (Hold dose until after lab has been drawn)   Other ONCE Blaise Ngo MD        predniSONE (DELTASONE) tablet 20 mg  20 mg Oral DAILY WITH BREAKFAST Balise Ngo MD   20 mg at 05/13/21 3351    0.9% sodium chloride infusion 250 mL  250 mL IntraVENous PRN Delroy LAI MD        0.9% sodium chloride infusion 250 mL  250 mL IntraVENous PRN Delroy LAI MD        0.9% sodium chloride infusion 250 mL  250 mL IntraVENous PRN Ulisses Martínez MD        vancomycin (VANCOCIN) 750 mg in 0.9% sodium chloride 250 mL (VIAL-MATE)  750 mg IntraVENous Q12H Edmundo Li MD   750 mg at 05/13/21 1706    glucose chewable tablet 16 g  4 Tab Oral PRN Blaise Ngo MD        glucagon (GLUCAGEN) injection 1 mg  1 mg IntraMUSCular PRN Delroy LAI MD        dextrose (D50W) injection syrg 12.5-25 g  25-50 mL IntraVENous PRN Blaise Ngo MD   25 g at 05/12/21 0820    0.9% sodium chloride infusion 250 mL  250 mL IntraVENous PRN Tay Jim NP        albuterol-ipratropium (DUO-NEB) 2.5 MG-0.5 MG/3 ML  3 mL Nebulization Q6H PRN Delroy LAI MD        aspirin chewable tablet 81 mg  81 mg Oral DAILY Delroy LAI MD   81 mg at 05/13/21 9361    atorvastatin (LIPITOR) tablet 20 mg  20 mg Oral DAILY Delroy LAI MD   20 mg at 05/13/21 2881    divalproex DR (DEPAKOTE) tablet 1,500 mg  1,500 mg Oral Q Delroy LAI MD   1,500 mg at 05/12/21 1956    mirtazapine (REMERON) tablet 30 mg  30 mg Oral Q Delroy Hernandez I MD   30 mg at 05/12/21 1956    ondansetron (ZOFRAN ODT) tablet 4 mg  4 mg Oral Q6H PRN Angelika LAI MD        pantoprazole (PROTONIX) tablet 40 mg  40 mg Oral ACB Angelika LAI MD   40 mg at 05/13/21 0853    sertraline (ZOLOFT) tablet 100 mg  100 mg Oral DAILY Angelika LAI MD   100 mg at 05/13/21 4535    sucralfate (CARAFATE) tablet 1 g  1 g Oral AC&HS Sabas Jones MD   1 g at 05/13/21 1706    sodium chloride (NS) flush 5-40 mL  5-40 mL IntraVENous PRN Angelika LAI MD        HYDROcodone-acetaminophen (NORCO) 5-325 mg per tablet 1 Tab  1 Tab Oral Q4H PRN Sabas Jones MD   1 Tab at 05/13/21 3169    0.9% sodium chloride infusion 250 mL  250 mL IntraVENous PRN Angelika LAI MD        glucose chewable tablet 16 g  16 g Oral PRN Sabas Jones MD        glucagon (GLUCAGEN) injection 1 mg  1 mg IntraMUSCular PRN Sabas Jones MD        VANCOMYCIN INFORMATION NOTE 1 Each  1 Each Other Rx Dosing/Monitoring Angelika LAI MD            Allergies   Allergen Reactions    Penicillins Rash       Review of Systems:  Constitutional  no fevers chills he has fatigue. No abdominal pain. Allergic/Immunologic No recent allergic reactions   Eyes No significant visual difficulties. No diplopia. ENMT No problems with hearing, no sore throat, no sinus drainage. Endocrine No hot flashes or night sweats. No cold intolerance, polyuria, or polydipsia   Hematologic/Lymphatic No easy bruising or bleeding. The patient denies any tender or palpable lymph nodes   Breasts No abnormal masses of breast, nipple discharge or pain. Respiratory No dyspnea on exertion, orthopnea, chest pain, cough or hemoptysis. Cardiovascular No anginal chest pain, irregular heart beat, tachycardia, palpitations or orthopnea. Gastrointestinal No nausea, vomiting, diarrhea, constipation, cramping, dysphagia, reflux, heartburn, GI bleeding, or early satiety. No change in bowel habits.    Genitourinary (M) No hematuria, dysuria, increased frequency, urgency, hesitancy or incontinence. Musculoskeletal No joint pain, swelling or redness. No decreased range of motion. Integumentary No chronic rashes, inflammation, ulcerations, pruritus, petechiae, purpura, ecchymoses, or skin changes. Neurologic No headache, blurred vision, and no areas of focal weakness or numbness. Normal gait. No sensory problems. Psychiatric No insomnia, depression, nadia or mood swings. No psychotropic drugs. Objective:     Vitals:    05/13/21 1138 05/13/21 1417 05/13/21 1531 05/13/21 2001   BP: 118/66  117/72 130/72   Pulse: 80  75 86   Resp: 18  18 20   Temp: 97.6 °F (36.4 °C)  97.9 °F (36.6 °C) 97.9 °F (36.6 °C)   SpO2: 95%  94% 95%   Weight:       Height:  5' 6\" (1.676 m)          Physical Exam:  Constitutional  white male , alert, cooperative, oriented. Appears close to chronological age. Well nourished. Well developed. Head Normocephalic; no scars   Eyes Conjunctivae and sclerae are clear and without icterus. Pupils are reactive and equal.   ENMT Sinuses are nontender. No oral exudates, ulcers, masses, thrush or mucositis. Oropharynx clear. Tongue normal.   Neck Supple without masses or thyromegaly. No jugular venous distension. Hematologic/Lymphatic No petechiae or purpura. No tender or palpable lymph nodes in the cervical, supraclavicular, axillary or inguinal area. Respiratory Lungs are clear to auscultation without rhonchi or wheezing. Cardiovascular Regular rate and rhythm of heart without murmurs, gallops or rubs. Chest / Line Site Chest is symmetric with no chest wall deformities. Abdomen Non-tender, non-distended, no masses, ascites or hepatosplenomegaly. Good bowel sounds. No guarding or rebound tenderness. No pulsatile masses. Musculoskeletal No tenderness or swelling, normal range of motion without obvious weakness. Extremities No visible deformities, no cyanosis, clubbing or edema.     Skin No rashes, scars, or lesions suggestive of malignancy. No petechiae, purpura, or ecchymoses. No excoriations. Neurologic No sensory or motor deficits, normal cerebellar function, normal gait, cranial nerves intact. Psychiatric Alert and oriented times three. Coherent speech. Verbalizes understanding of our discussions today. Lab/Data Review:  Recent Labs     05/13/21  0749 05/11/21  1244   WBC 5.1 4.0*   HGB 11.4* 6.6*   HCT 34.9* 22.4*    458*     Recent Labs     05/11/21  1244      K 4.1      CO2 29   *   BUN 30*   CREA 0.94   CA 7.8*   ALB 2.1*   TBILI 0.2   ALT 18     No results for input(s): PH, PCO2, PO2, HCO3, FIO2 in the last 72 hours. Recent Results (from the past 24 hour(s))   CBC WITH AUTOMATED DIFF    Collection Time: 05/13/21  7:49 AM   Result Value Ref Range    WBC 5.1 4.1 - 11.1 K/uL    RBC 4.11 4.10 - 5.70 M/uL    HGB 11.4 (L) 12.1 - 17.0 g/dL    HCT 34.9 (L) 36.6 - 50.3 %    MCV 84.9 80.0 - 99.0 FL    MCH 27.7 26.0 - 34.0 PG    MCHC 32.7 30.0 - 36.5 g/dL    RDW 15.0 (H) 11.5 - 14.5 %    PLATELET 597 342 - 695 K/uL    MPV 9.5 8.9 - 12.9 FL    NRBC 0.0 0.0  WBC    ABSOLUTE NRBC 0.00 0.00 - 0.01 K/uL    NEUTROPHILS 51 32 - 75 %    LYMPHOCYTES 28 12 - 49 %    MONOCYTES 15 (H) 5 - 13 %    EOSINOPHILS 4 0 - 7 %    BASOPHILS 2 (H) 0 - 1 %    IMMATURE GRANULOCYTES 0 0 - 0.5 %    ABS. NEUTROPHILS 2.6 1.8 - 8.0 K/UL    ABS. LYMPHOCYTES 1.4 0.8 - 3.5 K/UL    ABS. MONOCYTES 0.8 0.0 - 1.0 K/UL    ABS. EOSINOPHILS 0.2 0.0 - 0.4 K/UL    ABS. BASOPHILS 0.1 0.0 - 0.1 K/UL    ABS. IMM.  GRANS. 0.0 0.00 - 0.04 K/UL    DF AUTOMATED     GLUCOSE, POC    Collection Time: 05/13/21  8:39 AM   Result Value Ref Range    Glucose (POC) 297 (H) 65 - 117 mg/dL    Performed by Lavonne MYERS [address] [place] [address], POC    Collection Time: 05/13/21 11:43 AM   Result Value Ref Range    Glucose (POC) 350 (H) 65 - 117 mg/dL    Performed by Dejuan Medley, POC    Collection Time: 05/13/21  3:34 PM Result Value Ref Range    Glucose (POC) 212 (H) 65 - 117 mg/dL    Performed by Shira Crowley    GLUCOSE, POC    Collection Time: 05/13/21  8:04 PM   Result Value Ref Range    Glucose (POC) 256 (H) 65 - 117 mg/dL    Performed by Shira Crowley         No results found. Assessment and Plan:     Hospital Problems  Date Reviewed: 3/30/2021          Codes Class Noted POA    Severe anemia ICD-10-CM: D64.9  ICD-9-CM: 285.9  5/6/2021 Unknown        Diabetic foot infection [facility]) ICD-10-CM: E11.628, L08.9  ICD-9-CM: 250.80, 686.9  5/6/2021 Unknown            3  78-year-old gentleman with multiple medical problems including poorly controlled diabetes, history of non-STEMI, seizure disorder, obsessive-compulsive disorder memory impairment. 2.  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  -Agree with transfusing to keep his hemoglobin close to 8 g/dL. -Recommend IV iron infusions. Iron studies do show iron saturation of 6% serum iron 24 TIBC of 377 consistent with iron deficiency. -Monitor closely for bleeding.  -If epistaxis is severe we can ask ENT to evaluate and cauterize his bleeding vessels in the nose. 3) has wound infections currently on broad-spectrum antibiotics.   ID is following the patient as well      Thank you for the consult        Signed By: Basia Valles MD     May 13, 2021

## 2021-05-14 NOTE — PROGRESS NOTES
PHYSICAL THERAPY EVALUATION  Patient: Ximena Moody (56 y.o. male)  Date: 5/14/2021  Primary Diagnosis: Severe anemia [D64.9]  Diabetic foot infection ([facility] [address] [E11.628, L08.9]  Procedure(s) (LRB):  ANGIOGRAPHY LOWER EXT RIGHT (N/A) 2 Days Post-Op   Precautions: falls      ASSESSMENT  This is a 59 y/o male with  history of diabetes, liver disease, obsessive-compulsive disorder, depression, anxiety, history of seizures was directed to the hospital for admission for nonhealing diabetic foot ulcer and severe anemia with hemoglobin of 4.0 . Pt admitted on 5/6/21 for severe anemia , diabetic foot infection , s/p angiography R LE on 5/12/21 . Pt. Received in semi-supine , agreeable for PT evaluation . Pt is A& O x 4 , as per pt report , he is a resident of [facility] , IND with ADL's and mod I for functional transfers/mobility , limited ambulation , mostly w/c bound  prior to admission . PT checked with nsg . As per nsg , pt not actively bleeding , not on bed rest orders anymore , can be seen for PT eval and is activity as tolerated . Based on the objective data described below, the patient presents with decreased strength , 3+/5 grossly in L Le , -3/5 grossly in R Le  , R foot drop, balance deficits , generalized weakness , decreased safety awareness , decreased activity tolerance and increased need for  assist with functional mobility ( needs SBA  for rolling from side to side  , Lisset  for supine >sit transfers , intact static sitting balance , min/modA for STS . Nsg in the room to apply dressing on the sacrum , pt able to maintain standing with Lisset for ~1 minute  . Pt requesting to lie back to use the bed borrego for BM , returned to bed in semi-supine  with CGA ). Pt may benefit from skilled PT services while at [facility] in order to increase safety and independence with functional transfers/mobility. Recommend d/c to group home with prior level of care vs SNF pending progress  when medically appropriate . Current Level of Function Impacting Discharge (mobility/balance): Requires min/modA for transfers     Functional Outcome Measure: The patient scored 12 on the AM Pac basic mobility outcome measure which is indicative of medium complexity . Other factors to consider for discharge: PLOF , severity of deficits          PLAN :  Recommendations and Planned Interventions: bed mobility training, transfer training, gait training, therapeutic exercises, neuromuscular re-education, patient and family training/education and therapeutic activities      Frequency/Duration: Patient will be followed by physical therapy:  4 times a week to address goals. Recommendation for discharge: (in order for the patient to meet his/her long term goals)  Group home with prior level of care vs SNF    This discharge recommendation:  Has been made in collaboration with the attending provider and/or case management    IF patient discharges home will need the following DME: to be determined (TBD)         SUBJECTIVE:   Patient stated  I am going home today     OBJECTIVE DATA SUMMARY:   HISTORY:    Past Medical History:   Diagnosis Date    Anxiety disorder     Arthritis     Depression     Diabetes ([facility] [address]     Liver disease     Neurological disorder     neuro cognative disorder    OCD (obsessive compulsive disorder)     Psychiatric disorder     OCD    Psychiatric disorder     anxiety    Seizures ([facility] [address]     Thyroid disease      Past Surgical History:   Procedure Laterality Date    HX CRANIOTOMY         Personal factors and/or comorbidities impacting plan of care:     Home Situation  Home Environment: Group home  One/Two Story Residence: One story  Living Alone: No  Support Systems: Home care staff  Patient Expects to be Discharged to[de-identified] Group home  Current DME Used/Available at Home: Wheelchair    PLOF: Pt IND for ADLS/IADLS, mod I with mobility , limited ambulation , mostly w/c bound  prior to admission. EXAMINATION/PRESENTATION/DECISION MAKING:   Critical Behavior:  Neurologic State: Alert  Orientation Level: Disoriented X4  Cognition: Decreased attention/concentration, Memory loss  Safety/Judgement: Decreased awareness of need for safety  Hearing: Auditory  Auditory Impairment: None  Skin:  Intact where exposed    Range Of Motion:  AROM: Generally decreased, functional  Strength:    Strength: (3+/5 grossly in L Le , -3/5 grossly in R LE , R foot drop )  Tone & Sensation:   Tone: Normal  Sensation: Impaired(c/o numbness on R Le and UE )       Coordination:  Coordination: Generally decreased, functional  Functional Mobility:  Bed Mobility:  Rolling: Stand-by assistance  Supine to Sit: Minimum assistance  Sit to Supine: Contact guard assistance  Scooting: Stand-by assistance  Transfers:  Sit to Stand: Moderate assistance  Stand to Sit: Moderate assistance  Balance:   Sitting: Intact  Standing: Impaired; With support  Standing - Static: Fair;Constant support  Standing - Dynamic : Poor;Constant support        Functional Measure:    [address] [place] [address] AM-PAC 6 Clicks         Basic Mobility Inpatient Short Form  How much difficulty does the patient currently have. .. Unable A Lot A Little None   1. Turning over in bed (including adjusting bedclothes, sheets and blankets)? [] 1   [] 2   [x] 3   [] 4   2. Sitting down on and standing up from a chair with arms ( e.g., wheelchair, bedside commode, etc.)   [] 1   [x] 2   [] 3   [] 4   3. Moving from lying on back to sitting on the side of the bed? [] 1   [] 2   [x] 3   [] 4          How much help from another person does the patient currently need. .. Total A Lot A Little None   4. Moving to and from a bed to a chair (including a wheelchair)? [] 1   [x] 2   [] 3   [] 4   5. Need to walk in hospital room? [x] 1   [] 2   [] 3   [] 4   6. Climbing 3-5 steps with a railing?    [x] 1   [] 2   [] 3   [] 4   © 2007, Trustees of [address] [place] [id number], under license to [employer]. All rights reserved     Score:  Initial: 12 Most Recent: X (Date: 21-- )   Interpretation of Tool:  Represents activities that are increasingly more difficult (i.e. Bed mobility, Transfers, Gait). Score 24 23 22-20 19-15 14-10 9-7 6   Modifier CH CI CJ CK CL CM CN          Physical Therapy Evaluation Charge Determination   History Examination Presentation Decision-Making   MEDIUM  Complexity : 1-2 comorbidities / personal factors will impact the outcome/ POC  MEDIUM Complexity : 3 Standardized tests and measures addressing body structure, function, activity limitation and / or participation in recreation  LOW Complexity : Stable, uncomplicated  Other Functional Measure [facility] 6 medium complexity      Based on the above components, the patient evaluation is determined to be of the following complexity level: LOW     Pain Ratin/10    Activity Tolerance:   Fair  Please refer to the flowsheet for vital signs taken during this treatment. After treatment patient left in no apparent distress:   Supine in bed, Call bell within reach and Bed / chair alarm activated    COMMUNICATION/EDUCATION:   The patients plan of care was discussed with: Registered nurse. Fall prevention education was provided and the patient/caregiver indicated understanding. and Patient/family agree to work toward stated goals and plan of care. Problem: Mobility Impaired (Adult and Pediatric)  Goal: *Acute Goals and Plan of Care (Insert Text)  Description: Pt will be I with LE HEP in 7 days. Pt will perform bed mobility with mod I in 7 days. Pt will perform transfers with mod I in 7 days. Pt will amb - 25' feet with LRAD safely with SBA in 7 days.    Outcome: Not Met       Thank you for this referral.  Estela Kinney   Time Calculation: 43 mins

## 2021-05-14 NOTE — PROGRESS NOTES
Hematology Oncology Progress Note     Interval History :  Patient denies having any major bleeding problems. No epistaxis in the past 2 days. Receiving IV iron reports no problems with infusions.       Subjective:         Current Facility-Administered Medications   Medication Dose Route Frequency    sodium chloride (NS) flush 5-40 mL  5-40 mL IntraVENous Q8H    sodium chloride (NS) flush 5-40 mL  5-40 mL IntraVENous PRN    HYDROcodone-acetaminophen (NORCO) 7.5-325 mg per tablet 1 Tab  1 Tab Oral Q4H PRN    insulin glargine (LANTUS) injection 40 Units  40 Units SubCUTAneous DAILY    ferric gluconate (FERRLECIT) 125 mg in 0.9% sodium chloride 100 mL IVPB  125 mg IntraVENous DAILY    insulin lispro (HUMALOG) injection 10 Units  10 Units SubCUTAneous TIDAC    [START ON 5/15/2021] DUE: Vancomycin Trough 5/15 at 1500 (Hold dose until after lab has been drawn)   Other ONCE    predniSONE (DELTASONE) tablet 20 mg  20 mg Oral DAILY WITH BREAKFAST    0.9% sodium chloride infusion 250 mL  250 mL IntraVENous PRN    0.9% sodium chloride infusion 250 mL  250 mL IntraVENous PRN    0.9% sodium chloride infusion 250 mL  250 mL IntraVENous PRN    vancomycin (VANCOCIN) 750 mg in 0.9% sodium chloride 250 mL (VIAL-MATE)  750 mg IntraVENous Q12H    glucose chewable tablet 16 g  4 Tab Oral PRN    glucagon (GLUCAGEN) injection 1 mg  1 mg IntraMUSCular PRN    dextrose (D50W) injection syrg 12.5-25 g  25-50 mL IntraVENous PRN    0.9% sodium chloride infusion 250 mL  250 mL IntraVENous PRN    albuterol-ipratropium (DUO-NEB) 2.5 MG-0.5 MG/3 ML  3 mL Nebulization Q6H PRN    aspirin chewable tablet 81 mg  81 mg Oral DAILY    atorvastatin (LIPITOR) tablet 20 mg  20 mg Oral DAILY    divalproex DR (DEPAKOTE) tablet 1,500 mg  1,500 mg Oral QHS    mirtazapine (REMERON) tablet 30 mg  30 mg Oral QHS    ondansetron (ZOFRAN ODT) tablet 4 mg  4 mg Oral Q6H PRN    pantoprazole (PROTONIX) tablet 40 mg  40 mg Oral ACB    sertraline (ZOLOFT) tablet 100 mg  100 mg Oral DAILY    sucralfate (CARAFATE) tablet 1 g  1 g Oral AC&HS    sodium chloride (NS) flush 5-40 mL  5-40 mL IntraVENous PRN    HYDROcodone-acetaminophen (NORCO) 5-325 mg per tablet 1 Tab  1 Tab Oral Q4H PRN    0.9% sodium chloride infusion 250 mL  250 mL IntraVENous PRN    glucose chewable tablet 16 g  16 g Oral PRN    glucagon (GLUCAGEN) injection 1 mg  1 mg IntraMUSCular PRN    VANCOMYCIN INFORMATION NOTE 1 Each  1 Each Other Rx Dosing/Monitoring        Review of Systems:    Constitutional No fevers, chills, night sweats, excessive fatigue or weight loss. Allergic/Immunologic No recent allergic reactions   Eyes No significant visual difficulties. No diplopia. ENMT No problems with hearing, no sore throat, no sinus drainage. Endocrine No hot flashes or night sweats. No cold intolerance, polyuria, or polydipsia   Hematologic/Lymphatic No easy bruising or bleeding. The patient denies any tender or palpable lymph nodes   Breasts No abnormal masses of breast, nipple discharge or pain. Respiratory No dyspnea on exertion, orthopnea, chest pain, cough or hemoptysis. Cardiovascular No anginal chest pain, irregular heart beat, tachycardia, palpitations or orthopnea. Gastrointestinal No nausea, vomiting, diarrhea, constipation, cramping, dysphagia, reflux, heartburn, GI bleeding, or early satiety. No change in bowel habits. Genitourinary (M) No hematuria, dysuria, increased frequency, urgency, hesitancy or incontinence. Musculoskeletal No joint pain, swelling or redness. No decreased range of motion. Integumentary No chronic rashes, inflammation, ulcerations, pruritus, petechiae, purpura, ecchymoses, or skin changes. Neurologic No headache, blurred vision, and no areas of focal weakness or numbness. Normal gait. No sensory problems. Psychiatric No insomnia, depression, nadia or mood swings. No psychotropic drugs.      Objective:     Patient Vitals for the past 8 hrs:   BP Temp Pulse Resp SpO2   21 1515 (!) 127/58 97.8 °F (36.6 °C) 76 18 96 %   21 1220 (!) 185/81 97.7 °F (36.5 °C) 77 18 98 %   21 0903 (!) 184/81 97.2 °F (36.2 °C) 77 18 96 %       Temp (24hrs), Av.7 °F (36.5 °C), Min:97.2 °F (36.2 °C), Max:97.9 °F (36.6 °C)      Physical Exam:    Constitutional White male  Alert, cooperative, oriented. Mood and affect appropriate. Appears close to chronological age. Well nourished. Well developed. Head Normocephalic; no scars   Eyes Conjunctivae and sclerae are clear and without icterus. Pupils are reactive and equal.   ENMT Sinuses are nontender. No oral exudates, ulcers, masses, thrush or mucositis. Oropharynx clear. Tongue normal.   Neck Supple without masses or thyromegaly. No jugular venous distension. Hematologic/Lymphatic No petechiae or purpura. No tender or palpable lymph nodes in the cervical, supraclavicular, axillary or inguinal area. Respiratory Lungs are clear to auscultation without rhonchi or wheezing. Cardiovascular Regular rate and rhythm of heart without murmurs, gallops or rubs. Chest / Line Site Chest is symmetric with no chest wall deformities. Abdomen Non-tender, non-distended, no masses, ascites or hepatosplenomegaly. Good bowel sounds. No guarding or rebound tenderness. No pulsatile masses. Extremities  dressing on the legs   Skin No rashes, scars, or lesions suggestive of malignancy. No petechiae, purpura, or ecchymoses. No excoriations. Neurologic No sensory or motor deficits, normal cerebellar function, normal gait, cranial nerves intact. Psychiatric Alert and oriented times three. Coherent speech. Verbalizes understanding of our discussions today. Lab/Data Review:  Recent Labs     21  0749   WBC 5.1   HGB 11.4*   HCT 34.9*        No results for input(s): NA, K, CL, CO2, GLU, BUN, CREA, CA, MG, PHOS, ALB, TBIL, TBILI, ALT, INR, INREXT in the last 72 hours.     No lab exists for component: SGOT  No results for input(s): PH, PCO2, PO2, HCO3, FIO2 in the last 72 hours. Radiology:   No results found. Assessment /Plan:     Active Problems:    Severe anemia (5/6/2021)      Diabetic foot infection ([address] (5/6/2021)      3  57-year-old gentleman with multiple medical problems including poorly controlled diabetes, history of non-STEMI, seizure disorder, obsessive-compulsive disorder memory impairment.     2.  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  -Agree with transfusing to keep his hemoglobin close to 8 g/dL. -Recommend IV iron infusions. Iron studies do show iron saturation of 6% serum iron 24 TIBC of 377 consistent with iron deficiency. He is tolerating Ferrlecit without any problems  -Monitor closely for bleeding.  -If epistaxis is severe we can ask ENT to evaluate and cauterize his bleeding vessels in the nose. -Repeat CBC in a.m.     3) has wound infections currently on broad-spectrum antibiotics.   ID is following the patient as well           Radhika Padilla MD  5/14/2021

## 2021-05-14 NOTE — PROGRESS NOTES
General Daily Progress Note          Patient Name:   Tonya Caro. YOB: 1965       Age:  60 y.o. Admit Date: 5/6/2021      Subjective:       Patient not in distress           Objective:     Visit Vitals  BP (!) 184/81 (BP 1 Location: Left lower arm, BP Patient Position: At rest;Lying)   Pulse 77   Temp 97.2 °F (36.2 °C)   Resp 18   Ht 5' 6\" (1.676 m)   Wt 68.8 kg (151 lb 11.2 oz)   SpO2 96%   BMI 24.49 kg/m²        Recent Results (from the past 24 hour(s))   GLUCOSE, POC    Collection Time: 05/13/21  3:34 PM   Result Value Ref Range    Glucose (POC) 212 (H) 65 - 117 mg/dL    Performed by [address], POC    Collection Time: 05/13/21  8:04 PM   Result Value Ref Range    Glucose (POC) 256 (H) 65 - 117 mg/dL    Performed by [facility]    GLUCOSE, POC    Collection Time: 05/14/21  9:06 AM   Result Value Ref Range    Glucose (POC) 69 65 - 117 mg/dL    Performed by Shawna Montaño      [unfilled]      Review of Systems    Constitutional: Negative for chills and fever. HENT: Negative. Eyes: Negative. Respiratory: Negative. Cardiovascular: Negative. Gastrointestinal: Negative for abdominal pain and nausea. Skin: Negative. Neurological: Negative. Physical Exam:      Constitutional: pt is oriented to person, place, and time. HENT:   Head: Normocephalic and atraumatic. Eyes: Pupils are equal, round, and reactive to light. EOM are normal.   Cardiovascular: Normal rate, regular rhythm and normal heart sounds. Pulmonary/Chest: Breath sounds normal. No wheezes. No rales. Exhibits no tenderness. Abdominal: Soft. Bowel sounds are normal. There is no abdominal tenderness. There is no rebound and no guarding. Musculoskeletal: Normal range of motion. Neurological: pt is alert and oriented to person, place, and time.      NM INFLAM PROC LTD   Final Result      XR FOOT RT MIN 3 V   Final Result           Recent Results (from the past 24 hour(s))   GLUCOSE, POC    Collection Time: 05/13/21  3:34 PM   Result Value Ref Range    Glucose (POC) 212 (H) 65 - 117 mg/dL    Performed by Neita Confer    GLUCOSE, POC    Collection Time: 05/13/21  8:04 PM   Result Value Ref Range    Glucose (POC) 256 (H) 65 - 117 mg/dL    Performed by Neita [employer]    GLUCOSE, POC    Collection Time: 05/14/21  9:06 AM   Result Value Ref Range    Glucose (POC) 69 65 - 117 mg/dL    Performed by González Arceo        Results     Procedure Component Value Units Date/Time    CULTURE, Kathleen Meuse STAIN [[id number]] Collected: 05/07/21 1200    Order Status: Canceled Specimen: Wound from Ulcer     CULTURE, WOUND [[id number]]  (Susceptibility) Collected: 05/06/21 2015    Order Status: Completed Specimen: Wound Drainage Updated: 05/10/21 1107     Special Requests: No Special Requests        GRAM STAIN Few WBCs seen         No organisms seen        Culture result:       Few * methicillin resistant staphylococcus aureus *                  Few Staphylococcus species, coagulase negative          Susceptibility      Staphylococcus aureus Methcillin Resistant     SARINA     Ciprofloxacin ($) Susceptible     Clindamycin ($) Susceptible     Daptomycin ($$$$$) Susceptible     Doxycycline ($$) Susceptible     Erythromycin ($$$$) Resistant     Gentamicin ($) Susceptible     Levofloxacin ($) Susceptible     Linezolid ($$$$$) Susceptible     Oxacillin Resistant     Rifampin ($$$$) Susceptible [1]      Tetracycline Susceptible     Trimeth/Sulfa Susceptible     Vancomycin ($) Susceptible            [1]   Rifampin is not to be used for mono-therapy.                  CULTURE, BLOOD, PAIRED [[id number]] Collected: 05/06/21 1245    Order Status: Completed Specimen: Blood Updated: 05/13/21 0752     Special Requests: No Special Requests        Culture result: No growth 6 days              Labs:     Recent Labs     05/13/21  0749 05/11/21  1244   WBC 5.1 4.0*   HGB 11.4* 6.6*   HCT 34.9* 22.4*    458* Recent Labs     05/11/21  1244      K 4.1      CO2 29   BUN 30*   CREA 0.94   *   CA 7.8*     Recent Labs     05/11/21  1244   ALT 18   AP 65   TBILI 0.2   TP 5.7*   ALB 2.1*   GLOB 3.6     No results for input(s): INR, PTP, APTT, INREXT in the last 72 hours. No results for input(s): FE, TIBC, PSAT, FERR in the last 72 hours. No results found for: FOL, RBCF   No results for input(s): PH, PCO2, PO2 in the last 72 hours. No results for input(s): CPK, CKNDX, TROIQ in the last 72 hours.     No lab exists for component: CPKMB  Lab Results   Component Value Date/Time    Cholesterol, total 122 02/17/2021 07:57 PM    HDL Cholesterol 52 02/17/2021 07:57 PM    LDL, calculated 57.4 02/17/2021 07:57 PM    Triglyceride 63 02/17/2021 07:57 PM    CHOL/HDL Ratio 2.3 02/17/2021 07:57 PM     Lab Results   Component Value Date/Time    Glucose (POC) 69 05/14/2021 09:06 AM    Glucose (POC) 256 (H) 05/13/2021 08:04 PM    Glucose (POC) 212 (H) 05/13/2021 03:34 PM    Glucose (POC) 350 (H) 05/13/2021 11:43 AM    Glucose (POC) 297 (H) 05/13/2021 08:39 AM     Lab Results   Component Value Date/Time    Color Yellow/Straw 05/06/2021 01:49 PM    Appearance Clear 05/06/2021 01:49 PM    Specific gravity 1.012 05/06/2021 01:49 PM    Specific gravity 1.024 02/17/2021 05:31 AM    pH (UA) 7.0 05/06/2021 01:49 PM    Protein Negative 05/06/2021 01:49 PM    Glucose Negative 05/06/2021 01:49 PM    Ketone Negative 05/06/2021 01:49 PM    Bilirubin Negative 05/06/2021 01:49 PM    Urobilinogen 0.1 05/06/2021 01:49 PM    Nitrites Negative 05/06/2021 01:49 PM    Leukocyte Esterase Negative 05/06/2021 01:49 PM    Epithelial cells FEW 02/17/2021 05:31 AM    Bacteria Negative 05/06/2021 01:49 PM    WBC 0-4 05/06/2021 01:49 PM    RBC 0-5 05/06/2021 01:49 PM         Assessment:     Right buttocks ulcer  Right heel ulcer  Chronic anemia  Hemorrhagic CVA  Urine incontinence  Uncontrolled diabetes  History of non-STEMI  Seizure disorder  Obsessive-compulsive disorder  Memory impairment      Plan:     Aspirin 81 mg daily  Lipitor 20 mg daily  Depakote 1500 mg at bedtime  Vancomycin once  Lantus 40 units subcu daily  Remeron 30 mg nightly  Protonix 40 daily  Prednisone 20 mg daily  Zoloft 100 mg daily  Carafate 1 g 4 times daily  Vancomycin pharmacy protocol        Current Facility-Administered Medications:     sodium chloride (NS) flush 5-40 mL, 5-40 mL, IntraVENous, Q8H, Mauricio, Paulette Joya MD, 10 mL at 05/14/21 0609    sodium chloride (NS) flush 5-40 mL, 5-40 mL, IntraVENous, PRN, Paulette Martínez MD    HYDROcodone-acetaminophen [facility]) 7.5-325 mg per tablet 1 Tab, 1 Tab, Oral, Q4H PRN, Paulette Martínez MD    insulin glargine (LANTUS) injection 40 Units, 40 Units, SubCUTAneous, DAILY, Chip LAI MD, 40 Units at 05/14/21 1042    ferric gluconate (FERRLECIT) 125 mg in 0.9% sodium chloride 100 mL IVPB, 125 mg, IntraVENous, DAILY, NalluriCoral MD, Last Rate: 110 mL/hr at 05/14/21 1141, 125 mg at 05/14/21 1141    insulin lispro (HUMALOG) injection 10 Units, 10 Units, SubCUTAneous, Herrera Bernard MD, Stopped at 05/14/21 0730    [START ON 5/15/2021] DUE: Vancomycin Trough 5/15 at 1500 (Hold dose until after lab has been drawn), , Other, ONCE, Herrera Borja MD    predniSONE (DELTASONE) tablet 20 mg, 20 mg, Oral, DAILY WITH BREAKFAST, Herrera Borja MD, 20 mg at 05/14/21 1057    0.9% sodium chloride infusion 250 mL, 250 mL, IntraVENous, PRN, Herrera Nino MD    0.9% sodium chloride infusion 250 mL, 250 mL, IntraVENous, PRNSharon Raphael I, MD    0.9% sodium chloride infusion 250 mL, 250 mL, IntraVENous, PRN, Paulette Martínez MD    vancomycin (VANCOCIN) 750 mg in 0.9% sodium chloride 250 mL (VIAL-MATE), 750 mg, IntraVENous, Q12H, Edmundo Li MD, 750 mg at 05/14/21 0555    glucose chewable tablet 16 g, 4 Tab, Oral, PRN, Herrera Borja MD    glucagon (GLUCAGEN) injection 1 mg, 1 mg, IntraMUSCular, PRN, Mikal Sweeney MD    dextrose (D50W) injection syrg 12.5-25 g, 25-50 mL, IntraVENous, PRN, Herrera Mendez MD, 25 g at 05/12/21 0820    0.9% sodium chloride infusion 250 mL, 250 mL, IntraVENous, PRN, Everett Castro NP  [facility]  albuterol-ipratropium (DUO-NEB) 2.5 MG-0.5 MG/3 ML, 3 mL, Nebulization, Q6H PRN, Alexandra LAI MD    aspirin chewable tablet 81 mg, 81 mg, Oral, DAILY, Herrera Borja MD, 81 mg at 05/14/21 1044    atorvastatin (LIPITOR) tablet 20 mg, 20 mg, Oral, DAILY, Herrera Borja MD, 20 mg at 05/14/21 1044    divalproex DR (DEPAKOTE) tablet 1,500 mg, 1,500 mg, Oral, QHS, Herrera Borja MD, 1,500 mg at 05/13/21 2256    mirtazapine (REMERON) tablet 30 mg, 30 mg, Oral, QHS, Herrera Borja MD, 30 mg at 05/13/21 2256    ondansetron (ZOFRAN ODT) tablet 4 mg, 4 mg, Oral, Q6H PRN, Alexandra LAI MD    pantoprazole (PROTONIX) tablet 40 mg, 40 mg, Oral, ACB, Herrera Borja MD, 40 mg at 05/14/21 1057    sertraline (ZOLOFT) tablet 100 mg, 100 mg, Oral, DAILY, Herrera Borja MD, 100 mg at 05/14/21 1044    sucralfate (CARAFATE) tablet 1 g, 1 g, Oral, AC&HS, Mikal Sweeney MD, 1 g at 05/14/21 1044    sodium chloride (NS) flush 5-40 mL, 5-40 mL, IntraVENous, PRN, Herrera Borja MD    HYDROcodone-acetaminophen (NORCO) 5-325 mg per tablet 1 Tab, 1 Tab, Oral, Q4H PRN, Mikal Sweeney MD, 1 Tab at 05/13/21 2256    0.9% sodium chloride infusion 250 mL, 250 mL, IntraVENous, PRN, Herrera Mendez MD    glucose chewable tablet 16 g, 16 g, Oral, PRN, Alexandra LAI MD    glucagon (GLUCAGEN) injection 1 mg, 1 mg, IntraMUSCular, PRN, Mikal Sweeney MD    VANCOMYCIN INFORMATION NOTE 1 Each, 1 Each, Other, Rx Dosing/Monitoring, Mikal Sweeney MD

## 2021-05-15 LAB
ABO + RH BLD: NORMAL
ALBUMIN SERPL-MCNC: 2.5 G/DL (ref 3.5–5)
ALBUMIN/GLOB SERPL: 0.6 {RATIO} (ref 1.1–2.2)
ALP SERPL-CCNC: 77 U/L (ref 45–117)
ALT SERPL-CCNC: 29 U/L (ref 12–78)
ANION GAP SERPL CALC-SCNC: 3 MMOL/L (ref 5–15)
AST SERPL W P-5'-P-CCNC: 27 U/L (ref 15–37)
BASOPHILS # BLD: 0.1 K/UL (ref 0–0.1)
BASOPHILS NFR BLD: 1 % (ref 0–1)
BILIRUB SERPL-MCNC: 0.4 MG/DL (ref 0.2–1)
BLD PROD TYP BPU: NORMAL
BLOOD GROUP ANTIBODIES SERPL: NEGATIVE
BPU ID: NORMAL
BUN SERPL-MCNC: 30 MG/DL (ref 6–20)
BUN/CREAT SERPL: 30 (ref 12–20)
CA-I BLD-MCNC: 8.6 MG/DL (ref 8.5–10.1)
CHLORIDE SERPL-SCNC: 108 MMOL/L (ref 97–108)
CO2 SERPL-SCNC: 28 MMOL/L (ref 21–32)
CREAT SERPL-MCNC: 1.01 MG/DL (ref 0.7–1.3)
CROSSMATCH RESULT,%XM: NORMAL
DATE LAST DOSE: ABNORMAL
DIFFERENTIAL METHOD BLD: ABNORMAL
EOSINOPHIL # BLD: 0.1 K/UL (ref 0–0.4)
EOSINOPHIL NFR BLD: 2 % (ref 0–7)
ERYTHROCYTE [DISTWIDTH] IN BLOOD BY AUTOMATED COUNT: 15.7 % (ref 11.5–14.5)
GLOBULIN SER CALC-MCNC: 4.2 G/DL (ref 2–4)
GLUCOSE BLD STRIP.AUTO-MCNC: 234 MG/DL (ref 65–117)
GLUCOSE BLD STRIP.AUTO-MCNC: 260 MG/DL (ref 65–117)
GLUCOSE BLD STRIP.AUTO-MCNC: 293 MG/DL (ref 65–117)
GLUCOSE BLD STRIP.AUTO-MCNC: 68 MG/DL (ref 65–117)
GLUCOSE SERPL-MCNC: 200 MG/DL (ref 65–100)
HCT VFR BLD AUTO: 37.7 % (ref 36.6–50.3)
HGB BLD-MCNC: 12 G/DL (ref 12.1–17)
IMM GRANULOCYTES # BLD AUTO: 0 K/UL (ref 0–0.04)
IMM GRANULOCYTES NFR BLD AUTO: 0 % (ref 0–0.5)
LYMPHOCYTES # BLD: 1.5 K/UL (ref 0.8–3.5)
LYMPHOCYTES NFR BLD: 21 % (ref 12–49)
MCH RBC QN AUTO: 27.6 PG (ref 26–34)
MCHC RBC AUTO-ENTMCNC: 31.8 G/DL (ref 30–36.5)
MCV RBC AUTO: 86.9 FL (ref 80–99)
MONOCYTES # BLD: 0.8 K/UL (ref 0–1)
MONOCYTES NFR BLD: 11 % (ref 5–13)
NEUTS SEG # BLD: 4.8 K/UL (ref 1.8–8)
NEUTS SEG NFR BLD: 65 % (ref 32–75)
NRBC # BLD: 0 K/UL (ref 0–0.01)
NRBC BLD-RTO: 0 PER 100 WBC
PERFORMED BY, TECHID: ABNORMAL
PERFORMED BY, TECHID: NORMAL
PLATELET # BLD AUTO: 497 K/UL (ref 150–400)
PMV BLD AUTO: 9.8 FL (ref 8.9–12.9)
POTASSIUM SERPL-SCNC: 4.2 MMOL/L (ref 3.5–5.1)
PROT SERPL-MCNC: 6.7 G/DL (ref 6.4–8.2)
RBC # BLD AUTO: 4.34 M/UL (ref 4.1–5.7)
REPORTED DOSE,DOSE: ABNORMAL UNITS
SODIUM SERPL-SCNC: 139 MMOL/L (ref 136–145)
SPECIMEN EXP DATE BLD: NORMAL
STATUS OF UNIT,%ST: NORMAL
TRANSFUSION STATUS PATIENT QL: NORMAL
UNIT DIVISION, %UDIV: 0
VANCOMYCIN TROUGH SERPL-MCNC: 16.5 UG/ML (ref 5–10)
WBC # BLD AUTO: 7.3 K/UL (ref 4.1–11.1)

## 2021-05-15 PROCEDURE — 36415 COLL VENOUS BLD VENIPUNCTURE: CPT

## 2021-05-15 PROCEDURE — 74011250636 HC RX REV CODE- 250/636: Performed by: INTERNAL MEDICINE

## 2021-05-15 PROCEDURE — 99232 SBSQ HOSP IP/OBS MODERATE 35: CPT | Performed by: SURGERY

## 2021-05-15 PROCEDURE — 99232 SBSQ HOSP IP/OBS MODERATE 35: CPT | Performed by: INTERNAL MEDICINE

## 2021-05-15 PROCEDURE — 80053 COMPREHEN METABOLIC PANEL: CPT

## 2021-05-15 PROCEDURE — 65270000032 HC RM SEMIPRIVATE

## 2021-05-15 PROCEDURE — 80202 ASSAY OF VANCOMYCIN: CPT

## 2021-05-15 PROCEDURE — 74011000258 HC RX REV CODE- 258: Performed by: INTERNAL MEDICINE

## 2021-05-15 PROCEDURE — 85025 COMPLETE CBC W/AUTO DIFF WBC: CPT

## 2021-05-15 PROCEDURE — 74011636637 HC RX REV CODE- 636/637: Performed by: INTERNAL MEDICINE

## 2021-05-15 PROCEDURE — 82962 GLUCOSE BLOOD TEST: CPT

## 2021-05-15 PROCEDURE — 74011250637 HC RX REV CODE- 250/637: Performed by: INTERNAL MEDICINE

## 2021-05-15 RX ORDER — AMLODIPINE BESYLATE 5 MG/1
10 TABLET ORAL DAILY
Status: DISCONTINUED | OUTPATIENT
Start: 2021-05-16 | End: 2021-05-25 | Stop reason: HOSPADM

## 2021-05-15 RX ADMIN — INSULIN LISPRO 10 UNITS: 100 INJECTION, SOLUTION INTRAVENOUS; SUBCUTANEOUS at 17:03

## 2021-05-15 RX ADMIN — SUCRALFATE 1 G: 1 TABLET ORAL at 17:03

## 2021-05-15 RX ADMIN — Medication 10 ML: at 14:08

## 2021-05-15 RX ADMIN — PANTOPRAZOLE SODIUM 40 MG: 40 TABLET, DELAYED RELEASE ORAL at 08:57

## 2021-05-15 RX ADMIN — MIRTAZAPINE 30 MG: 30 TABLET, FILM COATED ORAL at 21:57

## 2021-05-15 RX ADMIN — SODIUM CHLORIDE 750 MG: 9 INJECTION, SOLUTION INTRAVENOUS at 17:03

## 2021-05-15 RX ADMIN — ASPIRIN 81 MG: 81 TABLET, CHEWABLE ORAL at 08:57

## 2021-05-15 RX ADMIN — SODIUM CHLORIDE 750 MG: 9 INJECTION, SOLUTION INTRAVENOUS at 05:17

## 2021-05-15 RX ADMIN — SUCRALFATE 1 G: 1 TABLET ORAL at 21:57

## 2021-05-15 RX ADMIN — SUCRALFATE 1 G: 1 TABLET ORAL at 12:34

## 2021-05-15 RX ADMIN — SERTRALINE HYDROCHLORIDE 100 MG: 50 TABLET ORAL at 08:57

## 2021-05-15 RX ADMIN — SUCRALFATE 1 G: 1 TABLET ORAL at 08:57

## 2021-05-15 RX ADMIN — DIVALPROEX SODIUM 1500 MG: 500 TABLET, DELAYED RELEASE ORAL at 21:57

## 2021-05-15 RX ADMIN — Medication 10 ML: at 06:15

## 2021-05-15 RX ADMIN — PREDNISONE 20 MG: 20 TABLET ORAL at 08:57

## 2021-05-15 RX ADMIN — SODIUM CHLORIDE 125 MG: 9 INJECTION, SOLUTION INTRAVENOUS at 12:35

## 2021-05-15 RX ADMIN — ATORVASTATIN CALCIUM 20 MG: 20 TABLET, FILM COATED ORAL at 08:57

## 2021-05-15 RX ADMIN — INSULIN GLARGINE 40 UNITS: 100 INJECTION, SOLUTION SUBCUTANEOUS at 09:00

## 2021-05-15 RX ADMIN — INSULIN LISPRO 10 UNITS: 100 INJECTION, SOLUTION INTRAVENOUS; SUBCUTANEOUS at 12:45

## 2021-05-15 NOTE — PROGRESS NOTES
PROGRESS NOTE      Chief Complaints:  Patient examined this morning. HPI and  Objective:    Patient looks comfortable without any new complaints. Denies chest pain shortness of breath fever abdominal pain nausea. Review of Systems:  Rest of review of systems negative. EXAM:  Visit Vitals  BP (!) 172/79 (BP 1 Location: Left lower arm, BP Patient Position: At rest;Supine)   Pulse 76   Temp 97.8 °F (36.6 °C)   Resp 18   Ht 5' 6\" (1.676 m)   Wt 151 lb 11.2 oz (68.8 kg)   SpO2 97%   BMI 24.49 kg/m²     Patient is awake and alert  Head and neck atraumatic  Cardiovascular system regular rate  Pulmonary no audible wheeze  Abdomen soft  Neurologically intact skin is warm and moist.  Right foot feels warm. Doppler signal noted both DP and PT. Recent Results (from the past 24 hour(s))   GLUCOSE, POC    Collection Time: 05/14/21  9:06 AM   Result Value Ref Range    Glucose (POC) 69 65 - 117 mg/dL    Performed by [address], POC    Collection Time: 05/14/21 12:24 PM   Result Value Ref Range    Glucose (POC) 229 (H) 65 - 117 mg/dL    Performed by [address], POC    Collection Time: 05/14/21  4:55 PM   Result Value Ref Range    Glucose (POC) 312 (H) 65 - 117 mg/dL    Performed by Wanda Herrera, POC    Collection Time: 05/14/21  9:30 PM   Result Value Ref Range    Glucose (POC) 202 (H) 65 - 117 mg/dL    Performed by Eugenio Rolle        ASSESSMENT:   Patient is 60 y.o. with diagnosis of : Active Problems:    Severe anemia (5/6/2021)      Diabetic foot infection ([address] (5/6/2021)        PLAN:                   Patient is stable to discharge from a vascular point of view. Please have the patient to come see me in 2 weeks for follow-up. Patient will continue Plavix therapy. If patient still here I will continue monitor his progress.

## 2021-05-15 NOTE — PROGRESS NOTES
General Daily Progress Note          Patient Name:   Porsche Johns YOB: 1965       Age:  60 y.o. Admit Date: 5/6/2021      Subjective:       Patient not in distress           Objective:     Visit Vitals  BP (!) 162/79 (BP Patient Position: At rest;Supine)   Pulse 82   Temp 98.1 °F (36.7 °C)   Resp 18   Ht 5' 6\" (1.676 m)   Wt 68.8 kg (151 lb 11.2 oz)   SpO2 98%   BMI 24.49 kg/m²        Recent Results (from the past 24 hour(s))   GLUCOSE, POC    Collection Time: 05/14/21 12:24 PM   Result Value Ref Range    Glucose (POC) 229 (H) 65 - 117 mg/dL    Performed by [address], POC    Collection Time: 05/14/21  4:55 PM   Result Value Ref Range    Glucose (POC) 312 (H) 65 - 117 mg/dL    Performed by [id number] [place], POC    Collection Time: 05/14/21  9:30 PM   Result Value Ref Range    Glucose (POC) 202 (H) 65 - 117 mg/dL    Performed by Deboraha Lennox    GLUCOSE, POC    Collection Time: 05/15/21  8:46 AM   Result Value Ref Range    Glucose (POC) 68 65 - 117 mg/dL    Performed by NIMO RUSHING    CBC WITH AUTOMATED DIFF    Collection Time: 05/15/21 10:35 AM   Result Value Ref Range    WBC 7.3 4.1 - 11.1 K/uL    RBC 4.34 4.10 - 5.70 M/uL    HGB 12.0 (L) 12.1 - 17.0 g/dL    HCT 37.7 36.6 - 50.3 %    MCV 86.9 80.0 - 99.0 FL    MCH 27.6 26.0 - 34.0 PG    MCHC 31.8 30.0 - 36.5 g/dL    RDW 15.7 (H) 11.5 - 14.5 %    PLATELET 738 (H) 163 - 400 K/uL    MPV 9.8 8.9 - 12.9 FL    NRBC 0.0 0.0  WBC    ABSOLUTE NRBC 0.00 0.00 - 0.01 K/uL    NEUTROPHILS 65 32 - 75 %    LYMPHOCYTES 21 12 - 49 %    MONOCYTES 11 5 - 13 %    EOSINOPHILS 2 0 - 7 %    BASOPHILS 1 0 - 1 %    IMMATURE GRANULOCYTES 0 0 - 0.5 %    ABS. NEUTROPHILS 4.8 1.8 - 8.0 K/UL    ABS. LYMPHOCYTES 1.5 0.8 - 3.5 K/UL    ABS. MONOCYTES 0.8 0.0 - 1.0 K/UL    ABS. EOSINOPHILS 0.1 0.0 - 0.4 K/UL    ABS. BASOPHILS 0.1 0.0 - 0.1 K/UL    ABS. IMM.  GRANS. 0.0 0.00 - 0.04 K/UL    DF AUTOMATED     METABOLIC PANEL, COMPREHENSIVE    Collection Time: 05/15/21 10:35 AM   Result Value Ref Range    Sodium 139 136 - 145 mmol/L    Potassium 4.2 3.5 - 5.1 mmol/L    Chloride 108 97 - 108 mmol/L    CO2 28 21 - 32 mmol/L    Anion gap 3 (L) 5 - 15 mmol/L    Glucose 200 (H) 65 - 100 mg/dL    BUN 30 (H) 6 - 20 mg/dL    Creatinine 1.01 0.70 - 1.30 mg/dL    BUN/Creatinine ratio 30 (H) 12 - 20      GFR est AA >60 >60 ml/min/1.73m2    GFR est non-AA >60 >60 ml/min/1.73m2    Calcium 8.6 8.5 - 10.1 mg/dL    Bilirubin, total 0.4 0.2 - 1.0 mg/dL    AST (SGOT) 27 15 - 37 U/L    ALT (SGPT) 29 12 - 78 U/L    Alk. phosphatase 77 45 - 117 U/L    Protein, total 6.7 6.4 - 8.2 g/dL    Albumin 2.5 (L) 3.5 - 5.0 g/dL    Globulin 4.2 (H) 2.0 - 4.0 g/dL    A-G Ratio 0.6 (L) 1.1 - 2.2       [unfilled]      Review of Systems    Constitutional: Negative for chills and fever. HENT: Negative. Eyes: Negative. Respiratory: Negative. Cardiovascular: Negative. Gastrointestinal: Negative for abdominal pain and nausea. Skin: Negative. Neurological: Negative. Physical Exam:      Constitutional: pt is oriented to person, place, and time. HENT:   Head: Normocephalic and atraumatic. Eyes: Pupils are equal, round, and reactive to light. EOM are normal.   Cardiovascular: Normal rate, regular rhythm and normal heart sounds. Pulmonary/Chest: Breath sounds normal. No wheezes. No rales. Exhibits no tenderness. Abdominal: Soft. Bowel sounds are normal. There is no abdominal tenderness. There is no rebound and no guarding. Musculoskeletal: Normal range of motion. Neurological: pt is alert and oriented to person, place, and time.      NM INFLAM PROC LTD   Final Result      XR FOOT RT MIN 3 V   Final Result           Recent Results (from the past 24 hour(s))   GLUCOSE, POC    Collection Time: 05/14/21 12:24 PM   Result Value Ref Range    Glucose (POC) 229 (H) 65 - 117 mg/dL    Performed by [address] [place] [address], POC    Collection Time: 05/14/21  4:55 PM   Result Value Ref Range    Glucose (POC) 312 (H) 65 - 117 mg/dL    Performed by [id number] [place], POC    Collection Time: 05/14/21  9:30 PM   Result Value Ref Range    Glucose (POC) 202 (H) 65 - 117 mg/dL    Performed by Mohinder Salcido    GLUCOSE, POC    Collection Time: 05/15/21  8:46 AM   Result Value Ref Range    Glucose (POC) 68 65 - 117 mg/dL    Performed by NIMO RUSHING    CBC WITH AUTOMATED DIFF    Collection Time: 05/15/21 10:35 AM   Result Value Ref Range    WBC 7.3 4.1 - 11.1 K/uL    RBC 4.34 4.10 - 5.70 M/uL    HGB 12.0 (L) 12.1 - 17.0 g/dL    HCT 37.7 36.6 - 50.3 %    MCV 86.9 80.0 - 99.0 FL    MCH 27.6 26.0 - 34.0 PG    MCHC 31.8 30.0 - 36.5 g/dL    RDW 15.7 (H) 11.5 - 14.5 %    PLATELET 932 (H) 607 - 400 K/uL    MPV 9.8 8.9 - 12.9 FL    NRBC 0.0 0.0  WBC    ABSOLUTE NRBC 0.00 0.00 - 0.01 K/uL    NEUTROPHILS 65 32 - 75 %    LYMPHOCYTES 21 12 - 49 %    MONOCYTES 11 5 - 13 %    EOSINOPHILS 2 0 - 7 %    BASOPHILS 1 0 - 1 %    IMMATURE GRANULOCYTES 0 0 - 0.5 %    ABS. NEUTROPHILS 4.8 1.8 - 8.0 K/UL    ABS. LYMPHOCYTES 1.5 0.8 - 3.5 K/UL    ABS. MONOCYTES 0.8 0.0 - 1.0 K/UL    ABS. EOSINOPHILS 0.1 0.0 - 0.4 K/UL    ABS. BASOPHILS 0.1 0.0 - 0.1 K/UL    ABS. IMM. GRANS. 0.0 0.00 - 0.04 K/UL    DF AUTOMATED     METABOLIC PANEL, COMPREHENSIVE    Collection Time: 05/15/21 10:35 AM   Result Value Ref Range    Sodium 139 136 - 145 mmol/L    Potassium 4.2 3.5 - 5.1 mmol/L    Chloride 108 97 - 108 mmol/L    CO2 28 21 - 32 mmol/L    Anion gap 3 (L) 5 - 15 mmol/L    Glucose 200 (H) 65 - 100 mg/dL    BUN 30 (H) 6 - 20 mg/dL    Creatinine 1.01 0.70 - 1.30 mg/dL    BUN/Creatinine ratio 30 (H) 12 - 20      GFR est AA >60 >60 ml/min/1.73m2    GFR est non-AA >60 >60 ml/min/1.73m2    Calcium 8.6 8.5 - 10.1 mg/dL    Bilirubin, total 0.4 0.2 - 1.0 mg/dL    AST (SGOT) 27 15 - 37 U/L    ALT (SGPT) 29 12 - 78 U/L    Alk.  phosphatase 77 45 - 117 U/L    Protein, total 6.7 6.4 - 8.2 g/dL    Albumin 2.5 (L) 3.5 - 5.0 g/dL    Globulin 4.2 (H) 2.0 - 4.0 g/dL    A-G Ratio 0.6 (L) 1.1 - 2.2         Results     Procedure Component Value Units Date/Time    CULTURE, Margarita Aguirre [[id number]] Collected: 05/07/21 1200    Order Status: Canceled Specimen: Wound from Ulcer     CULTURE, WOUND [[id number]]  (Susceptibility) Collected: 05/06/21 2015    Order Status: Completed Specimen: Wound Drainage Updated: 05/10/21 1107     Special Requests: No Special Requests        GRAM STAIN Few WBCs seen         No organisms seen        Culture result:       Few * methicillin resistant staphylococcus aureus *                  Few Staphylococcus species, coagulase negative          Susceptibility      Staphylococcus aureus Methcillin Resistant     SARINA     Ciprofloxacin ($) Susceptible     Clindamycin ($) Susceptible     Daptomycin ($$$$$) Susceptible     Doxycycline ($$) Susceptible     Erythromycin ($$$$) Resistant     Gentamicin ($) Susceptible     Levofloxacin ($) Susceptible     Linezolid ($$$$$) Susceptible     Oxacillin Resistant     Rifampin ($$$$) Susceptible [1]      Tetracycline Susceptible     Trimeth/Sulfa Susceptible     Vancomycin ($) Susceptible            [1]   Rifampin is not to be used for mono-therapy. CULTURE, BLOOD, PAIRED [[id number]] Collected: 05/06/21 1245    Order Status: Completed Specimen: Blood Updated: 05/13/21 0752     Special Requests: No Special Requests        Culture result: No growth 6 days              Labs:     Recent Labs     05/15/21  1035 05/13/21  0749   WBC 7.3 5.1   HGB 12.0* 11.4*   HCT 37.7 34.9*   * 385     Recent Labs     05/15/21  1035      K 4.2      CO2 28   BUN 30*   CREA 1.01   *   CA 8.6     Recent Labs     05/15/21  1035   ALT 29   AP 77   TBILI 0.4   TP 6.7   ALB 2.5*   GLOB 4.2*     No results for input(s): INR, PTP, APTT, INREXT, INREXT in the last 72 hours. No results for input(s): FE, TIBC, PSAT, FERR in the last 72 hours.    No results found for: FOL, RBCF   No results for input(s): PH, PCO2, PO2 in the last 72 hours. No results for input(s): CPK, CKNDX, TROIQ in the last 72 hours.     No lab exists for component: CPKMB  Lab Results   Component Value Date/Time    Cholesterol, total 122 02/17/2021 07:57 PM    HDL Cholesterol 52 02/17/2021 07:57 PM    LDL, calculated 57.4 02/17/2021 07:57 PM    Triglyceride 63 02/17/2021 07:57 PM    CHOL/HDL Ratio 2.3 02/17/2021 07:57 PM     Lab Results   Component Value Date/Time    Glucose (POC) 68 05/15/2021 08:46 AM    Glucose (POC) 202 (H) 05/14/2021 09:30 PM    Glucose (POC) 312 (H) 05/14/2021 04:55 PM    Glucose (POC) 229 (H) 05/14/2021 12:24 PM    Glucose (POC) 69 05/14/2021 09:06 AM     Lab Results   Component Value Date/Time    Color Yellow/Straw 05/06/2021 01:49 PM    Appearance Clear 05/06/2021 01:49 PM    Specific gravity 1.012 05/06/2021 01:49 PM    Specific gravity 1.024 02/17/2021 05:31 AM    pH (UA) 7.0 05/06/2021 01:49 PM    Protein Negative 05/06/2021 01:49 PM    Glucose Negative 05/06/2021 01:49 PM    Ketone Negative 05/06/2021 01:49 PM    Bilirubin Negative 05/06/2021 01:49 PM    Urobilinogen 0.1 05/06/2021 01:49 PM    Nitrites Negative 05/06/2021 01:49 PM    Leukocyte Esterase Negative 05/06/2021 01:49 PM    Epithelial cells FEW 02/17/2021 05:31 AM    Bacteria Negative 05/06/2021 01:49 PM    WBC 0-4 05/06/2021 01:49 PM    RBC 0-5 05/06/2021 01:49 PM         Assessment:     Right buttocks ulcer  Right heel ulcer  Chronic anemia  Hemorrhagic CVA  Urine incontinence  Uncontrolled diabetes  History of non-STEMI  Seizure disorder  Obsessive-compulsive disorder  Memory impairment      Plan:     Aspirin 81 mg daily  Lipitor 20 mg daily  Depakote 1500 mg at bedtime  Vancomycin once  Lantus 40 units subcu daily  Remeron 30 mg nightly  Protonix 40 daily  Prednisone 20 mg daily  Zoloft 100 mg daily  Carafate 1 g 4 times daily  Vancomycin pharmacy protocol  Add Norvasc 10 mg daily        Current Facility-Administered Medications:     sodium chloride (NS) flush 5-40 mL, 5-40 mL, IntraVENous, Q8H, Andre Martínez MD, 10 mL at 05/15/21 0615    sodium chloride (NS) flush 5-40 mL, 5-40 mL, IntraVENous, PRN, Andre Martínez MD    HYDROcodone-acetaminophen [facility]) 7.5-325 mg per tablet 1 Tab, 1 Tab, Oral, Q4H PRN, Andre Martínez MD    insulin glargine (LANTUS) injection 40 Units, 40 Units, SubCUTAneous, DAILY, Myra LAI MD, 40 Units at 05/15/21 0900    ferric gluconate (FERRLECIT) 125 mg in 0.9% sodium chloride 100 mL IVPB, 125 mg, IntraVENous, DAILY, NalluriDaylin MD, Last Rate: 110 mL/hr at 05/14/21 1141, 125 mg at 05/14/21 1141    insulin lispro (HUMALOG) injection 10 Units, 10 Units, SubCUTAneous, Marco LAI MD, Stopped at 05/15/21 0730    DUE: Vancomycin Trough 5/15 at 1500 (Hold dose until after lab has been drawn), , Other, ONCE, Herrera Borja MD    predniSONE (DELTASONE) tablet 20 mg, 20 mg, Oral, DAILY WITH BREAKFAST, Myra LAI MD, 20 mg at 05/15/21 0857    0.9% sodium chloride infusion 250 mL, 250 mL, IntraVENous, PRN, Herrera Gee MD    0.9% sodium chloride infusion 250 mL, 250 mL, IntraVENous, PRN, Herrera Gee MD    0.9% sodium chloride infusion 250 mL, 250 mL, IntraVENous, PRN, Andre Martínez MD    vancomycin (VANCOCIN) 750 mg in 0.9% sodium chloride 250 mL (VIAL-MATE), 750 mg, IntraVENous, Q12H, Edmundo Li MD, 750 mg at 05/15/21 0517    glucose chewable tablet 16 g, 4 Tab, Oral, PRN, Herrera Borja MD    glucagon (GLUCAGEN) injection 1 mg, 1 mg, IntraMUSCular, PRN, Herrera Gee MD    dextrose (D50W) injection syrg 12.5-25 g, 25-50 mL, IntraVENous, PRN, Herrera Borja MD, 25 g at 05/12/21 0820    0.9% sodium chloride infusion 250 mL, 250 mL, IntraVENous, PRN, Smita Miller NP    albuterol-ipratropium (DUO-NEB) 2.5 MG-0.5 MG/3 ML, 3 mL, Nebulization, Q6H PRN, Myra LAI MD    aspirin chewable tablet 81 mg, 81 mg, Oral, DAILY, Herrera Borja MD, 81 mg at 05/15/21 0857    atorvastatin (LIPITOR) tablet 20 mg, 20 mg, Oral, DAILY, Herrera Borja MD, 20 mg at 05/15/21 0857    divalproex DR (DEPAKOTE) tablet 1,500 mg, 1,500 mg, Oral, QHS, Herrera Borja MD, 1,500 mg at 05/14/21 2044    mirtazapine (REMERON) tablet 30 mg, 30 mg, Oral, QHS, Herrera Borja MD, 30 mg at 05/14/21 2046    ondansetron (ZOFRAN ODT) tablet 4 mg, 4 mg, Oral, Q6H PRN, Herrera Ortiz MD    pantoprazole (PROTONIX) tablet 40 mg, 40 mg, Oral, ACB, Herrera Borja MD, 40 mg at 05/15/21 0857    sertraline (ZOLOFT) tablet 100 mg, 100 mg, Oral, DAILY, Herrera Borja MD, 100 mg at 05/15/21 0857    sucralfate (CARAFATE) tablet 1 g, 1 g, Oral, AC&HS, Dallas Nieves MD, 1 g at 05/15/21 0857    sodium chloride (NS) flush 5-40 mL, 5-40 mL, IntraVENous, PRN, Herrera Borja MD    HYDROcodone-acetaminophen (NORCO) 5-325 mg per tablet 1 Tab, 1 Tab, Oral, Q4H PRN, Dallas Nieves MD, 1 Tab at 05/14/21 2044    0.9% sodium chloride infusion 250 mL, 250 mL, IntraVENous, PRN, Herrera Ortiz MD    glucose chewable tablet 16 g, 16 g, Oral, PRN, Magy LAI MD    glucagon (GLUCAGEN) injection 1 mg, 1 mg, IntraMUSCular, PRN, Dallas Nieves MD    VANCOMYCIN INFORMATION NOTE 1 Each, 1 Each, Other, Rx Dosing/Monitoring, Dallas Nieves MD

## 2021-05-15 NOTE — PROGRESS NOTES
Chief Complaint:None      Subjective:  No new issues. No further bleeding from the groin incision site. Review of Systems:   Constitutional:  no fever, no chills,  no sweats, No weakness, No fatigue, No decreased activity. Respiratory: No shortness of breath, No cough, No sputum production, No hemoptysis, No wheezing, No cyanosis. Cardiovascular: No chest pain, No palpitations, No bradycardia, No tachycardia, No peripheral edema, No syncope. Gastrointestinal: No nausea, No vomiting, No diarrhea, No constipation, No heartburn, No abdominal pain. Genitourinary: No dysuria, No hematuria, No change in urine stream, No urethral discharge, No lesions. Hematology/Lymphatics: No bruising tendency, No bleeding tendency, No petechiae, No swollen lymph glands. Endocrine: No excessive thirst, No polyuria, No cold intolerance, No heat intolerance, No excessive hunger. Musculoskeletal: No back pain, No neck pain, No joint pain, No muscle pain, No claudication, No decreased range of motion, No trauma. Integumentary: No rash, No pruritus, No abrasions. Neurologic: Alert and oriented X4, No abnormal balance, No headache, No confusion, No numbness, No tingling. Psychiatric: No anxiety, No depression, No nadia. Physical Exam:     Vitals & Measurements:     Wt Readings from Last 3 Encounters:   05/13/21 68.8 kg (151 lb 11.2 oz)   05/06/21 67.1 kg (148 lb)   03/30/21 66.3 kg (146 lb 1 oz)     Temp Readings from Last 3 Encounters:   05/14/21 97.5 °F (36.4 °C)   05/06/21 97.8 °F (36.6 °C)   03/30/21 97.3 °F (36.3 °C) (Oral)     BP Readings from Last 3 Encounters:   05/14/21 135/74   05/06/21 (!) 108/53   03/30/21 (!) 145/74     Pulse Readings from Last 3 Encounters:   05/14/21 77   05/06/21 99   03/30/21 88      Ht Readings from Last 3 Encounters:   05/13/21 5' 6\" (1.676 m)   05/06/21 5' 6\" (1.676 m)   03/30/21 5' 6\" (1.676 m)      Date 05/13/21 1900 - 05/14/21 0659 05/14/21 0700 - 05/15/21 0659   Shift 2994-4532 24 Hour Total 4460-7190 6857-9782 24 Hour Total   INTAKE   Shift Total(mL/kg)        OUTPUT   Urine(mL/kg/hr) 1200 1200        Urine Voided 1200 1200      Stool          Stool Occurrence(s)  2 x      Shift Total(mL/kg) 1200(17.4) 1200(17.4)      NET -1200 -1200      Weight (kg) 68.8 68.8 68.8 68.8 68.8       General: well appearing, no acute distress  Head: Normal  Face: Nornal  HEENT: atraumatic, PERRLA, moist mucosa, normal pharynx, normal tonsils and adenoids, normal tongue, no fluid in sinuses  Neck: Trachea midline, no carotid bruit, no masses  Chest: Normal.  Respiratory: normal chest wall expansion, CTA B, no r/r/w, no rubs  Cardiovascular: RRR, no m/r/g, Normal S1 and S2  Abdomen: Soft, non tender, non-distended, normal bowel sounds in all quadrants, no hepatosplenomegaly, no tympany. Incision scar:   Genitourinary: No inguinal hernia, normal external gentalia, Testis & scrotum normal, no renal angle tenderness  Rectal: deferred  Musculoskeletal: Normal ROM in upper and lower extremities, No joint swelling. Integumentary: Warm, dry, and pink, with no rash, purpura, or petechia  Right gluteal area: Open wound +, no active drainage. Heme/Lymph: No lymphadenopathy, no bruises  Neurological: Cranial Nerves II-XII grossly intact, No gross sensory or motor deficit.   Psychiatric: Cooperative with normal mood, affect, and cognition    Laboratory Values:   Recent Results (from the past 24 hour(s))   GLUCOSE, POC    Collection Time: 05/14/21  9:06 AM   Result Value Ref Range    Glucose (POC) 69 65 - 117 mg/dL    Performed by [address], POC    Collection Time: 05/14/21 12:24 PM   Result Value Ref Range    Glucose (POC) 229 (H) 65 - 117 mg/dL    Performed by [address], POC    Collection Time: 05/14/21  4:55 PM   Result Value Ref Range    Glucose (POC) 312 (H) 65 - 117 mg/dL    Performed by Ashleigh Bautista          Assessment:  Problem List Items Addressed This Visit        Endocrine    Diabetic foot infection ([facility] [address]       Urinary    SHIRA (acute kidney injury) ([facility] [address]       Other    Severe anemia - Primary      Other Visit Diagnoses     Buttock wound, right, subsequent encounter        Non-healing wound of right heel        PAD (peripheral artery disease) ([facility] [address]        Relevant Orders    INVASIVE VASCULAR PROCEDURE    Pain of right lower extremity        Relevant Orders    INVASIVE VASCULAR PROCEDURE (Completed)           Plan:    Local wound care   Abx per ID    Thank you

## 2021-05-15 NOTE — PROGRESS NOTES
Infectious Disease Progress Note           Subjective:   Pt seen and examined at bedside. Doing well, denies new complaints, no acute events since last seen   Objective:   Physical Exam:     Visit Vitals  /69 (BP Patient Position: At rest)   Pulse 86   Temp 98.3 °F (36.8 °C)   Resp 18   Ht 5' 6\" (1.676 m)   Wt 151 lb 11.2 oz (68.8 kg)   SpO2 96%   BMI 24.49 kg/m²      O2 Device: None (Room air)    Temp (24hrs), Av.9 °F (36.6 °C), Min:97.5 °F (36.4 °C), Max:98.3 °F (36.8 °C)    No intake/output data recorded.  1901 - 05/15 0700  In: -   Out: 1200 [Urine:1200]    General: NAD, resting comfortably   HEENT: OTILIA, Moist mucosa   Lungs: CTA b/l, no wheeze/rhonchi  Heart: S1S2+, RRR, no murmur  Abdo: Soft, NT, ND, +BS   Exts: right heel ulcer, tenderness on palpation, right hemiparesis   Skin: Right buttock chick ulcer, decreased surrounding induration, and tenderness     Data Review:       Recent Days:  Recent Labs     05/15/21  1035 21  0749   WBC 7.3 5.1   HGB 12.0* 11.4*   HCT 37.7 34.9*   * 385     Recent Labs     05/15/21  1035   BUN 30*   CREA 1.01     Lab Results   Component Value Date/Time    C-Reactive protein 1.64 (H) 2021 11:50 AM      Microbiology   - Right buttock wound Cx : MRSA, and coag neg staph     Diagnostics   CXR Results  (Last 48 hours)    None           Assessment/Plan     1. Right buttock ulcer, suspected abscess. S/p I&D on       MRSA/staph epidermidis isolated from wound Cx on       Healing wound w/o evidence of superinfection       Remains afebrile w a normal WBC on routine labs       On  day # . Plan on transitioning to Doxycycline upon d/c       Routine labs in the morning         2. Right heel ulcer/underlying PAD: S/p arteriogram on       Ceretec tagged WBC scan neg for osteomyelitis (05/10)      Needs off loading boots     3.  Chronic on chronic anemia blood loss anemia, due to recurrent nose bleeds Hgb staying stable after PRBC transfusion      4.  Urinary incontinence: at high risk for pressure ulcers, may benefit from long term pacheco cath     Justina Bennett MD    5/15/2021

## 2021-05-15 NOTE — BH NOTES
I was called this evening to inform that have a consult for level 2 placement is from the assisted living facility needs inpatient higher level of care and spoke with Deric  and also the nurse nurse working with him noted when I went to see see him he is in deep sleep multiple attempts to wake him up did not result in getting any alert or awake would come back another time and see him as not likely to be assessed for level 2 care needs.   Same is shared with the  and nursing staff

## 2021-05-15 NOTE — PROGRESS NOTES
Consult for Vancomycin Dosing by Pharmacy by Dr. Kaya Masters provided for this 60y.o. year old male , for indication of SSTI. Day of Therapy: 10  Goal of Level(s): 15-20mcg/dL      Significant Cultures:       Date                             Culture                                       Organism      5/6                                Wound                                       MRSA    Serum Creatinine Creatinine   Date Value Ref Range Status   05/15/2021 1.01 0.70 - 1.30 mg/dL Final   05/11/2021 0.94 0.70 - 1.30 mg/dL Final   05/08/2021 1.06 0.70 - 1.30 mg/dL Final      Creatinine Clearance Estimated Creatinine Clearance: 72.8 mL/min (based on SCr of 1.01 mg/dL). BUN Lab Results   Component Value Date/Time    BUN 30 (H) 05/15/2021 10:35 AM      WBC Lab Results   Component Value Date/Time    WBC 7.3 05/15/2021 10:35 AM      Temp 98.3 °F (36.8 °C)     Last Level:   Vancomycin,trough   Date Value Ref Range Status   05/15/2021 16.5 (H) 5.0 - 10.0 ug/mL Final     Comment:        Trough levels of 15-20 ug/mL should be targeted for patients with coagulase negative Staphylococcus and MRSA pneumonia, endocarditis,osteomyelitis, meningitis, and bacteremia, as well as patients not responding to lower levels. Trough levels of 10-15 ug/mL for infections from other sources (e.g. urinary tract, cellulitis) are appropriate. All patients receiving concomitant nephrotoxic therapies should have their function closely monitored regardless of peak or trough levels. Ht Readings from Last 1 Encounters:   05/13/21 167.6 cm (66\")        Wt Readings from Last 1 Encounters:   05/13/21 68.8 kg (151 lb 11.2 oz)     Ideal body weight: 63.8 kg (140 lb 10.5 oz)  Adjusted ideal body weight: 65.8 kg (145 lb 1.2 oz)     Previous Regimen: 750mg IV q12h    New Regimen:   Vancomycin trough is therapeutic. Continue the current regimen.     Pharmacy to follow daily and will make changes to dose and/or frequency based on clinical status.   _________________________________     Pharmacist Dixie Montaño

## 2021-05-16 LAB
ALBUMIN SERPL-MCNC: 2.7 G/DL (ref 3.5–5)
ALBUMIN/GLOB SERPL: 0.6 {RATIO} (ref 1.1–2.2)
ALP SERPL-CCNC: 83 U/L (ref 45–117)
ALT SERPL-CCNC: 34 U/L (ref 12–78)
ANION GAP SERPL CALC-SCNC: 4 MMOL/L (ref 5–15)
AST SERPL W P-5'-P-CCNC: 24 U/L (ref 15–37)
BASOPHILS # BLD: 0.1 K/UL (ref 0–0.1)
BASOPHILS NFR BLD: 1 % (ref 0–1)
BILIRUB SERPL-MCNC: 0.4 MG/DL (ref 0.2–1)
BUN SERPL-MCNC: 25 MG/DL (ref 6–20)
BUN/CREAT SERPL: 27 (ref 12–20)
CA-I BLD-MCNC: 8.9 MG/DL (ref 8.5–10.1)
CHLORIDE SERPL-SCNC: 108 MMOL/L (ref 97–108)
CO2 SERPL-SCNC: 29 MMOL/L (ref 21–32)
CREAT SERPL-MCNC: 0.93 MG/DL (ref 0.7–1.3)
DIFFERENTIAL METHOD BLD: ABNORMAL
EOSINOPHIL # BLD: 0.2 K/UL (ref 0–0.4)
EOSINOPHIL NFR BLD: 2 % (ref 0–7)
ERYTHROCYTE [DISTWIDTH] IN BLOOD BY AUTOMATED COUNT: 16.5 % (ref 11.5–14.5)
GLOBULIN SER CALC-MCNC: 4.2 G/DL (ref 2–4)
GLUCOSE BLD STRIP.AUTO-MCNC: 131 MG/DL (ref 65–117)
GLUCOSE BLD STRIP.AUTO-MCNC: 252 MG/DL (ref 65–117)
GLUCOSE BLD STRIP.AUTO-MCNC: 52 MG/DL (ref 65–117)
GLUCOSE BLD STRIP.AUTO-MCNC: 87 MG/DL (ref 65–117)
GLUCOSE BLD STRIP.AUTO-MCNC: 94 MG/DL (ref 65–117)
GLUCOSE SERPL-MCNC: 69 MG/DL (ref 65–100)
HCT VFR BLD AUTO: 39.6 % (ref 36.6–50.3)
HGB BLD-MCNC: 12.4 G/DL (ref 12.1–17)
IMM GRANULOCYTES # BLD AUTO: 0 K/UL (ref 0–0.04)
IMM GRANULOCYTES NFR BLD AUTO: 0 % (ref 0–0.5)
LYMPHOCYTES # BLD: 1.7 K/UL (ref 0.8–3.5)
LYMPHOCYTES NFR BLD: 28 % (ref 12–49)
MCH RBC QN AUTO: 27.9 PG (ref 26–34)
MCHC RBC AUTO-ENTMCNC: 31.3 G/DL (ref 30–36.5)
MCV RBC AUTO: 89 FL (ref 80–99)
MONOCYTES # BLD: 0.7 K/UL (ref 0–1)
MONOCYTES NFR BLD: 12 % (ref 5–13)
NEUTS SEG # BLD: 3.6 K/UL (ref 1.8–8)
NEUTS SEG NFR BLD: 57 % (ref 32–75)
NRBC # BLD: 0 K/UL (ref 0–0.01)
NRBC BLD-RTO: 0 PER 100 WBC
PERFORMED BY, TECHID: ABNORMAL
PERFORMED BY, TECHID: NORMAL
PERFORMED BY, TECHID: NORMAL
PLATELET # BLD AUTO: 447 K/UL (ref 150–400)
PMV BLD AUTO: 9.3 FL (ref 8.9–12.9)
POTASSIUM SERPL-SCNC: 3.8 MMOL/L (ref 3.5–5.1)
PROT SERPL-MCNC: 6.9 G/DL (ref 6.4–8.2)
RBC # BLD AUTO: 4.45 M/UL (ref 4.1–5.7)
SODIUM SERPL-SCNC: 141 MMOL/L (ref 136–145)
WBC # BLD AUTO: 6.3 K/UL (ref 4.1–11.1)

## 2021-05-16 PROCEDURE — 74011250636 HC RX REV CODE- 250/636: Performed by: INTERNAL MEDICINE

## 2021-05-16 PROCEDURE — 36415 COLL VENOUS BLD VENIPUNCTURE: CPT

## 2021-05-16 PROCEDURE — 74011250637 HC RX REV CODE- 250/637: Performed by: FAMILY MEDICINE

## 2021-05-16 PROCEDURE — 99232 SBSQ HOSP IP/OBS MODERATE 35: CPT | Performed by: INTERNAL MEDICINE

## 2021-05-16 PROCEDURE — 74011250637 HC RX REV CODE- 250/637: Performed by: INTERNAL MEDICINE

## 2021-05-16 PROCEDURE — 85025 COMPLETE CBC W/AUTO DIFF WBC: CPT

## 2021-05-16 PROCEDURE — 80053 COMPREHEN METABOLIC PANEL: CPT

## 2021-05-16 PROCEDURE — 74011636637 HC RX REV CODE- 636/637: Performed by: INTERNAL MEDICINE

## 2021-05-16 PROCEDURE — 65270000032 HC RM SEMIPRIVATE

## 2021-05-16 PROCEDURE — 99232 SBSQ HOSP IP/OBS MODERATE 35: CPT | Performed by: SURGERY

## 2021-05-16 PROCEDURE — 82962 GLUCOSE BLOOD TEST: CPT

## 2021-05-16 PROCEDURE — 74011000258 HC RX REV CODE- 258: Performed by: INTERNAL MEDICINE

## 2021-05-16 RX ADMIN — DIVALPROEX SODIUM 1500 MG: 500 TABLET, DELAYED RELEASE ORAL at 21:00

## 2021-05-16 RX ADMIN — SUCRALFATE 1 G: 1 TABLET ORAL at 11:03

## 2021-05-16 RX ADMIN — ATORVASTATIN CALCIUM 20 MG: 20 TABLET, FILM COATED ORAL at 08:29

## 2021-05-16 RX ADMIN — SUCRALFATE 1 G: 1 TABLET ORAL at 21:15

## 2021-05-16 RX ADMIN — PREDNISONE 20 MG: 20 TABLET ORAL at 08:29

## 2021-05-16 RX ADMIN — Medication 10 ML: at 21:43

## 2021-05-16 RX ADMIN — SODIUM CHLORIDE 750 MG: 9 INJECTION, SOLUTION INTRAVENOUS at 06:31

## 2021-05-16 RX ADMIN — SUCRALFATE 1 G: 1 TABLET ORAL at 16:27

## 2021-05-16 RX ADMIN — INSULIN LISPRO 10 UNITS: 100 INJECTION, SOLUTION INTRAVENOUS; SUBCUTANEOUS at 08:29

## 2021-05-16 RX ADMIN — AMLODIPINE BESYLATE 10 MG: 5 TABLET ORAL at 08:29

## 2021-05-16 RX ADMIN — SERTRALINE HYDROCHLORIDE 100 MG: 50 TABLET ORAL at 08:29

## 2021-05-16 RX ADMIN — Medication 10 ML: at 14:09

## 2021-05-16 RX ADMIN — SODIUM CHLORIDE 125 MG: 9 INJECTION, SOLUTION INTRAVENOUS at 11:42

## 2021-05-16 RX ADMIN — ASPIRIN 81 MG: 81 TABLET, CHEWABLE ORAL at 08:29

## 2021-05-16 RX ADMIN — INSULIN GLARGINE 40 UNITS: 100 INJECTION, SOLUTION SUBCUTANEOUS at 08:28

## 2021-05-16 RX ADMIN — SUCRALFATE 1 G: 1 TABLET ORAL at 08:29

## 2021-05-16 RX ADMIN — PANTOPRAZOLE SODIUM 40 MG: 40 TABLET, DELAYED RELEASE ORAL at 08:29

## 2021-05-16 RX ADMIN — MIRTAZAPINE 30 MG: 30 TABLET, FILM COATED ORAL at 21:15

## 2021-05-16 RX ADMIN — SODIUM CHLORIDE 750 MG: 9 INJECTION, SOLUTION INTRAVENOUS at 16:26

## 2021-05-16 RX ADMIN — HYDROCODONE BITARTRATE AND ACETAMINOPHEN 1 TABLET: 5; 325 TABLET ORAL at 22:20

## 2021-05-16 RX ADMIN — Medication 10 ML: at 12:51

## 2021-05-16 NOTE — PROGRESS NOTES
General Daily Progress Note          Patient Name:   Arnav Bauman YOB: 1965       Age:  60 y.o. Admit Date: 5/6/2021      Subjective:       Patient not in distress  Resting           Objective:     Visit Vitals  BP (!) 166/90   Pulse 89   Temp 97.3 °F (36.3 °C)   Resp 16   Ht 5' 6\" (1.676 m)   Wt 68.8 kg (151 lb 11.2 oz)   SpO2 98%   BMI 24.49 kg/m²        Recent Results (from the past 24 hour(s))   GLUCOSE, POC    Collection Time: 05/15/21 12:39 PM   Result Value Ref Range    Glucose (POC) 234 (H) 65 - 117 mg/dL    Performed by Marta Tapia, TROUGH    Collection Time: 05/15/21  3:50 PM   Result Value Ref Range    Vancomycin,trough 16.5 (H) 5.0 - 10.0 ug/mL    Reported dose date Not provided      Reported dose: Not provided Units   GLUCOSE, POC    Collection Time: 05/15/21  4:29 PM   Result Value Ref Range    Glucose (POC) 260 (H) 65 - 117 mg/dL    Performed by Jones Viera    GLUCOSE, POC    Collection Time: 05/15/21  9:52 PM   Result Value Ref Range    Glucose (POC) 293 (H) 65 - 117 mg/dL    Performed by Eh Landrum    GLUCOSE, POC    Collection Time: 05/16/21  8:21 AM   Result Value Ref Range    Glucose (POC) 94 65 - 117 mg/dL    Performed by Byron VADIM    CBC WITH AUTOMATED DIFF    Collection Time: 05/16/21 10:10 AM   Result Value Ref Range    WBC 6.3 4.1 - 11.1 K/uL    RBC 4.45 4.10 - 5.70 M/uL    HGB 12.4 12.1 - 17.0 g/dL    HCT 39.6 36.6 - 50.3 %    MCV 89.0 80.0 - 99.0 FL    MCH 27.9 26.0 - 34.0 PG    MCHC 31.3 30.0 - 36.5 g/dL    RDW 16.5 (H) 11.5 - 14.5 %    PLATELET 607 (H) 163 - 400 K/uL    MPV 9.3 8.9 - 12.9 FL    NRBC 0.0 0.0  WBC    ABSOLUTE NRBC 0.00 0.00 - 0.01 K/uL    NEUTROPHILS 57 32 - 75 %    LYMPHOCYTES 28 12 - 49 %    MONOCYTES 12 5 - 13 %    EOSINOPHILS 2 0 - 7 %    BASOPHILS 1 0 - 1 %    IMMATURE GRANULOCYTES 0 0 - 0.5 %    ABS. NEUTROPHILS 3.6 1.8 - 8.0 K/UL    ABS. LYMPHOCYTES 1.7 0.8 - 3.5 K/UL    ABS.  MONOCYTES 0.7 0.0 - 1.0 K/UL    ABS. EOSINOPHILS 0.2 0.0 - 0.4 K/UL    ABS. BASOPHILS 0.1 0.0 - 0.1 K/UL    ABS. IMM. GRANS. 0.0 0.00 - 0.04 K/UL    DF AUTOMATED     METABOLIC PANEL, COMPREHENSIVE    Collection Time: 05/16/21 10:10 AM   Result Value Ref Range    Sodium 141 136 - 145 mmol/L    Potassium 3.8 3.5 - 5.1 mmol/L    Chloride 108 97 - 108 mmol/L    CO2 29 21 - 32 mmol/L    Anion gap 4 (L) 5 - 15 mmol/L    Glucose 69 65 - 100 mg/dL    BUN 25 (H) 6 - 20 mg/dL    Creatinine 0.93 0.70 - 1.30 mg/dL    BUN/Creatinine ratio 27 (H) 12 - 20      GFR est AA >60 >60 ml/min/1.73m2    GFR est non-AA >60 >60 ml/min/1.73m2    Calcium 8.9 8.5 - 10.1 mg/dL    Bilirubin, total 0.4 0.2 - 1.0 mg/dL    AST (SGOT) 24 15 - 37 U/L    ALT (SGPT) 34 12 - 78 U/L    Alk. phosphatase 83 45 - 117 U/L    Protein, total 6.9 6.4 - 8.2 g/dL    Albumin 2.7 (L) 3.5 - 5.0 g/dL    Globulin 4.2 (H) 2.0 - 4.0 g/dL    A-G Ratio 0.6 (L) 1.1 - 2.2     GLUCOSE, POC    Collection Time: 05/16/21 10:59 AM   Result Value Ref Range    Glucose (POC) 52 (LL) 65 - 117 mg/dL    Performed by Letty Figueredo, POC    Collection Time: 05/16/21 11:45 AM   Result Value Ref Range    Glucose (POC) 87 65 - 117 mg/dL    Performed by Mark Peace      [unfilled]      Review of Systems    Constitutional: Negative for chills and fever. HENT: Negative. Eyes: Negative. Respiratory: Negative. Cardiovascular: Negative. Gastrointestinal: Negative for abdominal pain and nausea. Skin: Negative. Neurological: Negative. Physical Exam:      Constitutional: pt is oriented to person, place, and time. HENT:   Head: Normocephalic and atraumatic. Eyes: Pupils are equal, round, and reactive to light. EOM are normal.   Cardiovascular: Normal rate, regular rhythm and normal heart sounds. Pulmonary/Chest: Breath sounds normal. No wheezes. No rales. Exhibits no tenderness. Abdominal: Soft. Bowel sounds are normal. There is no abdominal tenderness.  There is no rebound and no guarding. Musculoskeletal: Normal range of motion. Neurological: pt is alert and oriented to person, place, and time. NM INFLAM PROC LTD   Final Result      XR FOOT RT MIN 3 V   Final Result           Recent Results (from the past 24 hour(s))   GLUCOSE, POC    Collection Time: 05/15/21 12:39 PM   Result Value Ref Range    Glucose (POC) 234 (H) 65 - 117 mg/dL    Performed by Aysha Marinelli, TROUGH    Collection Time: 05/15/21  3:50 PM   Result Value Ref Range    Vancomycin,trough 16.5 (H) 5.0 - 10.0 ug/mL    Reported dose date Not provided      Reported dose: Not provided Units   GLUCOSE, POC    Collection Time: 05/15/21  4:29 PM   Result Value Ref Range    Glucose (POC) 260 (H) 65 - 117 mg/dL    Performed by [facility]    GLUCOSE, POC    Collection Time: 05/15/21  9:52 PM   Result Value Ref Range    Glucose (POC) 293 (H) 65 - 117 mg/dL    Performed by Juan Clarke    GLUCOSE, POC    Collection Time: 05/16/21  8:21 AM   Result Value Ref Range    Glucose (POC) 94 65 - 117 mg/dL    Performed by [facility]    CBC WITH AUTOMATED DIFF    Collection Time: 05/16/21 10:10 AM   Result Value Ref Range    WBC 6.3 4.1 - 11.1 K/uL    RBC 4.45 4.10 - 5.70 M/uL    HGB 12.4 12.1 - 17.0 g/dL    HCT 39.6 36.6 - 50.3 %    MCV 89.0 80.0 - 99.0 FL    MCH 27.9 26.0 - 34.0 PG    MCHC 31.3 30.0 - 36.5 g/dL    RDW 16.5 (H) 11.5 - 14.5 %    PLATELET 924 (H) 548 - 400 K/uL    MPV 9.3 8.9 - 12.9 FL    NRBC 0.0 0.0  WBC    ABSOLUTE NRBC 0.00 0.00 - 0.01 K/uL    NEUTROPHILS 57 32 - 75 %    LYMPHOCYTES 28 12 - 49 %    MONOCYTES 12 5 - 13 %    EOSINOPHILS 2 0 - 7 %    BASOPHILS 1 0 - 1 %    IMMATURE GRANULOCYTES 0 0 - 0.5 %    ABS. NEUTROPHILS 3.6 1.8 - 8.0 K/UL    ABS. LYMPHOCYTES 1.7 0.8 - 3.5 K/UL    ABS. MONOCYTES 0.7 0.0 - 1.0 K/UL    ABS. EOSINOPHILS 0.2 0.0 - 0.4 K/UL    ABS. BASOPHILS 0.1 0.0 - 0.1 K/UL    ABS. IMM.  GRANS. 0.0 0.00 - 0.04 K/UL    DF AUTOMATED     METABOLIC PANEL, COMPREHENSIVE    Collection Time: 05/16/21 10:10 AM   Result Value Ref Range    Sodium 141 136 - 145 mmol/L    Potassium 3.8 3.5 - 5.1 mmol/L    Chloride 108 97 - 108 mmol/L    CO2 29 21 - 32 mmol/L    Anion gap 4 (L) 5 - 15 mmol/L    Glucose 69 65 - 100 mg/dL    BUN 25 (H) 6 - 20 mg/dL    Creatinine 0.93 0.70 - 1.30 mg/dL    BUN/Creatinine ratio 27 (H) 12 - 20      GFR est AA >60 >60 ml/min/1.73m2    GFR est non-AA >60 >60 ml/min/1.73m2    Calcium 8.9 8.5 - 10.1 mg/dL    Bilirubin, total 0.4 0.2 - 1.0 mg/dL    AST (SGOT) 24 15 - 37 U/L    ALT (SGPT) 34 12 - 78 U/L    Alk.  phosphatase 83 45 - 117 U/L    Protein, total 6.9 6.4 - 8.2 g/dL    Albumin 2.7 (L) 3.5 - 5.0 g/dL    Globulin 4.2 (H) 2.0 - 4.0 g/dL    A-G Ratio 0.6 (L) 1.1 - 2.2     GLUCOSE, POC    Collection Time: 05/16/21 10:59 AM   Result Value Ref Range    Glucose (POC) 52 (LL) 65 - 117 mg/dL    Performed by Johnson Acosta, POC    Collection Time: 05/16/21 11:45 AM   Result Value Ref Range    Glucose (POC) 87 65 - 117 mg/dL    Performed by Elsi Denton        Results     Procedure Component Value Units Date/Time    CULTURE, Marcy Korina STAIN [[id number]] Collected: 05/07/21 1200    Order Status: Canceled Specimen: Wound from Ulcer     CULTURE, WOUND [[id number]]  (Susceptibility) Collected: 05/06/21 2015    Order Status: Completed Specimen: Wound Drainage Updated: 05/10/21 1107     Special Requests: No Special Requests        GRAM STAIN Few WBCs seen         No organisms seen        Culture result:       Few * methicillin resistant staphylococcus aureus *                  Few Staphylococcus species, coagulase negative          Susceptibility      Staphylococcus aureus Methcillin Resistant     SARINA     Ciprofloxacin ($) Susceptible     Clindamycin ($) Susceptible     Daptomycin ($$$$$) Susceptible     Doxycycline ($$) Susceptible     Erythromycin ($$$$) Resistant     Gentamicin ($) Susceptible     Levofloxacin ($) Susceptible     Linezolid ($$$$$) Susceptible     Oxacillin Resistant     Rifampin ($$$$) Susceptible [1]      Tetracycline Susceptible     Trimeth/Sulfa Susceptible     Vancomycin ($) Susceptible            [1]   Rifampin is not to be used for mono-therapy. CULTURE, BLOOD, PAIRED [[id number]] Collected: 05/06/21 1245    Order Status: Completed Specimen: Blood Updated: 05/13/21 0752     Special Requests: No Special Requests        Culture result: No growth 6 days              Labs:     Recent Labs     05/16/21  1010 05/15/21  1035   WBC 6.3 7.3   HGB 12.4 12.0*   HCT 39.6 37.7   * 497*     Recent Labs     05/16/21  1010 05/15/21  1035    139   K 3.8 4.2    108   CO2 29 28   BUN 25* 30*   CREA 0.93 1.01   GLU 69 200*   CA 8.9 8.6     Recent Labs     05/16/21  1010 05/15/21  1035   ALT 34 29   AP 83 77   TBILI 0.4 0.4   TP 6.9 6.7   ALB 2.7* 2.5*   GLOB 4.2* 4.2*     No results for input(s): INR, PTP, APTT, INREXT, INREXT in the last 72 hours. No results for input(s): FE, TIBC, PSAT, FERR in the last 72 hours. No results found for: FOL, RBCF   No results for input(s): PH, PCO2, PO2 in the last 72 hours. No results for input(s): CPK, CKNDX, TROIQ in the last 72 hours.     No lab exists for component: CPKMB  Lab Results   Component Value Date/Time    Cholesterol, total 122 02/17/2021 07:57 PM    HDL Cholesterol 52 02/17/2021 07:57 PM    LDL, calculated 57.4 02/17/2021 07:57 PM    Triglyceride 63 02/17/2021 07:57 PM    CHOL/HDL Ratio 2.3 02/17/2021 07:57 PM     Lab Results   Component Value Date/Time    Glucose (POC) 87 05/16/2021 11:45 AM    Glucose (POC) 52 (LL) 05/16/2021 10:59 AM    Glucose (POC) 94 05/16/2021 08:21 AM    Glucose (POC) 293 (H) 05/15/2021 09:52 PM    Glucose (POC) 260 (H) 05/15/2021 04:29 PM     Lab Results   Component Value Date/Time    Color Yellow/Straw 05/06/2021 01:49 PM    Appearance Clear 05/06/2021 01:49 PM    Specific gravity 1.012 05/06/2021 01:49 PM    Specific gravity 1.024 02/17/2021 05:31 AM    pH (UA) 7.0 05/06/2021 01:49 PM    Protein Negative 05/06/2021 01:49 PM    Glucose Negative 05/06/2021 01:49 PM    Ketone Negative 05/06/2021 01:49 PM    Bilirubin Negative 05/06/2021 01:49 PM    Urobilinogen 0.1 05/06/2021 01:49 PM    Nitrites Negative 05/06/2021 01:49 PM    Leukocyte Esterase Negative 05/06/2021 01:49 PM    Epithelial cells FEW 02/17/2021 05:31 AM    Bacteria Negative 05/06/2021 01:49 PM    WBC 0-4 05/06/2021 01:49 PM    RBC 0-5 05/06/2021 01:49 PM         Assessment:     Right buttocks ulcer  Right heel ulcer  Chronic anemia  Hemorrhagic CVA  Urine incontinence  Uncontrolled diabetes  History of non-STEMI  Seizure disorder  Obsessive-compulsive disorder  Memory impairment      Plan:     Aspirin 81 mg daily  Lipitor 20 mg daily  Depakote 1500 mg at bedtime  Vancomycin once  Lantus 40 units subcu daily  Remeron 30 mg nightly  Protonix 40 daily  Prednisone 20 mg daily  Zoloft 100 mg daily  Carafate 1 g 4 times daily  Vancomycin pharmacy protocol  Add Norvasc 10 mg daily        Current Facility-Administered Medications:     amLODIPine (NORVASC) tablet 10 mg, 10 mg, Oral, DAILY, Fely Flood MD, 10 mg at 05/16/21 8954    sodium chloride (NS) flush 5-40 mL, 5-40 mL, IntraVENous, Q8H, Kisha Martínez MD, 10 mL at 05/15/21 1408    sodium chloride (NS) flush 5-40 mL, 5-40 mL, IntraVENous, PRN, Kisha Martínez MD    HYDROcodone-acetaminophen [facility]) 7.5-325 mg per tablet 1 Tab, 1 Tab, Oral, Q4H PRN, Chivo Hanna MD    insulin glargine (LANTUS) injection 40 Units, 40 Units, SubCUTAneous, DAILY, Herrera Borja MD, 40 Units at 05/16/21 0828    ferric gluconate (FERRLECIT) 125 mg in 0.9% sodium chloride 100 mL IVPB, 125 mg, IntraVENous, DAILY, Carrie Diana MD, Last Rate: 110 mL/hr at 05/16/21 1142, 125 mg at 05/16/21 1142    insulin lispro (HUMALOG) injection 10 Units, 10 Units, SubCUTAneous, Daren BOATENG MD, Stopped at 05/16/21 1130    predniSONE (Varun Heater) tablet 20 mg, 20 mg, Oral, DAILY WITH BREAKFAST, Myra LAI MD, 20 mg at 05/16/21 0829    0.9% sodium chloride infusion 250 mL, 250 mL, IntraVENous, PRN, Herrera Gee MD    0.9% sodium chloride infusion 250 mL, 250 mL, IntraVENous, PRN, Herrera Gee MD    0.9% sodium chloride infusion 250 mL, 250 mL, IntraVENous, PRN, Mauricio, Andre Tavares MD    vancomycin (VANCOCIN) 750 mg in 0.9% sodium chloride 250 mL (VIAL-MATE), 750 mg, IntraVENous, Q12H, Edmundo Li MD, 750 mg at 05/16/21 0631    glucose chewable tablet 16 g, 4 Tab, Oral, PRN, Myra LAI MD    glucagon (GLUCAGEN) injection 1 mg, 1 mg, IntraMUSCular, PRN, Herrera Gee MD    dextrose (D50W) injection syrg 12.5-25 g, 25-50 mL, IntraVENous, PRN, Herrera Borja MD, 25 g at 05/12/21 0820    0.9% sodium chloride infusion 250 mL, 250 mL, IntraVENous, PRN, Smita Miller NP    albuterol-ipratropium (DUO-NEB) 2.5 MG-0.5 MG/3 ML, 3 mL, Nebulization, Q6H PRN, Herrera Gee MD    aspirin chewable tablet 81 mg, 81 mg, Oral, DAILY, Myra LAI MD, 81 mg at 05/16/21 7801    atorvastatin (LIPITOR) tablet 20 mg, 20 mg, Oral, DAILY, Herrera Borja MD, 20 mg at 05/16/21 0829    divalproex DR (DEPAKOTE) tablet 1,500 mg, 1,500 mg, Oral, QHS, Herrera Borja MD, 1,500 mg at 05/15/21 2157    mirtazapine (REMERON) tablet 30 mg, 30 mg, Oral, QHS, Herrera Borja MD, 30 mg at 05/15/21 2157    ondansetron (ZOFRAN ODT) tablet 4 mg, 4 mg, Oral, Q6H PRN, Herrera Gee MD    pantoprazole (PROTONIX) tablet 40 mg, 40 mg, Oral, ACB, Herrera Borja MD, 40 mg at 05/16/21 6135    sertraline (ZOLOFT) tablet 100 mg, 100 mg, Oral, DAILY, Herrera Borja MD, 100 mg at 05/16/21 0829    sucralfate (CARAFATE) tablet 1 g, 1 g, Oral, AC&HS, Sayra Rangel MD, 1 g at 05/16/21 1103    sodium chloride (NS) flush 5-40 mL, 5-40 mL, IntraVENous, PRN, Myra LAI MD    HYDROcodone-acetaminophen (NORCO) 5-325 mg per tablet 1 Tab, 1 Tab, Oral, Q4H PRN, Viktoria LAI MD, 1 Tab at 05/14/21 2044    0.9% sodium chloride infusion 250 mL, 250 mL, IntraVENous, PRN, Herrera Modi MD    glucose chewable tablet 16 g, 16 g, Oral, PRN, Ricardo Modi MD    glucagon (GLUCAGEN) injection 1 mg, 1 mg, IntraMUSCular, PRN, August Barnes MD    VANCOMYCIN INFORMATION NOTE 1 Each, 1 Each, Other, Rx Dosing/Monitoring, August Barnes MD

## 2021-05-16 NOTE — PROGRESS NOTES
Admission skin: Clean dry and intact. Back stimulator noted on palpation to right lower back. Charted on incorrect patient. Please disregard for this patient.

## 2021-05-16 NOTE — PROGRESS NOTES
Hematology Oncology Progress Note     Interval History :  Sleeping comfortabley. No major bleeding problems reported. No epistaxis in the past 2 days. Receiving IV iron reports no problems with infusions. Hemoglobin is markedly improved.        Subjective:         Current Facility-Administered Medications   Medication Dose Route Frequency    [START ON 5/16/2021] amLODIPine (NORVASC) tablet 10 mg  10 mg Oral DAILY    sodium chloride (NS) flush 5-40 mL  5-40 mL IntraVENous Q8H    sodium chloride (NS) flush 5-40 mL  5-40 mL IntraVENous PRN    HYDROcodone-acetaminophen (NORCO) 7.5-325 mg per tablet 1 Tab  1 Tab Oral Q4H PRN    insulin glargine (LANTUS) injection 40 Units  40 Units SubCUTAneous DAILY    ferric gluconate (FERRLECIT) 125 mg in 0.9% sodium chloride 100 mL IVPB  125 mg IntraVENous DAILY    insulin lispro (HUMALOG) injection 10 Units  10 Units SubCUTAneous TIDAC    predniSONE (DELTASONE) tablet 20 mg  20 mg Oral DAILY WITH BREAKFAST    0.9% sodium chloride infusion 250 mL  250 mL IntraVENous PRN    0.9% sodium chloride infusion 250 mL  250 mL IntraVENous PRN    0.9% sodium chloride infusion 250 mL  250 mL IntraVENous PRN    vancomycin (VANCOCIN) 750 mg in 0.9% sodium chloride 250 mL (VIAL-MATE)  750 mg IntraVENous Q12H    glucose chewable tablet 16 g  4 Tab Oral PRN    glucagon (GLUCAGEN) injection 1 mg  1 mg IntraMUSCular PRN    dextrose (D50W) injection syrg 12.5-25 g  25-50 mL IntraVENous PRN    0.9% sodium chloride infusion 250 mL  250 mL IntraVENous PRN    albuterol-ipratropium (DUO-NEB) 2.5 MG-0.5 MG/3 ML  3 mL Nebulization Q6H PRN    aspirin chewable tablet 81 mg  81 mg Oral DAILY    atorvastatin (LIPITOR) tablet 20 mg  20 mg Oral DAILY    divalproex DR (DEPAKOTE) tablet 1,500 mg  1,500 mg Oral QHS    mirtazapine (REMERON) tablet 30 mg  30 mg Oral QHS    ondansetron (ZOFRAN ODT) tablet 4 mg  4 mg Oral Q6H PRN    pantoprazole (PROTONIX) tablet 40 mg  40 mg Oral ACB    sertraline (ZOLOFT) tablet 100 mg  100 mg Oral DAILY    sucralfate (CARAFATE) tablet 1 g  1 g Oral AC&HS    sodium chloride (NS) flush 5-40 mL  5-40 mL IntraVENous PRN    HYDROcodone-acetaminophen (NORCO) 5-325 mg per tablet 1 Tab  1 Tab Oral Q4H PRN    0.9% sodium chloride infusion 250 mL  250 mL IntraVENous PRN    glucose chewable tablet 16 g  16 g Oral PRN    glucagon (GLUCAGEN) injection 1 mg  1 mg IntraMUSCular PRN    VANCOMYCIN INFORMATION NOTE 1 Each  1 Each Other Rx Dosing/Monitoring        Review of Systems:    Constitutional No fevers, chills, night sweats, excessive fatigue or weight loss. Allergic/Immunologic No recent allergic reactions   Eyes No significant visual difficulties. No diplopia. ENMT No problems with hearing, no sore throat, no sinus drainage. Endocrine No hot flashes or night sweats. No cold intolerance, polyuria, or polydipsia   Hematologic/Lymphatic No easy bruising or bleeding. The patient denies any tender or palpable lymph nodes   Breasts No abnormal masses of breast, nipple discharge or pain. Respiratory No dyspnea on exertion, orthopnea, chest pain, cough or hemoptysis. Cardiovascular No anginal chest pain, irregular heart beat, tachycardia, palpitations or orthopnea. Gastrointestinal No nausea, vomiting, diarrhea, constipation, cramping, dysphagia, reflux, heartburn, GI bleeding, or early satiety. No change in bowel habits. Genitourinary (M) No hematuria, dysuria, increased frequency, urgency, hesitancy or incontinence. Musculoskeletal No joint pain, swelling or redness. No decreased range of motion. Integumentary No chronic rashes, inflammation, ulcerations, pruritus, petechiae, purpura, ecchymoses, or skin changes. Neurologic No headache, blurred vision, and no areas of focal weakness or numbness. Normal gait. No sensory problems. Psychiatric No insomnia, depression, nadia or mood swings. No psychotropic drugs.      Objective:     Patient Vitals for the past 8 hrs:   BP Temp Pulse Resp SpO2   05/15/21 2121 (!) 151/73 98.1 °F (36.7 °C) 84 20 95 %   05/15/21 1627 128/69 98.3 °F (36.8 °C) 86 18 96 %       Temp (24hrs), Av °F (36.7 °C), Min:97.6 °F (36.4 °C), Max:98.3 °F (36.8 °C)      Physical Exam:    Constitutional White male  Alert, cooperative, oriented. Mood and affect appropriate. Appears close to chronological age. Well nourished. Well developed. Head Normocephalic; no scars   Eyes Conjunctivae and sclerae are clear and without icterus. Pupils are reactive and equal.   ENMT Sinuses are nontender. No oral exudates, ulcers, masses, thrush or mucositis. Oropharynx clear. Tongue normal.   Neck Supple without masses or thyromegaly. No jugular venous distension. Hematologic/Lymphatic No petechiae or purpura. No tender or palpable lymph nodes in the cervical, supraclavicular, axillary or inguinal area. Respiratory Lungs are clear to auscultation without rhonchi or wheezing. Cardiovascular Regular rate and rhythm of heart without murmurs, gallops or rubs. Chest / Line Site Chest is symmetric with no chest wall deformities. Abdomen Non-tender, non-distended, no masses, ascites or hepatosplenomegaly. Good bowel sounds. No guarding or rebound tenderness. No pulsatile masses. Extremities  dressing on the legs   Skin No rashes, scars, or lesions suggestive of malignancy. No petechiae, purpura, or ecchymoses. No excoriations. Neurologic No sensory or motor deficits, normal cerebellar function, normal gait, cranial nerves intact. Psychiatric Alert and oriented times three. Coherent speech. Verbalizes understanding of our discussions today.      Lab/Data Review:  Recent Labs     05/15/21  1035 21  0749   WBC 7.3 5.1   HGB 12.0* 11.4*   HCT 37.7 34.9*   * 385     Recent Labs     05/15/21  1035      K 4.2      CO2 28   *   BUN 30*   CREA 1.01   CA 8.6   ALB 2.5*   TBILI 0.4   ALT 29     No results for input(s): PH, PCO2, PO2, HCO3, FIO2 in the last 72 hours. Radiology:   No results found. Assessment /Plan:     Active Problems:    Severe anemia (5/6/2021)      Diabetic foot infection ([address] (5/6/2021)      3  53-year-old gentleman with multiple medical problems including poorly controlled diabetes, history of non-STEMI, seizure disorder, obsessive-compulsive disorder memory impairment.     2.  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 -s/p prbc transfusions. .  -continue IV iron infusions. Iron studies do show iron saturation of 6% serum iron 24 TIBC of 377 consistent with iron deficiency. He is tolerating Ferrlecit without any problems  -Monitor closely for bleeding. Hemoglobin today improved to 12gm/dl . -If epistaxis is severe we can ask ENT to evaluate and cauterize his bleeding vessels in the nose. -Repeat CBC in a.m.     3) has wound infections currently on broad-spectrum antibiotics.   ID is following the patient as well           Kathleen Sandoval MD  5/15/2021

## 2021-05-16 NOTE — PROGRESS NOTES
Problem: Pressure Injury - Risk of  Goal: *Prevention of pressure injury  Description: Document Doug Scale and appropriate interventions in the flowsheet. Outcome: Progressing Towards Goal  Note: Pressure Injury Interventions:  Sensory Interventions: Assess changes in LOC    Moisture Interventions: Absorbent underpads    Activity Interventions: PT/OT evaluation    Mobility Interventions: PT/OT evaluation    Nutrition Interventions: Offer support with meals,snacks and hydration    Friction and Shear Interventions: Minimize layers                Problem: Patient Education: Go to Patient Education Activity  Goal: Patient/Family Education  Outcome: Progressing Towards Goal     Problem: Falls - Risk of  Goal: *Absence of Falls  Description: Document Jeannette Fall Risk and appropriate interventions in the flowsheet. Outcome: Progressing Towards Goal  Note: Fall Risk Interventions:  Mobility Interventions: Bed/chair exit alarm    Mentation Interventions: Door open when patient unattended    Medication Interventions: Bed/chair exit alarm    Elimination Interventions: Call light in reach    History of Falls Interventions: Bed/chair exit alarm         Problem: Patient Education: Go to Patient Education Activity  Goal: Patient/Family Education  Outcome: Progressing Towards Goal     Problem: Impaired Skin Integrity/Pressure Injury Treatment  Goal: *Improvement of Existing Pressure Injury  Outcome: Progressing Towards Goal  Goal: *Prevention of pressure injury  Description: Document Doug Scale and appropriate interventions in the flowsheet.   Outcome: Progressing Towards Goal  Note: Pressure Injury Interventions:  Sensory Interventions: Assess changes in LOC    Moisture Interventions: Absorbent underpads    Activity Interventions: PT/OT evaluation    Mobility Interventions: PT/OT evaluation    Nutrition Interventions: Offer support with meals,snacks and hydration    Friction and Shear Interventions: Minimize layers Problem: Patient Education: Go to Patient Education Activity  Goal: Patient/Family Education  Outcome: Progressing Towards Goal     Problem: Risk for Spread of Infection  Goal: Prevent transmission of infectious organism to others  Description: Prevent the transmission of infectious organisms to other patients, staff members, and visitors.   Outcome: Progressing Towards Goal     Problem: Patient Education:  Go to Education Activity  Goal: Patient/Family Education  Outcome: Progressing Towards Goal     Problem: Patient Education: Go to Patient Education Activity  Goal: Patient/Family Education  Outcome: Progressing Towards Goal

## 2021-05-16 NOTE — PROGRESS NOTES
Hematology Oncology Progress Note     Interval History :reports no new complaints. No major bleeding problems reported. No epistaxis in the past 2 days. Receiving IV iron reports no problems with infusions. Hemoglobin is markedly improved.        Subjective:         Current Facility-Administered Medications   Medication Dose Route Frequency    amLODIPine (NORVASC) tablet 10 mg  10 mg Oral DAILY    sodium chloride (NS) flush 5-40 mL  5-40 mL IntraVENous Q8H    sodium chloride (NS) flush 5-40 mL  5-40 mL IntraVENous PRN    HYDROcodone-acetaminophen (NORCO) 7.5-325 mg per tablet 1 Tab  1 Tab Oral Q4H PRN    insulin glargine (LANTUS) injection 40 Units  40 Units SubCUTAneous DAILY    ferric gluconate (FERRLECIT) 125 mg in 0.9% sodium chloride 100 mL IVPB  125 mg IntraVENous DAILY    insulin lispro (HUMALOG) injection 10 Units  10 Units SubCUTAneous TIDAC    predniSONE (DELTASONE) tablet 20 mg  20 mg Oral DAILY WITH BREAKFAST    0.9% sodium chloride infusion 250 mL  250 mL IntraVENous PRN    0.9% sodium chloride infusion 250 mL  250 mL IntraVENous PRN    0.9% sodium chloride infusion 250 mL  250 mL IntraVENous PRN    vancomycin (VANCOCIN) 750 mg in 0.9% sodium chloride 250 mL (VIAL-MATE)  750 mg IntraVENous Q12H    glucose chewable tablet 16 g  4 Tab Oral PRN    glucagon (GLUCAGEN) injection 1 mg  1 mg IntraMUSCular PRN    dextrose (D50W) injection syrg 12.5-25 g  25-50 mL IntraVENous PRN    0.9% sodium chloride infusion 250 mL  250 mL IntraVENous PRN    albuterol-ipratropium (DUO-NEB) 2.5 MG-0.5 MG/3 ML  3 mL Nebulization Q6H PRN    aspirin chewable tablet 81 mg  81 mg Oral DAILY    atorvastatin (LIPITOR) tablet 20 mg  20 mg Oral DAILY    divalproex DR (DEPAKOTE) tablet 1,500 mg  1,500 mg Oral QHS    mirtazapine (REMERON) tablet 30 mg  30 mg Oral QHS    ondansetron (ZOFRAN ODT) tablet 4 mg  4 mg Oral Q6H PRN    pantoprazole (PROTONIX) tablet 40 mg  40 mg Oral ACB    sertraline (ZOLOFT) tablet 100 mg  100 mg Oral DAILY    sucralfate (CARAFATE) tablet 1 g  1 g Oral AC&HS    sodium chloride (NS) flush 5-40 mL  5-40 mL IntraVENous PRN    HYDROcodone-acetaminophen (NORCO) 5-325 mg per tablet 1 Tab  1 Tab Oral Q4H PRN    0.9% sodium chloride infusion 250 mL  250 mL IntraVENous PRN    glucose chewable tablet 16 g  16 g Oral PRN    glucagon (GLUCAGEN) injection 1 mg  1 mg IntraMUSCular PRN    VANCOMYCIN INFORMATION NOTE 1 Each  1 Each Other Rx Dosing/Monitoring        Review of Systems:    Constitutional No fevers, chills, night sweats, excessive fatigue or weight loss. Allergic/Immunologic No recent allergic reactions   Eyes No significant visual difficulties. No diplopia. ENMT No problems with hearing, no sore throat, no sinus drainage. Endocrine No hot flashes or night sweats. No cold intolerance, polyuria, or polydipsia   Hematologic/Lymphatic No easy bruising or bleeding. The patient denies any tender or palpable lymph nodes   Breasts No abnormal masses of breast, nipple discharge or pain. Respiratory No dyspnea on exertion, orthopnea, chest pain, cough or hemoptysis. Cardiovascular No anginal chest pain, irregular heart beat, tachycardia, palpitations or orthopnea. Gastrointestinal No nausea, vomiting, diarrhea, constipation, cramping, dysphagia, reflux, heartburn, GI bleeding, or early satiety. No change in bowel habits. Genitourinary (M) No hematuria, dysuria, increased frequency, urgency, hesitancy or incontinence. Musculoskeletal No joint pain, swelling or redness. No decreased range of motion. Integumentary No chronic rashes, inflammation, ulcerations, pruritus, petechiae, purpura, ecchymoses, or skin changes. Neurologic No headache, blurred vision, and no areas of focal weakness or numbness. Normal gait. No sensory problems. Psychiatric No insomnia, depression, nadia or mood swings. No psychotropic drugs.      Objective:     Patient Vitals for the past 8 hrs:   BP Temp Pulse Resp SpO2   21 1247 133/69 98.1 °F (36.7 °C) 94 16 96 %   21 0817 (!) 166/90 97.3 °F (36.3 °C) 89 16 98 %       Temp (24hrs), Av °F (36.7 °C), Min:97.3 °F (36.3 °C), Max:98.3 °F (36.8 °C)      Physical Exam:    Constitutional White male  Alert, cooperative, oriented. Mood and affect appropriate. Appears close to chronological age. Well nourished. Well developed. Head Normocephalic; no scars   Eyes Conjunctivae and sclerae are clear and without icterus. Pupils are reactive and equal.   ENMT Sinuses are nontender. No oral exudates, ulcers, masses, thrush or mucositis. Oropharynx clear. Tongue normal.   Neck Supple without masses or thyromegaly. No jugular venous distension. Hematologic/Lymphatic No petechiae or purpura. No tender or palpable lymph nodes in the cervical, supraclavicular, axillary or inguinal area. Respiratory Lungs are clear to auscultation without rhonchi or wheezing. Cardiovascular Regular rate and rhythm of heart without murmurs, gallops or rubs. Chest / Line Site Chest is symmetric with no chest wall deformities. Abdomen Non-tender, non-distended, no masses, ascites or hepatosplenomegaly. Good bowel sounds. No guarding or rebound tenderness. No pulsatile masses. Extremities  dressing and left leg in pressure relieving orta    Skin No rashes, scars, or lesions suggestive of malignancy. No petechiae, purpura, or ecchymoses. No excoriations. Neurologic No sensory or motor deficits, normal cerebellar function, normal gait, cranial nerves intact. Psychiatric Alert and oriented times three. Coherent speech. Verbalizes understanding of our discussions today.      Lab/Data Review:  Recent Labs     21  1010 05/15/21  1035   WBC 6.3 7.3   HGB 12.4 12.0*   HCT 39.6 37.7   * 497*     Recent Labs     21  1010 05/15/21  1035    139   K 3.8 4.2    108   CO2 29 28   GLU 69 200*   BUN 25* 30*   CREA 0.93 1.01   CA 8.9 8.6   ALB 2.7* 2.5* TBILI 0.4 0.4   ALT 34 29     No results for input(s): PH, PCO2, PO2, HCO3, FIO2 in the last 72 hours. Radiology:   No results found. Assessment /Plan:     Active Problems:    Severe anemia (5/6/2021)      Diabetic foot infection ([address] (5/6/2021)      3  78-year-old gentleman with multiple medical problems including poorly controlled diabetes, history of non-STEMI, seizure disorder, obsessive-compulsive disorder memory impairment.     2.  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 -s/p prbc transfusions. .  -continue IV iron infusions. Iron studies do show iron saturation of 6% serum iron 24 TIBC of 377 consistent with iron deficiency. He is tolerating Ferrlecit without any problems, complete tomorrow. -Monitor closely for bleeding. Hemoglobin today improved to 12.4 gm/dl . -If epistaxis is severe we can ask ENT to evaluate and cauterize his bleeding vessels in the nose. .     3) has wound infection currently on broad-spectrum antibiotic. ID is following the patient as well     He will benefit from outpatient hematology follow up after discharge due to his problems with recurrent bleeding from his HHT . He is instructed to see me in the office in 3-4 weeks after discharge. Will sign off. Please call for any questions.        Leonard Teague MD  5/16/2021

## 2021-05-16 NOTE — PROGRESS NOTES
Infectious Disease Progress Note           Subjective:   Assessed pt at bedside. Doing well, denies new complaints. No acute events since last seen   Objective:   Physical Exam:     Visit Vitals  /74   Pulse 91   Temp 98.2 °F (36.8 °C)   Resp 18   Ht 5' 6\" (1.676 m)   Wt 151 lb 11.2 oz (68.8 kg)   SpO2 96%   BMI 24.49 kg/m²      O2 Device: None (Room air)    Temp (24hrs), Av °F (36.7 °C), Min:97.3 °F (36.3 °C), Max:98.2 °F (36.8 °C)    701 -  190  In: -   Out: 1    No intake/output data recorded. General: NAD, resting comfortably   HEENT: OTILIA, Moist mucosa   Lungs: CTA b/l, no wheeze/rhonchi  Heart: S1S2+, RRR, no murmur  Abdo: Soft, NT, ND, +BS   Exts: right heel ulcer, tenderness on palpation, right hemiparesis   Skin: Right buttock chick ulcer, decreased surrounding induration, and tenderness     Data Review:       Recent Days:  Recent Labs     21  1010 05/15/21  1035   WBC 6.3 7.3   HGB 12.4 12.0*   HCT 39.6 37.7   * 497*     Recent Labs     21  1010 05/15/21  1035   BUN 25* 30*   CREA 0.93 1.01     Lab Results   Component Value Date/Time    C-Reactive protein 1.64 (H) 2021 11:50 AM      Microbiology   - Right buttock wound Cx : MRSA, and coag neg staph     Diagnostics   CXR Results  (Last 48 hours)    None           Assessment/Plan     1. Right buttock ulcer, suspected abscess. S/p I&D on       MRSA/staph epidermidis isolated from wound Cx on       On Vanc day # . Plan on transitioning to Doxycycline upon d/c       Continue frequent turns and routine wound care as is already being done       Routine labs in the morning         2. Right heel ulcer/underlying PAD: S/p arteriogram on       [facility] tagged WBC scan neg for osteomyelitis (05/10)      Recommend off loading boots to help prevent pressure ulcers     3. Chronic on chronic anemia blood loss anemia.  Hgb staying stable after PRBC transfusion      4. Urinary incontinence: Needs frequent changing to avoid ulcers/skin break down     Cleared for d/c from ID stand point     Clearance MD Tanika    5/16/2021

## 2021-05-17 LAB
ALBUMIN SERPL-MCNC: 2.6 G/DL (ref 3.5–5)
ALBUMIN/GLOB SERPL: 0.7 {RATIO} (ref 1.1–2.2)
ALP SERPL-CCNC: 70 U/L (ref 45–117)
ALT SERPL-CCNC: 25 U/L (ref 12–78)
ANION GAP SERPL CALC-SCNC: 3 MMOL/L (ref 5–15)
AST SERPL W P-5'-P-CCNC: 12 U/L (ref 15–37)
BASOPHILS # BLD: 0.1 K/UL (ref 0–0.1)
BASOPHILS NFR BLD: 1 % (ref 0–1)
BILIRUB SERPL-MCNC: 0.3 MG/DL (ref 0.2–1)
BUN SERPL-MCNC: 26 MG/DL (ref 6–20)
BUN/CREAT SERPL: 33 (ref 12–20)
CA-I BLD-MCNC: 8.7 MG/DL (ref 8.5–10.1)
CHLORIDE SERPL-SCNC: 111 MMOL/L (ref 97–108)
CO2 SERPL-SCNC: 29 MMOL/L (ref 21–32)
CREAT SERPL-MCNC: 0.79 MG/DL (ref 0.7–1.3)
DIFFERENTIAL METHOD BLD: ABNORMAL
EOSINOPHIL # BLD: 0.1 K/UL (ref 0–0.4)
EOSINOPHIL NFR BLD: 2 % (ref 0–7)
ERYTHROCYTE [DISTWIDTH] IN BLOOD BY AUTOMATED COUNT: 16.4 % (ref 11.5–14.5)
GLOBULIN SER CALC-MCNC: 3.8 G/DL (ref 2–4)
GLUCOSE BLD STRIP.AUTO-MCNC: 159 MG/DL (ref 65–117)
GLUCOSE BLD STRIP.AUTO-MCNC: 209 MG/DL (ref 65–117)
GLUCOSE BLD STRIP.AUTO-MCNC: 328 MG/DL (ref 65–117)
GLUCOSE BLD STRIP.AUTO-MCNC: 46 MG/DL (ref 65–117)
GLUCOSE BLD STRIP.AUTO-MCNC: 503 MG/DL (ref 65–117)
GLUCOSE SERPL-MCNC: 33 MG/DL (ref 65–100)
HCT VFR BLD AUTO: 36.9 % (ref 36.6–50.3)
HGB BLD-MCNC: 11.7 G/DL (ref 12.1–17)
IMM GRANULOCYTES # BLD AUTO: 0 K/UL (ref 0–0.04)
IMM GRANULOCYTES NFR BLD AUTO: 0 % (ref 0–0.5)
LYMPHOCYTES # BLD: 2.1 K/UL (ref 0.8–3.5)
LYMPHOCYTES NFR BLD: 31 % (ref 12–49)
MCH RBC QN AUTO: 28.2 PG (ref 26–34)
MCHC RBC AUTO-ENTMCNC: 31.7 G/DL (ref 30–36.5)
MCV RBC AUTO: 88.9 FL (ref 80–99)
MONOCYTES # BLD: 1 K/UL (ref 0–1)
MONOCYTES NFR BLD: 14 % (ref 5–13)
NEUTS SEG # BLD: 3.6 K/UL (ref 1.8–8)
NEUTS SEG NFR BLD: 52 % (ref 32–75)
NRBC # BLD: 0 K/UL (ref 0–0.01)
NRBC BLD-RTO: 0 PER 100 WBC
PERFORMED BY, TECHID: ABNORMAL
PLATELET # BLD AUTO: 408 K/UL (ref 150–400)
PMV BLD AUTO: 9.4 FL (ref 8.9–12.9)
POTASSIUM SERPL-SCNC: 3.3 MMOL/L (ref 3.5–5.1)
PROT SERPL-MCNC: 6.4 G/DL (ref 6.4–8.2)
RBC # BLD AUTO: 4.15 M/UL (ref 4.1–5.7)
SODIUM SERPL-SCNC: 143 MMOL/L (ref 136–145)
WBC # BLD AUTO: 6.9 K/UL (ref 4.1–11.1)

## 2021-05-17 PROCEDURE — 99231 SBSQ HOSP IP/OBS SF/LOW 25: CPT | Performed by: INTERNAL MEDICINE

## 2021-05-17 PROCEDURE — 74011250636 HC RX REV CODE- 250/636: Performed by: INTERNAL MEDICINE

## 2021-05-17 PROCEDURE — 80053 COMPREHEN METABOLIC PANEL: CPT

## 2021-05-17 PROCEDURE — 74011636637 HC RX REV CODE- 636/637: Performed by: INTERNAL MEDICINE

## 2021-05-17 PROCEDURE — 65270000029 HC RM PRIVATE

## 2021-05-17 PROCEDURE — 82962 GLUCOSE BLOOD TEST: CPT

## 2021-05-17 PROCEDURE — 74011250637 HC RX REV CODE- 250/637: Performed by: FAMILY MEDICINE

## 2021-05-17 PROCEDURE — 74011000258 HC RX REV CODE- 258: Performed by: INTERNAL MEDICINE

## 2021-05-17 PROCEDURE — 85025 COMPLETE CBC W/AUTO DIFF WBC: CPT

## 2021-05-17 PROCEDURE — 74011250637 HC RX REV CODE- 250/637: Performed by: INTERNAL MEDICINE

## 2021-05-17 PROCEDURE — 36415 COLL VENOUS BLD VENIPUNCTURE: CPT

## 2021-05-17 RX ORDER — INSULIN LISPRO 100 [IU]/ML
10 INJECTION, SOLUTION INTRAVENOUS; SUBCUTANEOUS 3 TIMES DAILY
Qty: 1 VIAL | Refills: 0 | Status: SHIPPED
Start: 2021-05-17

## 2021-05-17 RX ORDER — HYDROCODONE BITARTRATE AND ACETAMINOPHEN 5; 325 MG/1; MG/1
1 TABLET ORAL
Qty: 12 TAB | Refills: 0 | Status: SHIPPED | OUTPATIENT
Start: 2021-05-17 | End: 2021-05-20

## 2021-05-17 RX ORDER — DOXYCYCLINE 100 MG/1
100 CAPSULE ORAL 2 TIMES DAILY
Qty: 28 CAP | Refills: 0 | Status: SHIPPED | OUTPATIENT
Start: 2021-05-17

## 2021-05-17 RX ORDER — ATORVASTATIN CALCIUM 20 MG/1
20 TABLET, FILM COATED ORAL DAILY
Qty: 30 TAB | Refills: 0 | Status: SHIPPED | OUTPATIENT
Start: 2021-05-18 | End: 2021-05-28 | Stop reason: SDUPTHER

## 2021-05-17 RX ORDER — INSULIN GLARGINE 100 [IU]/ML
40 INJECTION, SOLUTION SUBCUTANEOUS DAILY
Qty: 1 VIAL | Refills: 0 | Status: SHIPPED | OUTPATIENT
Start: 2021-05-17

## 2021-05-17 RX ORDER — AMLODIPINE BESYLATE 10 MG/1
10 TABLET ORAL DAILY
Qty: 30 TAB | Refills: 0 | Status: SHIPPED | OUTPATIENT
Start: 2021-05-18

## 2021-05-17 RX ADMIN — SUCRALFATE 1 G: 1 TABLET ORAL at 18:13

## 2021-05-17 RX ADMIN — SUCRALFATE 1 G: 1 TABLET ORAL at 22:12

## 2021-05-17 RX ADMIN — INSULIN LISPRO 10 UNITS: 100 INJECTION, SOLUTION INTRAVENOUS; SUBCUTANEOUS at 18:05

## 2021-05-17 RX ADMIN — Medication 10 ML: at 06:17

## 2021-05-17 RX ADMIN — SUCRALFATE 1 G: 1 TABLET ORAL at 12:00

## 2021-05-17 RX ADMIN — INSULIN GLARGINE 40 UNITS: 100 INJECTION, SOLUTION SUBCUTANEOUS at 18:05

## 2021-05-17 RX ADMIN — SODIUM CHLORIDE 125 MG: 9 INJECTION, SOLUTION INTRAVENOUS at 11:49

## 2021-05-17 RX ADMIN — SODIUM CHLORIDE 750 MG: 9 INJECTION, SOLUTION INTRAVENOUS at 06:16

## 2021-05-17 RX ADMIN — ATORVASTATIN CALCIUM 20 MG: 20 TABLET, FILM COATED ORAL at 08:43

## 2021-05-17 RX ADMIN — SODIUM CHLORIDE 750 MG: 9 INJECTION, SOLUTION INTRAVENOUS at 18:13

## 2021-05-17 RX ADMIN — AMLODIPINE BESYLATE 10 MG: 5 TABLET ORAL at 08:43

## 2021-05-17 RX ADMIN — MIRTAZAPINE 30 MG: 30 TABLET, FILM COATED ORAL at 22:12

## 2021-05-17 RX ADMIN — DIVALPROEX SODIUM 1500 MG: 500 TABLET, DELAYED RELEASE ORAL at 22:12

## 2021-05-17 RX ADMIN — Medication 10 ML: at 22:12

## 2021-05-17 RX ADMIN — PREDNISONE 20 MG: 20 TABLET ORAL at 08:43

## 2021-05-17 RX ADMIN — PANTOPRAZOLE SODIUM 40 MG: 40 TABLET, DELAYED RELEASE ORAL at 08:43

## 2021-05-17 RX ADMIN — SERTRALINE HYDROCHLORIDE 100 MG: 50 TABLET ORAL at 08:43

## 2021-05-17 RX ADMIN — ASPIRIN 81 MG: 81 TABLET, CHEWABLE ORAL at 08:43

## 2021-05-17 RX ADMIN — HYDROCODONE BITARTRATE AND ACETAMINOPHEN 1 TABLET: 5; 325 TABLET ORAL at 08:43

## 2021-05-17 RX ADMIN — SUCRALFATE 1 G: 1 TABLET ORAL at 08:43

## 2021-05-17 NOTE — PROGRESS NOTES
Infectious Disease Progress Note           Subjective:   Remains stable, transferred to , denies new complaints. No acute events since last seen   Objective:   Physical Exam:     Visit Vitals  BP (!) 100/51 (BP 1 Location: Left lower arm, BP Patient Position: At rest;Lying)   Pulse 73   Temp 97.6 °F (36.4 °C)   Resp 18   Ht 5' 6\" (1.676 m)   Wt 151 lb 11.2 oz (68.8 kg)   SpO2 94%   BMI 24.49 kg/m²      O2 Device: None (Room air)    Temp (24hrs), Av.7 °F (36.5 °C), Min:97.4 °F (36.3 °C), Max:98.1 °F (36.7 °C)    No intake/output data recorded. 05/15 1901 -  0700  In: -   Out: 1     General: NAD, resting comfortably   HEENT: OTILIA, Moist mucosa   Lungs: CTA b/l, no wheeze/rhonchi  Heart: S1S2+, RRR, no murmur  Abdo: Soft, NT, ND, +BS   Exts: right heel ulcer, tenderness on palpation, right hemiparesis   Skin: Right buttock chick ulcer, decreased surrounding induration, and tenderness     Data Review:       Recent Days:  Recent Labs     21  0740 21  1010 05/15/21  1035   WBC 6.9 6.3 7.3   HGB 11.7* 12.4 12.0*   HCT 36.9 39.6 37.7   * 447* 497*     Recent Labs     21  0740 21  1010 05/15/21  1035   BUN 26* 25* 30*   CREA 0.79 0.93 1.01     Lab Results   Component Value Date/Time    C-Reactive protein 1.64 (H) 2021 11:50 AM      Microbiology   - Right buttock wound Cx : MRSA, and coag neg staph     Diagnostics   CXR Results  (Last 48 hours)    None           Assessment/Plan     1. Right buttock ulcer, suspected abscess. S/p I&D on       MRSA/staph epidermidis isolated from wound Cx on       Remains afebrile w a normal WBC on routine labs       On Vanc day # 10/14. Plan on transitioning to Doxycycline upon d/c       Routine labs in the morning. Continue routine wound care as is already being done     2. Right heel ulcer/underlying PAD: S/p arteriogram on       [facility] tagged WBC scan neg for osteomyelitis (05/10)        3.  Chronic on chronic anemia blood loss anemia      Hgb staying stable after PRBC transfusion      4. Urinary incontinence: ongoing, increased risk for pressure ulcers     Sascha Pride MD    5/17/2021

## 2021-05-17 NOTE — PROGRESS NOTES
TRANSFER - IN REPORT:    Verbal report received from MARKO Holcomb on Cheko Lizarraga.  being received from 4W for continuation of care. Report consisted of patients Situation, Background, Assessment and   Recommendations(SBAR). Opportunity for questions and clarification was provided. Assessment completed upon patients arrival to unit and care assumed.

## 2021-05-17 NOTE — PROGRESS NOTES
Patients sugar was 46 when checked by PCT. Patient given 2 apple juices.  Will recheck in 15 minutes    Rechecked patients sugar and it was 159

## 2021-05-17 NOTE — PROGRESS NOTES
Spiritual Care Assessment/Progress Note  [facility]      NAME: Marcellus Michael MRN: [id number]  AGE: 60 y.o.  SEX: male  Amish Affiliation: Atheist   Language: English     5/17/2021     Total Time (in minutes): 6     Spiritual Assessment begun in [address] through conversation with:         [x]Patient        [] Family    [] Friend(s)        Reason for Consult: Initial/Spiritual assessment, patient floor     Spiritual beliefs: (Please include comment if needed)     [] Identifies with a maranda tradition:         [] Supported by a maranda community:            [] Claims no spiritual orientation:           [] Seeking spiritual identity:                [] Adheres to an individual form of spirituality:           [x] Not able to assess:                           Identified resources for coping:      [] Prayer                               [] Music                  [] Guided Imagery     [] Family/friends                 [] Pet visits     [] Devotional reading                         [x] Unknown     [] Other:                                              Interventions offered during this visit: (See comments for more details)    Patient Interventions: Coping skills reviewed/reinforced, Normalization of emotional/spiritual concerns           Plan of Care:     [] Support spiritual and/or cultural needs    [] Support AMD and/or advance care planning process      [] Support grieving process   [] Coordinate Rites and/or Rituals    [] Coordination with community clergy   [] No spiritual needs identified at this time   [] Detailed Plan of Care below (See Comments)  [] Make referral to Music Therapy  [] Make referral to Pet Therapy     [] Make referral to Addiction services  [] Make referral to [facility]  [] Make referral to Spiritual Care Partner  [] No future visits requested        [x] Follow up upon further referrals     Comments:  Visited patient in the [address] [place] [address] telemetry unit for initial assessment during rounds. Patient was alone during the visit. Patient declined visit from the . I normalized his choice to decline this visit and advised of  availability should he need support in the future. Chaplains will follow up if further referrals are requested. Chaplain Krzysztof Cortes M.Div.    can be reached by calling the  at [facility]  [phone]

## 2021-05-17 NOTE — PROGRESS NOTES
PROGRESS NOTE      Chief Complaints:  No new complaints. HPI and  Objective:    Patient states right foot feels better. Denies any fever chills denies abdominal pain nausea denies any chest pain. Review of Systems:  Rest of review of system negative. EXAM:  Visit Vitals  BP (!) 161/84 (BP 1 Location: Left lower arm, BP Patient Position: Lying)   Pulse 80   Temp 97.4 °F (36.3 °C)   Resp 16   Ht 5' 6\" (1.676 m)   Wt 151 lb 11.2 oz (68.8 kg)   SpO2 97%   BMI 24.49 kg/m²     Patient awake and alert  Head and neck atraumatic  Cardiovascular system regular rate  Pulmonary no audible wheeze  Abdomen soft  Neurological nonfocal  Skin is warm and moist.  Right foot feels warm Doppler signal noted in both DP and PT right heel black spot looks better. Recent Results (from the past 24 hour(s))   GLUCOSE, POC    Collection Time: 05/16/21  8:21 AM   Result Value Ref Range    Glucose (POC) 94 65 - 117 mg/dL    Performed by [place] [facility]    CBC WITH AUTOMATED DIFF    Collection Time: 05/16/21 10:10 AM   Result Value Ref Range    WBC 6.3 4.1 - 11.1 K/uL    RBC 4.45 4.10 - 5.70 M/uL    HGB 12.4 12.1 - 17.0 g/dL    HCT 39.6 36.6 - 50.3 %    MCV 89.0 80.0 - 99.0 FL    MCH 27.9 26.0 - 34.0 PG    MCHC 31.3 30.0 - 36.5 g/dL    RDW 16.5 (H) 11.5 - 14.5 %    PLATELET 469 (H) 264 - 400 K/uL    MPV 9.3 8.9 - 12.9 FL    NRBC 0.0 0.0  WBC    ABSOLUTE NRBC 0.00 0.00 - 0.01 K/uL    NEUTROPHILS 57 32 - 75 %    LYMPHOCYTES 28 12 - 49 %    MONOCYTES 12 5 - 13 %    EOSINOPHILS 2 0 - 7 %    BASOPHILS 1 0 - 1 %    IMMATURE GRANULOCYTES 0 0 - 0.5 %    ABS. NEUTROPHILS 3.6 1.8 - 8.0 K/UL    ABS. LYMPHOCYTES 1.7 0.8 - 3.5 K/UL    ABS. MONOCYTES 0.7 0.0 - 1.0 K/UL    ABS. EOSINOPHILS 0.2 0.0 - 0.4 K/UL    ABS. BASOPHILS 0.1 0.0 - 0.1 K/UL    ABS. IMM.  GRANS. 0.0 0.00 - 0.04 K/UL    DF AUTOMATED     METABOLIC PANEL, COMPREHENSIVE    Collection Time: 05/16/21 10:10 AM   Result Value Ref Range    Sodium 141 136 - 145 mmol/L    Potassium 3.8 3.5 - 5.1 mmol/L    Chloride 108 97 - 108 mmol/L    CO2 29 21 - 32 mmol/L    Anion gap 4 (L) 5 - 15 mmol/L    Glucose 69 65 - 100 mg/dL    BUN 25 (H) 6 - 20 mg/dL    Creatinine 0.93 0.70 - 1.30 mg/dL    BUN/Creatinine ratio 27 (H) 12 - 20      GFR est AA >60 >60 ml/min/1.73m2    GFR est non-AA >60 >60 ml/min/1.73m2    Calcium 8.9 8.5 - 10.1 mg/dL    Bilirubin, total 0.4 0.2 - 1.0 mg/dL    AST (SGOT) 24 15 - 37 U/L    ALT (SGPT) 34 12 - 78 U/L    Alk. phosphatase 83 45 - 117 U/L    Protein, total 6.9 6.4 - 8.2 g/dL    Albumin 2.7 (L) 3.5 - 5.0 g/dL    Globulin 4.2 (H) 2.0 - 4.0 g/dL    A-G Ratio 0.6 (L) 1.1 - 2.2     GLUCOSE, POC    Collection Time: 05/16/21 10:59 AM   Result Value Ref Range    Glucose (POC) 52 (LL) 65 - 117 mg/dL    Performed by Pilar Zhong, POC    Collection Time: 05/16/21 11:45 AM   Result Value Ref Range    Glucose (POC) 87 65 - 117 mg/dL    Performed by Pilar Zhong, POC    Collection Time: 05/16/21  4:16 PM   Result Value Ref Range    Glucose (POC) 131 (H) 65 - 117 mg/dL    Performed by Demarco Alvarez, POC    Collection Time: 05/16/21  8:26 PM   Result Value Ref Range    Glucose (POC) 252 (H) 65 - 117 mg/dL    Performed by Bhupinder Vargas        ASSESSMENT:   Patient is 60 y.o. with diagnosis of : Active Problems:    Severe anemia (5/6/2021)      Diabetic foot infection ([address] (5/6/2021)        PLAN:                 Patient doing well, patient will need to monitor wound healing progress. Continue surveillance vascular examinations continue current medical therapy patient is to be completely offloading on the right heel area upon discharge.

## 2021-05-17 NOTE — PROGRESS NOTES
Problem: Falls - Risk of  Goal: *Absence of Falls  Description: Document Arden Leungn Fall Risk and appropriate interventions in the flowsheet.   Outcome: Progressing Towards Goal  Note: Fall Risk Interventions:  Mobility Interventions: Bed/chair exit alarm, Patient to call before getting OOB, PT Consult for mobility concerns    Mentation Interventions: Bed/chair exit alarm, Reorient patient, Room close to nurse's station, Update white board    Medication Interventions: Bed/chair exit alarm, Patient to call before getting OOB, Teach patient to arise slowly    Elimination Interventions: Call light in reach, Bed/chair exit alarm    History of Falls Interventions: Bed/chair exit alarm

## 2021-05-17 NOTE — DISCHARGE SUMMARY
Discharge Summary     Patient: Porsche Johns MRN: [id number]  SSN: [id number]    YOB: 1965  Age: 60 y.o. Sex: male       Admit Date: 5/6/2021    Discharge Date: 5/17/2021      Admission Diagnoses: Severe anemia [D64.9]  Diabetic foot infection ([facility] [G34.932, L08.9]    Discharge Diagnoses:   Problem List as of 5/17/2021 Date Reviewed: 3/30/2021          Codes Class Noted - Resolved    Severe anemia ICD-10-CM: D64.9  ICD-9-CM: 285.9  5/6/2021 - Present        Diabetic foot infection ([facility] ICD-10-CM: E11.628, L08.9  ICD-9-CM: 250.80, 686.9  5/6/2021 - Present        DKA (diabetic ketoacidoses) ([place] [address] ICD-10-CM: E11.10  ICD-9-CM: 250.12  2/17/2021 - Present        Metabolic acidosis UYT-93-[id number]: E87.2  ICD-9-CM: 276.2  2/17/2021 - Present        Hyponatremia ICD-10-CM: E87.1  ICD-9-CM: 276.1  2/17/2021 - Present        Hyperkalemia ICD-10-CM: E87.5  ICD-9-CM: 276.7  2/17/2021 - Present        SHIRA (acute kidney injury) ([place] [address].) ICD-10-CM: N17.9  ICD-9-CM: 584.9  2/17/2021 - Present        NSTEMI (non-ST elevated myocardial infarction) ([facility] ICD-10-CM: I21.4  ICD-9-CM: 410.70  2/17/2021 - Present        Shock ([facility] ICD-10-CM: R57.9  ICD-9-CM: 785.50  2/17/2021 - Present        Iron deficiency anemia due to chronic blood loss ICD-10-CM: D50.0  ICD-9-CM: 280.0  2/26/2020 - Present        Memory impairment ICD-10-CM: R41.3  ICD-9-CM: 780.93  4/26/2016 - Present        Obsessive-compulsive disorder ICD-10-CM: F42.9  ICD-9-CM: 300.3  4/26/2016 - Present        Seizure disorder ([facility] ICD-10-CM: G40.909  ICD-9-CM: 345.90  2/3/2016 - Present        Uncontrolled type 2 diabetes mellitus ([facility] ICD-10-CM: E11.65  ICD-9-CM: 250.02  9/3/2015 - Present    Overview Signed 10/11/2018  8:28 AM by Deanna Fofana Last Assessment & Plan:    Hemoglobin A1c is elevated 8.8% this time which is up from 7.3% in December of 2016. Worsened.   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 Patient was asked to keep the foot offloaded until patient sees vascular surgeon continue to monitor the healing.   Radiological studies did not show osteomyelitis patient was transitioned to doxycycline and also to follow-up with hematology for blood disorder  Consults: Hematology/Oncology, Infectious Disease and Vascular Surgery    Significant Diagnostic Studies: labs:   Recent Results (from the past 24 hour(s))   GLUCOSE, POC    Collection Time: 05/16/21  8:26 PM   Result Value Ref Range    Glucose (POC) 252 (H) 65 - 117 mg/dL    Performed by Vincenzo ZHANG    CBC WITH AUTOMATED DIFF    Collection Time: 05/17/21  7:40 AM   Result Value Ref Range    WBC 6.9 4.1 - 11.1 K/uL    RBC 4.15 4.10 - 5.70 M/uL    HGB 11.7 (L) 12.1 - 17.0 g/dL    HCT 36.9 36.6 - 50.3 %    MCV 88.9 80.0 - 99.0 FL    MCH 28.2 26.0 - 34.0 PG    MCHC 31.7 30.0 - 36.5 g/dL    RDW 16.4 (H) 11.5 - 14.5 %    PLATELET 829 (H) 232 - 400 K/uL    MPV 9.4 8.9 - 12.9 FL    NRBC 0.0 0.0  WBC    ABSOLUTE NRBC 0.00 0.00 - 0.01 K/uL    NEUTROPHILS 52 32 - 75 % LYMPHOCYTES 31 12 - 49 %    MONOCYTES 14 (H) 5 - 13 %    EOSINOPHILS 2 0 - 7 %    BASOPHILS 1 0 - 1 %    IMMATURE GRANULOCYTES 0 0 - 0.5 %    ABS. NEUTROPHILS 3.6 1.8 - 8.0 K/UL    ABS. LYMPHOCYTES 2.1 0.8 - 3.5 K/UL    ABS. MONOCYTES 1.0 0.0 - 1.0 K/UL    ABS. EOSINOPHILS 0.1 0.0 - 0.4 K/UL    ABS. BASOPHILS 0.1 0.0 - 0.1 K/UL    ABS. IMM. GRANS. 0.0 0.00 - 0.04 K/UL    DF AUTOMATED     METABOLIC PANEL, COMPREHENSIVE    Collection Time: 05/17/21  7:40 AM   Result Value Ref Range    Sodium 143 136 - 145 mmol/L    Potassium 3.3 (L) 3.5 - 5.1 mmol/L    Chloride 111 (H) 97 - 108 mmol/L    CO2 29 21 - 32 mmol/L    Anion gap 3 (L) 5 - 15 mmol/L    Glucose 33 (LL) 65 - 100 mg/dL    BUN 26 (H) 6 - 20 mg/dL    Creatinine 0.79 0.70 - 1.30 mg/dL    BUN/Creatinine ratio 33 (H) 12 - 20      GFR est AA >60 >60 ml/min/1.73m2    GFR est non-AA >60 >60 ml/min/1.73m2    Calcium 8.7 8.5 - 10.1 mg/dL    Bilirubin, total 0.3 0.2 - 1.0 mg/dL    AST (SGOT) 12 (L) 15 - 37 U/L    ALT (SGPT) 25 12 - 78 U/L    Alk.  phosphatase 70 45 - 117 U/L    Protein, total 6.4 6.4 - 8.2 g/dL    Albumin 2.6 (L) 3.5 - 5.0 g/dL    Globulin 3.8 2.0 - 4.0 g/dL    A-G Ratio 0.7 (L) 1.1 - 2.2     GLUCOSE, POC    Collection Time: 05/17/21  7:55 AM   Result Value Ref Range    Glucose (POC) 46 (LL) 65 - 117 mg/dL    Performed by [address], POC    Collection Time: 05/17/21  9:53 AM   Result Value Ref Range    Glucose (POC) 159 (H) 65 - 117 mg/dL    Performed by [address] [place] [address], POC    Collection Time: 05/17/21 10:48 AM   Result Value Ref Range    Glucose (POC) 209 (H) 65 - 117 mg/dL    Performed by Lavonne MYERS [address] [place] [address], POC    Collection Time: 05/17/21  4:18 PM   Result Value Ref Range    Glucose (POC) 503 (H) 65 - 117 mg/dL    Performed by Alicia DAMICO INFLAM PROC LTD   Final Result      XR FOOT RT MIN 3 V   Final Result          Disposition: SNF    Discharge Medications:   Current Discharge Medication List START taking these medications    Details   amLODIPine (NORVASC) 10 mg tablet Take 1 Tab by mouth daily. Qty: 30 Tab, Refills: 0      HYDROcodone-acetaminophen (NORCO) 5-325 mg per tablet Take 1 Tab by mouth every four (4) hours as needed for Pain for up to 3 days. Max Daily Amount: 6 Tabs. Qty: 12 Tab, Refills: 0    Associated Diagnoses: Pain      insulin glargine (LANTUS) 100 unit/mL injection 40 Units by SubCUTAneous route daily. Qty: 1 Vial, Refills: 0      insulin lispro (HUMALOG) 100 unit/mL injection 10 Units by SubCUTAneous route three (3) times daily. Hold for bs < 120  Qty: 1 Vial, Refills: 0      doxycycline (MONODOX) 100 mg capsule Take 1 Cap by mouth two (2) times a day. Qty: 28 Cap, Refills: 0         CONTINUE these medications which have CHANGED    Details   atorvastatin (LIPITOR) 20 mg tablet Take 1 Tab by mouth daily. Qty: 30 Tab, Refills: 0         CONTINUE these medications which have NOT CHANGED    Details   pantoprazole (Protonix) 40 mg tablet Take 1 Tab by mouth Daily (before breakfast). Qty: 30 Tab, Refills: 3      sucralfate (Carafate) 1 gram tablet Take 1 Tab by mouth Before breakfast, lunch, dinner and at bedtime. Qty: 120 Tab, Refills: 3      mirtazapine (REMERON) 15 mg tablet TAKE ONE TABLET BY MOUTH AT BEDTIME. Qty: 30 Tab, Refills: 0      sertraline (ZOLOFT) 100 mg tablet TAKE ONE TABLET BY MOUTH DAILY  Qty: 30 Tab, Refills: 0      aspirin 81 mg chewable tablet Take 1 Tab by mouth daily. Qty: 30 Tab, Refills: 5      divalproex ER (Depakote ER) 500 mg ER tablet Take 1,500 mg by mouth nightly. ferrous sulfate 325 mg (65 mg iron) tablet Take 1 Tab by mouth two (2) times daily (after meals). Qty: 60 Tab, Refills: 2      albuterol-ipratropium (DUO-NEB) 2.5 mg-0.5 mg/3 ml nebu 3 mL by Nebulization route three (3) times daily as needed for Wheezing.          STOP taking these medications       insulin detemir U-100 (LEVEMIR FLEXTOUCH) 100 unit/mL (3 mL) inpn Comments:   Reason for Stopping:         insulin aspart U-100 (NovoLOG Flexpen U-100 Insulin) 100 unit/mL (3 mL) inpn Comments:   Reason for Stopping:         metFORMIN (GLUCOPHAGE) 850 mg tablet Comments:   Reason for Stopping:         magnesium hydroxide (MILK OF MAGNESIA) 400 mg/5 mL suspension Comments:   Reason for Stopping:         ondansetron (ZOFRAN ODT) 4 mg disintegrating tablet Comments:   Reason for Stopping:         insulin aspart U-100 (NovoLOG Flexpen U-100 Insulin) 100 unit/mL (3 mL) inpn Comments:   Reason for Stopping:         glucagon (GLUCAGEN) 1 mg injection Comments:   Reason for Stopping:         glucose 4 gram chewable tablet Comments:   Reason for Stopping:         guaiFENesin (ROBITUSSIN) 100 mg/5 mL liquid Comments:   Reason for Stopping:         hydrocortisone (CORTAID) 1 % topical cream Comments:   Reason for Stopping:         ibuprofen (Motrin IB) 200 mg tablet Comments:   Reason for Stopping:         diphenhydrAMINE (BENADRYL) 25 mg capsule Comments:   Reason for Stopping:         lip protectant (BLISTEX) 0.6-0.5-1.1-0.5 % oint ointment Comments:   Reason for Stopping:         lip protectant with sunscreen (CARMEX) crea Comments:   Reason for Stopping:         ketoconazole (NIZORAL) 2 % topical cream Comments:   Reason for Stopping:         loperamide (IMMODIUM) 2 mg tablet Comments:   Reason for Stopping:         acetaminophen (TYLENOL) 325 mg tablet Comments:   Reason for Stopping:               Activity: keep foot off loaded  Diet: Diabetic Diet  Wound Care: As directed    Follow-up Appointments   Procedures    FOLLOW UP VISIT Appointment in: 3 - 5 Days     Standing Status:   Standing     Number of Occurrences:   1     Order Specific Question:   Appointment in     Answer:   3 - 5 Days     45 minutes discharge time  Signed By: Lul Zambrano MD     May 17, 2021

## 2021-05-18 LAB
CREAT SERPL-MCNC: 1.41 MG/DL (ref 0.7–1.3)
GLUCOSE BLD STRIP.AUTO-MCNC: 113 MG/DL (ref 65–117)
GLUCOSE BLD STRIP.AUTO-MCNC: 305 MG/DL (ref 65–117)
GLUCOSE BLD STRIP.AUTO-MCNC: 64 MG/DL (ref 65–117)
PERFORMED BY, TECHID: ABNORMAL
PERFORMED BY, TECHID: ABNORMAL
PERFORMED BY, TECHID: NORMAL
VANCOMYCIN TROUGH SERPL-MCNC: 16.6 UG/ML (ref 5–10)

## 2021-05-18 PROCEDURE — 99231 SBSQ HOSP IP/OBS SF/LOW 25: CPT | Performed by: INTERNAL MEDICINE

## 2021-05-18 PROCEDURE — 82565 ASSAY OF CREATININE: CPT

## 2021-05-18 PROCEDURE — 82962 GLUCOSE BLOOD TEST: CPT

## 2021-05-18 PROCEDURE — 74011250637 HC RX REV CODE- 250/637: Performed by: INTERNAL MEDICINE

## 2021-05-18 PROCEDURE — 74011636637 HC RX REV CODE- 636/637: Performed by: INTERNAL MEDICINE

## 2021-05-18 PROCEDURE — 80202 ASSAY OF VANCOMYCIN: CPT

## 2021-05-18 PROCEDURE — 74011250637 HC RX REV CODE- 250/637: Performed by: FAMILY MEDICINE

## 2021-05-18 PROCEDURE — 65270000029 HC RM PRIVATE

## 2021-05-18 PROCEDURE — 36415 COLL VENOUS BLD VENIPUNCTURE: CPT

## 2021-05-18 PROCEDURE — 74011250636 HC RX REV CODE- 250/636: Performed by: INTERNAL MEDICINE

## 2021-05-18 RX ADMIN — SODIUM CHLORIDE 750 MG: 9 INJECTION, SOLUTION INTRAVENOUS at 06:19

## 2021-05-18 RX ADMIN — AMLODIPINE BESYLATE 10 MG: 5 TABLET ORAL at 08:57

## 2021-05-18 RX ADMIN — PREDNISONE 20 MG: 20 TABLET ORAL at 08:56

## 2021-05-18 RX ADMIN — DIVALPROEX SODIUM 1500 MG: 500 TABLET, DELAYED RELEASE ORAL at 22:57

## 2021-05-18 RX ADMIN — SUCRALFATE 1 G: 1 TABLET ORAL at 12:23

## 2021-05-18 RX ADMIN — SUCRALFATE 1 G: 1 TABLET ORAL at 17:49

## 2021-05-18 RX ADMIN — SUCRALFATE 1 G: 1 TABLET ORAL at 08:56

## 2021-05-18 RX ADMIN — SUCRALFATE 1 G: 1 TABLET ORAL at 22:58

## 2021-05-18 RX ADMIN — SODIUM CHLORIDE 750 MG: 9 INJECTION, SOLUTION INTRAVENOUS at 17:49

## 2021-05-18 RX ADMIN — INSULIN LISPRO 10 UNITS: 100 INJECTION, SOLUTION INTRAVENOUS; SUBCUTANEOUS at 16:30

## 2021-05-18 RX ADMIN — MIRTAZAPINE 30 MG: 30 TABLET, FILM COATED ORAL at 22:58

## 2021-05-18 RX ADMIN — PANTOPRAZOLE SODIUM 40 MG: 40 TABLET, DELAYED RELEASE ORAL at 08:56

## 2021-05-18 RX ADMIN — ATORVASTATIN CALCIUM 20 MG: 20 TABLET, FILM COATED ORAL at 08:57

## 2021-05-18 RX ADMIN — SERTRALINE HYDROCHLORIDE 100 MG: 50 TABLET ORAL at 08:56

## 2021-05-18 RX ADMIN — ASPIRIN 81 MG: 81 TABLET, CHEWABLE ORAL at 08:57

## 2021-05-18 RX ADMIN — Medication 10 ML: at 14:00

## 2021-05-18 RX ADMIN — Medication 10 ML: at 22:58

## 2021-05-18 NOTE — DISCHARGE SUMMARY
Discharge Summary     Patient: Harley Mantilla. MRN: [id number]  SSN: [id number]    YOB: 1965  Age: 60 y.o. Sex: male       Admit Date: 5/6/2021    Discharge Date: 5/18/2021      Admission Diagnoses: Severe anemia [D64.9]  Diabetic foot infection ([facility] [R71.250, L08.9]    Discharge Diagnoses:   Problem List as of 5/18/2021 Date Reviewed: 3/30/2021          Codes Class Noted - Resolved    Severe anemia ICD-10-CM: D64.9  ICD-9-CM: 285.9  5/6/2021 - Present        Diabetic foot infection ([facility] ICD-10-CM: E11.628, L08.9  ICD-9-CM: 250.80, 686.9  5/6/2021 - Present        DKA (diabetic ketoacidoses) ([facility] ICD-10-CM: E11.10  ICD-9-CM: 250.12  2/17/2021 - Present        Metabolic acidosis WSS-30-QY: E87.2  ICD-9-CM: 276.2  2/17/2021 - Present        Hyponatremia ICD-10-CM: E87.1  ICD-9-CM: 276.1  2/17/2021 - Present        Hyperkalemia ICD-10-CM: E87.5  ICD-9-CM: 276.7  2/17/2021 - Present        SHIRA (acute kidney injury) ([facility] ICD-10-CM: N17.9  ICD-9-CM: 584.9  2/17/2021 - Present        NSTEMI (non-ST elevated myocardial infarction) ([facility] ICD-10-CM: I21.4  ICD-9-CM: 410.70  2/17/2021 - Present        Shock ([facility] ICD-10-CM: R57.9  ICD-9-CM: 785.50  2/17/2021 - Present        Iron deficiency anemia due to chronic blood loss ICD-10-CM: D50.0  ICD-9-CM: 280.0  2/26/2020 - Present        Memory impairment ICD-10-CM: R41.3  ICD-9-CM: 780.93  4/26/2016 - Present        Obsessive-compulsive disorder ICD-10-CM: F42.9  ICD-9-CM: 300.3  4/26/2016 - Present        Seizure disorder ([facility] ICD-10-CM: G40.909  ICD-9-CM: 345.90  2/3/2016 - Present        Uncontrolled type 2 diabetes mellitus ([facility] ICD-10-CM: E11.65  ICD-9-CM: 250.02  9/3/2015 - Present    Overview Signed 10/11/2018  8:28 AM by Katt Thornton Last Assessment & Plan:    Hemoglobin A1c is elevated 8.8% this time which is up from 7.3% in December of 2016. Worsened.   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 Patient was asked to keep the foot offloaded until patient sees vascular surgeon continue to monitor the healing.   Radiological studies did not show osteomyelitis patient was transitioned to doxycycline and also to follow-up with hematology for blood disorder  Consults: Hematology/Oncology, Infectious Disease and Vascular Surgery    Significant Diagnostic Studies: labs:   Recent Results (from the past 24 hour(s))   GLUCOSE, POC    Collection Time: 05/17/21  8:33 PM   Result Value Ref Range    Glucose (POC) 328 (H) 65 - 117 mg/dL    Performed by [facility]    GLUCOSE, POC    Collection Time: 05/18/21  7:24 AM   Result Value Ref Range    Glucose (POC) 64 (L) 65 - 117 mg/dL    Performed by Cheng Petersen    GLUCOSE, POC    Collection Time: 05/18/21 11:23 AM   Result Value Ref Range    Glucose (POC) 113 65 - 117 mg/dL    Performed by Pillo Davidson, TROUGH    Collection Time: 05/18/21  3:15 PM   Result Value Ref Range    Vancomycin,trough 16.6 (H) 5.0 - 10.0 ug/mL   CREATININE    Collection Time: 05/18/21  3:15 PM   Result Value Ref Range    Creatinine 1.41 (H) 0.70 - 1.30 mg/dL   GLUCOSE, POC    Collection Time: 05/18/21  3:21 PM   Result Value Ref Range    Glucose (POC) 305 (H) 65 - 117 mg/dL    Performed by YOHAN DAMICO INFLAM PROC LTD   Final Result      XR FOOT RT MIN 3 V   Final Result          Disposition: SNF    Discharge Medications:   Current Discharge Medication List      START taking these medications    Details   amLODIPine (NORVASC) 10 mg tablet Take 1 Tab by mouth daily. Qty: 30 Tab, Refills: 0      HYDROcodone-acetaminophen (NORCO) 5-325 mg per tablet Take 1 Tab by mouth every four (4) hours as needed for Pain for up to 3 days. Max Daily Amount: 6 Tabs. Qty: 12 Tab, Refills: 0    Associated Diagnoses: Pain      insulin glargine (LANTUS) 100 unit/mL injection 40 Units by SubCUTAneous route daily. Qty: 1 Vial, Refills: 0      insulin lispro (HUMALOG) 100 unit/mL injection 10 Units by SubCUTAneous route three (3) times daily. Hold for bs < 120  Qty: 1 Vial, Refills: 0      doxycycline (MONODOX) 100 mg capsule Take 1 Cap by mouth two (2) times a day. Qty: 28 Cap, Refills: 0         CONTINUE these medications which have CHANGED    Details   atorvastatin (LIPITOR) 20 mg tablet Take 1 Tab by mouth daily. Qty: 30 Tab, Refills: 0         CONTINUE these medications which have NOT CHANGED    Details   pantoprazole (Protonix) 40 mg tablet Take 1 Tab by mouth Daily (before breakfast). Qty: 30 Tab, Refills: 3      sucralfate (Carafate) 1 gram tablet Take 1 Tab by mouth Before breakfast, lunch, dinner and at bedtime. Qty: 120 Tab, Refills: 3      mirtazapine (REMERON) 15 mg tablet TAKE ONE TABLET BY MOUTH AT BEDTIME. Qty: 30 Tab, Refills: 0      sertraline (ZOLOFT) 100 mg tablet TAKE ONE TABLET BY MOUTH DAILY  Qty: 30 Tab, Refills: 0      aspirin 81 mg chewable tablet Take 1 Tab by mouth daily. Qty: 30 Tab, Refills: 5      divalproex ER (Depakote ER) 500 mg ER tablet Take 1,500 mg by mouth nightly.       ferrous sulfate 325 mg (65 mg iron) tablet Take 1 Tab by mouth two (2) times daily (after meals). Qty: 60 Tab, Refills: 2      albuterol-ipratropium (DUO-NEB) 2.5 mg-0.5 mg/3 ml nebu 3 mL by Nebulization route three (3) times daily as needed for Wheezing.          STOP taking these medications       insulin detemir U-100 (LEVEMIR FLEXTOUCH) 100 unit/mL (3 mL) inpn Comments:   Reason for Stopping:         insulin aspart U-100 (NovoLOG Flexpen U-100 Insulin) 100 unit/mL (3 mL) inpn Comments:   Reason for Stopping:         metFORMIN (GLUCOPHAGE) 850 mg tablet Comments:   Reason for Stopping:         magnesium hydroxide (MILK OF MAGNESIA) 400 mg/5 mL suspension Comments:   Reason for Stopping:         ondansetron (ZOFRAN ODT) 4 mg disintegrating tablet Comments:   Reason for Stopping:         insulin aspart U-100 (NovoLOG Flexpen U-100 Insulin) 100 unit/mL (3 mL) inpn Comments:   Reason for Stopping:         glucagon (GLUCAGEN) 1 mg injection Comments:   Reason for Stopping:         glucose 4 gram chewable tablet Comments:   Reason for Stopping:         guaiFENesin (ROBITUSSIN) 100 mg/5 mL liquid Comments:   Reason for Stopping:         hydrocortisone (CORTAID) 1 % topical cream Comments:   Reason for Stopping:         ibuprofen (Motrin IB) 200 mg tablet Comments:   Reason for Stopping:         diphenhydrAMINE (BENADRYL) 25 mg capsule Comments:   Reason for Stopping:         lip protectant (BLISTEX) 0.6-0.5-1.1-0.5 % oint ointment Comments:   Reason for Stopping:         lip protectant with sunscreen (CARMEX) crea Comments:   Reason for Stopping:         ketoconazole (NIZORAL) 2 % topical cream Comments:   Reason for Stopping:         loperamide (IMMODIUM) 2 mg tablet Comments:   Reason for Stopping:         acetaminophen (TYLENOL) 325 mg tablet Comments:   Reason for Stopping:               Activity: keep foot off loaded  Diet: Diabetic Diet  Wound Care: As directed    Follow-up Appointments   Procedures    FOLLOW UP VISIT Appointment in: 3 - 5 Days     Standing Status:   Standing     Number of Occurrences:   1     Order Specific Question:   Appointment in     Answer:   3 - 5 Days     45 minutes discharge time  Called mother at [phone] busy tone  Signed By: Renu Trinh MD     May 18, 2021

## 2021-05-18 NOTE — PROGRESS NOTES
Problem: Pressure Injury - Risk of  Goal: *Prevention of pressure injury  Description: Document Doug Scale and appropriate interventions in the flowsheet. Outcome: Progressing Towards Goal  Note: Pressure Injury Interventions:  Sensory Interventions: Assess changes in LOC, Maintain/enhance activity level, Minimize linen layers, Keep linens dry and wrinkle-free    Moisture Interventions: Absorbent underpads, Apply protective barrier, creams and emollients, Check for incontinence Q2 hours and as needed, Maintain skin hydration (lotion/cream)    Activity Interventions: PT/OT evaluation    Mobility Interventions: HOB 30 degrees or less    Nutrition Interventions: Document food/fluid/supplement intake, Offer support with meals,snacks and hydration    Friction and Shear Interventions: Apply protective barrier, creams and emollients                Problem: Patient Education: Go to Patient Education Activity  Goal: Patient/Family Education  Outcome: Progressing Towards Goal     Problem: Falls - Risk of  Goal: *Absence of Falls  Description: Document Jeannette Fall Risk and appropriate interventions in the flowsheet.   Outcome: Progressing Towards Goal  Note: Fall Risk Interventions:  Mobility Interventions: Bed/chair exit alarm, Patient to call before getting OOB, PT Consult for mobility concerns, PT Consult for assist device competence    Mentation Interventions: Adequate sleep, hydration, pain control, Bed/chair exit alarm, Eyeglasses and hearing aids, Door open when patient unattended, More frequent rounding    Medication Interventions: Bed/chair exit alarm, Patient to call before getting OOB, Teach patient to arise slowly    Elimination Interventions: Bed/chair exit alarm, Call light in reach, Toileting schedule/hourly rounds    History of Falls Interventions: Bed/chair exit alarm         Problem: Risk for Spread of Infection  Goal: Prevent transmission of infectious organism to others  Description: Prevent the transmission of infectious organisms to other patients, staff members, and visitors.   Outcome: Progressing Towards Goal

## 2021-05-18 NOTE — PROGRESS NOTES
CM spoke to Dr. Smith via phone regarding psych consult for level 2. Dr. Smith reported that when he saw patient last week, patient was drowsy. Dr. Smith reported that he will not be in the hospital for the next two days because he has a court case. Dr. Smith will follow up with patient.

## 2021-05-18 NOTE — PROGRESS NOTES
Infectious Disease Progress Note           Subjective:   Pt seen and examined at bedside. Inquired when he will discharged home,  States he is tired of waiting, want his mother up dated on clinical status. No acute events since last seen   Objective:   Physical Exam:     Visit Vitals  BP (!) 104/55 (BP Patient Position: At rest)   Pulse 82   Temp 98.8 °F (37.1 °C)   Resp 20   Ht 5' 6\" (1.676 m)   Wt 151 lb 11.2 oz (68.8 kg)   SpO2 95%   BMI 24.49 kg/m²      O2 Device: None (Room air)    Temp (24hrs), Av.3 °F (36.8 °C), Min:97.8 °F (36.6 °C), Max:98.8 °F (37.1 °C)    No intake/output data recorded.  1901 -  0700  In: 480 [P.O.:480]  Out: -     General: NAD, resting comfortably   HEENT: OTILIA, Moist mucosa   Lungs: CTA b/l, no wheeze/rhonchi  Heart: S1S2+, RRR, no murmur  Abdo: Soft, NT, ND, +BS   Exts: right heel ulcer, tenderness on palpation, right hemiparesis   Skin: Right buttock chick ulcer, decreased surrounding induration, and tenderness     Data Review:       Recent Days:  Recent Labs     21  0740 21  1010   WBC 6.9 6.3   HGB 11.7* 12.4   HCT 36.9 39.6   * 447*     Recent Labs     21  1515 21  0740 21  1010   BUN  --  26* 25*   CREA 1.41* 0.79 0.93     Lab Results   Component Value Date/Time    C-Reactive protein 1.64 (H) 2021 11:50 AM      Microbiology   - Right buttock wound Cx : MRSA, and coag neg staph     Diagnostics   CXR Results  (Last 48 hours)    None           Assessment/Plan     1. Right buttock ulcer. S/p I&D on . MRSA/staph epidermidis isolated from wound Cx on       Healing decreased induration, no tenderness, minimal drainage       On Vanc day # . Plan on transitioning to Doxycycline upon d/c       I dont see a need for long term IV antibiotics. Continue frequent turns     2.  Right heel ulcer/underlying PAD: S/p arteriogram on       [facility] for [facility]el[facility] (05[facility]) Decreased tenderness, no erythema or edema         3. Chronic on chronic blood loss anemia.  Hgb staying stable after PRBC transfusion       Seen by HemeOnc, out pt f/u recommended      4. Urinary incontinence: May benefit from long term pacheco cath     Dispo: Per CM documentation awaiting Psych eval for level 2 prior to d/c     Rocio Kinsey MD    5/18/2021

## 2021-05-18 NOTE — PROGRESS NOTES
Consult for Vancomycin Dosing by Pharmacy by Dr. Esperanza Charles provided for this 60y.o. year old male , for indication of SSTI. Day of Therapy: 13  Goal of Level(s): 15-20mcg/dL    Previous Regimen: 750mg IV q12h     New Regimen:   Vancomycin trough is therapeutic (16.6). Continue the current regimen. Pharmacy to follow daily and will make changes to dose and/or frequency based on clinical status.

## 2021-05-19 LAB
DATE LAST DOSE: ABNORMAL
GLUCOSE BLD STRIP.AUTO-MCNC: 197 MG/DL (ref 65–117)
GLUCOSE BLD STRIP.AUTO-MCNC: 200 MG/DL (ref 65–117)
GLUCOSE BLD STRIP.AUTO-MCNC: 306 MG/DL (ref 65–117)
GLUCOSE BLD STRIP.AUTO-MCNC: 79 MG/DL (ref 65–117)
PERFORMED BY, TECHID: ABNORMAL
PERFORMED BY, TECHID: NORMAL
REPORTED DOSE,DOSE: ABNORMAL UNITS
VANCOMYCIN TROUGH SERPL-MCNC: 15.4 UG/ML (ref 5–10)

## 2021-05-19 PROCEDURE — 74011250636 HC RX REV CODE- 250/636: Performed by: INTERNAL MEDICINE

## 2021-05-19 PROCEDURE — 36415 COLL VENOUS BLD VENIPUNCTURE: CPT

## 2021-05-19 PROCEDURE — 74011250637 HC RX REV CODE- 250/637: Performed by: INTERNAL MEDICINE

## 2021-05-19 PROCEDURE — 74011250637 HC RX REV CODE- 250/637: Performed by: FAMILY MEDICINE

## 2021-05-19 PROCEDURE — 82962 GLUCOSE BLOOD TEST: CPT

## 2021-05-19 PROCEDURE — 65270000029 HC RM PRIVATE

## 2021-05-19 PROCEDURE — 74011250637 HC RX REV CODE- 250/637: Performed by: SURGERY

## 2021-05-19 PROCEDURE — 80202 ASSAY OF VANCOMYCIN: CPT

## 2021-05-19 PROCEDURE — 74011636637 HC RX REV CODE- 636/637: Performed by: INTERNAL MEDICINE

## 2021-05-19 RX ADMIN — INSULIN LISPRO 10 UNITS: 100 INJECTION, SOLUTION INTRAVENOUS; SUBCUTANEOUS at 11:23

## 2021-05-19 RX ADMIN — Medication 10 ML: at 06:08

## 2021-05-19 RX ADMIN — ATORVASTATIN CALCIUM 20 MG: 20 TABLET, FILM COATED ORAL at 09:11

## 2021-05-19 RX ADMIN — HYDROCODONE BITARTRATE AND ACETAMINOPHEN 1 TABLET: 7.5; 325 TABLET ORAL at 09:12

## 2021-05-19 RX ADMIN — VANCOMYCIN HYDROCHLORIDE 750 MG: 750 INJECTION, POWDER, LYOPHILIZED, FOR SOLUTION INTRAVENOUS at 20:34

## 2021-05-19 RX ADMIN — DIVALPROEX SODIUM 1500 MG: 500 TABLET, DELAYED RELEASE ORAL at 20:36

## 2021-05-19 RX ADMIN — PREDNISONE 20 MG: 20 TABLET ORAL at 09:11

## 2021-05-19 RX ADMIN — SUCRALFATE 1 G: 1 TABLET ORAL at 07:32

## 2021-05-19 RX ADMIN — AMLODIPINE BESYLATE 10 MG: 5 TABLET ORAL at 09:11

## 2021-05-19 RX ADMIN — HYDROCODONE BITARTRATE AND ACETAMINOPHEN 1 TABLET: 7.5; 325 TABLET ORAL at 17:25

## 2021-05-19 RX ADMIN — PANTOPRAZOLE SODIUM 40 MG: 40 TABLET, DELAYED RELEASE ORAL at 07:30

## 2021-05-19 RX ADMIN — MIRTAZAPINE 30 MG: 30 TABLET, FILM COATED ORAL at 20:35

## 2021-05-19 RX ADMIN — INSULIN LISPRO 10 UNITS: 100 INJECTION, SOLUTION INTRAVENOUS; SUBCUTANEOUS at 09:11

## 2021-05-19 RX ADMIN — ASPIRIN 81 MG: 81 TABLET, CHEWABLE ORAL at 09:11

## 2021-05-19 RX ADMIN — SUCRALFATE 1 G: 1 TABLET ORAL at 11:23

## 2021-05-19 RX ADMIN — SUCRALFATE 1 G: 1 TABLET ORAL at 20:35

## 2021-05-19 RX ADMIN — SERTRALINE HYDROCHLORIDE 100 MG: 50 TABLET ORAL at 09:11

## 2021-05-19 RX ADMIN — Medication 10 ML: at 20:41

## 2021-05-19 RX ADMIN — Medication 10 ML: at 15:17

## 2021-05-19 RX ADMIN — SODIUM CHLORIDE 750 MG: 9 INJECTION, SOLUTION INTRAVENOUS at 06:08

## 2021-05-19 RX ADMIN — INSULIN GLARGINE 40 UNITS: 100 INJECTION, SOLUTION SUBCUTANEOUS at 09:11

## 2021-05-19 RX ADMIN — SUCRALFATE 1 G: 1 TABLET ORAL at 17:22

## 2021-05-19 NOTE — DISCHARGE SUMMARY
Discharge Summary     Patient: Gavin Hart MRN: [id number]  SSN: [id number]    YOB: 1965  Age: 60 y.o. Sex: male       Admit Date: 5/6/2021    Discharge Date: 5/19/2021      Admission Diagnoses: Severe anemia [D64.9]  Diabetic foot infection ([facility] [I78.266, L08.9]    Discharge Diagnoses:   Problem List as of 5/19/2021 Date Reviewed: 3/30/2021        Codes Class Noted - Resolved    Severe anemia ICD-10-CM: D64.9  ICD-9-CM: 285.9  5/6/2021 - Present        Diabetic foot infection ([facility] ICD-10-CM: E11.628, L08.9  ICD-9-CM: 250.80, 686.9  5/6/2021 - Present        DKA (diabetic ketoacidoses) ([place] [address] ICD-10-CM: E11.10  ICD-9-CM: 250.12  2/17/2021 - Present        Metabolic acidosis [facility]: E87.2  ICD-9-CM: 276.2  2/17/2021 - Present        Hyponatremia ICD-10-CM: E87.1  ICD-9-CM: 276.1  2/17/2021 - Present        Hyperkalemia ICD-10-CM: E87.5  ICD-9-CM: 276.7  2/17/2021 - Present        SHIRA (acute kidney injury) ([place] [address].) ICD-10-CM: N17.9  ICD-9-CM: 584.9  2/17/2021 - Present        NSTEMI (non-ST elevated myocardial infarction) ([place] [address] ICD-10-CM: I21.4  ICD-9-CM: 410.70  2/17/2021 - Present        Shock ([place] [address].) ICD-10-CM: R57.9  ICD-9-CM: 785.50  2/17/2021 - Present        Iron deficiency anemia due to chronic blood loss ICD-10-CM: D50.0  ICD-9-CM: 280.0  2/26/2020 - Present        Memory impairment ICD-10-CM: R41.3  ICD-9-CM: 780.93  4/26/2016 - Present        Obsessive-compulsive disorder ICD-10-CM: F42.9  ICD-9-CM: 300.3  4/26/2016 - Present        Seizure disorder ([facility] ICD-10-CM: G40.909  ICD-9-CM: 345.90  2/3/2016 - Present        Uncontrolled type 2 diabetes mellitus ([facility] ICD-10-CM: E11.65  ICD-9-CM: 250.02  9/3/2015 - Present    Overview Signed 10/11/2018  8:28 AM by Haim Eduardo Last Assessment & Plan:    Hemoglobin A1c is elevated 8.8% this time which is up from 7.3% in December of 2016. Worsened.   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 Patient was asked to keep the foot offloaded until patient sees vascular surgeon continue to monitor the healing.   Radiological studies did not show osteomyelitis patient was transitioned to doxycycline and also to follow-up with hematology for blood disorder  Consults: Hematology/Oncology, Infectious Disease and Vascular Surgery    Significant Diagnostic Studies: labs:   Recent Results (from the past 24 hour(s))   GLUCOSE, POC    Collection Time: 05/18/21 11:23 AM   Result Value Ref Range    Glucose (POC) 113 65 - 117 mg/dL    Performed by Melody Ozuna, TROUGH    Collection Time: 05/18/21  3:15 PM   Result Value Ref Range    Vancomycin,trough 16.6 (H) 5.0 - 10.0 ug/mL   CREATININE    Collection Time: 05/18/21  3:15 PM   Result Value Ref Range    Creatinine 1.41 (H) 0.70 - 1.30 mg/dL   GLUCOSE, POC    Collection Time: 05/18/21  3:21 PM   Result Value Ref Range    Glucose (POC) 305 (H) 65 - 117 mg/dL    Performed by YOHAN SEVILLA    GLUCOSE, POC    Collection Time: 05/19/21  7:23 AM   Result Value Ref Range    Glucose (POC) 197 (H) 65 - 117 mg/dL    Performed by [facility] [employer] [employer] [facility]   Final Result      XR FOOT RT MIN 3 V   Final Result          Disposition: SNF    Discharge Medications:   Current Discharge Medication List      START taking these medications    Details   amLODIPine (NORVASC) 10 mg tablet Take 1 Tab by mouth daily. Qty: 30 Tab, Refills: 0      HYDROcodone-acetaminophen (NORCO) 5-325 mg per tablet Take 1 Tab by mouth every four (4) hours as needed for Pain for up to 3 days. Max Daily Amount: 6 Tabs. Qty: 12 Tab, Refills: 0    Associated Diagnoses: Pain      insulin glargine (LANTUS) 100 unit/mL injection 40 Units by SubCUTAneous route daily. Qty: 1 Vial, Refills: 0      insulin lispro (HUMALOG) 100 unit/mL injection 10 Units by SubCUTAneous route three (3) times daily. Hold for bs < 120  Qty: 1 Vial, Refills: 0      doxycycline (MONODOX) 100 mg capsule Take 1 Cap by mouth two (2) times a day. Qty: 28 Cap, Refills: 0         CONTINUE these medications which have CHANGED    Details   atorvastatin (LIPITOR) 20 mg tablet Take 1 Tab by mouth daily. Qty: 30 Tab, Refills: 0         CONTINUE these medications which have NOT CHANGED    Details   pantoprazole (Protonix) 40 mg tablet Take 1 Tab by mouth Daily (before breakfast). Qty: 30 Tab, Refills: 3      sucralfate (Carafate) 1 gram tablet Take 1 Tab by mouth Before breakfast, lunch, dinner and at bedtime. Qty: 120 Tab, Refills: 3      mirtazapine (REMERON) 15 mg tablet TAKE ONE TABLET BY MOUTH AT BEDTIME. Qty: 30 Tab, Refills: 0      sertraline (ZOLOFT) 100 mg tablet TAKE ONE TABLET BY MOUTH DAILY  Qty: 30 Tab, Refills: 0      aspirin 81 mg chewable tablet Take 1 Tab by mouth daily. Qty: 30 Tab, Refills: 5      divalproex ER (Depakote ER) 500 mg ER tablet Take 1,500 mg by mouth nightly. ferrous sulfate 325 mg (65 mg iron) tablet Take 1 Tab by mouth two (2) times daily (after meals).   Qty: 60 Tab, Refills: 2      albuterol-ipratropium (DUO-NEB) 2.5 mg-0.5 mg/3 ml nebu 3 mL by Nebulization route three (3) times daily as needed for Wheezing.          STOP taking these medications       insulin detemir U-100 (LEVEMIR FLEXTOUCH) 100 unit/mL (3 mL) inpn Comments:   Reason for Stopping:         insulin aspart U-100 (NovoLOG Flexpen U-100 Insulin) 100 unit/mL (3 mL) inpn Comments:   Reason for Stopping:         metFORMIN (GLUCOPHAGE) 850 mg tablet Comments:   Reason for Stopping:         magnesium hydroxide (MILK OF MAGNESIA) 400 mg/5 mL suspension Comments:   Reason for Stopping:         ondansetron (ZOFRAN ODT) 4 mg disintegrating tablet Comments:   Reason for Stopping:         insulin aspart U-100 (NovoLOG Flexpen U-100 Insulin) 100 unit/mL (3 mL) inpn Comments:   Reason for Stopping:         glucagon (GLUCAGEN) 1 mg injection Comments:   Reason for Stopping:         glucose 4 gram chewable tablet Comments:   Reason for Stopping:         guaiFENesin (ROBITUSSIN) 100 mg/5 mL liquid Comments:   Reason for Stopping:         hydrocortisone (CORTAID) 1 % topical cream Comments:   Reason for Stopping:         ibuprofen (Motrin IB) 200 mg tablet Comments:   Reason for Stopping:         diphenhydrAMINE (BENADRYL) 25 mg capsule Comments:   Reason for Stopping:         lip protectant (BLISTEX) 0.6-0.5-1.1-0.5 % oint ointment Comments:   Reason for Stopping:         lip protectant with sunscreen (CARMEX) crea Comments:   Reason for Stopping:         ketoconazole (NIZORAL) 2 % topical cream Comments:   Reason for Stopping:         loperamide (IMMODIUM) 2 mg tablet Comments:   Reason for Stopping:         acetaminophen (TYLENOL) 325 mg tablet Comments:   Reason for Stopping:               Activity: keep foot off loaded  Diet: Diabetic Diet  Wound Care: As directed    Follow-up Appointments   Procedures    FOLLOW UP VISIT Appointment in: 3 - 5 Days     Standing Status:   Standing     Number of Occurrences:   1     Order Specific Question:   Appointment in Answer:   3 - 5 Days     45 minutes discharge time  Called mother at [phone] busy tone again  Signed By: Lul Zambrano MD     May 19, 2021

## 2021-05-19 NOTE — INTERDISCIPLINARY ROUNDS
Two person skin assessment performed by Howard Prescott RN and Joseph Hidalgo RN. Patient has a stage 2 on his right hip that is packed with aquacell. Patient has excoriation to his sacrum. Covered with z-guard. Patient has a right heel diabetic foot ulcer. Patient left heel is very red, barely blanchable. Patient has one stitch to his left groin open to air.

## 2021-05-19 NOTE — PROGRESS NOTES
Vancomycin - 05/19/2021    Patient had Scr increase from 0.79 to 1.41. vancomycin therapy was on hold until level resulted. Level drawn at 1600 resulted 15. 4. last dose was 750mg IV administered 0608. Will restart vancomycin 750mg IV q12hr and continue to monitor renal function closely. Level ordered for 05/20 @ 1800. Patient pending discharge and awaiting placement.  PO Doxycycline planned for discharge abx per MD notes    Abby HendricksD

## 2021-05-19 NOTE — ROUTINE PROCESS
Bedside shift report given to Jeremy Hernandez RN  (oncoming nurse) by Kay Nelson RN (off going nurse). Report to include SBAR, plan of care, recent results, discharge planning, and patient's questions and concerns.

## 2021-05-19 NOTE — PROGRESS NOTES
Successful confirmation to POA re: SNF listing. BRO Sarah          10:58AM Outbound call to [facility], at [phone]. [facility] provided writer w/a fax number to send SNF listing. Fax # [phone]. SNF listing to be faxed to [address] [place] [address].         BRO Sarah

## 2021-05-19 NOTE — WOUND CARE
IP WOUND CONSULT Francheska Chaudhry MEDICAL RECORD NUMBER:  [id number] AGE: 60 y.o. GENDER: male  : 1965 TODAY'S DATE:  2021 GENERAL  
 
[] Follow-up [x] New Consult Francheska Chaudhry is a 60 y.o. male referred by:  
[x] Physician 
[] Nursing 
[] Other: PAST MEDICAL HISTORY Past Medical History:  
Diagnosis Date  Anxiety disorder  Arthritis  Depression  Diabetes ([facility]  Liver disease  Neurological disorder   
 neuro cognative disorder  OCD (obsessive compulsive disorder)  Psychiatric disorder OCD  Psychiatric disorder   
 anxiety  Seizures ([facility]  Thyroid disease PAST SURGICAL HISTORY Past Surgical History:  
Procedure Laterality Date  HX CRANIOTOMY FAMILY HISTORY Family History Problem Relation Age of Onset  Cancer Mother ALLERGIES Allergies Allergen Reactions  Penicillins Rash MEDICATIONS No current facility-administered medications on file prior to encounter. Current Outpatient Medications on File Prior to Encounter Medication Sig Dispense Refill  pantoprazole (Protonix) 40 mg tablet Take 1 Tab by mouth Daily (before breakfast). 30 Tab 3  
 sucralfate (Carafate) 1 gram tablet Take 1 Tab by mouth Before breakfast, lunch, dinner and at bedtime. 120 Tab 3  
 mirtazapine (REMERON) 15 mg tablet TAKE ONE TABLET BY MOUTH AT BEDTIME. 30 Tab 0  
 sertraline (ZOLOFT) 100 mg tablet TAKE ONE TABLET BY MOUTH DAILY 30 Tab 0  
 aspirin 81 mg chewable tablet Take 1 Tab by mouth daily. 30 Tab 5  divalproex ER (Depakote ER) 500 mg ER tablet Take 1,500 mg by mouth nightly.  insulin detemir U-100 (LEVEMIR FLEXTOUCH) 100 unit/mL (3 mL) inpn 22 Units by SubCUTAneous route Daily (before breakfast).  insulin aspart U-100 (NovoLOG Flexpen U-100 Insulin) 100 unit/mL (3 mL) inpn by SubCUTAneous route Before breakfast, lunch, and dinner. Sliding Scale Blood glucose Less than 70 give 0 units 70 to 150 give 3 units 151 to 200 give 5 units 201 to 250 give 7 units 251 to 300 give 12 units 301 to 350 give 15 units Greater than 350 give  20 units If blood glucose is greater than is greater than 300 recheck in 2 hours  ferrous sulfate 325 mg (65 mg iron) tablet Take 1 Tab by mouth two (2) times daily (after meals). 60 Tab 2  
 ondansetron (ZOFRAN ODT) 4 mg disintegrating tablet DISSOLVE 1 TABLET UNDER THE TONGUE EVERY 8 HOURS AS NEEDED FOR NAUSEA OR VOMITING 20 Tab 0  
 insulin aspart U-100 (NovoLOG Flexpen U-100 Insulin) 100 unit/mL (3 mL) inpn by SubCUTAneous route nightly. Sliding scale Blood glucose Less than 200 give 0 units 200 to 299 give 3 units Greater than 300 give 6 units  glucagon (GLUCAGEN) 1 mg injection 1 mg by IntraVENous route as needed for Hypoglycemia.  glucose 4 gram chewable tablet Take 45 g by mouth as needed (Blood glucose less than 70).  guaiFENesin (ROBITUSSIN) 100 mg/5 mL liquid Take 200 mg by mouth four (4) times daily as needed for Cough.  hydrocortisone (CORTAID) 1 % topical cream Apply  to affected area two (2) times daily as needed for Skin Irritation. use thin layer  ibuprofen (Motrin IB) 200 mg tablet Take 400 mg by mouth every four (4) hours as needed for Pain.  albuterol-ipratropium (DUO-NEB) 2.5 mg-0.5 mg/3 ml nebu 3 mL by Nebulization route three (3) times daily as needed for Wheezing. [unfilled] Visit Vitals /69 (BP 1 Location: Left upper arm, BP Patient Position: At rest) Pulse 90 Temp 97.8 °F (36.6 °C) Resp 18 Ht 5' 6\" (1.676 m) Wt 68.8 kg (151 lb 11.2 oz) SpO2 96% BMI 24.49 kg/m² ASSESSMENT Wound Identification & Type: surgical incision to right buttocks; unstageable PI to right heel; blanchable erythema to left heel Dressing change: Yes, see flowchart Verbal consent for picture: Yes Contributing Factors: anticoagulation therapy, diabetes, poor glucose control, decreased mobility, shear force and incontinence of urine Wound Heel Right #1 05/06/21 (Active) Wound Image   05/19/21 1047 Wound Etiology Pressure Unstageable 05/19/21 1047 Dressing Status New dressing applied 05/19/21 0608 Cleansed Cleansed with saline 05/19/21 1047 Dressing/Treatment Alginate; Foam 05/19/21 1047 Offloading for Diabetic Foot Ulcers Offloading boot 05/19/21 1047 Dressing Change Due 05/20/21 05/19/21 1047 Wound Length (cm) 0.7 cm 05/19/21 1047 Wound Width (cm) 0.8 cm 05/19/21 1047 Wound Depth (cm) 0.1 cm 05/19/21 1047 Wound Surface Area (cm^2) 0.56 cm^2 05/19/21 1047 Change in Wound Size % 22.22 05/19/21 1047 Wound Volume (cm^3) 0.06 cm^3 05/19/21 1047 Wound Healing % 14 05/19/21 1047 Wound Assessment Dry;Eschar dry 05/19/21 1047 Drainage Amount None 05/19/21 1047 Wound Odor None 05/19/21 1047 [place]ona-Wound/Incision Assessment Dry/flaky 05/19/21 1047 Edges Attached edges 05/19/21 1047 Number of days: 13 Wound Buttocks Right #2 05/06/21 (Active) Wound Image   05/19/21 1048 Wound Etiology Non-Healing Surgical 05/19/21 1048 Dressing Status Other (Comment) 05/18/21 2255 Cleansed Cleansed with saline 05/19/21 1048 Dressing/Treatment Alginate; Foam 05/19/21 1048 Dressing Change Due 05/20/21 05/19/21 1048 Wound Length (cm) 0.7 cm 05/19/21 1048 Wound Width (cm) 0.9 cm 05/19/21 1048 Wound Depth (cm) 0.8 cm 05/19/21 1048 Wound Surface Area (cm^2) 0.63 cm^2 05/19/21 1048 Change in Wound Size % 65 05/19/21 1048 Wound Volume (cm^3) 0.5 cm^3 05/19/21 1048 Wound Healing % -178 05/19/21 1048 Post-Procedure Length (cm) 1.4 cm 05/06/21 0843 Post-Procedure Width (cm) 1.7 cm 05/06/21 0843 Post-Procedure Depth (cm) 1 cm 05/06/21 0843 Post-Procedure Surface Area (cm^2) 2.38 cm^2 05/06/21 6262 Post-Procedure Volume (cm^3) 2.38 cm^3 05/06/21 7332 Wound Assessment Dry;Erythema;Pink/red 05/19/21 1048 Drainage Amount None 05/19/21 1048 Drainage Description Serosanguinous 05/10/21 1052 Wound Odor None 05/19/21 1048 [place]-Wound/Incision Assessment Dry/flaky; Blanchable erythema 05/19/21 1048 Edges Defined edges 05/19/21 1048 Wound Thickness Description Full thickness 05/19/21 1048 Number of days: 13 Wound Heel Left Blanchable Erythema, Intact 05/19/21 (Active) Wound Etiology Other (Comment) 05/19/21 1038 Wound Assessment Pink/red;Erythema 05/19/21 1038 Drainage Amount None 05/19/21 1038 Wound Odor None 05/19/21 1038 [place]-Wound/Incision Assessment Blanchable erythema 05/19/21 1038 Edges Attached edges 05/19/21 1038 Number of days: 0 PLAN Skin Care & Pressure Relief Recommendations Minimize layers of linen Turn/reposition approximately every 2 hours Pillow wedges Manage incontinence Promote continence; Skin Protective lotion/cream to buttocks and sacrum daily and as needed with incontinence care Offloading boots Doug 13 Blood Glucose: 197 on 5/19/21 Albumin: 2.6 on 5/17/21 WBCs: 6.9 on 5/17/21 Support Surface: gel mattrress Physician/Provider notified:  
Recommendations: Continue with current orders as prescribed by Dr. Cyrus Orourke. Apply heel boots to both heels for offloading. Wound to right buttock appears slightly deeper than previous assessment, however margins are slightly less. Turn and reposition q2h at 30 degrees or more with wedge to offload sacrum and buttocks. Use Quick Change Male Incontinence Wrap to manage  incontinence or other appropriate management system. Maintain HOB at 30 degrees or less if not contraindicated. Will continue to follow. Teaching completed with:  
[] Patient          
[] Family member      
[] Caregiver         
[] Nursing 
[] Other Patient/Caregiver Teaching: 
Level of patient/caregiver understanding able to:  
[] Indicates understanding       [] Needs reinforcement 
[] Unsuccessful      [] Verbal Understanding 
[] Demonstrated understanding       [] No evidence of learning 
[] Refused teaching         [] N/A Electronically signed by Harini Martinez RN on 5/19/2021 at 10:51 AM

## 2021-05-20 LAB
DATE LAST DOSE: 0
GLUCOSE BLD STRIP.AUTO-MCNC: 101 MG/DL (ref 65–117)
GLUCOSE BLD STRIP.AUTO-MCNC: 375 MG/DL (ref 65–117)
GLUCOSE BLD STRIP.AUTO-MCNC: 539 MG/DL (ref 65–117)
GLUCOSE BLD STRIP.AUTO-MCNC: 65 MG/DL (ref 65–117)
PERFORMED BY, TECHID: ABNORMAL
PERFORMED BY, TECHID: ABNORMAL
PERFORMED BY, TECHID: NORMAL
PERFORMED BY, TECHID: NORMAL
REPORTED DOSE,DOSE: 0 UNITS
VANCOMYCIN TROUGH SERPL-MCNC: 17.7 UG/ML (ref 5–10)

## 2021-05-20 PROCEDURE — 74011636637 HC RX REV CODE- 636/637: Performed by: INTERNAL MEDICINE

## 2021-05-20 PROCEDURE — 65270000029 HC RM PRIVATE

## 2021-05-20 PROCEDURE — 97530 THERAPEUTIC ACTIVITIES: CPT

## 2021-05-20 PROCEDURE — 74011250637 HC RX REV CODE- 250/637: Performed by: INTERNAL MEDICINE

## 2021-05-20 PROCEDURE — 74011250636 HC RX REV CODE- 250/636: Performed by: INTERNAL MEDICINE

## 2021-05-20 PROCEDURE — 80202 ASSAY OF VANCOMYCIN: CPT

## 2021-05-20 PROCEDURE — 36415 COLL VENOUS BLD VENIPUNCTURE: CPT

## 2021-05-20 PROCEDURE — 82962 GLUCOSE BLOOD TEST: CPT

## 2021-05-20 PROCEDURE — 74011250637 HC RX REV CODE- 250/637: Performed by: FAMILY MEDICINE

## 2021-05-20 RX ORDER — INSULIN LISPRO 100 [IU]/ML
12 INJECTION, SOLUTION INTRAVENOUS; SUBCUTANEOUS
Status: DISCONTINUED | OUTPATIENT
Start: 2021-05-21 | End: 2021-05-25 | Stop reason: HOSPADM

## 2021-05-20 RX ORDER — INSULIN GLARGINE 100 [IU]/ML
50 INJECTION, SOLUTION SUBCUTANEOUS DAILY
Status: DISCONTINUED | OUTPATIENT
Start: 2021-05-21 | End: 2021-05-25 | Stop reason: HOSPADM

## 2021-05-20 RX ADMIN — AMLODIPINE BESYLATE 10 MG: 5 TABLET ORAL at 10:04

## 2021-05-20 RX ADMIN — SUCRALFATE 1 G: 1 TABLET ORAL at 15:58

## 2021-05-20 RX ADMIN — PREDNISONE 20 MG: 20 TABLET ORAL at 10:03

## 2021-05-20 RX ADMIN — PANTOPRAZOLE SODIUM 40 MG: 40 TABLET, DELAYED RELEASE ORAL at 10:03

## 2021-05-20 RX ADMIN — SUCRALFATE 1 G: 1 TABLET ORAL at 20:14

## 2021-05-20 RX ADMIN — ASPIRIN 81 MG: 81 TABLET, CHEWABLE ORAL at 10:03

## 2021-05-20 RX ADMIN — VANCOMYCIN HYDROCHLORIDE 750 MG: 750 INJECTION, POWDER, LYOPHILIZED, FOR SOLUTION INTRAVENOUS at 10:02

## 2021-05-20 RX ADMIN — SUCRALFATE 1 G: 1 TABLET ORAL at 11:30

## 2021-05-20 RX ADMIN — DIVALPROEX SODIUM 1500 MG: 500 TABLET, DELAYED RELEASE ORAL at 20:13

## 2021-05-20 RX ADMIN — MIRTAZAPINE 30 MG: 30 TABLET, FILM COATED ORAL at 20:13

## 2021-05-20 RX ADMIN — SUCRALFATE 1 G: 1 TABLET ORAL at 10:03

## 2021-05-20 RX ADMIN — VANCOMYCIN HYDROCHLORIDE 750 MG: 750 INJECTION, POWDER, LYOPHILIZED, FOR SOLUTION INTRAVENOUS at 20:14

## 2021-05-20 RX ADMIN — Medication 10 ML: at 19:24

## 2021-05-20 RX ADMIN — INSULIN LISPRO 10 UNITS: 100 INJECTION, SOLUTION INTRAVENOUS; SUBCUTANEOUS at 16:30

## 2021-05-20 RX ADMIN — INSULIN GLARGINE 40 UNITS: 100 INJECTION, SOLUTION SUBCUTANEOUS at 09:00

## 2021-05-20 RX ADMIN — INSULIN LISPRO 10 UNITS: 100 INJECTION, SOLUTION INTRAVENOUS; SUBCUTANEOUS at 08:00

## 2021-05-20 RX ADMIN — SERTRALINE HYDROCHLORIDE 100 MG: 50 TABLET ORAL at 10:03

## 2021-05-20 RX ADMIN — Medication 10 ML: at 06:35

## 2021-05-20 NOTE — PROGRESS NOTES
Consult for Vancomycin Dosing by Pharmacy by Dr. Farhad Chris provided for this 60y.o. year old male , for indication of SSTI. Day of Therapy: 15  Goal of Level(s): 15-20mcg/dL      Significant Cultures: All Micro Results       Procedure Component Value Units Date/Time    CULTURE, BLOOD, PAIRED [[id number]] Collected: 05/06/21 1245    Order Status: Completed Specimen: Blood Updated: 05/13/21 0752     Special Requests: No Special Requests        Culture result: No growth 6 days       CULTURE, WOUND [[id number]]  (Susceptibility) Collected: 05/06/21 2015    Order Status: Completed Specimen: Wound Drainage Updated: 05/10/21 1107     Special Requests: No Special Requests        GRAM STAIN Few WBCs seen         No organisms seen        Culture result:       Few * methicillin resistant staphylococcus aureus *                  Few Staphylococcus species, coagulase negative          CULTURE, Vielka Ear STAIN [[id number]] Collected: 05/07/21 1200    Order Status: Canceled Specimen: Wound from Ulcer             Serum Creatinine Creatinine   Date Value Ref Range Status   05/18/2021 1.41 (H) 0.70 - 1.30 mg/dL Final   05/17/2021 0.79 0.70 - 1.30 mg/dL Final   05/16/2021 0.93 0.70 - 1.30 mg/dL Final      Creatinine Clearance Estimated Creatinine Clearance: 52.2 mL/min (A) (by C-G formula based on SCr of 1.41 mg/dL (H)). BUN Lab Results   Component Value Date/Time    BUN 26 (H) 05/17/2021 07:40 AM      WBC Lab Results   Component Value Date/Time    WBC 6.9 05/17/2021 07:40 AM      Temp 97.3 °F (36.3 °C)     Last Level:   Vancomycin,trough   Date Value Ref Range Status   05/20/2021 17.7 (H) 5.0 - 10.0 ug/mL Final     Comment:        Trough levels of 15-20 ug/mL should be targeted for patients with coagulase negative Staphylococcus and MRSA pneumonia, endocarditis,osteomyelitis, meningitis, and bacteremia, as well as patients not responding to lower levels.   Trough levels of 10-15 ug/mL for infections from other sources (e.g. urinary tract, cellulitis) are appropriate. All patients receiving concomitant nephrotoxic therapies should have their function closely monitored regardless of peak or trough levels. Ht Readings from Last 1 Encounters:   05/20/21 167.6 cm (66\")        Wt Readings from Last 1 Encounters:   05/13/21 68.8 kg (151 lb 11.2 oz)     Ideal body weight: 63.8 kg (140 lb 10.5 oz)  Adjusted ideal body weight: 65.8 kg (145 lb 1.2 oz)     Previous Regimen: 750mg IV q12h    New Regimen:   Vancomycin trough is therapeutic. Continue the current regimen. Pharmacy to follow daily and will make changes to dose and/or frequency based on clinical status.   _________________________________     Pharmacist Dixie Paredes

## 2021-05-20 NOTE — PROGRESS NOTES
Bedside and Verbal shift change report given to Bebo Nicholas RN (oncoming nurse) by Daniela Martinez RN (offgoing nurse). Report included the following information SBAR, Intake/Output, MAR, Recent Results, Med Rec Status and Cardiac Rhythm NSR.

## 2021-05-20 NOTE — DISCHARGE SUMMARY
Discharge Summary     Patient: Roosevelt Lazo MRN: [id number]  SSN: [id number]    YOB: 1965  Age: 60 y.o. Sex: male       Admit Date: 5/6/2021    Discharge Date: 5/20/2021      Admission Diagnoses: Severe anemia [D64.9]  Diabetic foot infection ([facility] [Q35.358, L08.9]    Discharge Diagnoses:   Problem List as of 5/20/2021 Date Reviewed: 3/30/2021        Codes Class Noted - Resolved    Severe anemia ICD-10-CM: D64.9  ICD-9-CM: 285.9  5/6/2021 - Present        Diabetic foot infection ([place] [address] ICD-10-CM: E11.628, L08.9  ICD-9-CM: 250.80, 686.9  5/6/2021 - Present        DKA (diabetic ketoacidoses) ([place] [address] ICD-10-CM: E11.10  ICD-9-CM: 250.12  2/17/2021 - Present        Metabolic acidosis [facility]-: E87.2  ICD-9-CM: 276.2  2/17/2021 - Present        Hyponatremia ICD-10-CM: E87.1  ICD-9-CM: 276.1  2/17/2021 - Present        Hyperkalemia ICD-10-CM: E87.5  ICD-9-CM: 276.7  2/17/2021 - Present        SHIRA (acute kidney injury) ([place] [address] ICD-10-CM: N17.9  ICD-9-CM: 584.9  2/17/2021 - Present        NSTEMI (non-ST elevated myocardial infarction) ([place] [address] ICD-10-CM: I21.4  ICD-9-CM: 410.70  2/17/2021 - Present        Shock ([place] [address] ICD-10-CM: R57.9  ICD-9-CM: 785.50  2/17/2021 - Present        Iron deficiency anemia due to chronic blood loss ICD-10-CM: D50.0  ICD-9-CM: 280.0  2/26/2020 - Present        Memory impairment ICD-10-CM: R41.3  ICD-9-CM: 780.93  4/26/2016 - Present        Obsessive-compulsive disorder ICD-10-CM: F42.9  ICD-9-CM: 300.3  4/26/2016 - Present        Seizure disorder ([facility] ICD-10-CM: G40.909  ICD-9-CM: 345.90  2/3/2016 - Present        Uncontrolled type 2 diabetes mellitus ([facility] ICD-10-CM: E11.65  ICD-9-CM: 250.02  9/3/2015 - Present    Overview Signed 10/11/2018  8:28 AM by Jese Boyer Last Assessment & Plan:    Hemoglobin A1c is elevated 8.8% this time which is up from 7.3% in December of 2016. Worsened.   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 Patient was asked to keep the foot offloaded until patient sees vascular surgeon continue to monitor the healing.   Radiological studies did not show osteomyelitis patient was transitioned to doxycycline and also to follow-up with hematology for blood disorder  Consults: Hematology/Oncology, Infectious Disease and Vascular Surgery    Significant Diagnostic Studies: labs:   Recent Results (from the past 24 hour(s))   GLUCOSE, POC    Collection Time: 05/19/21  5:23 PM   Result Value Ref Range    Glucose (POC) 79 65 - 117 mg/dL    Performed by Navi Urrutia, POC    Collection Time: 05/19/21  8:32 PM   Result Value Ref Range    Glucose (POC) 200 (H) 65 - 117 mg/dL    Performed by Valente Montague ([place])    GLUCOSE, POC    Collection Time: 05/20/21  8:26 AM   Result Value Ref Range    Glucose (POC) 65 65 - 117 mg/dL    Performed by Tevin 150, POC    Collection Time: 05/20/21  3:42 PM   Result Value Ref Range    Glucose (POC) 101 65 - 117 mg/dL    Performed by [facility] KAROL DAMICO INFLAM [facility]   Final Result      XR FOOT RT MIN 3 V   Final Result          Disposition: SNF    Discharge Medications:   Current Discharge Medication List      START taking these medications    Details   amLODIPine (NORVASC) 10 mg tablet Take 1 Tab by mouth daily. Qty: 30 Tab, Refills: 0      HYDROcodone-acetaminophen (NORCO) 5-325 mg per tablet Take 1 Tab by mouth every four (4) hours as needed for Pain for up to 3 days. Max Daily Amount: 6 Tabs. Qty: 12 Tab, Refills: 0    Associated Diagnoses: Pain      insulin glargine (LANTUS) 100 unit/mL injection 40 Units by SubCUTAneous route daily. Qty: 1 Vial, Refills: 0      insulin lispro (HUMALOG) 100 unit/mL injection 10 Units by SubCUTAneous route three (3) times daily. Hold for bs < 120  Qty: 1 Vial, Refills: 0      doxycycline (MONODOX) 100 mg capsule Take 1 Cap by mouth two (2) times a day. Qty: 28 Cap, Refills: 0         CONTINUE these medications which have CHANGED    Details   atorvastatin (LIPITOR) 20 mg tablet Take 1 Tab by mouth daily. Qty: 30 Tab, Refills: 0         CONTINUE these medications which have NOT CHANGED    Details   pantoprazole (Protonix) 40 mg tablet Take 1 Tab by mouth Daily (before breakfast). Qty: 30 Tab, Refills: 3      sucralfate (Carafate) 1 gram tablet Take 1 Tab by mouth Before breakfast, lunch, dinner and at bedtime. Qty: 120 Tab, Refills: 3      mirtazapine (REMERON) 15 mg tablet TAKE ONE TABLET BY MOUTH AT BEDTIME. Qty: 30 Tab, Refills: 0      sertraline (ZOLOFT) 100 mg tablet TAKE ONE TABLET BY MOUTH DAILY  Qty: 30 Tab, Refills: 0      aspirin 81 mg chewable tablet Take 1 Tab by mouth daily. Qty: 30 Tab, Refills: 5      divalproex ER (Depakote ER) 500 mg ER tablet Take 1,500 mg by mouth nightly. ferrous sulfate 325 mg (65 mg iron) tablet Take 1 Tab by mouth two (2) times daily (after meals).   Qty: 60 Tab, Refills: 2      albuterol-ipratropium (DUO-NEB) 2.5 mg-0.5 mg/3 ml nebu 3 mL by Nebulization route three (3) times daily as needed for Wheezing.          STOP taking these medications       insulin detemir U-100 (LEVEMIR FLEXTOUCH) 100 unit/mL (3 mL) inpn Comments:   Reason for Stopping:         insulin aspart U-100 (NovoLOG Flexpen U-100 Insulin) 100 unit/mL (3 mL) inpn Comments:   Reason for Stopping:         metFORMIN (GLUCOPHAGE) 850 mg tablet Comments:   Reason for Stopping:         magnesium hydroxide (MILK OF MAGNESIA) 400 mg/5 mL suspension Comments:   Reason for Stopping:         ondansetron (ZOFRAN ODT) 4 mg disintegrating tablet Comments:   Reason for Stopping:         insulin aspart U-100 (NovoLOG Flexpen U-100 Insulin) 100 unit/mL (3 mL) inpn Comments:   Reason for Stopping:         glucagon (GLUCAGEN) 1 mg injection Comments:   Reason for Stopping:         glucose 4 gram chewable tablet Comments:   Reason for Stopping:         guaiFENesin (ROBITUSSIN) 100 mg/5 mL liquid Comments:   Reason for Stopping:         hydrocortisone (CORTAID) 1 % topical cream Comments:   Reason for Stopping:         ibuprofen (Motrin IB) 200 mg tablet Comments:   Reason for Stopping:         diphenhydrAMINE (BENADRYL) 25 mg capsule Comments:   Reason for Stopping:         lip protectant (BLISTEX) 0.6-0.5-1.1-0.5 % oint ointment Comments:   Reason for Stopping:         lip protectant with sunscreen (CARMEX) crea Comments:   Reason for Stopping:         ketoconazole (NIZORAL) 2 % topical cream Comments:   Reason for Stopping:         loperamide (IMMODIUM) 2 mg tablet Comments:   Reason for Stopping:         acetaminophen (TYLENOL) 325 mg tablet Comments:   Reason for Stopping:               Activity: keep foot off loaded  Diet: Diabetic Diet  Wound Care: As directed    Follow-up Appointments   Procedures    FOLLOW UP VISIT Appointment in: 3 - 5 Days     Standing Status:   Standing     Number of Occurrences:   1     Order Specific Question:   Appointment in     Answer:   3 - 5 Days     45 minutes discharge time  Called mother at [phone] busy tone again  Signed By: Prince Stone MD     May 20, 2021

## 2021-05-20 NOTE — PROGRESS NOTES
SW consulted w/Dr. Jennie Dover ([facility]) and will evaluate the patient. BRO Chand                14:55PM Outbound call to [facility], @ [phone]. Identified self, role, and nature of the call. POA acknowledged receipt of SNF listing. Gave writer verbal consent to send SNF referrals to the following facilities'    [facility]; [facility]; and [facility]. SNF referrals sent via [facility]. POA informed writer of the following: \"my brother has never had a psychiatric inpatient hospitalization. His psych diagnosis is for OCD & Anxiety. My brother had a brain tumor at the age of 6. He got an infection from the brain abscess, and was diagnosed as a kid w/a TBI. \"       Patient unable to return to prior Group Home [facility]) d/t staff feels patient requires more help.           BRO Chand

## 2021-05-20 NOTE — PROGRESS NOTES
Problem: Mobility Impaired (Adult and Pediatric)  Goal: *Acute Goals and Plan of Care (Insert Text)  Description: Pt will be I with LE HEP in 7 days. Pt will perform bed mobility with mod I in 7 days. Pt will perform transfers with mod I in 7 days. Pt will amb - 25' feet with LRAD safely with SBA in 7 days. Outcome: Progressing Towards Goal   PHYSICAL THERAPY TREATMENT  Patient: Hue Enamorado. (56 y.o. male)  Date: 5/20/2021  Diagnosis: Severe anemia [D64.9]  Diabetic foot infection ([facility] [R03.667, L08.9] <principal problem not specified>  Procedure(s) (LRB):  ANGIOGRAPHY LOWER EXT RIGHT (N/A) 8 Days Post-Op  Precautions:    Chart, physical therapy assessment, plan of care and goals were reviewed. ASSESSMENT  Patient continues with skilled PT services and is progressing towards goals. Pt. Agreed to work with PT. Pt. Improving with mobility. Pt. Performed sit to stand with HHA mod assist and IV pole. Pt. Required min/mod assist for taking a few side steps along EOB. Pt. Tf to and from w/c with min assist X 1 and vcs. Pt.s R LE is weaker than L LE. Pt. Fatigues easily and requires rest breaks often. Current Level of Function Impacting Discharge (mobility/balance): decrease mobility/strength    Other factors to consider for discharge: safety/memory loss         PLAN :  Patient continues to benefit from skilled intervention to address the above impairments. Continue treatment per established plan of care. to address goals. Recommendation for discharge: (in order for the patient to meet his/her long term goals)  To be determined: Return to group home prior level of care vs SNF    This discharge recommendation:  Has been made in collaboration with the attending provider and/or case management    IF patient discharges home will need the following DME: Return to group home prior level of care vs SNF       SUBJECTIVE:   Patient stated I want to go home and get out of here. \" Pt.  Stated he was in a [facility] today. Pt. Also stated he mostly stayed in his w/c and did not ambulate much. OBJECTIVE DATA SUMMARY:   Critical Behavior:  Neurologic State: Alert, Confused  Orientation Level: Oriented to person, Oriented to place, Oriented to time, Disoriented to situation  Cognition: Follows commands  Safety/Judgement: Decreased awareness of need for safety  Functional Mobility Training:  Bed Mobility:  Rolling: Stand-by assistance  Supine to Sit: Minimum assistance  Sit to Supine: Minimum assistance  Scooting: Contact guard assistance        Transfers:  Sit to Stand: Moderate assistance  Stand to Sit: Moderate assistance                    Other:  (Tf to and from w/c min assist X 1.)        Balance:  Sitting: Intact  Standing: Impaired; With support  Standing - Static: Constant support; Fair  Standing - Dynamic : Constant support;Poor  Ambulation/Gait Training:  Distance (ft): 4 Feet (ft)  Assistive Device: Other (comment) (IV  POLE and HHA X 1 mod assist )  Ambulation - Level of Assistance: Minimal assistance; Moderate assistance (and IV pole)     Gait Description (WDL): Exceptions to [facility]           Base of Support: Narrowed     Speed/Phoebe: Slow  Step Length: Left shortened;Right shortened                    Therapeutic Exercises: Therapeutic Exercises:       EXERCISE   Sets   Reps   Active Active Assist   Passive Self ROM   Comments   Ankle Pumps   [x] [] [] [] L LE   Quad Sets/Glut Sets   [] [] [] []    Hamstring Sets   [] [] [] []    Short Arc Quads   [] [] [] []    Heel Slides  12 [x] [] [] []    Straight Leg Raises   [] [] [] []    Hip abd/add  12 [x] [] [] []    Long Arc Quads  12 [x] [x] [] [] Active assist for R LE but active L   Marching   [] [] [] []    PROM to R ankle   [] [] [] []         Pain Ratin/10 R LE      Activity Tolerance:   Fair and requires rest breaks  Please refer to the flowsheet for vital signs taken during this treatment.     After treatment patient left in no apparent distress:   Supine in bed, Call bell within reach, Bed / chair alarm activated, Side rails x 3, and RN notified of RX.    COMMUNICATION/COLLABORATION:   The patients plan of care was discussed with: Registered nurse.      Nargis Guerrero   Time Calculation: 39 mins

## 2021-05-20 NOTE — PROGRESS NOTES
Problem: Pressure Injury - Risk of  Goal: *Prevention of pressure injury  Description: Document Doug Scale and appropriate interventions in the flowsheet. Outcome: Progressing Towards Goal  Note: Pressure Injury Interventions:  Sensory Interventions: Assess changes in LOC, Check visual cues for pain, Keep linens dry and wrinkle-free, Turn and reposition approx. every two hours (pillows and wedges if needed)    Moisture Interventions: Absorbent underpads, Minimize layers    Activity Interventions: PT/OT evaluation    Mobility Interventions: HOB 30 degrees or less    Nutrition Interventions: Document food/fluid/supplement intake    Friction and Shear Interventions: Apply protective barrier, creams and emollients, HOB 30 degrees or less                Problem: Patient Education: Go to Patient Education Activity  Goal: Patient/Family Education  Outcome: Progressing Towards Goal     Problem: Falls - Risk of  Goal: *Absence of Falls  Description: Document Jeannette Fall Risk and appropriate interventions in the flowsheet. Outcome: Progressing Towards Goal  Note: Fall Risk Interventions:  Mobility Interventions: Bed/chair exit alarm    Mentation Interventions: Bed/chair exit alarm    Medication Interventions: Bed/chair exit alarm    Elimination Interventions: Call light in reach    History of Falls Interventions: Bed/chair exit alarm         Problem: Patient Education: Go to Patient Education Activity  Goal: Patient/Family Education  Outcome: Progressing Towards Goal     Problem: Impaired Skin Integrity/Pressure Injury Treatment  Goal: *Improvement of Existing Pressure Injury  Outcome: Progressing Towards Goal  Goal: *Prevention of pressure injury  Description: Document Doug Scale and appropriate interventions in the flowsheet.   Outcome: Progressing Towards Goal  Note: Pressure Injury Interventions:  Sensory Interventions: Assess changes in LOC, Check visual cues for pain, Keep linens dry and wrinkle-free, Turn and reposition approx. every two hours (pillows and wedges if needed)    Moisture Interventions: Absorbent underpads, Minimize layers    Activity Interventions: PT/OT evaluation    Mobility Interventions: HOB 30 degrees or less    Nutrition Interventions: Document food/fluid/supplement intake    Friction and Shear Interventions: Apply protective barrier, creams and emollients, HOB 30 degrees or less                Problem: Patient Education: Go to Patient Education Activity  Goal: Patient/Family Education  Outcome: Progressing Towards Goal     Problem: Risk for Spread of Infection  Goal: Prevent transmission of infectious organism to others  Description: Prevent the transmission of infectious organisms to other patients, staff members, and visitors.   Outcome: Progressing Towards Goal     Problem: Patient Education:  Go to Education Activity  Goal: Patient/Family Education  Outcome: Progressing Towards Goal     Problem: Patient Education: Go to Patient Education Activity  Goal: Patient/Family Education  Outcome: Progressing Towards Goal

## 2021-05-20 NOTE — PROGRESS NOTES
Comprehensive Nutrition Assessment    Type and Reason for Visit: RD nutrition re-screen/LOS    Nutrition Recommendations/Plan:   Continue current diet  Nursing to monitor BM, I/Os, %PO and ONS    Nutrition Assessment:  Admitted 2/2 severe anemia. Expected d/c 5/19. Current appetite reported as good. Per pt %PO DM CC and ONS. Nutrition intervention- RD to add pt food likes/wants. Labs: BG . K 3.3. BUN 26. HGB 11.7. Cl 111. Creat 1.41. AST 12. Alb 2.6. Meds reviewed. Malnutrition Assessment:  Malnutrition Status:  No malnutrition      Nutrition Related Findings:  No NFPE performed, nourished. Pt denies N/V/C/D. BM 5/20 normal. Pt denies C/S issues. No edema documented. Wounds:    Moisture associate skin damage (Left heel blanchable erythema)       Current Nutrition Therapies:  DIET NUTRITIONAL SUPPLEMENTS Lunch, Dinner; Yuniel  DIET NUTRITIONAL SUPPLEMENTS Breakfast, Lunch, Dinner; Beneprotein  DIET DIABETIC CONSISTENT CARB Regular    Anthropometric Measures:  · Height:  5' 6\" (167.6 cm)  · Current Body Wt:  68.8 kg (151 lb 10.8 oz)   · Admission Body Wt:  148 lb    · Usual Body Wt:  63.5 kg (140 lb)  · Ideal Body Wt:  142 lbs:  106.8 %    · BMI Category:  Normal weight (BMI 18.5-24. 9)     Wt Readings from Last 3 Encounters:   05/13/21 68.8 kg (151 lb 11.2 oz)   05/06/21 67.1 kg (148 lb)   03/30/21 66.3 kg (146 lb 1 oz)       Nutrition Interventions:   Food and/or Nutrient Delivery: Continue current diet, Continue oral nutrition supplement  Nutrition Education and Counseling: No recommendations at this time  Coordination of Nutrition Care: No recommendation at this time    Nutrition Monitoring and Evaluation:   Behavioral-Environmental Outcomes: None identified  Food/Nutrient Intake Outcomes: Food and nutrient intake  Physical Signs/Symptoms Outcomes: Biochemical data, Weight    Discharge Planning:    Continue current diet, Continue oral nutrition supplement     Electronically signed by Inell  AIRAM Powell on 5/20/2021 at 1:57 PM    Contact: Ext [phone]

## 2021-05-21 LAB
GLUCOSE BLD STRIP.AUTO-MCNC: 264 MG/DL (ref 65–117)
GLUCOSE BLD STRIP.AUTO-MCNC: 286 MG/DL (ref 65–117)
PERFORMED BY, TECHID: ABNORMAL
PERFORMED BY, TECHID: ABNORMAL

## 2021-05-21 PROCEDURE — 82962 GLUCOSE BLOOD TEST: CPT

## 2021-05-21 PROCEDURE — 74011250637 HC RX REV CODE- 250/637: Performed by: FAMILY MEDICINE

## 2021-05-21 PROCEDURE — 74011250637 HC RX REV CODE- 250/637: Performed by: INTERNAL MEDICINE

## 2021-05-21 PROCEDURE — 74011636637 HC RX REV CODE- 636/637: Performed by: INTERNAL MEDICINE

## 2021-05-21 PROCEDURE — 99232 SBSQ HOSP IP/OBS MODERATE 35: CPT | Performed by: INTERNAL MEDICINE

## 2021-05-21 PROCEDURE — 65270000029 HC RM PRIVATE

## 2021-05-21 PROCEDURE — 74011250636 HC RX REV CODE- 250/636: Performed by: INTERNAL MEDICINE

## 2021-05-21 RX ORDER — DOXYCYCLINE 100 MG/1
100 CAPSULE ORAL EVERY 12 HOURS
Status: DISCONTINUED | OUTPATIENT
Start: 2021-05-21 | End: 2021-05-25 | Stop reason: HOSPADM

## 2021-05-21 RX ADMIN — ATORVASTATIN CALCIUM 20 MG: 20 TABLET, FILM COATED ORAL at 08:55

## 2021-05-21 RX ADMIN — PANTOPRAZOLE SODIUM 40 MG: 40 TABLET, DELAYED RELEASE ORAL at 08:55

## 2021-05-21 RX ADMIN — DIVALPROEX SODIUM 1500 MG: 500 TABLET, DELAYED RELEASE ORAL at 20:25

## 2021-05-21 RX ADMIN — DOXYCYCLINE HYCLATE 100 MG: 100 CAPSULE ORAL at 20:25

## 2021-05-21 RX ADMIN — DOXYCYCLINE HYCLATE 100 MG: 100 CAPSULE ORAL at 15:00

## 2021-05-21 RX ADMIN — SUCRALFATE 1 G: 1 TABLET ORAL at 05:29

## 2021-05-21 RX ADMIN — INSULIN LISPRO 12 UNITS: 100 INJECTION, SOLUTION INTRAVENOUS; SUBCUTANEOUS at 08:55

## 2021-05-21 RX ADMIN — ASPIRIN 81 MG: 81 TABLET, CHEWABLE ORAL at 08:55

## 2021-05-21 RX ADMIN — SUCRALFATE 1 G: 1 TABLET ORAL at 16:11

## 2021-05-21 RX ADMIN — VANCOMYCIN HYDROCHLORIDE 750 MG: 750 INJECTION, POWDER, LYOPHILIZED, FOR SOLUTION INTRAVENOUS at 08:55

## 2021-05-21 RX ADMIN — AMLODIPINE BESYLATE 10 MG: 5 TABLET ORAL at 08:55

## 2021-05-21 RX ADMIN — SERTRALINE HYDROCHLORIDE 100 MG: 50 TABLET ORAL at 09:00

## 2021-05-21 RX ADMIN — PREDNISONE 20 MG: 20 TABLET ORAL at 08:55

## 2021-05-21 RX ADMIN — INSULIN GLARGINE 50 UNITS: 100 INJECTION, SOLUTION SUBCUTANEOUS at 08:55

## 2021-05-21 RX ADMIN — INSULIN LISPRO 12 UNITS: 100 INJECTION, SOLUTION INTRAVENOUS; SUBCUTANEOUS at 16:30

## 2021-05-21 RX ADMIN — PANTOPRAZOLE SODIUM 40 MG: 40 TABLET, DELAYED RELEASE ORAL at 05:29

## 2021-05-21 RX ADMIN — SUCRALFATE 1 G: 1 TABLET ORAL at 20:25

## 2021-05-21 RX ADMIN — MIRTAZAPINE 30 MG: 30 TABLET, FILM COATED ORAL at 20:25

## 2021-05-21 RX ADMIN — INSULIN LISPRO 12 UNITS: 100 INJECTION, SOLUTION INTRAVENOUS; SUBCUTANEOUS at 11:30

## 2021-05-21 NOTE — PROGRESS NOTES
Discharge Summary     Patient: Oni Clement MRN: [id number]  SSN: [id number]    YOB: 1965  Age: 60 y.o. Sex: male       Admit Date: 5/6/2021    Discharge Date: 5/21/2021      Admission Diagnoses: Severe anemia [D64.9]  Diabetic foot infection ([facility] [Q30.853, L08.9]    Discharge Diagnoses:   Problem List as of 5/21/2021 Date Reviewed: 3/30/2021        Codes Class Noted - Resolved    Severe anemia ICD-10-CM: D64.9  ICD-9-CM: 285.9  5/6/2021 - Present        Diabetic foot infection ([place] [address] ICD-10-CM: E11.628, L08.9  ICD-9-CM: 250.80, 686.9  5/6/2021 - Present        DKA (diabetic ketoacidoses) ([place] [address] ICD-10-CM: E11.10  ICD-9-CM: 250.12  2/17/2021 - Present        Metabolic acidosis ZZI-28-Macedonian: E87.2  ICD-9-CM: 276.2  2/17/2021 - Present        Hyponatremia ICD-10-CM: E87.1  ICD-9-CM: 276.1  2/17/2021 - Present        Hyperkalemia ICD-10-CM: E87.5  ICD-9-CM: 276.7  2/17/2021 - Present        SHIRA (acute kidney injury) ([place] [address] ICD-10-CM: N17.9  ICD-9-CM: 584.9  2/17/2021 - Present        NSTEMI (non-ST elevated myocardial infarction) ([place] [address] ICD-10-CM: I21.4  ICD-9-CM: 410.70  2/17/2021 - Present        Shock ([place] [address] ICD-10-CM: R57.9  ICD-9-CM: 785.50  2/17/2021 - Present        Iron deficiency anemia due to chronic blood loss ICD-10-CM: D50.0  ICD-9-CM: 280.0  2/26/2020 - Present        Memory impairment ICD-10-CM: R41.3  ICD-9-CM: 780.93  4/26/2016 - Present        Obsessive-compulsive disorder ICD-10-CM: F42.9  ICD-9-CM: 300.3  4/26/2016 - Present        Seizure disorder ([facility] ICD-10-CM: G40.909  ICD-9-CM: 345.90  2/3/2016 - Present        Uncontrolled type 2 diabetes mellitus ([facility] ICD-10-CM: E11.65  ICD-9-CM: 250.02  9/3/2015 - Present    Overview Signed 10/11/2018  8:28 AM by Maday Will Last Assessment & Plan:    Hemoglobin A1c is elevated 8.8% this time which is up from 7.3% in December of 2016. Worsened.   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 Patient was asked to keep the foot offloaded until patient sees vascular surgeon continue to monitor the healing.   Radiological studies did not show osteomyelitis patient was transitioned to doxycycline and also to follow-up with hematology for blood disorder  Consults: Hematology/Oncology, Infectious Disease and Vascular Surgery    Significant Diagnostic Studies: labs:   Recent Results (from the past 24 hour(s))   VANCOMYCIN, TROUGH    Collection Time: 05/20/21  6:00 PM   Result Value Ref Range    Vancomycin,trough 17.7 (H) 5.0 - 10.0 ug/mL    Reported dose date 0      Reported dose: 0 Units   GLUCOSE, POC    Collection Time: 05/20/21  7:36 PM   Result Value Ref Range    Glucose (POC) 539 (H) 65 - 117 mg/dL    Performed by Noe Ortiz    GLUCOSE, POC    Collection Time: 05/20/21  7:39 PM   Result Value Ref Range    Glucose (POC) 375 (H) 65 - 117 mg/dL    Performed by Noe Ortiz    GLUCOSE, POC    Collection Time: 05/21/21  3:39 PM   Result Value Ref Range    Glucose (POC) 286 (H) 65 - 117 mg/dL    Performed by John Haynes NM INFLAM PROC LTD   Final Result      XR FOOT RT MIN 3 V   Final Result          Disposition: SNF    Discharge Medications:   Current Discharge Medication List      START taking these medications    Details   amLODIPine (NORVASC) 10 mg tablet Take 1 Tab by mouth daily. Qty: 30 Tab, Refills: 0      HYDROcodone-acetaminophen (NORCO) 5-325 mg per tablet Take 1 Tab by mouth every four (4) hours as needed for Pain for up to 3 days. Max Daily Amount: 6 Tabs. Qty: 12 Tab, Refills: 0    Associated Diagnoses: Pain      insulin glargine (LANTUS) 100 unit/mL injection 40 Units by SubCUTAneous route daily. Qty: 1 Vial, Refills: 0      insulin lispro (HUMALOG) 100 unit/mL injection 10 Units by SubCUTAneous route three (3) times daily. Hold for bs < 120  Qty: 1 Vial, Refills: 0      doxycycline (MONODOX) 100 mg capsule Take 1 Cap by mouth two (2) times a day. Qty: 28 Cap, Refills: 0         CONTINUE these medications which have CHANGED    Details   atorvastatin (LIPITOR) 20 mg tablet Take 1 Tab by mouth daily. Qty: 30 Tab, Refills: 0         CONTINUE these medications which have NOT CHANGED    Details   pantoprazole (Protonix) 40 mg tablet Take 1 Tab by mouth Daily (before breakfast). Qty: 30 Tab, Refills: 3      sucralfate (Carafate) 1 gram tablet Take 1 Tab by mouth Before breakfast, lunch, dinner and at bedtime. Qty: 120 Tab, Refills: 3      mirtazapine (REMERON) 15 mg tablet TAKE ONE TABLET BY MOUTH AT BEDTIME. Qty: 30 Tab, Refills: 0      sertraline (ZOLOFT) 100 mg tablet TAKE ONE TABLET BY MOUTH DAILY  Qty: 30 Tab, Refills: 0      aspirin 81 mg chewable tablet Take 1 Tab by mouth daily. Qty: 30 Tab, Refills: 5      divalproex ER (Depakote ER) 500 mg ER tablet Take 1,500 mg by mouth nightly. ferrous sulfate 325 mg (65 mg iron) tablet Take 1 Tab by mouth two (2) times daily (after meals).   Qty: 60 Tab, Refills: 2      albuterol-ipratropium (DUO-NEB) 2.5 mg-0.5 mg/3 ml nebu 3 mL by Nebulization route three (3) times daily as needed for Wheezing.          STOP taking these medications       insulin detemir U-100 (LEVEMIR FLEXTOUCH) 100 unit/mL (3 mL) inpn Comments:   Reason for Stopping:         insulin aspart U-100 (NovoLOG Flexpen U-100 Insulin) 100 unit/mL (3 mL) inpn Comments:   Reason for Stopping:         metFORMIN (GLUCOPHAGE) 850 mg tablet Comments:   Reason for Stopping:         magnesium hydroxide (MILK OF MAGNESIA) 400 mg/5 mL suspension Comments:   Reason for Stopping:         ondansetron (ZOFRAN ODT) 4 mg disintegrating tablet Comments:   Reason for Stopping:         insulin aspart U-100 (NovoLOG Flexpen U-100 Insulin) 100 unit/mL (3 mL) inpn Comments:   Reason for Stopping:         glucagon (GLUCAGEN) 1 mg injection Comments:   Reason for Stopping:         glucose 4 gram chewable tablet Comments:   Reason for Stopping:         guaiFENesin (ROBITUSSIN) 100 mg/5 mL liquid Comments:   Reason for Stopping:         hydrocortisone (CORTAID) 1 % topical cream Comments:   Reason for Stopping:         ibuprofen (Motrin IB) 200 mg tablet Comments:   Reason for Stopping:         diphenhydrAMINE (BENADRYL) 25 mg capsule Comments:   Reason for Stopping:         lip protectant (BLISTEX) 0.6-0.5-1.1-0.5 % oint ointment Comments:   Reason for Stopping:         lip protectant with sunscreen (CARMEX) crea Comments:   Reason for Stopping:         ketoconazole (NIZORAL) 2 % topical cream Comments:   Reason for Stopping:         loperamide (IMMODIUM) 2 mg tablet Comments:   Reason for Stopping:         acetaminophen (TYLENOL) 325 mg tablet Comments:   Reason for Stopping:               Activity: keep foot off loaded  Diet: Diabetic Diet  Wound Care: As directed    Follow-up Appointments   Procedures    FOLLOW UP VISIT Appointment in: 3 - 5 Days     Standing Status:   Standing     Number of Occurrences:   1     Order Specific Question:   Appointment in     Answer:   3 - 5 Days     45 minutes discharge time  Called mother at [phone] busy tone again  Signed By: Mazin Moore MD     May 21, 2021

## 2021-05-21 NOTE — PROGRESS NOTES
Infectious Disease Progress Note           Subjective:   Stable, awaiting placement. Denies new complaints. No acute events since last seen. Still w urinary incontinence   Objective:   Physical Exam:     Visit Vitals  /62   Pulse 80   Temp 98.4 °F (36.9 °C)   Resp 18   Ht 5' 6\" (1.676 m)   Wt 151 lb 11.2 oz (68.8 kg)   SpO2 95%   BMI 24.49 kg/m²      O2 Device: None (Room air)    Temp (24hrs), Av.1 °F (36.7 °C), Min:97.3 °F (36.3 °C), Max:98.6 °F (37 °C)    No intake/output data recorded. No intake/output data recorded. General: NAD, resting comfortably   HEENT: OTILIA, Moist mucosa   Lungs: CTA b/l, no wheeze/rhonchi  Heart: S1S2+, RRR, no murmur  Abdo: Soft, NT, ND, +BS   Exts: right heel ulcer, tenderness on palpation, right hemiparesis   Skin: Healing right buttock ulcer     Data Review:       Recent Days:  No results for input(s): WBC, HGB, HCT, PLT, HGBEXT, HCTEXT, PLTEXT, HGBEXT, HCTEXT, PLTEXT in the last 72 hours. Recent Labs     21  1515   CREA 1.41*     Lab Results   Component Value Date/Time    C-Reactive protein 1.64 (H) 2021 11:50 AM      Microbiology   - Right buttock wound Cx : MRSA, and coag neg staph     Diagnostics   CXR Results  (Last 48 hours)    None           Assessment/Plan     1. Right buttock ulcer. S/p I&D on . MRSA/staph epidermidis isolated from wound Cx on       Healing incisional wound, no drainage, induration or erythema       Completed 14 days of Vanc, will d/c and start on Doxycycline for 7 more days       Continue routine wound care     2. Right heel ulcer/underlying PAD: S/p arteriogram on       Ceretec tagged WBC scan neg for osteomyelitis (05/10)      Decreased right heel tenderness     3. Chronic on chronic blood loss anemia.  Hgb staying stable after PRBC transfusion       Seen by HemeOnc, out pt f/u recommended      4. Urinary incontinence: May benefit from long term pacheco cath     Dispo: Awaiting placement for d/c     Jim Santacruz MD    5/21/2021

## 2021-05-21 NOTE — PROGRESS NOTES
[address]   Progress Note          Chief Complaint: None    History of Present Illness:    History of Wound Context: right gluteal open wound. Wound/Ulcer Pain Timing/Severity: none  Quality of pain: none  Severity:  0 / 10   Modifying Factors: None  Associated Signs/Symptoms: none    Ulcer Identification:  Ulcer Type: pressure    Contributing Factors: diabetes, chronic pressure and malnutrition    Wound: right gluteal     Past Medical History:   Past Medical History:   Diagnosis Date    Anxiety disorder     Arthritis     Depression     Diabetes ([facility]     Liver disease     Neurological disorder     neuro cognative disorder    OCD (obsessive compulsive disorder)     Psychiatric disorder     OCD    Psychiatric disorder     anxiety    Seizures ([facility] [facility]     Thyroid disease        Past Surgical History:   Past Surgical History:   Procedure Laterality Date    HX CRANIOTOMY          Allergy:  Allergies   Allergen Reactions    Penicillins Rash       Social History:  reports that he has quit smoking. He has never used smokeless tobacco. He reports that he does not drink alcohol and does not use drugs.      Family History:  Family History   Problem Relation Age of Onset    Cancer Mother         Current Medications:  Current Facility-Administered Medications:     [START ON 5/21/2021] insulin lispro (HUMALOG) injection 12 Units, 12 Units, SubCUTAneous, TIDAC, Herrera Borja MD    [START ON 5/21/2021] insulin glargine (LANTUS) injection 50 Units, 50 Units, SubCUTAneous, DAILY, Herrera Borja MD    vancomycin (VANCOCIN) 750 mg in 0.9% sodium chloride 250 mL (VIAL-MATE), 750 mg, IntraVENous, Q12H, Herrera Boraj MD, 750 mg at 05/20/21 2014    amLODIPine (NORVASC) tablet 10 mg, 10 mg, Oral, DAILY, Fely Flood MD, 10 mg at 05/20/21 1004    sodium chloride (NS) flush 5-40 mL, 5-40 mL, IntraVENous, PRN, Mauricio, Farheen Heaton MD    HYDROcodone-acetaminophen [facility]) 7.5-325 mg per tablet 1 Tab, 1 Tablet, Oral, Q4H PRN, Mauricio, Jolynn Lyons MD, 1 Tablet at 05/19/21 1725    predniSONE (DELTASONE) tablet 20 mg, 20 mg, Oral, DAILY WITH BREAKFAST, Herrera Borja MD, 20 mg at 05/20/21 1003    0.9% sodium chloride infusion 250 mL, 250 mL, IntraVENous, PRN, Herrera Crook MD    0.9% sodium chloride infusion 250 mL, 250 mL, IntraVENous, PRN, Laminoris TaragHerrera MD    0.9% sodium chloride infusion 250 mL, 250 mL, IntraVENous, PRN, Mauricio, Jolynn Lyons MD    glucose chewable tablet 16 g, 4 Tablet, Oral, PRN, Pastora LAI MD    glucagon (GLUCAGEN) injection 1 mg, 1 mg, IntraMUSCular, PRN, Pastora LAI MD    dextrose (D50W) injection syrg 12.5-25 g, 25-50 mL, IntraVENous, PRN, Herrera Crook MD, 25 g at 05/12/21 0820    0.9% sodium chloride infusion 250 mL, 250 mL, IntraVENous, PRN, Paula Ochoa NP  [employer]  albuterol-ipratropium (DUO-NEB) 2.5 MG-0.5 MG/3 ML, 3 mL, Nebulization, Q6H PRN, Pastora LAI MD    aspirin chewable tablet 81 mg, 81 mg, Oral, DAILY, Herrera Borja MD, 81 mg at 05/20/21 1003    atorvastatin (LIPITOR) tablet 20 mg, 20 mg, Oral, DAILY, Herrera Borja MD, 20 mg at 05/19/21 0911    divalproex DR (DEPAKOTE) tablet 1,500 mg, 1,500 mg, Oral, QHS, Herrera Borja MD, 1,500 mg at 05/20/21 2013    mirtazapine (REMERON) tablet 30 mg, 30 mg, Oral, QHS, Herrera Borja MD, 30 mg at 05/20/21 2013    ondansetron (ZOFRAN ODT) tablet 4 mg, 4 mg, Oral, Q6H PRN, Pastora LAI MD    pantoprazole (PROTONIX) tablet 40 mg, 40 mg, Oral, ACB, Herrera Borja MD, 40 mg at 05/20/21 1003    sertraline (ZOLOFT) tablet 100 mg, 100 mg, Oral, DAILY, Herrera Borja MD, 100 mg at 05/20/21 1003    sucralfate (CARAFATE) tablet 1 g, 1 g, Oral, AC&HS, Ping Garner MD, 1 g at 05/20/21 2014    sodium chloride (NS) flush 5-40 mL, 5-40 mL, IntraVENous, PRN, Herrera Borja MD    0.9% sodium chloride infusion 250 mL, 250 mL, IntraVENous, PRN, Ping Garner MD  [employer] VANCOMYCIN INFORMATION NOTE 1 Each, 1 Each, Other, Rx Dosing/Monitoring, Chidi Pierce MD     Immunization History:   Most Recent Immunizations   Administered Date(s) Administered    Influenza Vaccine 10/03/2014    Influenza Vaccine (Quad) Mdck Pf (>4 Yrs Flucelvax QUAD [id number]) 11/07/2019    Influenza Vaccine (Quad) PF (>6 Mo Flulaval, Fluarix, and >3 Yrs Afluria, Fluzone [id number]) 10/11/2018    Pneumococcal Polysaccharide (PPSV-23) 10/31/2007    TB Skin Test (PPD) Intradermal 10/17/2018    Td 10/31/2007      Complete    Review of Systems:     Constitutional:  no fever,  no chills,  no sweats, No weakness, No fatigue, No decreased activity. Eye: No recent visual problem, No icterus, No discharge, No double vision. Ear/Nose/Mouth/Throat: No decreased hearing, No ear pain, No nasal congestion, No sore throat. Respiratory: No shortness of breath, No cough, No sputum production, No hemoptysis, No wheezing, No cyanosis. Cardiovascular: No chest pain, No palpitations, No bradycardia, No tachycardia, No peripheral edema, No syncope. Gastrointestinal: No nausea,  No vomiting, No diarrhea, No constipation, No heartburn,  No abdominal pain. Genitourinary: No dysuria, No hematuria, No change in urine stream, No urethral discharge, No lesions. Hematology/Lymphatics: No bruising tendency, No bleeding tendency, No petechiae, No swollen lymph glands. Endocrine: No excessive thirst, No polyuria, No cold intolerance, No heat intolerance, No excessive hunger. Immunologic: Not immunocompromised, No recurrent fevers, No recurrent infections. Musculoskeletal: No back pain, No neck pain, No joint pain, No muscle pain, No claudication, No decreased range of motion, No trauma. Integumentary: No rash, No pruritus, No abrasions. Neurologic: Alert and oriented X4, No abnormal balance, No headache, No confusion, No numbness, No tingling. Psychiatric: No anxiety, No depression, No nadia.     Physical Exam:     Vitals & Measurements: Wt Readings from Last 3 Encounters:   05/13/21 68.8 kg (151 lb 11.2 oz)   05/06/21 67.1 kg (148 lb)   03/30/21 66.3 kg (146 lb 1 oz)     Temp Readings from Last 3 Encounters:   05/20/21 98.6 °F (37 °C)   05/06/21 97.8 °F (36.6 °C)   03/30/21 97.3 °F (36.3 °C) (Oral)     BP Readings from Last 3 Encounters:   05/20/21 (!) 108/52   05/06/21 (!) 108/53   03/30/21 (!) 145/74     Pulse Readings from Last 3 Encounters:   05/20/21 86   05/06/21 99   03/30/21 88      Ht Readings from Last 3 Encounters:   05/20/21 5' 6\" (1.676 m)   05/06/21 5' 6\" (1.676 m)   03/30/21 5' 6\" (1.676 m)          General: well appearing, no acute distress  Head: Normal  Face: Nornal  HEENT: atraumatic, PERRLA, moist mucosa, normal pharynx, normal tonsils and adenoids, normal tongue, no fluid in sinuses  Neck: Trachea midline, no carotid bruit, no masses  Chest: Normal.  Respiratory: Normal chest wall expansion, CTA B, no r/r/w, no rubs  Cardiovascular: RRR, no m/r/g, Normal S1 and S2  Abdomen: Soft, non tender, non-distended, normal bowel sounds in all quadrants, no hepatosplenomegaly, no tympany. Genitourinary: No inguinal hernia, normal external gentalia, Testis & scrotum normal, no renal angle tenderness  Rectal: deferred  Musculoskeletal: normal ROM in upper and lower extremities, No joint swelling.   Integumentary: Warm, dry, and pink, with no rash, purpura, or petechia  Heme/Lymph: No lymphadenopathy, no bruises  Neurological:Cranial Nerves II-XII grossly intact, no gross motor or sensory deficit  Psychiatric: Cooperative with normal mood, affect, and cognition    Problem List Items Addressed This Visit        Endocrine    Diabetic foot infection ([address]    Relevant Medications    atorvastatin (LIPITOR) 20 mg tablet    insulin glargine (LANTUS) 100 unit/mL injection    insulin lispro (HUMALOG) 100 unit/mL injection    doxycycline (MONODOX) 100 mg capsule       Urinary    SHIRA (acute kidney injury) ([facility]       Other    Severe anemia - Primary      Other Visit Diagnoses     Buttock wound, right, subsequent encounter        Non-healing wound of right heel        PAD (peripheral artery disease) (HCC)        Relevant Medications    atorvastatin (LIPITOR) 20 mg tablet    Other Relevant Orders    INVASIVE VASCULAR PROCEDURE    Pain of right lower extremity        Relevant Orders    INVASIVE VASCULAR PROCEDURE (Completed)    Pain        Relevant Medications    HYDROcodone-acetaminophen (NORCO) 5-325 mg per tablet            Wound/Ulcer #: 3      Wound Heel Right #1 05/06/21 (Active)   Wound Image   05/19/21 1047   Wound Etiology Pressure Unstageable 05/19/21 1047   Dressing Status New dressing applied 05/19/21 0608   Cleansed Cleansed with saline 05/19/21 1047   Dressing/Treatment Alginate; Foam 05/19/21 1047   Offloading for Diabetic Foot Ulcers Offloading boot 05/19/21 1047   Dressing Change Due 05/20/21 05/19/21 1047   Wound Length (cm) 0.7 cm 05/19/21 1047   Wound Width (cm) 0.8 cm 05/19/21 1047   Wound Depth (cm) 0.1 cm 05/19/21 1047   Wound Surface Area (cm^2) 0.56 cm^2 05/19/21 1047   Change in Wound Size % 22.22 05/19/21 1047   Wound Volume (cm^3) 0.06 cm^3 05/19/21 1047   Wound Healing % 14 05/19/21 1047   Wound Assessment Dry;Eschar dry 05/19/21 1047   Drainage Amount None 05/19/21 1047   Wound Odor None 05/19/21 1047   [place]-Wound/Incision Assessment Dry/flaky 05/19/21 1047   Edges Attached edges 05/19/21 1047   Number of days: 14       Wound Buttocks Right #2 05/06/21 (Active)   Wound Image   05/19/21 1048   Wound Etiology Non-Healing Surgical 05/19/21 1048   Dressing Status Other (Comment) 05/18/21 2255   Cleansed Cleansed with saline 05/19/21 1048   Dressing/Treatment Alginate; Foam 05/19/21 1048   Dressing Change Due 05/20/21 05/19/21 1048   Wound Length (cm) 0.7 cm 05/19/21 1048   Wound Width (cm) 0.9 cm 05/19/21 1048   Wound Depth (cm) 0.8 cm 05/19/21 1048   Wound Surface Area (cm^2) 0.63 cm^2 05/19/21 1048   Change in Wound Size % 65 05/19/21 1048   Wound Volume (cm^3) 0.5 cm^3 05/19/21 1048   Wound Healing % -178 05/19/21 1048   Post-Procedure Length (cm) 1.4 cm 05/06/21 0843   Post-Procedure Width (cm) 1.7 cm 05/06/21 0843   Post-Procedure Depth (cm) 1 cm 05/06/21 0843   Post-Procedure Surface Area (cm^2) 2.38 cm^2 05/06/21 0843   Post-Procedure Volume (cm^3) 2.38 cm^3 05/06/21 0843   Wound Assessment Dry;Erythema;Pink/red 05/19/21 1048   Drainage Amount None 05/19/21 1048   Drainage Description Serosanguinous 05/10/21 1052   Wound Odor None 05/19/21 1048   [place]-Wound/Incision Assessment Dry/flaky; Blanchable erythema 05/19/21 1048   Edges Defined edges 05/19/21 1048   Wound Thickness Description Full thickness 05/19/21 1048   Number of days: 14       Wound Heel Left Blanchable Erythema, Intact 05/19/21 (Active)   Wound Etiology Other (Comment) 05/19/21 1038   Wound Assessment Pink/red;Erythema 05/19/21 1038   Drainage Amount None 05/19/21 1038   Wound Odor None 05/19/21 1038   [place]-Wound/Incision Assessment Blanchable erythema 05/19/21 1038   Edges Attached edges 05/19/21 1038   Number of days: 1          Laboratory Values:   Recent Results (from the past 24 hour(s))   GLUCOSE, POC    Collection Time: 05/20/21  8:26 AM   Result Value Ref Range    Glucose (POC) 65 65 - 117 mg/dL    Performed by Tevin Bautista, POC    Collection Time: 05/20/21  3:42 PM   Result Value Ref Range    Glucose (POC) 101 65 - 117 mg/dL    Performed by Cindy Boo, TROUGH    Collection Time: 05/20/21  6:00 PM   Result Value Ref Range    Vancomycin,trough 17.7 (H) 5.0 - 10.0 ug/mL    Reported dose date 0      Reported dose: 0 Units   GLUCOSE, POC    Collection Time: 05/20/21  7:36 PM   Result Value Ref Range    Glucose (POC) 539 (H) 65 - 117 mg/dL    Performed by Bertha Diaz    GLUCOSE, POC    Collection Time: 05/20/21  7:39 PM   Result Value Ref Range    Glucose (POC) 375 (H) 65 - 117 mg/dL    Performed by Bertha Diaz              NM INFLAM PROC LTD   Final Result      XR FOOT RT MIN 3 V   Final Result          Assessment:  Problem List Items Addressed This Visit        Endocrine    Diabetic foot infection ([facility] [address]    Relevant Medications    atorvastatin (LIPITOR) 20 mg tablet    insulin glargine (LANTUS) 100 unit/mL injection    insulin lispro (HUMALOG) 100 unit/mL injection    doxycycline (MONODOX) 100 mg capsule       Urinary    SHIRA (acute kidney injury) ([facility] [address]       Other    Severe anemia - Primary      Other Visit Diagnoses     Buttock wound, right, subsequent encounter        Non-healing wound of right heel        PAD (peripheral artery disease) (HCC)        Relevant Medications    atorvastatin (LIPITOR) 20 mg tablet    Other Relevant Orders    INVASIVE VASCULAR PROCEDURE    Pain of right lower extremity        Relevant Orders    INVASIVE VASCULAR PROCEDURE (Completed)    Pain        Relevant Medications    HYDROcodone-acetaminophen (NORCO) 5-325 mg per tablet           Plan:    1. Local wound care. 2. Avoid pressure to right gluteal area and right heel. 3. Air mattress. 4. High protein diet    5. Correct anemia    6. PAD & heel ulcer management per Dr. Braun Poster.    7. Apply AquacelAg to right gluteal open wound and cover with mepilex. 8. Place a wedge underneath the right gluteal area to avoid pressure to right gluteal area. 9. PO antibiotics. 10. Patient can follow up with dr. Josselyn Greene at [facility] after discharge. Thank you for the consultation & allowing me to participate in the care of this patient.

## 2021-05-21 NOTE — CONSULTS
[address] [place] [address]    Name:  Linda Webster  MR#:  [id number]  :  1965  ACCOUNT #:  [de-identified]  DATE OF SERVICE:  2021    PSYCHIATRIC CONSULTATION    ATTENDING PHYSICIAN:  Monica Hernandez MD    DATE OF CONSULT:  05/15/2021    DATE SEEN:  2021    Came to see the patient on 05/15/2021. He was indeed stable, did not wake up. The patient was seen today. REASON FOR CONSULT:  Level 2 assessment ordered by Dr. Rodrigue Bocanegra, the patient's attending. The patient is a very poor historian, rambling, uncertain, could not even respond to the simple questions, cognitively impaired. Most of the information came from chart, speaking to Tyson Evans and Brandee Coe, the case mangers, and the chart reviewed and the patient seen. CHIEF COMPLAINT:  Referred from Wound Care because of abscess and ulcer on the right heel and the right buttock, refusing treatment, and according to the patient's brother, who has a power of , he has never been psychiatrically hospitalized but seeing a psychiatrist.  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 No alcohol abuse. No drug abuse. No cigarette abuse. Apparently, he was staying in a group home. At this point, they felt he cannot be managed because of his physical condition, and the patient was seen and chart reviewed. The patient is average height, patient in bed. Alert, but poor eye contact. Could not tell me the day of week, date. He could not tell me what city. He knew this is the hospital.  He could not remember some of the things I talked. He says his sister is power of , but then, his brother is also power of . I am not sure both of them. His insight is poor. Judgment is poor.   Not able to recognize his treatment needs and at risk of not getting treatment. At this point, the patient's brother has power of  and did not go to skilled nursing facility. The patient has no agitation, no aggression, no overt psychosis, not suicidal, not homicidal.  Cognitively impaired with a poor insight. CURRENT MEDICATIONS:  Amlodipine; aspirin; atorvastatin; Depakote 1500 mg in the night, more likely for seizures; insulin; mirtazapine 30 mg at night to help sleep, appetite; pantoprazole; prednisone 20 mg daily; sertraline 100 mg daily; sucralfate; vancomycin. LABORATORY DATA:  Glucose today 375. CBC on 05/14/2021, WBC 7.3, RBC 4.34, hemoglobin 12, platelet 189. DIAGNOSES:  Diabetes; neurocognitive disorder; neurological disease; obsessive-compulsive disorder, asymptomatic; anxiety disorder, asymptomatic; arthritis; depression, asymptomatic; seizure disorder; thyroid disease; history of craniotomy. DISPOSITION AND DISCUSSION:  From psych point of view, he is only taking mirtazapine 30 mg, sertraline, and he is cognitively impaired from childhood at age 6, surgery and infections, and some metabolic issues impairing now, but psychiatrically, he is intellectually disabled. No prior psychiatric hospitalization per brother, and he has got issues, but they are not being active at this time. No agitation, no aggression, no psychosis. 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 Brain tumor surgery and infection. He can be managed with continued mirtazapine, sertraline, and his psychiatric needs can be adequately met in nursing home/skilled nursing facility with a periodic followup. Thank you for allowing to me participate in the care of this patient.         Valorie Roldan MD      [id number]  D:  05/20/2021 22:40  T:  05/21/2021 4:23  JOB #:  [id number]

## 2021-05-22 LAB
CREAT SERPL-MCNC: 1.2 MG/DL (ref 0.7–1.3)
GLUCOSE BLD STRIP.AUTO-MCNC: 100 MG/DL (ref 65–117)
GLUCOSE BLD STRIP.AUTO-MCNC: 117 MG/DL (ref 65–117)
GLUCOSE BLD STRIP.AUTO-MCNC: 135 MG/DL (ref 65–117)
GLUCOSE BLD STRIP.AUTO-MCNC: 194 MG/DL (ref 65–117)
GLUCOSE BLD STRIP.AUTO-MCNC: 206 MG/DL (ref 65–117)
GLUCOSE BLD STRIP.AUTO-MCNC: 241 MG/DL (ref 65–117)
GLUCOSE BLD STRIP.AUTO-MCNC: 33 MG/DL (ref 65–117)
GLUCOSE BLD STRIP.AUTO-MCNC: 39 MG/DL (ref 65–117)
GLUCOSE BLD STRIP.AUTO-MCNC: 68 MG/DL (ref 65–117)
PERFORMED BY, TECHID: ABNORMAL
PERFORMED BY, TECHID: NORMAL

## 2021-05-22 PROCEDURE — 65270000029 HC RM PRIVATE

## 2021-05-22 PROCEDURE — 74011250637 HC RX REV CODE- 250/637: Performed by: INTERNAL MEDICINE

## 2021-05-22 PROCEDURE — 36415 COLL VENOUS BLD VENIPUNCTURE: CPT

## 2021-05-22 PROCEDURE — 74011636637 HC RX REV CODE- 636/637: Performed by: INTERNAL MEDICINE

## 2021-05-22 PROCEDURE — 74011250637 HC RX REV CODE- 250/637: Performed by: SURGERY

## 2021-05-22 PROCEDURE — 82565 ASSAY OF CREATININE: CPT

## 2021-05-22 PROCEDURE — 74011250637 HC RX REV CODE- 250/637: Performed by: FAMILY MEDICINE

## 2021-05-22 RX ADMIN — DIVALPROEX SODIUM 1500 MG: 500 TABLET, DELAYED RELEASE ORAL at 21:32

## 2021-05-22 RX ADMIN — PREDNISONE 20 MG: 20 TABLET ORAL at 09:25

## 2021-05-22 RX ADMIN — SUCRALFATE 1 G: 1 TABLET ORAL at 05:50

## 2021-05-22 RX ADMIN — SERTRALINE HYDROCHLORIDE 100 MG: 50 TABLET ORAL at 09:25

## 2021-05-22 RX ADMIN — PANTOPRAZOLE SODIUM 40 MG: 40 TABLET, DELAYED RELEASE ORAL at 05:50

## 2021-05-22 RX ADMIN — DOXYCYCLINE HYCLATE 100 MG: 100 CAPSULE ORAL at 09:25

## 2021-05-22 RX ADMIN — SUCRALFATE 1 G: 1 TABLET ORAL at 11:06

## 2021-05-22 RX ADMIN — ASPIRIN 81 MG: 81 TABLET, CHEWABLE ORAL at 09:25

## 2021-05-22 RX ADMIN — ATORVASTATIN CALCIUM 20 MG: 20 TABLET, FILM COATED ORAL at 09:25

## 2021-05-22 RX ADMIN — AMLODIPINE BESYLATE 10 MG: 5 TABLET ORAL at 09:25

## 2021-05-22 RX ADMIN — SUCRALFATE 1 G: 1 TABLET ORAL at 16:36

## 2021-05-22 RX ADMIN — MIRTAZAPINE 30 MG: 30 TABLET, FILM COATED ORAL at 21:32

## 2021-05-22 RX ADMIN — INSULIN LISPRO 12 UNITS: 100 INJECTION, SOLUTION INTRAVENOUS; SUBCUTANEOUS at 16:36

## 2021-05-22 RX ADMIN — DOXYCYCLINE HYCLATE 100 MG: 100 CAPSULE ORAL at 21:32

## 2021-05-22 RX ADMIN — INSULIN LISPRO 12 UNITS: 100 INJECTION, SOLUTION INTRAVENOUS; SUBCUTANEOUS at 11:06

## 2021-05-22 RX ADMIN — HYDROCODONE BITARTRATE AND ACETAMINOPHEN 1 TABLET: 7.5; 325 TABLET ORAL at 11:06

## 2021-05-22 RX ADMIN — SUCRALFATE 1 G: 1 TABLET ORAL at 21:32

## 2021-05-22 NOTE — DISCHARGE SUMMARY
Discharge Summary     Patient: Hue Enamorado. MRN: [id number]  SSN: [id number]    YOB: 1965  Age: 60 y.o. Sex: male       Admit Date: 5/6/2021    Discharge Date: 5/22/2021      Admission Diagnoses: Severe anemia [D64.9]  Diabetic foot infection ([facility] [A65.051, L08.9]    Discharge Diagnoses:   Problem List as of 5/22/2021 Date Reviewed: 3/30/2021        Codes Class Noted - Resolved    Severe anemia ICD-10-CM: D64.9  ICD-9-CM: 285.9  5/6/2021 - Present        Diabetic foot infection ([facility] ICD-10-CM: E11.628, L08.9  ICD-9-CM: 250.80, 686.9  5/6/2021 - Present        DKA (diabetic ketoacidoses) ([place] [address].) ICD-10-CM: E11.10  ICD-9-CM: 250.12  2/17/2021 - Present        Metabolic acidosis W-15-NX: E87.2  ICD-9-CM: 276.2  2/17/2021 - Present        Hyponatremia ICD-10-CM: E87.1  ICD-9-CM: 276.1  2/17/2021 - Present        Hyperkalemia ICD-10-CM: E87.5  ICD-9-CM: 276.7  2/17/2021 - Present        SHIRA (acute kidney injury) ([facility] ICD-10-CM: N17.9  ICD-9-CM: 584.9  2/17/2021 - Present        NSTEMI (non-ST elevated myocardial infarction) ([facility] ICD-10-CM: I21.4  ICD-9-CM: 410.70  2/17/2021 - Present        Shock ([facility] ICD-10-CM: R57.9  ICD-9-CM: 785.50  2/17/2021 - Present        Iron deficiency anemia due to chronic blood loss ICD-10-CM: D50.0  ICD-9-CM: 280.0  2/26/2020 - Present        Memory impairment ICD-10-CM: R41.3  ICD-9-CM: 780.93  4/26/2016 - Present        Obsessive-compulsive disorder ICD-10-CM: F42.9  ICD-9-CM: 300.3  4/26/2016 - Present        Seizure disorder ([facility] ICD-10-CM: G40.909  ICD-9-CM: 345.90  2/3/2016 - Present        Uncontrolled type 2 diabetes mellitus ([facility] ICD-10-CM: E11.65  ICD-9-CM: 250.02  9/3/2015 - Present    Overview Signed 10/11/2018  8:28 AM by Marquis Cummings Last Assessment & Plan:    Hemoglobin A1c is elevated 8.8% this time which is up from 7.3% in December of 2016. Worsened.   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 Patient was asked to keep the foot offloaded until patient sees vascular surgeon continue to monitor the healing.   Radiological studies did not show osteomyelitis patient was transitioned to doxycycline and also to follow-up with hematology for blood disorder  Consults: Hematology/Oncology, Infectious Disease and Vascular Surgery    Significant Diagnostic Studies: labs:   Recent Results (from the past 24 hour(s))   GLUCOSE, POC    Collection Time: 05/21/21  7:33 PM   Result Value Ref Range    Glucose (POC) 264 (H) 65 - 117 mg/dL    Performed by Jair Signs    GLUCOSE, POC    Collection Time: 05/21/21 11:41 PM   Result Value Ref Range    Glucose (POC) 68 65 - 117 mg/dL    Performed by Jair Signs    GLUCOSE, POC    Collection Time: 05/22/21 12:12 AM   Result Value Ref Range    Glucose (POC) 100 65 - 117 mg/dL    Performed by Nathalie Pruitt, POC    Collection Time: 05/22/21  7:56 AM   Result Value Ref Range    Glucose (POC) 39 (LL) 65 - 117 mg/dL    Performed by [address] [place] [address], POC    Collection Time: 05/22/21 7:58 AM   Result Value Ref Range    Glucose (POC) 33 (LL) 65 - 117 mg/dL    Performed by [address], POC    Collection Time: 05/22/21  9:27 AM   Result Value Ref Range    Glucose (POC) 117 65 - 117 mg/dL    Performed by [facility], POC    Collection Time: 05/22/21 10:37 AM   Result Value Ref Range    Glucose (POC) 206 (H) 65 - 117 mg/dL    Performed by [facility], POC    Collection Time: 05/22/21  2:57 PM   Result Value Ref Range    Glucose (POC) 135 (H) 65 - 117 mg/dL    Performed by MESHA BUITRAGO          NM INFLAM PROC LTD   Final Result      XR FOOT RT MIN 3 V   Final Result          Disposition: SNF    Discharge Medications:   Current Discharge Medication List      START taking these medications    Details   amLODIPine (NORVASC) 10 mg tablet Take 1 Tab by mouth daily. Qty: 30 Tab, Refills: 0      HYDROcodone-acetaminophen (NORCO) 5-325 mg per tablet Take 1 Tab by mouth every four (4) hours as needed for Pain for up to 3 days. Max Daily Amount: 6 Tabs. Qty: 12 Tab, Refills: 0    Associated Diagnoses: Pain      insulin glargine (LANTUS) 100 unit/mL injection 40 Units by SubCUTAneous route daily. Qty: 1 Vial, Refills: 0      insulin lispro (HUMALOG) 100 unit/mL injection 10 Units by SubCUTAneous route three (3) times daily. Hold for bs < 120  Qty: 1 Vial, Refills: 0      doxycycline (MONODOX) 100 mg capsule Take 1 Cap by mouth two (2) times a day. Qty: 28 Cap, Refills: 0         CONTINUE these medications which have CHANGED    Details   atorvastatin (LIPITOR) 20 mg tablet Take 1 Tab by mouth daily. Qty: 30 Tab, Refills: 0         CONTINUE these medications which have NOT CHANGED    Details   pantoprazole (Protonix) 40 mg tablet Take 1 Tab by mouth Daily (before breakfast). Qty: 30 Tab, Refills: 3      sucralfate (Carafate) 1 gram tablet Take 1 Tab by mouth Before breakfast, lunch, dinner and at bedtime.   Qty: 120 Tab, Refills: 3 mirtazapine (REMERON) 15 mg tablet TAKE ONE TABLET BY MOUTH AT BEDTIME. Qty: 30 Tab, Refills: 0      sertraline (ZOLOFT) 100 mg tablet TAKE ONE TABLET BY MOUTH DAILY  Qty: 30 Tab, Refills: 0      aspirin 81 mg chewable tablet Take 1 Tab by mouth daily. Qty: 30 Tab, Refills: 5      divalproex ER (Depakote ER) 500 mg ER tablet Take 1,500 mg by mouth nightly. ferrous sulfate 325 mg (65 mg iron) tablet Take 1 Tab by mouth two (2) times daily (after meals). Qty: 60 Tab, Refills: 2      albuterol-ipratropium (DUO-NEB) 2.5 mg-0.5 mg/3 ml nebu 3 mL by Nebulization route three (3) times daily as needed for Wheezing.          STOP taking these medications       insulin detemir U-100 (LEVEMIR FLEXTOUCH) 100 unit/mL (3 mL) inpn Comments:   Reason for Stopping:         insulin aspart U-100 (NovoLOG Flexpen U-100 Insulin) 100 unit/mL (3 mL) inpn Comments:   Reason for Stopping:         metFORMIN (GLUCOPHAGE) 850 mg tablet Comments:   Reason for Stopping:         magnesium hydroxide (MILK OF MAGNESIA) 400 mg/5 mL suspension Comments:   Reason for Stopping:         ondansetron (ZOFRAN ODT) 4 mg disintegrating tablet Comments:   Reason for Stopping:         insulin aspart U-100 (NovoLOG Flexpen U-100 Insulin) 100 unit/mL (3 mL) inpn Comments:   Reason for Stopping:         glucagon (GLUCAGEN) 1 mg injection Comments:   Reason for Stopping:         glucose 4 gram chewable tablet Comments:   Reason for Stopping:         guaiFENesin (ROBITUSSIN) 100 mg/5 mL liquid Comments:   Reason for Stopping:         hydrocortisone (CORTAID) 1 % topical cream Comments:   Reason for Stopping:         ibuprofen (Motrin IB) 200 mg tablet Comments:   Reason for Stopping:         diphenhydrAMINE (BENADRYL) 25 mg capsule Comments:   Reason for Stopping:         lip protectant (BLISTEX) 0.6-0.5-1.1-0.5 % oint ointment Comments:   Reason for Stopping:         lip protectant with sunscreen (CARMEX) crea Comments:   Reason for Stopping: ketoconazole (NIZORAL) 2 % topical cream Comments:   Reason for Stopping:         loperamide (IMMODIUM) 2 mg tablet Comments:   Reason for Stopping:         acetaminophen (TYLENOL) 325 mg tablet Comments:   Reason for Stopping:               Activity: keep foot off loaded  Diet: Diabetic Diet  Wound Care: As directed    Follow-up Appointments   Procedures    FOLLOW UP VISIT Appointment in: 3 - 5 Days     Standing Status:   Standing     Number of Occurrences:   1     Order Specific Question:   Appointment in     Answer:   3 - 5 Days     45 minutes discharge time  Called mother at [phone] busy tone again  Signed By: Qamar Hansen MD     May 22, 2021

## 2021-05-23 LAB
GLUCOSE BLD STRIP.AUTO-MCNC: 273 MG/DL (ref 65–117)
GLUCOSE BLD STRIP.AUTO-MCNC: 329 MG/DL (ref 65–117)
GLUCOSE BLD STRIP.AUTO-MCNC: 360 MG/DL (ref 65–117)
GLUCOSE BLD STRIP.AUTO-MCNC: 361 MG/DL (ref 65–117)
PERFORMED BY, TECHID: ABNORMAL

## 2021-05-23 PROCEDURE — 65270000029 HC RM PRIVATE

## 2021-05-23 PROCEDURE — 97530 THERAPEUTIC ACTIVITIES: CPT

## 2021-05-23 PROCEDURE — 74011250637 HC RX REV CODE- 250/637: Performed by: INTERNAL MEDICINE

## 2021-05-23 PROCEDURE — 74011636637 HC RX REV CODE- 636/637: Performed by: INTERNAL MEDICINE

## 2021-05-23 PROCEDURE — 82962 GLUCOSE BLOOD TEST: CPT

## 2021-05-23 PROCEDURE — 74011250637 HC RX REV CODE- 250/637: Performed by: FAMILY MEDICINE

## 2021-05-23 RX ADMIN — AMLODIPINE BESYLATE 10 MG: 5 TABLET ORAL at 10:54

## 2021-05-23 RX ADMIN — INSULIN LISPRO 12 UNITS: 100 INJECTION, SOLUTION INTRAVENOUS; SUBCUTANEOUS at 10:53

## 2021-05-23 RX ADMIN — SUCRALFATE 1 G: 1 TABLET ORAL at 15:37

## 2021-05-23 RX ADMIN — SERTRALINE HYDROCHLORIDE 100 MG: 50 TABLET ORAL at 10:54

## 2021-05-23 RX ADMIN — ATORVASTATIN CALCIUM 20 MG: 20 TABLET, FILM COATED ORAL at 10:54

## 2021-05-23 RX ADMIN — ASPIRIN 81 MG: 81 TABLET, CHEWABLE ORAL at 10:54

## 2021-05-23 RX ADMIN — INSULIN GLARGINE 50 UNITS: 100 INJECTION, SOLUTION SUBCUTANEOUS at 10:53

## 2021-05-23 RX ADMIN — PANTOPRAZOLE SODIUM 40 MG: 40 TABLET, DELAYED RELEASE ORAL at 05:27

## 2021-05-23 RX ADMIN — SUCRALFATE 1 G: 1 TABLET ORAL at 20:52

## 2021-05-23 RX ADMIN — PREDNISONE 20 MG: 20 TABLET ORAL at 10:54

## 2021-05-23 RX ADMIN — INSULIN LISPRO 12 UNITS: 100 INJECTION, SOLUTION INTRAVENOUS; SUBCUTANEOUS at 16:50

## 2021-05-23 RX ADMIN — MIRTAZAPINE 30 MG: 30 TABLET, FILM COATED ORAL at 20:52

## 2021-05-23 RX ADMIN — DIVALPROEX SODIUM 1500 MG: 500 TABLET, DELAYED RELEASE ORAL at 20:52

## 2021-05-23 RX ADMIN — DOXYCYCLINE HYCLATE 100 MG: 100 CAPSULE ORAL at 10:55

## 2021-05-23 RX ADMIN — SUCRALFATE 1 G: 1 TABLET ORAL at 05:27

## 2021-05-23 RX ADMIN — SUCRALFATE 1 G: 1 TABLET ORAL at 10:54

## 2021-05-23 RX ADMIN — DOXYCYCLINE HYCLATE 100 MG: 100 CAPSULE ORAL at 20:52

## 2021-05-23 NOTE — PROGRESS NOTES
Problem: Mobility Impaired (Adult and Pediatric)  Goal: *Acute Goals and Plan of Care (Insert Text)  Description: Pt will be I with LE HEP in 7 days. Pt will perform bed mobility with mod I in 7 days. Pt will perform transfers with mod I in 7 days. Pt will amb - 25' feet with LRAD safely with SBA in 7 days. Outcome: Progressing Towards Goal   PHYSICAL THERAPY TREATMENT  Patient: Marcellus Michael (56 y.o. male)  Date: 5/23/2021  Diagnosis: Severe anemia [D64.9]  Diabetic foot infection ([facility] [J03.033, L08.9] <principal problem not specified>  Procedure(s) (LRB):  ANGIOGRAPHY LOWER EXT RIGHT (N/A) 11 Days Post-Op  Precautions:    Chart, physical therapy assessment, plan of care and goals were reviewed. ASSESSMENT  Patient continues with skilled PT services and is progressing towards goals. Upon entry into room, patient initially declining working with therapy but with some encouragement patient was agreeable to do so and wanted to sit up and brush his teeth. He declined a w/c transfer today. ModA  x 1 for supine>sit on L side of bed as patient says he does not have much use of RUE so could not reach across body to grab bed rail. Patient sat EOB for ~20 minutes to brush his teeth, wipe his face and put on deodorant. No trunk or UE support and good dynamic sittting balance noted. Assistance needed to hold wash basin so patient could brush his teeth but patient able to brush teeth with L hand without physical assistance. SBA to return back to bed and position to comfort. He will benefit from continued skilled PT services to improve his strength for transfers and bed mobility to decrease assistance from caregivers and return to PLOF. Patient with flat affect.      Current Level of Function Impacting Discharge (mobility/balance): decr mobility, assistance with bed mobility, declined OOB today, patient uses w/c to ambulate    Other factors to consider for discharge: medical history, lives in group home, diminished use of RUE         PLAN :  Patient continues to benefit from skilled intervention to address the above impairments. Continue treatment per established plan of care. to address goals. Recommendation for discharge: (in order for the patient to meet his/her long term goals)  Therapy up to 5 days/week in SNF setting    This discharge recommendation:  Has been made in collaboration with the attending provider and/or case management    IF patient discharges home will need the following DME: to be determined (TBD)       SUBJECTIVE:   Patient stated I don't want to do therapy today. I don't want to get out of bed. I want to brush my teeth.     OBJECTIVE DATA SUMMARY:   Critical Behavior:  Neurologic State: Alert, Irritable  Orientation Level: Oriented to place, Oriented to person  Cognition: Follows commands  Safety/Judgement: Decreased awareness of need for safety  Functional Mobility Training:  Bed Mobility:  Rolling: Stand-by assistance  Supine to Sit: Moderate assistance  Sit to Supine: Stand-by assistance  Scooting: Stand-by assistance       Balance:  Sitting: Intact; Without support  No assistance needed to maintain sitting balance and good dynamic balance noted with ADL tasks     Therapeutic Exercises:   Patient declined today   Pain Ratin/10    Activity Tolerance:   Poor  Please refer to the flowsheet for vital signs taken during this treatment.     After treatment patient left in no apparent distress:   Supine in bed, Call bell within reach, Bed / chair alarm activated, and Side rails x 3    COMMUNICATION/COLLABORATION:   The patients plan of care was discussed with: Physical therapist.     Breanna Henry   Time Calculation: 24 mins

## 2021-05-23 NOTE — PROGRESS NOTES
Discharge Summary     Patient: Alberto Lee MRN: [id number]  SSN: [id number]    YOB: 1965  Age: 60 y.o. Sex: male       Admit Date: 5/6/2021    Discharge Date: 5/23/2021      Admission Diagnoses: Severe anemia [D64.9]  Diabetic foot infection ([facility] [S12.890, L08.9]    Discharge Diagnoses:   Problem List as of 5/23/2021 Date Reviewed: 3/30/2021        Codes Class Noted - Resolved    Severe anemia ICD-10-CM: D64.9  ICD-9-CM: 285.9  5/6/2021 - Present        Diabetic foot infection ([facility] ICD-10-CM: E11.628, L08.9  ICD-9-CM: 250.80, 686.9  5/6/2021 - Present        DKA (diabetic ketoacidoses) ([facility].) ICD-10-CM: E11.10  ICD-9-CM: 250.12  2/17/2021 - Present        Metabolic acidosis [facility]: E87.2  ICD-9-CM: 276.2  2/17/2021 - Present        Hyponatremia ICD-10-CM: E87.1  ICD-9-CM: 276.1  2/17/2021 - Present        Hyperkalemia ICD-10-CM: E87.5  ICD-9-CM: 276.7  2/17/2021 - Present        SHIRA (acute kidney injury) ([facility] ICD-10-CM: N17.9  ICD-9-CM: 584.9  2/17/2021 - Present        NSTEMI (non-ST elevated myocardial infarction) ([facility] ICD-10-CM: I21.4  ICD-9-CM: 410.70  2/17/2021 - Present        Shock ([facility].) ICD-10-CM: R57.9  ICD-9-CM: 785.50  2/17/2021 - Present        Iron deficiency anemia due to chronic blood loss ICD-10-CM: D50.0  ICD-9-CM: 280.0  2/26/2020 - Present        Memory impairment ICD-10-CM: R41.3  ICD-9-CM: 780.93  4/26/2016 - Present        Obsessive-compulsive disorder ICD-10-CM: F42.9  ICD-9-CM: 300.3  4/26/2016 - Present        Seizure disorder ([facility] ICD-10-CM: G40.909  ICD-9-CM: 345.90  2/3/2016 - Present        Uncontrolled type 2 diabetes mellitus ([facility] ICD-10-CM: E11.65  ICD-9-CM: 250.02  9/3/2015 - Present    Overview Signed 10/11/2018  8:28 AM by Naman Prakash Last Assessment & Plan:    Hemoglobin A1c is elevated 8.8% this time which is up from 7.3% in December of 2016. Worsened.   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 Patient was asked to keep the foot offloaded until patient sees vascular surgeon continue to monitor the healing.   Radiological studies did not show osteomyelitis patient was transitioned to doxycycline and also to follow-up with hematology for blood disorder  Consults: Hematology/Oncology, Infectious Disease and Vascular Surgery    Significant Diagnostic Studies: labs:   Recent Results (from the past 24 hour(s))   GLUCOSE, POC    Collection Time: 05/22/21  9:31 PM   Result Value Ref Range    Glucose (POC) 194 (H) 65 - 117 mg/dL    Performed by Lacy Pathak    GLUCOSE, POC    Collection Time: 05/23/21  7:46 AM   Result Value Ref Range    Glucose (POC) 360 (H) 65 - 117 mg/dL    Performed by Lachelle Hernandez    GLUCOSE, POC    Collection Time: 05/23/21 10:48 AM   Result Value Ref Range    Glucose (POC) 361 (H) 65 - 117 mg/dL    Performed by Rosa Mcdonnell Real, POC    Collection Time: 05/23/21  3:30 PM   Result Value Ref Range    Glucose (POC) 273 (H) 65 - 117 mg/dL    Performed by Lachelle Hernandez          NM INFLAM 2014 [facility] Final Result      XR FOOT RT MIN 3 V   Final Result          Disposition: SNF    Discharge Medications:   Current Discharge Medication List      START taking these medications    Details   amLODIPine (NORVASC) 10 mg tablet Take 1 Tab by mouth daily. Qty: 30 Tab, Refills: 0      HYDROcodone-acetaminophen (NORCO) 5-325 mg per tablet Take 1 Tab by mouth every four (4) hours as needed for Pain for up to 3 days. Max Daily Amount: 6 Tabs. Qty: 12 Tab, Refills: 0    Associated Diagnoses: Pain      insulin glargine (LANTUS) 100 unit/mL injection 40 Units by SubCUTAneous route daily. Qty: 1 Vial, Refills: 0      insulin lispro (HUMALOG) 100 unit/mL injection 10 Units by SubCUTAneous route three (3) times daily. Hold for bs < 120  Qty: 1 Vial, Refills: 0      doxycycline (MONODOX) 100 mg capsule Take 1 Cap by mouth two (2) times a day. Qty: 28 Cap, Refills: 0         CONTINUE these medications which have CHANGED    Details   atorvastatin (LIPITOR) 20 mg tablet Take 1 Tab by mouth daily. Qty: 30 Tab, Refills: 0         CONTINUE these medications which have NOT CHANGED    Details   pantoprazole (Protonix) 40 mg tablet Take 1 Tab by mouth Daily (before breakfast). Qty: 30 Tab, Refills: 3      sucralfate (Carafate) 1 gram tablet Take 1 Tab by mouth Before breakfast, lunch, dinner and at bedtime. Qty: 120 Tab, Refills: 3      mirtazapine (REMERON) 15 mg tablet TAKE ONE TABLET BY MOUTH AT BEDTIME. Qty: 30 Tab, Refills: 0      sertraline (ZOLOFT) 100 mg tablet TAKE ONE TABLET BY MOUTH DAILY  Qty: 30 Tab, Refills: 0      aspirin 81 mg chewable tablet Take 1 Tab by mouth daily. Qty: 30 Tab, Refills: 5      divalproex ER (Depakote ER) 500 mg ER tablet Take 1,500 mg by mouth nightly. ferrous sulfate 325 mg (65 mg iron) tablet Take 1 Tab by mouth two (2) times daily (after meals).   Qty: 60 Tab, Refills: 2      albuterol-ipratropium (DUO-NEB) 2.5 mg-0.5 mg/3 ml nebu 3 mL by Nebulization route three (3) times daily as needed for Wheezing.          STOP taking these medications       insulin detemir U-100 (LEVEMIR FLEXTOUCH) 100 unit/mL (3 mL) inpn Comments:   Reason for Stopping:         insulin aspart U-100 (NovoLOG Flexpen U-100 Insulin) 100 unit/mL (3 mL) inpn Comments:   Reason for Stopping:         metFORMIN (GLUCOPHAGE) 850 mg tablet Comments:   Reason for Stopping:         magnesium hydroxide (MILK OF MAGNESIA) 400 mg/5 mL suspension Comments:   Reason for Stopping:         ondansetron (ZOFRAN ODT) 4 mg disintegrating tablet Comments:   Reason for Stopping:         insulin aspart U-100 (NovoLOG Flexpen U-100 Insulin) 100 unit/mL (3 mL) inpn Comments:   Reason for Stopping:         glucagon (GLUCAGEN) 1 mg injection Comments:   Reason for Stopping:         glucose 4 gram chewable tablet Comments:   Reason for Stopping:         guaiFENesin (ROBITUSSIN) 100 mg/5 mL liquid Comments:   Reason for Stopping:         hydrocortisone (CORTAID) 1 % topical cream Comments:   Reason for Stopping:         ibuprofen (Motrin IB) 200 mg tablet Comments:   Reason for Stopping:         diphenhydrAMINE (BENADRYL) 25 mg capsule Comments:   Reason for Stopping:         lip protectant (BLISTEX) 0.6-0.5-1.1-0.5 % oint ointment Comments:   Reason for Stopping:         lip protectant with sunscreen (CARMEX) crea Comments:   Reason for Stopping:         ketoconazole (NIZORAL) 2 % topical cream Comments:   Reason for Stopping:         loperamide (IMMODIUM) 2 mg tablet Comments:   Reason for Stopping:         acetaminophen (TYLENOL) 325 mg tablet Comments:   Reason for Stopping:               Activity: keep foot off loaded  Diet: Diabetic Diet  Wound Care: As directed    Follow-up Appointments   Procedures    FOLLOW UP VISIT Appointment in: 3 - 5 Days     Standing Status:   Standing     Number of Occurrences:   1     Order Specific Question:   Appointment in     Answer:   3 - 5 Days     45 minutes discharge time  Called mother at [phone] busy tone again  Signed By: Jessica Morrell MD     May 23, 2021

## 2021-05-24 VITALS
HEART RATE: 87 BPM | RESPIRATION RATE: 20 BRPM | SYSTOLIC BLOOD PRESSURE: 112 MMHG | TEMPERATURE: 97.3 F | WEIGHT: 151.7 LBS | OXYGEN SATURATION: 95 % | BODY MASS INDEX: 24.38 KG/M2 | DIASTOLIC BLOOD PRESSURE: 52 MMHG | HEIGHT: 66 IN

## 2021-05-24 LAB
GLUCOSE BLD STRIP.AUTO-MCNC: 101 MG/DL (ref 65–117)
GLUCOSE BLD STRIP.AUTO-MCNC: 126 MG/DL (ref 65–117)
GLUCOSE BLD STRIP.AUTO-MCNC: 130 MG/DL (ref 65–117)
GLUCOSE BLD STRIP.AUTO-MCNC: 173 MG/DL (ref 65–117)
GLUCOSE BLD STRIP.AUTO-MCNC: 246 MG/DL (ref 65–117)
GLUCOSE BLD STRIP.AUTO-MCNC: 51 MG/DL (ref 65–117)
GLUCOSE BLD STRIP.AUTO-MCNC: 557 MG/DL (ref 65–117)
GLUCOSE BLD STRIP.AUTO-MCNC: 89 MG/DL (ref 65–117)
GLUCOSE BLD STRIP.AUTO-MCNC: 90 MG/DL (ref 65–117)
PERFORMED BY, TECHID: ABNORMAL
PERFORMED BY, TECHID: NORMAL

## 2021-05-24 PROCEDURE — 99232 SBSQ HOSP IP/OBS MODERATE 35: CPT | Performed by: INTERNAL MEDICINE

## 2021-05-24 PROCEDURE — 74011636637 HC RX REV CODE- 636/637: Performed by: INTERNAL MEDICINE

## 2021-05-24 PROCEDURE — 74011250637 HC RX REV CODE- 250/637: Performed by: INTERNAL MEDICINE

## 2021-05-24 PROCEDURE — 74011250637 HC RX REV CODE- 250/637: Performed by: FAMILY MEDICINE

## 2021-05-24 RX ADMIN — DOXYCYCLINE HYCLATE 100 MG: 100 CAPSULE ORAL at 09:06

## 2021-05-24 RX ADMIN — SERTRALINE HYDROCHLORIDE 100 MG: 50 TABLET ORAL at 09:06

## 2021-05-24 RX ADMIN — SUCRALFATE 1 G: 1 TABLET ORAL at 11:38

## 2021-05-24 RX ADMIN — SUCRALFATE 1 G: 1 TABLET ORAL at 05:06

## 2021-05-24 RX ADMIN — INSULIN LISPRO 12 UNITS: 100 INJECTION, SOLUTION INTRAVENOUS; SUBCUTANEOUS at 11:30

## 2021-05-24 RX ADMIN — PANTOPRAZOLE SODIUM 40 MG: 40 TABLET, DELAYED RELEASE ORAL at 05:06

## 2021-05-24 RX ADMIN — PREDNISONE 20 MG: 20 TABLET ORAL at 09:06

## 2021-05-24 RX ADMIN — ASPIRIN 81 MG: 81 TABLET, CHEWABLE ORAL at 09:06

## 2021-05-24 RX ADMIN — ATORVASTATIN CALCIUM 20 MG: 20 TABLET, FILM COATED ORAL at 09:06

## 2021-05-24 RX ADMIN — INSULIN GLARGINE 50 UNITS: 100 INJECTION, SOLUTION SUBCUTANEOUS at 09:00

## 2021-05-24 RX ADMIN — SUCRALFATE 1 G: 1 TABLET ORAL at 15:51

## 2021-05-24 RX ADMIN — AMLODIPINE BESYLATE 10 MG: 5 TABLET ORAL at 09:06

## 2021-05-24 NOTE — PROGRESS NOTES
TRANSFER - OUT REPORT:    Verbal report given to DORI Acevedo(name) on [facility].  being transferred to [facility] (room 310)        Report consisted of patients Situation, Background, Assessment and   Recommendations(SBAR). Information from the SBAR was reviewed with the receiving nurse. Opportunity for questions and clarification was provided.

## 2021-05-24 NOTE — PROGRESS NOTES
Infectious Disease Progress Note           Subjective:   Pt seen and examined at bedside. Doing well, denies new complaints. No acute events since last seen   Objective:   Physical Exam:     Visit Vitals  BP (!) 112/52 (BP Patient Position: At rest)   Pulse 87   Temp 97.3 °F (36.3 °C)   Resp 20   Ht 5' 6\" (1.676 m)   Wt 151 lb 11.2 oz (68.8 kg)   SpO2 95%   BMI 24.49 kg/m²      O2 Device: None (Room air)    Temp (24hrs), Av.7 °F (36.5 °C), Min:97.2 °F (36.2 °C), Max:98.2 °F (36.8 °C)    No intake/output data recorded. No intake/output data recorded. General: NAD, resting comfortably   HEENT: OTILIA, Moist mucosa   Lungs: CTA b/l, no wheeze/rhonchi  Heart: S1S2+, RRR, no murmur  Abdo: Soft, NT, ND, +BS   Exts: right heel ulcer, tenderness on palpation, right hemiparesis   Skin: Healing right buttock ulcer     Data Review:       Recent Days:  No results for input(s): WBC, HGB, HCT, PLT, HGBEXT, HCTEXT, PLTEXT, HGBEXT, HCTEXT, PLTEXT in the last 72 hours. Recent Labs     21  1740   CREA 1.20     Lab Results   Component Value Date/Time    C-Reactive protein 1.64 (H) 2021 11:50 AM      Microbiology   - Right buttock wound Cx : MRSA, and coag neg staph     Diagnostics   CXR Results  (Last 48 hours)    None           Assessment/Plan     1. Right buttock ulcer. S/p I&D on . MRSA/staph epidermidis isolated from wound Cx on       Healing incisional wound w/o evidence of superinfection        S/p 2 wks of vanc, currently on Doxycycline day # 4       Continue routine wound care and frequent turns        2. Right heel ulcer/underlying PAD: S/p arteriogram on       Ceretec tagged WBC scan neg for osteomyelitis (05/10)       S/p Vascular work up by Dr Ely Sequeira     3. Chronic on chronic blood loss anemia. Hgb staying stable after PRBC transfusion     4. Urinary incontinence:Ongoing.  May benefit from long term pacheco cath    Amador Dash MD    2021

## 2021-05-24 NOTE — DISCHARGE SUMMARY
Discharge Summary     Patient: Chasidy Staples MRN: [id number]  SSN: [id number]    YOB: 1965  Age: 60 y.o. Sex: male       Admit Date: 5/6/2021    Discharge Date: 5/24/2021      Admission Diagnoses: Severe anemia [D64.9]  Diabetic foot infection ([facility] [K95.564, L08.9]    Discharge Diagnoses:   Problem List as of 5/24/2021 Date Reviewed: 3/30/2021        Codes Class Noted - Resolved    Severe anemia ICD-10-CM: D64.9  ICD-9-CM: 285.9  5/6/2021 - Present        Diabetic foot infection ([place] [address].) ICD-10-CM: E11.628, L08.9  ICD-9-CM: 250.80, 686.9  5/6/2021 - Present        DKA (diabetic ketoacidoses) ([place] [address].) ICD-10-CM: E11.10  ICD-9-CM: 250.12  2/17/2021 - Present        Metabolic acidosis EBT-20-UA: E87.2  ICD-9-CM: 276.2  2/17/2021 - Present        Hyponatremia ICD-10-CM: E87.1  ICD-9-CM: 276.1  2/17/2021 - Present        Hyperkalemia ICD-10-CM: E87.5  ICD-9-CM: 276.7  2/17/2021 - Present        SHIRA (acute kidney injury) ([place] [address].) ICD-10-CM: N17.9  ICD-9-CM: 584.9  2/17/2021 - Present        NSTEMI (non-ST elevated myocardial infarction) ([place] [address].) ICD-10-CM: I21.4  ICD-9-CM: 410.70  2/17/2021 - Present        Shock ([place] [address] ICD-10-CM: R57.9  ICD-9-CM: 785.50  2/17/2021 - Present        Iron deficiency anemia due to chronic blood loss ICD-10-CM: D50.0  ICD-9-CM: 280.0  2/26/2020 - Present        Memory impairment ICD-10-CM: R41.3  ICD-9-CM: 780.93  4/26/2016 - Present        Obsessive-compulsive disorder ICD-10-CM: F42.9  ICD-9-CM: 300.3  4/26/2016 - Present        Seizure disorder ([facility] ICD-10-CM: G40.909  ICD-9-CM: 345.90  2/3/2016 - Present        Uncontrolled type 2 diabetes mellitus ([facility] ICD-10-CM: E11.65  ICD-9-CM: 250.02  9/3/2015 - Present    Overview Signed 10/11/2018  8:28 AM by Angus Aponte Last Assessment & Plan:    Hemoglobin A1c is elevated 8.8% this time which is up from 7.3% in December of 2016. Worsened.   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 Patient was asked to keep the foot offloaded until patient sees vascular surgeon continue to monitor the healing.   Radiological studies did not show osteomyelitis patient was transitioned to doxycycline and also to follow-up with hematology for blood disorder  Consults: Hematology/Oncology, Infectious Disease and Vascular Surgery    Significant Diagnostic Studies: labs:   Recent Results (from the past 24 hour(s))   GLUCOSE, POC    Collection Time: 05/23/21  3:30 PM   Result Value Ref Range    Glucose (POC) 273 (H) 65 - 117 mg/dL    Performed by Juanito Herbert    GLUCOSE, POC    Collection Time: 05/23/21  7:23 PM   Result Value Ref Range    Glucose (POC) 329 (H) 65 - 117 mg/dL    Performed by SALENA Butcher 51, POC    Collection Time: 05/24/21  7:21 AM   Result Value Ref Range    Glucose (POC) 90 65 - 117 mg/dL    Performed by Heidy Filter    GLUCOSE, POC    Collection Time: 05/24/21 11:21 AM   Result Value Ref Range    Glucose (POC) 246 (H) 65 - 117 mg/dL    Performed by Heidy Filter          [facility]   Final Result XR FOOT RT MIN 3 V   Final Result          Disposition: SNF    Discharge Medications:   Current Discharge Medication List      START taking these medications    Details   amLODIPine (NORVASC) 10 mg tablet Take 1 Tab by mouth daily. Qty: 30 Tab, Refills: 0      HYDROcodone-acetaminophen (NORCO) 5-325 mg per tablet Take 1 Tab by mouth every four (4) hours as needed for Pain for up to 3 days. Max Daily Amount: 6 Tabs. Qty: 12 Tab, Refills: 0    Associated Diagnoses: Pain      insulin glargine (LANTUS) 100 unit/mL injection 40 Units by SubCUTAneous route daily. Qty: 1 Vial, Refills: 0      insulin lispro (HUMALOG) 100 unit/mL injection 10 Units by SubCUTAneous route three (3) times daily. Hold for bs < 120  Qty: 1 Vial, Refills: 0      doxycycline (MONODOX) 100 mg capsule Take 1 Cap by mouth two (2) times a day. Qty: 28 Cap, Refills: 0         CONTINUE these medications which have CHANGED    Details   atorvastatin (LIPITOR) 20 mg tablet Take 1 Tab by mouth daily. Qty: 30 Tab, Refills: 0         CONTINUE these medications which have NOT CHANGED    Details   pantoprazole (Protonix) 40 mg tablet Take 1 Tab by mouth Daily (before breakfast). Qty: 30 Tab, Refills: 3      sucralfate (Carafate) 1 gram tablet Take 1 Tab by mouth Before breakfast, lunch, dinner and at bedtime. Qty: 120 Tab, Refills: 3      mirtazapine (REMERON) 15 mg tablet TAKE ONE TABLET BY MOUTH AT BEDTIME. Qty: 30 Tab, Refills: 0      sertraline (ZOLOFT) 100 mg tablet TAKE ONE TABLET BY MOUTH DAILY  Qty: 30 Tab, Refills: 0      aspirin 81 mg chewable tablet Take 1 Tab by mouth daily. Qty: 30 Tab, Refills: 5      divalproex ER (Depakote ER) 500 mg ER tablet Take 1,500 mg by mouth nightly. ferrous sulfate 325 mg (65 mg iron) tablet Take 1 Tab by mouth two (2) times daily (after meals). Qty: 60 Tab, Refills: 2      albuterol-ipratropium (DUO-NEB) 2.5 mg-0.5 mg/3 ml nebu 3 mL by Nebulization route three (3) times daily as needed for Wheezing. STOP taking these medications       insulin detemir U-100 (LEVEMIR FLEXTOUCH) 100 unit/mL (3 mL) inpn Comments:   Reason for Stopping:         insulin aspart U-100 (NovoLOG Flexpen U-100 Insulin) 100 unit/mL (3 mL) inpn Comments:   Reason for Stopping:         metFORMIN (GLUCOPHAGE) 850 mg tablet Comments:   Reason for Stopping:         magnesium hydroxide (MILK OF MAGNESIA) 400 mg/5 mL suspension Comments:   Reason for Stopping:         ondansetron (ZOFRAN ODT) 4 mg disintegrating tablet Comments:   Reason for Stopping:         insulin aspart U-100 (NovoLOG Flexpen U-100 Insulin) 100 unit/mL (3 mL) inpn Comments:   Reason for Stopping:         glucagon (GLUCAGEN) 1 mg injection Comments:   Reason for Stopping:         glucose 4 gram chewable tablet Comments:   Reason for Stopping:         guaiFENesin (ROBITUSSIN) 100 mg/5 mL liquid Comments:   Reason for Stopping:         hydrocortisone (CORTAID) 1 % topical cream Comments:   Reason for Stopping:         ibuprofen (Motrin IB) 200 mg tablet Comments:   Reason for Stopping:         diphenhydrAMINE (BENADRYL) 25 mg capsule Comments:   Reason for Stopping:         lip protectant (BLISTEX) 0.6-0.5-1.1-0.5 % oint ointment Comments:   Reason for Stopping:         lip protectant with sunscreen (CARMEX) crea Comments:   Reason for Stopping:         ketoconazole (NIZORAL) 2 % topical cream Comments:   Reason for Stopping:         loperamide (IMMODIUM) 2 mg tablet Comments:   Reason for Stopping:         acetaminophen (TYLENOL) 325 mg tablet Comments:   Reason for Stopping:               Activity: keep foot off loaded  Diet: Diabetic Diet  Wound Care: As directed    Follow-up Appointments   Procedures    FOLLOW UP VISIT Appointment in: 3 - 5 Days     Standing Status:   Standing     Number of Occurrences:   1     Order Specific Question:   Appointment in     Answer:   3 - 5 Days     45 minutes discharge time  SNF discharge  Signed By: Amaya Zhong MD     May 24, 2021

## 2021-05-24 NOTE — PROGRESS NOTES
12: 50PM Outbound call to POA/brother @ [phone]. Identified self, role, and nature of the call. POA informed writer he recv'd a phone call from Bart Blanco (admissions) at [facility]. POA voiced he'll arrive today at the hospital  btwn 15:00-16:00PM.  Requests transportation be scheduled for later; and no medical staff mention the new placement to him. Medicare 2nd IM letter prior to discharge. BRO Monroe          Accepted at [facility]. Admissions coordinator spoke w/POA regarding acceptance. Room 310. RN to call report to [phone]. BRO Monroe            10:13AM Outbound call to [facility] @ [phone]. SW informed admissions coordinator Kostas Griffith, is in a meeting. Voice message left requesting a return phone call. Updated clinicals sent via [facility]. Still no accepting SNF for LTC placement. BRO Monroe        09:35M Inbound call from /admissions @ [phone] [facility]. Writer informed they're not in [facility] and unable to see the referral sent over. Admissions asked the age of the patient for the referral, and once given patient declined immediately. Writer informed patient doesn't meet the age requirement for their facility.         BRO Monroe

## 2021-05-25 NOTE — PROGRESS NOTES
Patient discharged to [facility] via [facility]. Belongings including wheelchair, glasses and bunny boot in patients possession. Wearing clothes, shoes and has glasses on. No sign of distress. No IV, no telemetry present.

## 2021-06-01 RX ORDER — ATORVASTATIN CALCIUM 20 MG/1
20 TABLET, FILM COATED ORAL DAILY
Qty: 30 TABLET | Refills: 0 | Status: SHIPPED | OUTPATIENT
Start: 2021-06-01

## 2021-09-03 ENCOUNTER — OFFICE VISIT (OUTPATIENT)
Dept: SURGERY | Age: 56
End: 2021-09-03
Payer: MEDICARE

## 2021-09-03 VITALS
OXYGEN SATURATION: 93 % | SYSTOLIC BLOOD PRESSURE: 95 MMHG | TEMPERATURE: 97.3 F | DIASTOLIC BLOOD PRESSURE: 45 MMHG | HEART RATE: 82 BPM | RESPIRATION RATE: 20 BRPM | HEIGHT: 66 IN | BODY MASS INDEX: 22.5 KG/M2 | WEIGHT: 140 LBS

## 2021-09-03 DIAGNOSIS — E11.628 DIABETIC FOOT INFECTION (HCC): Primary | ICD-10-CM

## 2021-09-03 DIAGNOSIS — L08.9 DIABETIC FOOT INFECTION (HCC): Primary | ICD-10-CM

## 2021-09-03 PROCEDURE — 3017F COLORECTAL CA SCREEN DOC REV: CPT | Performed by: SURGERY

## 2021-09-03 PROCEDURE — 3052F HG A1C>EQUAL 8.0%<EQUAL 9.0%: CPT | Performed by: SURGERY

## 2021-09-03 PROCEDURE — G8510 SCR DEP NEG, NO PLAN REQD: HCPCS | Performed by: SURGERY

## 2021-09-03 PROCEDURE — 2022F DILAT RTA XM EVC RTNOPTHY: CPT | Performed by: SURGERY

## 2021-09-03 PROCEDURE — G8427 DOCREV CUR MEDS BY ELIG CLIN: HCPCS | Performed by: SURGERY

## 2021-09-03 PROCEDURE — 99213 OFFICE O/P EST LOW 20 MIN: CPT | Performed by: SURGERY

## 2021-09-03 PROCEDURE — G8420 CALC BMI NORM PARAMETERS: HCPCS | Performed by: SURGERY

## 2021-09-03 RX ORDER — HYDROCODONE BITARTRATE AND ACETAMINOPHEN 5; 325 MG/1; MG/1
TABLET ORAL
COMMUNITY

## 2021-09-03 RX ORDER — ACETAMINOPHEN 500 MG
500 TABLET ORAL
COMMUNITY

## 2021-09-03 NOTE — PROGRESS NOTES
Visit Vitals  BP (!) 95/45 (BP 1 Location: Left upper arm, BP Patient Position: Sitting, BP Cuff Size: Adult)   Pulse 82   Temp 97.3 °F (36.3 °C) (Temporal)   Resp 20   Ht 5' 6\" (1.676 m)   Wt 140 lb (63.5 kg) Comment: brother stated   SpO2 93%   BMI 22.60 kg/m²     Chief Complaint   Patient presents with    New Patient     f/u from angiogram right leg in May 2021

## 2021-09-09 NOTE — PROGRESS NOTES
VASCULAR FOLLOW UP      Subjective:   CHIEF COMPLAINTS:  Right leg wound issues. PRESENTATION OF ILLNESS:    Mr. Naga Osborne is here today follow-up after right leg angiogram.  Patient was hospitalized recently because of the right foot wound infection. Patient underwent successful right leg angiogram angioplasty. Patient doing well no fever chills. Patient is asking toenail trimmed today. Past Medical History:   Diagnosis Date    Anemia     iron deficiency    Anxiety disorder     Arthritis     Depression     Diabetes ([facility]     Epilepsy ([facility]     Liver disease     Neurological disorder     neuro cognative disorder    OCD (obsessive compulsive disorder)     Psychiatric disorder     OCD    Psychiatric disorder     anxiety    PVD (peripheral vascular disease) ([facility]     Seizures ([facility]     [facility]     Thyroid disease       Past Surgical History:   Procedure Laterality Date    HX CRANIOTOMY       Family History   Problem Relation Age of Onset    Cancer Mother       Social History     Tobacco Use    Smoking status: Never Smoker    Smokeless tobacco: Never Used   Substance Use Topics    Alcohol use: No       Prior to Admission medications    Medication Sig Start Date End Date Taking? Authorizing Provider   acetaminophen (TYLENOL) 500 mg tablet Take 500 mg by mouth every six (6) hours as needed for Pain. Yes Provider, Historical   HYDROcodone-acetaminophen (NORCO) 5-325 mg per tablet Take  by mouth every four (4) hours as needed for Pain. Yes Provider, Historical   atorvastatin (LIPITOR) 20 mg tablet Take 1 Tablet by mouth daily.  6/1/21  Yes Sirisha Krause MD   sertraline (ZOLOFT) 100 mg tablet TAKE ONE TABLET BY MOUTH DAILY 5/26/21  Yes Sirisha Krause MD   mirtazapine (REMERON) 15 mg tablet TAKE ONE TABLET BY MOUTH AT BEDTIME. 5/26/21  Yes Sirisha Krause MD   FeroSuL 325 mg (65 mg iron) tablet TAKE (1) TABLET BY MOUTH TWICE A DAY AFTER MEALS 5/26/21  Yes Sirisha Krause MD   amLODIPine (NORVASC) 10 mg tablet Take 1 Tab by mouth daily. 5/18/21  Yes Cass Braun MD   insulin glargine (LANTUS) 100 unit/mL injection 40 Units by SubCUTAneous route daily. 5/17/21  Yes Cass Braun MD   insulin lispro (HUMALOG) 100 unit/mL injection 10 Units by SubCUTAneous route three (3) times daily. Hold for bs < 120 5/17/21  Yes Cass Braun MD   pantoprazole (Protonix) 40 mg tablet Take 1 Tab by mouth Daily (before breakfast). 5/5/21  Yes Chandni Miner MD   sucralfate (Carafate) 1 gram tablet Take 1 Tab by mouth Before breakfast, lunch, dinner and at bedtime. 5/5/21  Yes Chandni Miner MD   aspirin 81 mg chewable tablet Take 1 Tab by mouth daily. 3/1/21  Yes Chandni Miner MD   divalproex ER (Depakote ER) 500 mg ER tablet Take 1,500 mg by mouth nightly. Yes Provider, Historical   albuterol-ipratropium (DUO-NEB) 2.5 mg-0.5 mg/3 ml nebu 3 mL by Nebulization route three (3) times daily as needed for Wheezing. Yes Provider, Historical   metFORMIN (GLUCOPHAGE) 850 mg tablet TAKE ONE TABLET BY MOUTH THREE TIMES DAILY WITH MEALS. Patient not taking: Reported on 9/3/2021 5/26/21   Chandni Miner MD   doxycycline (MONODOX) 100 mg capsule Take 1 Cap by mouth two (2) times a day. Patient not taking: Reported on 9/3/2021 5/17/21   Cass Braun MD     Allergies   Allergen Reactions    Penicillins Rash        Review of Systems:  I reviewed the rest of organ systems personally and they were negative signed by Dr. Wray Heads    Objective:     Visit Vitals  BP (!) 95/45 (BP 1 Location: Left upper arm, BP Patient Position: Sitting, BP Cuff Size: Adult)   Pulse 82   Temp 97.3 °F (36.3 °C) (Temporal)   Resp 20   Ht 5' 6\" (1.676 m)   Wt 140 lb (63.5 kg) Comment: brother stated   SpO2 93%   BMI 22.60 kg/m²     VITAL SIGNS REVIEWED. Physical Exam:  Patient is well-nourished pleasant in conversation is appropriate. Head and neck examination atraumatic, normocephalic. Gaze appropriate.   Conversation appropriate. Neck examination shows supple. No mass. No obvious carotid bruit. Chest examination shows lungs are clear bilaterally well-expanded, no crackles or wheezes. Cardiovascular system regular rate, no obvious murmur. Skin warm to touch  and moist, no skin lesions. Abdomen is soft ,not tender or distended bowel sounds present. No palpable mass. Neurological examinations, no focal neuro deficits moving all 4 extremities. Cranial nerves intact. Sensation is intact as well. Hematologic: No obvious bruise or swelling or obvious lymphadenopathy. Psychosocial: Appropriate. Has good effect. Musculoskeletal system: No muscle wasting, appropriate movements upper and lower extremity. Vascular examination: Patient's right foot is warm. I do toenail trimmed. Patient has excellent Doppler signal noted. Foot is warm swelling is minimal.        Data Review:   No visits with results within 1 Month(s) from this visit. Latest known visit with results is:   No results displayed because visit has over 200 results. Assessment:     Problem List Items Addressed This Visit        Endocrine    Diabetic foot infection ([facility] - Primary              Plan:     Patient and family has requested the input they could follow-up with vascular surgeon nearby their home at the [facility].  Patient will need a surveillance follow-up vascular exam in 3-month. If the unable to find a surgeon there for follow-up I am happy to see him again 3 months follow-up. Continue monitor his wound healing progress. Patient will also follow with primary care physician for cholesterol level checkup annually and a strict diabetic control management.         Maria Fernanda Bai MD

## 2022-06-15 NOTE — CONSULTS
[id number] SURGERY CONSULT          Chief Complaint: right buttock wound x 3 weeks. History of Present Illness:    Mr. Aby Flores is a 60y.o. year old * male transferred from [facility] for nonhealing right buttock wound/abscess. It has been there for 3 weeks associated with pain & drainage. Denies any fever or chills. Past Medical History:   Past Medical History:   Diagnosis Date    Anxiety disorder     Arthritis     Depression     [facility] ([facility]     Liver disease     Neurological disorder     neuro cognative disorder    OCD (obsessive compulsive disorder)     Psychiatric disorder     OCD    Psychiatric disorder     anxiety    [facility]     Thyroid disease        Past Surgical History:   Past Surgical History:   Procedure Laterality Date    HX CRANIOTOMY          Allergy:  Allergies   Allergen Reactions    Penicillins Rash       Social History:  reports that he has quit smoking. He has never used smokeless tobacco. He reports that he does not drink alcohol or use drugs.      Family History:  Family History   Problem Relation Age of Onset    Cancer Mother         Current Medications:  Current Facility-Administered Medications:     insulin glargine (LANTUS) injection 10 Units, 10 Units, SubCUTAneous, ACB/HS, Bob Stephens NP    insulin lispro (HUMALOG) injection, , SubCUTAneous, AC&HS, Amy LAI MD, Stopped at 05/07/21 2200    glucose chewable tablet 16 g, 4 Tab, Oral, PRN, Amy LAI MD    glucagon (GLUCAGEN) injection 1 mg, 1 mg, IntraMUSCular, PRN, Amy LAI MD    dextrose (D50W) injection syrg 12.5-25 g, 25-50 mL, IntraVENous, PRN, Herrera Gomez MD    0.9% sodium chloride infusion 250 mL, 250 mL, IntraVENous, PRN, Delmis Franco NP    albuterol-ipratropium (DUO-NEB) 2.5 MG-0.5 MG/3 ML, 3 mL, Nebulization, Q6H PRN, Amy LAI MD    aspirin chewable tablet 81 mg, 81 mg, Oral, DAILY, Amy LAI MD, 81 mg at 05/08/21 0835   atorvastatin (LIPITOR) tablet 20 mg, 20 mg, Oral, DAILY, Toshia Banegas MD, 20 mg at 05/08/21 0835    divalproex DR (DEPAKOTE) tablet 1,500 mg, 1,500 mg, Oral, QHS, Karthik LAI MD, 1,500 mg at 05/07/21 2235    mirtazapine (REMERON) tablet 30 mg, 30 mg, Oral, QHS, Karthik LAI MD, 30 mg at 05/07/21 2234    ondansetron (ZOFRAN ODT) tablet 4 mg, 4 mg, Oral, Q6H PRN, Herrera Dumont MD    pantoprazole (PROTONIX) tablet 40 mg, 40 mg, Oral, ACB, Toshia Banegas MD, 40 mg at 05/08/21 0603    sertraline (ZOLOFT) tablet 100 mg, 100 mg, Oral, DAILY, Toshia Banegas MD, 100 mg at 05/08/21 0835    sucralfate (CARAFATE) tablet 1 g, 1 g, Oral, AC&HS, Toshia Banegas MD, 1 g at 05/08/21 0603    sodium chloride (NS) flush 5-40 mL, 5-40 mL, IntraVENous, PRN, Karthik LAI MD    meropenem (MERREM) 1 g in sterile water (preservative free) 20 mL IV syringe, 1 g, IntraVENous, Q12H, AgaHerrera baltazar MD, 1 g at 05/08/21 0835    enoxaparin (LOVENOX) injection 40 mg, 40 mg, SubCUTAneous, Q24H, Karthik LAI MD, 40 mg at 05/07/21 2235    HYDROcodone-acetaminophen (NORCO) 5-325 mg per tablet 1 Tab, 1 Tab, Oral, Q4H PRN, Toshia Banegas MD, 1 Tab at 05/07/21 2234    0.9% sodium chloride infusion 250 mL, 250 mL, IntraVENous, PRN, Karthik LAI MD    glucose chewable tablet 16 g, 16 g, Oral, PRN, Herrera Dumont MD    glucagon (GLUCAGEN) injection 1 mg, 1 mg, IntraMUSCular, PRN, Rand Herrera Lei MD    vancomycin (VANCOCIN) 1,000 mg in 0.9% sodium chloride 250 mL (VIAL-MATE), 1,000 mg, IntraVENous, Q12H, Herrera Borja MD, 1,000 mg at 05/07/21 2235    VANCOMYCIN INFORMATION NOTE 1 Each, 1 Each, Other, Rx Dosing/Monitoring, Toshia Banegas MD     Immunization History:   Most Recent Immunizations   Administered Date(s) Administered    Influenza Vaccine 10/03/2014    Influenza Vaccine (Quad) Mdck Pf (>4 Yrs Flucelvax QUAD [id number]) 11/07/2019    Influenza Vaccine (Quad) PF (>6 Mo Flulaval, Fluarix, and >3 Yrs Afluria, Fluzone [id number]) 10/11/2018    Pneumococcal Polysaccharide (PPSV-23) 10/31/2007    TB Skin Test (PPD) Intradermal 10/17/2018    Td 10/31/2007      Complete    Review of Systems:     Constitutional:  no fever,  no chills,  no sweats, weakness, fatigue, No decreased activity. Eye: No recent visual problem, No icterus, No discharge, No double vision. Ear/Nose/Mouth/Throat: No decreased hearing, No ear pain, No nasal congestion, No sore throat. Respiratory: No shortness of breath, No cough, No sputum production, No hemoptysis, No wheezing, No cyanosis. Cardiovascular: No chest pain, No palpitations, No bradycardia, No tachycardia, No peripheral edema, No syncope. Gastrointestinal: No nausea,  No vomiting, No diarrhea, No constipation, No heartburn,  No abdominal pain. Genitourinary: No dysuria, No hematuria, No change in urine stream, No urethral discharge, No lesions. Hematology/Lymphatics: No bruising tendency, No bleeding tendency, No petechiae, No swollen lymph glands. Endocrine: No excessive thirst, No polyuria, No cold intolerance, No heat intolerance, No excessive hunger. Immunologic: Not immunocompromised, No recurrent fevers, No recurrent infections. Musculoskeletal: No back pain, No neck pain, No joint pain, No muscle pain, No claudication, No decreased range of motion, No trauma. Integumentary: No rash, No pruritus, No abrasions. Neurologic: Alert and oriented X4, No abnormal balance, No headache, No confusion, No numbness, No tingling. Psychiatric: No anxiety, No depression, No nadia. Physical Exam:     Vitals & Measurements:     Wt Readings from Last 3 Encounters:   05/06/21 67.1 kg (148 lb)   05/06/21 67.1 kg (148 lb)   03/30/21 66.3 kg (146 lb 1 oz)     Temp Readings from Last 3 Encounters:   05/08/21 98 °F (36.7 °C)   05/06/21 97.8 °F (36.6 °C)   03/30/21 97.3 °F (36.3 °C) (Oral)     BP Readings from Last 3 Encounters:   05/08/21 (!) 159/81   05/06/21 (!) 108/53   03/30/21 (!) 145/74     Pulse Readings from Last 3 Encounters:   05/08/21 100   05/06/21 99   03/30/21 88      Ht Readings from Last 3 Encounters:   05/06/21 5' 6\" (1.676 m)   05/06/21 5' 6\" (1.676 m)   03/30/21 5' 6\" (1.676 m)          General: pale appearing, no acute distress  Head: Normal  Face: Nornal  HEENT: atraumatic, PERRLA, moist mucosa, normal pharynx, normal tonsils and adenoids, normal tongue, no fluid in sinuses  Neck: Trachea midline, no carotid bruit, no masses  Chest: Normal.  Respiratory: Normal chest wall expansion, CTA B, no r/r/w, no rubs  Cardiovascular: RRR, no m/r/g, Normal S1 and S2  Abdomen: Soft, non tender, non-distended, normal bowel sounds in all quadrants, no hepatosplenomegaly, no tympany. Incision scar:   Genitourinary: No inguinal hernia, normal external gentalia, Testis & scrotum normal, no renal angle tenderness  Rectal: deferred  Musculoskeletal: normal ROM in upper and lower extremities, No joint swelling. Integumentary: Warm, dry, and pink, with no rash, purpura, or petechia  Right gluteal area: 3cm x 3cm open wound with surrounding erythema, induration and tenderness, base covered with slough & some necrotic tissue.   Right heel has some black eschar about 2cm x 2.3cm  Heme/Lymph: No lymphadenopathy, no bruises  Neurological:Cranial Nerves II-XII grossly intact, no gross motor or sensory deficit  Psychiatric: Cooperative with normal mood, affect, and cognition      Laboratory Values:   Recent Results (from the past 24 hour(s))   GLUCOSE, POC    Collection Time: 05/07/21  4:40 PM   Result Value Ref Range    Glucose (POC) 370 (H) 65 - 100 mg/dL    Performed by Altagracia Meza [facility])    GLUCOSE, POC    Collection Time: 05/07/21 10:25 PM   Result Value Ref Range    Glucose (POC) 88 65 - 100 mg/dL    Performed by [address] [place] [address] Road, POC    Collection Time: 05/08/21 12:43 AM   Result Value Ref Range    Glucose (POC) 97 65 - 100 mg/dL    Performed by Aby Lozano TEO    GLUCOSE, POC    Collection Time: 05/08/21  7:55 AM   Result Value Ref Range    Glucose (POC) 54 (L) 65 - 100 mg/dL    Performed by POPE YARELY    GLUCOSE, POC    Collection Time: 05/08/21 10:15 AM   Result Value Ref Range    Glucose (POC) 311 (H) 65 - 100 mg/dL    Performed by Altagracia Meza ([place])          XR FOOT RT MIN 3 V   Final Result      NM INFLAM PROC LTD    (Results Pending)     Assessment:  1. Right gluteal infected open wound/ulcer    2. Right heel unstageable pressure ulcer    3. Anemia. Plan:  Correct anemia  Right heel Mepilex dressing  Avoid direct pressure to right foot  I debrided right gluteal wound by bedside. (See op note). Continue to pack the right gluteal wound with AquacelAg daily and cover dry gauze dressing every day. Home health arrangement. Abx based on the culture report. Thank you for the consultation & allowing me to participate in the care of this patient. Asc Procedure Text (A): After obtaining clear surgical margins the patient was sent to an ASC for surgical repair.  The patient understands they will receive post-surgical care and follow-up from the ASC physician.

## 2025-04-04 NOTE — TELEPHONE ENCOUNTER
Called and spoke with Ms Zaheer Brown. She states that they rechecked it last night 2 hours after dinner and it was 415. She states she called and spoke with an on call doctor who told them to give him his levemir early. It was given at 8 pm and an hour after his levemir his glucose was 277. She states that todays readings have not been reported to her. I instructed her that if todays reading is high to recheck 30 minutes after the insulin is given and call back with that reading as well. She verbalized understanding. Called pt, let her know results and that new antibiotic was sent to pharmacy.

## (undated) DEVICE — PINNACLE INTRODUCER SHEATH: Brand: PINNACLE

## (undated) DEVICE — CATHETER PTCA L130CM BLLN L80MM DIA5MM SHTH 6FR DRUG COAT

## (undated) DEVICE — COVER PRB INTOP 96X6 IN LF

## (undated) DEVICE — CATHETER MARINER BRAID RIM 5FX65 CM .035 IN.DIAGNOSTIC

## (undated) DEVICE — STARCLOSE SE VASCULAR CLOSURE SYSTEM: Brand: STARCLOSE SE

## (undated) DEVICE — RADIFOCUS GLIDEWIRE: Brand: GLIDEWIRE

## (undated) DEVICE — SURGICAL PROCEDURE TRAY CRD CATH 3 PRT

## (undated) DEVICE — SYSTEM ATHRCTMY SHTH 7FR L114CM TIP L6.6CM VES DIA3.5-7MM

## (undated) DEVICE — 96"PROBE COVER (US)10PK: Brand: SITE-RITE

## (undated) DEVICE — EP SPD2-US-060-320 SPIDER FX V04
Type: IMPLANTABLE DEVICE | Status: NON-FUNCTIONAL
Brand: SPIDERFX™
Removed: 2021-05-12

## (undated) DEVICE — MICROPUNCTURE INTRODUCER SET SILHOUETTE TRANSITIONLESS PUSH-PLUS DESIGN - STIFFENED CANNULA WITH STAINLESS STEEL WIRE GUIDE: Brand: MICROPUNCTURE

## (undated) DEVICE — GAUZE,SPONGE,4"X4",16PLY,STRL,LF,10/TRAY: Brand: MEDLINE

## (undated) DEVICE — 3M™ TEGADERM™ TRANSPARENT FILM DRESSING FIRST AID STYLE, 1621, 4 IN X 4-3/4 IN, 50 BAGS/CARTON, 4 CARTONS/CASE: Brand: 3M™ TEGADERM™

## (undated) DEVICE — TOWEL,OR,DSP,ST,BLUE,DLX,6/PK,12PK/CS: Brand: MEDLINE

## (undated) DEVICE — NAVICROSS SUPPORT CATHETER: Brand: NAVICROSS

## (undated) DEVICE — DEVICE INFL SYR BLLN ENDO 30 -- INTELLI

## (undated) DEVICE — GLOW 'N TELL 55CM TAPE (20 STRIPS): Brand: VASCUTAPE RADIOPAQUE TAPE

## (undated) DEVICE — DESTINATION PERIPHERAL GUIDING SHEATH: Brand: DESTINATION

## (undated) DEVICE — Device

## (undated) DEVICE — SURSITE 4 X 4.8  MED MSC2705Z